# Patient Record
Sex: FEMALE | Race: WHITE | NOT HISPANIC OR LATINO | Employment: FULL TIME | URBAN - METROPOLITAN AREA
[De-identification: names, ages, dates, MRNs, and addresses within clinical notes are randomized per-mention and may not be internally consistent; named-entity substitution may affect disease eponyms.]

---

## 2015-10-28 LAB
HUMAN PAPILLOMAVIRUS (HPV): NORMAL
PAP RECOMMENDATION EXT: NORMAL
PAP SMEAR: NORMAL

## 2019-08-09 ENCOUNTER — HISTORICAL (OUTPATIENT)
Dept: LAB | Facility: HOSPITAL | Age: 59
End: 2019-08-09

## 2019-08-09 LAB
ABS NEUT (OLG): 6.04 X10(3)/MCL (ref 2.1–9.2)
ALBUMIN SERPL-MCNC: 3.8 GM/DL (ref 3.4–5)
ALBUMIN/GLOB SERPL: 1 RATIO (ref 1.1–2)
ALP SERPL-CCNC: 85 UNIT/L (ref 46–116)
ALT SERPL-CCNC: 22 UNIT/L (ref 12–78)
AST SERPL-CCNC: 11 UNIT/L (ref 15–37)
BASOPHILS # BLD AUTO: 0 X10(3)/MCL (ref 0–0.2)
BASOPHILS NFR BLD AUTO: 0 %
BILIRUB SERPL-MCNC: 0.4 MG/DL (ref 0.2–1)
BILIRUBIN DIRECT+TOT PNL SERPL-MCNC: 0.11 MG/DL (ref 0–0.2)
BILIRUBIN DIRECT+TOT PNL SERPL-MCNC: 0.29 MG/DL (ref 0–0.8)
BUN SERPL-MCNC: 12.9 MG/DL (ref 7–18)
CALCIUM SERPL-MCNC: 9.6 MG/DL (ref 8.5–10.1)
CHLORIDE SERPL-SCNC: 102 MMOL/L (ref 98–107)
CHOLEST SERPL-MCNC: 202 MG/DL (ref 0–200)
CHOLEST/HDLC SERPL: 4.9 {RATIO} (ref 0–4)
CO2 SERPL-SCNC: 27.7 MMOL/L (ref 21–32)
CREAT SERPL-MCNC: 0.65 MG/DL (ref 0.6–1.3)
DEPRECATED CALCIDIOL+CALCIFEROL SERPL-MC: 4.89 NG/ML (ref 30–80)
EOSINOPHIL # BLD AUTO: 0.2 X10(3)/MCL (ref 0–0.9)
EOSINOPHIL NFR BLD AUTO: 2 %
ERYTHROCYTE [DISTWIDTH] IN BLOOD BY AUTOMATED COUNT: 13.2 % (ref 11.5–17)
GLOBULIN SER-MCNC: 3.9 GM/DL (ref 2.4–3.5)
GLUCOSE SERPL-MCNC: 90 MG/DL (ref 74–106)
HCT VFR BLD AUTO: 44.4 % (ref 37–47)
HDLC SERPL-MCNC: 41 MG/DL (ref 40–60)
HGB BLD-MCNC: 14.1 GM/DL (ref 12–16)
IMM GRANULOCYTES # BLD AUTO: 0.03 % (ref 0–0.02)
IMM GRANULOCYTES NFR BLD AUTO: 0.3 % (ref 0–0.43)
LDLC SERPL CALC-MCNC: 133 MG/DL (ref 0–129)
LYMPHOCYTES # BLD AUTO: 2.5 X10(3)/MCL (ref 0.6–4.6)
LYMPHOCYTES NFR BLD AUTO: 27 %
MCH RBC QN AUTO: 27.9 PG (ref 27–31)
MCHC RBC AUTO-ENTMCNC: 31.8 GM/DL (ref 33–36)
MCV RBC AUTO: 87.7 FL (ref 80–94)
MONOCYTES # BLD AUTO: 0.5 X10(3)/MCL (ref 0.1–1.3)
MONOCYTES NFR BLD AUTO: 6 %
NEUTROPHILS # BLD AUTO: 6.04 X10(3)/MCL (ref 1.4–7.9)
NEUTROPHILS NFR BLD AUTO: 64 %
PLATELET # BLD AUTO: 280 X10(3)/MCL (ref 130–400)
PMV BLD AUTO: 10.9 FL (ref 9.4–12.4)
POTASSIUM SERPL-SCNC: 4.5 MMOL/L (ref 3.5–5.1)
PROT SERPL-MCNC: 7.7 GM/DL (ref 6.4–8.2)
RBC # BLD AUTO: 5.06 X10(6)/MCL (ref 4.2–5.4)
SODIUM SERPL-SCNC: 140 MMOL/L (ref 136–145)
TRIGL SERPL-MCNC: 139 MG/DL
TSH SERPL-ACNC: 1 MIU/ML (ref 0.36–3.74)
VLDLC SERPL CALC-MCNC: 28 MG/DL
WBC # SPEC AUTO: 9.4 X10(3)/MCL (ref 4.5–11.5)

## 2019-08-14 ENCOUNTER — HISTORICAL (OUTPATIENT)
Dept: RADIOLOGY | Facility: HOSPITAL | Age: 59
End: 2019-08-14

## 2019-09-09 ENCOUNTER — HISTORICAL (OUTPATIENT)
Dept: RADIOLOGY | Facility: HOSPITAL | Age: 59
End: 2019-09-09

## 2019-09-19 ENCOUNTER — HISTORICAL (OUTPATIENT)
Dept: RADIOLOGY | Facility: HOSPITAL | Age: 59
End: 2019-09-19

## 2019-10-08 ENCOUNTER — HISTORICAL (OUTPATIENT)
Dept: PREADMISSION TESTING | Facility: HOSPITAL | Age: 59
End: 2019-10-08

## 2019-10-08 LAB
ABS NEUT (OLG): 6.19 X10(3)/MCL (ref 2.1–9.2)
ALBUMIN SERPL-MCNC: 3.7 GM/DL (ref 3.4–5)
ALBUMIN/GLOB SERPL: 1.1 {RATIO}
ALP SERPL-CCNC: 79 UNIT/L (ref 38–126)
ALT SERPL-CCNC: 18 UNIT/L (ref 12–78)
AST SERPL-CCNC: 8 UNIT/L (ref 15–37)
BASOPHILS # BLD AUTO: 0 X10(3)/MCL (ref 0–0.2)
BASOPHILS NFR BLD AUTO: 0 %
BILIRUB SERPL-MCNC: 0.6 MG/DL (ref 0.2–1)
BILIRUBIN DIRECT+TOT PNL SERPL-MCNC: 0.2 MG/DL (ref 0–0.2)
BILIRUBIN DIRECT+TOT PNL SERPL-MCNC: 0.4 MG/DL (ref 0–0.8)
BUN SERPL-MCNC: 12 MG/DL (ref 7–18)
CALCIUM SERPL-MCNC: 9 MG/DL (ref 8.5–10.1)
CHLORIDE SERPL-SCNC: 104 MMOL/L (ref 98–107)
CO2 SERPL-SCNC: 28 MMOL/L (ref 21–32)
CREAT SERPL-MCNC: 0.66 MG/DL (ref 0.55–1.02)
EOSINOPHIL # BLD AUTO: 0.2 X10(3)/MCL (ref 0–0.9)
EOSINOPHIL NFR BLD AUTO: 2 %
ERYTHROCYTE [DISTWIDTH] IN BLOOD BY AUTOMATED COUNT: 14 % (ref 11.5–17)
GLOBULIN SER-MCNC: 3.3 GM/DL (ref 2.4–3.5)
GLUCOSE SERPL-MCNC: 113 MG/DL (ref 74–106)
HCT VFR BLD AUTO: 42.9 % (ref 37–47)
HGB BLD-MCNC: 13.3 GM/DL (ref 12–16)
LYMPHOCYTES # BLD AUTO: 2.3 X10(3)/MCL (ref 0.6–4.6)
LYMPHOCYTES NFR BLD AUTO: 25 %
MCH RBC QN AUTO: 27.6 PG (ref 27–31)
MCHC RBC AUTO-ENTMCNC: 31 GM/DL (ref 33–36)
MCV RBC AUTO: 89 FL (ref 80–94)
MONOCYTES # BLD AUTO: 0.5 X10(3)/MCL (ref 0.1–1.3)
MONOCYTES NFR BLD AUTO: 6 %
NEUTROPHILS # BLD AUTO: 6.19 X10(3)/MCL (ref 2.1–9.2)
NEUTROPHILS NFR BLD AUTO: 67 %
PLATELET # BLD AUTO: 226 X10(3)/MCL (ref 130–400)
PMV BLD AUTO: 10.9 FL (ref 9.4–12.4)
POTASSIUM SERPL-SCNC: 4.2 MMOL/L (ref 3.5–5.1)
PROT SERPL-MCNC: 7 GM/DL (ref 6.4–8.2)
RBC # BLD AUTO: 4.82 X10(6)/MCL (ref 4.2–5.4)
SODIUM SERPL-SCNC: 139 MMOL/L (ref 136–145)
WBC # SPEC AUTO: 9.2 X10(3)/MCL (ref 4.5–11.5)

## 2019-11-04 ENCOUNTER — HISTORICAL (OUTPATIENT)
Dept: ADMINISTRATIVE | Facility: HOSPITAL | Age: 59
End: 2019-11-04

## 2019-11-04 LAB
ABS NEUT (OLG): 5.96 X10(3)/MCL (ref 2.1–9.2)
ALBUMIN SERPL-MCNC: 3.6 GM/DL (ref 3.4–5)
ALBUMIN/GLOB SERPL: 1.1 {RATIO}
ALP SERPL-CCNC: 86 UNIT/L (ref 38–126)
ALT SERPL-CCNC: 17 UNIT/L (ref 12–78)
AST SERPL-CCNC: 8 UNIT/L (ref 15–37)
BASOPHILS # BLD AUTO: 0 X10(3)/MCL (ref 0–0.2)
BASOPHILS NFR BLD AUTO: 0 %
BILIRUB SERPL-MCNC: 0.8 MG/DL (ref 0.2–1)
BILIRUBIN DIRECT+TOT PNL SERPL-MCNC: 0.1 MG/DL (ref 0–0.2)
BILIRUBIN DIRECT+TOT PNL SERPL-MCNC: 0.7 MG/DL (ref 0–0.8)
BUN SERPL-MCNC: 16 MG/DL (ref 7–18)
CALCIUM SERPL-MCNC: 8.6 MG/DL (ref 8.5–10.1)
CHLORIDE SERPL-SCNC: 107 MMOL/L (ref 98–107)
CO2 SERPL-SCNC: 26 MMOL/L (ref 21–32)
CREAT SERPL-MCNC: 0.71 MG/DL (ref 0.55–1.02)
EOSINOPHIL # BLD AUTO: 0.2 X10(3)/MCL (ref 0–0.9)
EOSINOPHIL NFR BLD AUTO: 2 %
ERYTHROCYTE [DISTWIDTH] IN BLOOD BY AUTOMATED COUNT: 14.2 % (ref 11.5–17)
GLOBULIN SER-MCNC: 3.4 GM/DL (ref 2.4–3.5)
GLUCOSE SERPL-MCNC: 104 MG/DL (ref 74–106)
HCT VFR BLD AUTO: 43 % (ref 37–47)
HGB BLD-MCNC: 13 GM/DL (ref 12–16)
LYMPHOCYTES # BLD AUTO: 2.4 X10(3)/MCL (ref 0.6–4.6)
LYMPHOCYTES NFR BLD AUTO: 26 %
MCH RBC QN AUTO: 27.5 PG (ref 27–31)
MCHC RBC AUTO-ENTMCNC: 30.2 GM/DL (ref 33–36)
MCV RBC AUTO: 91.1 FL (ref 80–94)
MONOCYTES # BLD AUTO: 0.6 X10(3)/MCL (ref 0.1–1.3)
MONOCYTES NFR BLD AUTO: 6 %
NEUTROPHILS # BLD AUTO: 5.96 X10(3)/MCL (ref 2.1–9.2)
NEUTROPHILS NFR BLD AUTO: 65 %
PLATELET # BLD AUTO: 233 X10(3)/MCL (ref 130–400)
PMV BLD AUTO: 11.5 FL (ref 9.4–12.4)
POTASSIUM SERPL-SCNC: 4.2 MMOL/L (ref 3.5–5.1)
PROT SERPL-MCNC: 7 GM/DL (ref 6.4–8.2)
RBC # BLD AUTO: 4.72 X10(6)/MCL (ref 4.2–5.4)
SODIUM SERPL-SCNC: 138 MMOL/L (ref 136–145)
WBC # SPEC AUTO: 9.2 X10(3)/MCL (ref 4.5–11.5)

## 2020-03-27 ENCOUNTER — HISTORICAL (OUTPATIENT)
Dept: LAB | Facility: HOSPITAL | Age: 60
End: 2020-03-27

## 2020-03-27 LAB — SARS-COV-2 RNA RESP QL NAA+PROBE: DETECTED

## 2020-05-08 ENCOUNTER — HISTORICAL (OUTPATIENT)
Dept: LAB | Facility: HOSPITAL | Age: 60
End: 2020-05-08

## 2021-01-11 LAB
BILIRUB SERPL-MCNC: NEGATIVE MG/DL
BLOOD URINE, POC: NORMAL
CLARITY, POC UA: CLEAR
COLOR, POC UA: YELLOW
GLUCOSE UR QL STRIP: NEGATIVE
KETONES UR QL STRIP: NEGATIVE
LEUKOCYTE EST, POC UA: NORMAL
NITRITE, POC UA: POSITIVE
PH, POC UA: 6.5
PROTEIN, POC: NEGATIVE
SPECIFIC GRAVITY, POC UA: 1.02
UROBILINOGEN, POC UA: NORMAL

## 2021-01-18 ENCOUNTER — HISTORICAL (OUTPATIENT)
Dept: RADIOLOGY | Facility: HOSPITAL | Age: 61
End: 2021-01-18

## 2021-12-02 ENCOUNTER — HISTORICAL (OUTPATIENT)
Dept: SURGERY | Facility: HOSPITAL | Age: 61
End: 2021-12-02

## 2021-12-02 LAB — CRC RECOMMENDATION EXT: NORMAL

## 2022-02-25 ENCOUNTER — HISTORICAL (OUTPATIENT)
Dept: LAB | Facility: HOSPITAL | Age: 62
End: 2022-02-25

## 2022-02-25 LAB
ABS NEUT (OLG): 7.49 (ref 2.1–9.2)
ALBUMIN SERPL-MCNC: 3.8 G/DL (ref 3.5–5)
ALBUMIN/GLOB SERPL: 1 {RATIO} (ref 1.1–2)
ALP SERPL-CCNC: 86 U/L (ref 40–150)
ALT SERPL-CCNC: 16 U/L (ref 0–55)
AST SERPL-CCNC: 12 U/L (ref 5–34)
BASOPHILS # BLD AUTO: 0 10*3/UL (ref 0–0.2)
BASOPHILS NFR BLD AUTO: 0 %
BILIRUB SERPL-MCNC: 0.6 MG/DL
BILIRUBIN DIRECT+TOT PNL SERPL-MCNC: 0.2 (ref 0–0.5)
BILIRUBIN DIRECT+TOT PNL SERPL-MCNC: 0.4 (ref 0–0.8)
BUN SERPL-MCNC: 16.3 MG/DL (ref 9.8–20.1)
CALCIUM SERPL-MCNC: 9.9 MG/DL (ref 8.7–10.5)
CHLORIDE SERPL-SCNC: 104 MMOL/L (ref 98–107)
CHOLEST SERPL-MCNC: 220 MG/DL
CHOLEST/HDLC SERPL: 5 {RATIO} (ref 0–5)
CO2 SERPL-SCNC: 27 MMOL/L (ref 22–29)
CREAT SERPL-MCNC: 0.78 MG/DL (ref 0.55–1.02)
DEPRECATED CALCIDIOL+CALCIFEROL SERPL-MC: 9.5 NG/ML (ref 30–80)
EOSINOPHIL # BLD AUTO: 0.3 10*3/UL (ref 0–0.9)
EOSINOPHIL NFR BLD AUTO: 3 %
ERYTHROCYTE [DISTWIDTH] IN BLOOD BY AUTOMATED COUNT: 13.9 % (ref 11.5–17)
EST. AVERAGE GLUCOSE BLD GHB EST-MCNC: 108.3 MG/DL
GLOBULIN SER-MCNC: 3.8 G/DL (ref 2.4–3.5)
GLUCOSE SERPL-MCNC: 113 MG/DL (ref 74–100)
HBA1C MFR BLD: 5.4 %
HCT VFR BLD AUTO: 43.1 % (ref 37–47)
HDLC SERPL-MCNC: 46 MG/DL (ref 35–60)
HEMOLYSIS INTERF INDEX SERPL-ACNC: 8
HGB BLD-MCNC: 13.5 G/DL (ref 12–16)
ICTERIC INTERF INDEX SERPL-ACNC: 1
IMM GRANULOCYTES # BLD AUTO: 0.03 10*3/UL (ref 0–0.02)
IMM GRANULOCYTES NFR BLD AUTO: 0.3 % (ref 0–0.43)
LDLC SERPL CALC-MCNC: 134 MG/DL (ref 50–140)
LIPEMIC INTERF INDEX SERPL-ACNC: 3
LYMPHOCYTES # BLD AUTO: 2.1 10*3/UL (ref 0.6–4.6)
LYMPHOCYTES NFR BLD AUTO: 20 %
MANUAL DIFF? (OHS): NO
MCH RBC QN AUTO: 28 PG (ref 27–31)
MCHC RBC AUTO-ENTMCNC: 31.3 G/DL (ref 33–36)
MCV RBC AUTO: 89.4 FL (ref 80–94)
MONOCYTES # BLD AUTO: 0.6 10*3/UL (ref 0.1–1.3)
MONOCYTES NFR BLD AUTO: 6 %
NEUTROPHILS # BLD AUTO: 7.49 10*3/UL (ref 1.4–7.9)
NEUTROPHILS NFR BLD AUTO: 71 %
PLATELET # BLD AUTO: 263 10*3/UL (ref 130–400)
PMV BLD AUTO: 10.3 FL (ref 9.4–12.4)
POTASSIUM SERPL-SCNC: 4.8 MMOL/L (ref 3.5–5.1)
PROT SERPL-MCNC: 7.6 G/DL (ref 6.4–8.3)
RBC # BLD AUTO: 4.82 10*6/UL (ref 4.2–5.4)
SODIUM SERPL-SCNC: 140 MMOL/L (ref 136–145)
TRIGL SERPL-MCNC: 201 MG/DL (ref 37–140)
TSH SERPL-ACNC: 1.33 M[IU]/L (ref 0.35–4.94)
VLDLC SERPL CALC-MCNC: 40 MG/DL
WBC # SPEC AUTO: 10.6 10*3/UL (ref 4.5–11.5)

## 2022-04-11 ENCOUNTER — HISTORICAL (OUTPATIENT)
Dept: ADMINISTRATIVE | Facility: HOSPITAL | Age: 62
End: 2022-04-11
Payer: COMMERCIAL

## 2022-04-24 VITALS
HEIGHT: 63 IN | DIASTOLIC BLOOD PRESSURE: 81 MMHG | OXYGEN SATURATION: 98 % | BODY MASS INDEX: 51.91 KG/M2 | SYSTOLIC BLOOD PRESSURE: 119 MMHG | WEIGHT: 293 LBS

## 2022-04-30 NOTE — OP NOTE
DATE OF SURGERY:        SURGEON:  Andrey Vizcarra MD    PROCEDURE PERFORMED:  Colonoscopy and rectal polypectomy.    FINDINGS:    1. Significant descending colon diverticulosis.  2. A 1 x 1 cm pedunculated rectal polyp 3 cm distal to the anal verge and no other intracolonic lesions.    ESTIMATED BLOOD LOSS:  None.    PREOPERATIVE DIAGNOSIS:  Need for screening colonoscopy.    POSTOPERATIVE DIAGNOSIS:  Need for screening colonoscopy.    ANESTHESIA:  MAC.    DESCRIPTION OF PROCEDURE:  Patient was brought to the operating room, laid on the OR stretcher, placed in the right lateral decubitus position.  She was sedated.  The enteroscope was advanced through her anus all the way to her cecum.  She had significant diverticulosis of the descending colon, but no colonic lesions or abnormalities.  However, upon retroflexion of the rectum, we saw that she had about a 1 x 1 cm pedunculated smooth-appearing rectal polyp 3 cm from the anal verge.  This was encircled and removed with a hot snare and sent to Pathology as specimen.  Patient tolerated the procedure well.  Scope was withdrawn.        ______________________________  Andrey Vizcarra MD RA/MAXX  DD:  12/02/2021  Time:  09:51AM  DT:  12/02/2021  Time:  10:15AM  Job #:  [id number]

## 2022-05-04 NOTE — HISTORICAL [facility] CERNER
This is a historical note converted from Master. Formatting and pictures may have been removed.  Please reference Master for original formatting and attached multimedia. DATE OF SERVICE:?11/04/2019  ?  SURGEON: Dr. Carmel Uriarte  ?   ASSIST: None  ?   PREOPERATIVE DIAGNOSIS: Left breast sclerosed intraductal papilloma  ?   POSTOPERATIVE DIAGNOSIS: Left breast sclerosed intraductal papilloma  ?  PROCEDURE:  1. Intra-op ultrasound-guided wire localization of the left breast?6:00 oclock papilloma  2. Left breast excisional biopsy  ?   ANESTHESIA: General plus 30 mL of 0.5% Bupivacaine  ?  ESTIMATED BLOOD LOSS:  ?  FINDINGS:  1. Left breast with clip and mass  ?  SPECIMEN:?Tissue Processing-pathology (left breast excisional biopsy (fresh),AP Specimen)  ?  DRAINS: None  ?  COMPLICATIONS: None  ?  PROCEDURE INDICATION:  Tanya Linda is a pleasant 58 year old female who presents with core biopsy of the left breast revealing an intraductal papilloma. Tyrer-Cuzick Risk Assessment - Lifetime risk 45.2% and 10-year risk is 17.7%.  ?  I informed the patient that intraductal papillomas are benign (non-cancerous), growths within the ducts of the breast. They are the most common cause of bloody nipple discharge. Not all women present with this finding and many are found incidentally on imaging and biopsy. Intraductal papillomas generally do not increase the risk of breast cancer. However, if they are associated with atypical cells, this may slightly increase the risk for breast cancer. The recommendation for an excisional biopsy stems from the potential of finding a breast cancer in approximately 10% of cases.  ?  I informed her that a surgical wire localized biopsy can be done on outpatient basis under local anesthesia and sedation or general anesthesia. The risks and complications of pain, bleeding, infection, hematoma, seroma, additional surgery, and scarring were explained. The details of the surgery were described in depth.  All of the patients questions were answered.  ?  ?  PROCEDURE DESCRIPTION:  The patient was then brought to the operating room and placed supine on the operative table. General anesthesia was initiated. The skin of the?Left breast was prepped and draped in standard sterile surgical fashion along with the Left axilla and upper arm. A time-out was completed verifying correct patient, procedure, site, positioning and equipment prior to beginning the procedure.?  ?  Under ultrasound guidance and 7cm Kopans double hooked wire was inserted into the are of the papilloma just at the double hook.  ?  A circumareolar?incision was planned of the Left breast. The incision was planned such that the wires may be included into the excision field. The incision was made using a 15-blade. The subcutaneous tissue was deepened using electrocautery. Hemostasis was checked and maintained. The wire was brought into the surgical field. The dissection was carried out circumferentially to include the wire. The specimen was then completed dissected free. Using intra-operative imaging, an x-ray film of the specimen was taken and reviewed. It was confirmed that the wire, clip, and lesion were within the specimen. The specimen was then sent to pathology. Hemostasis was again checked and obtained. Irrigation was performed of the cavity.  ?  The cavity was closed with interrupted 3-0 Dyed Vicryl sutures. The subdermal was closed with 3-0 dyed Vicryl and 4-0 subcuticular running skin closure. Exofin was applied over the incision followed by 4x4 gauze.  ?  All instruments and sponge counts were correct at the end of the procedure.  ?  The patient was awaken from anesthesia and taken to the post-anesthesia care unit in stable condition.

## 2022-05-06 ENCOUNTER — OFFICE VISIT (OUTPATIENT)
Dept: FAMILY MEDICINE | Facility: CLINIC | Age: 62
End: 2022-05-06
Payer: COMMERCIAL

## 2022-05-06 VITALS
WEIGHT: 293 LBS | DIASTOLIC BLOOD PRESSURE: 69 MMHG | HEIGHT: 65 IN | BODY MASS INDEX: 48.82 KG/M2 | TEMPERATURE: 98 F | RESPIRATION RATE: 18 BRPM | SYSTOLIC BLOOD PRESSURE: 109 MMHG | OXYGEN SATURATION: 100 % | HEART RATE: 63 BPM

## 2022-05-06 DIAGNOSIS — R10.9 FLANK PAIN: ICD-10-CM

## 2022-05-06 DIAGNOSIS — E66.9 OBESITY, UNSPECIFIED CLASSIFICATION, UNSPECIFIED OBESITY TYPE, UNSPECIFIED WHETHER SERIOUS COMORBIDITY PRESENT: ICD-10-CM

## 2022-05-06 DIAGNOSIS — R30.0 DYSURIA: Primary | ICD-10-CM

## 2022-05-06 LAB
APPEARANCE UR: ABNORMAL
BACTERIA #/AREA URNS AUTO: ABNORMAL /HPF
BILIRUB SERPL-MCNC: NORMAL MG/DL
BILIRUB UR QL STRIP.AUTO: NEGATIVE MG/DL
BLOOD URINE, POC: NORMAL
CAOX CRY URNS QL MICRO: ABNORMAL /HPF
CLARITY, POC UA: NORMAL
COLOR UR AUTO: YELLOW
COLOR, POC UA: NORMAL
GLUCOSE UR QL STRIP.AUTO: NEGATIVE MG/DL
GLUCOSE UR QL STRIP: NORMAL
KETONES UR QL STRIP.AUTO: NEGATIVE MG/DL
KETONES UR QL STRIP: NORMAL
LEUKOCYTE ESTERASE UR QL STRIP.AUTO: ABNORMAL UNIT/L
LEUKOCYTE ESTERASE URINE, POC: NORMAL
NITRITE UR QL STRIP.AUTO: NEGATIVE
NITRITE, POC UA: NORMAL
PH UR STRIP.AUTO: 5.5 [PH]
PH, POC UA: 6
PROT UR QL STRIP.AUTO: NEGATIVE MG/DL
PROTEIN, POC: NORMAL
RBC #/AREA URNS AUTO: ABNORMAL /HPF
RBC UR QL AUTO: NEGATIVE UNIT/L
SP GR UR STRIP.AUTO: 1.02
SPECIFIC GRAVITY, POC UA: 1.03
SQUAMOUS #/AREA URNS AUTO: ABNORMAL /LPF
UROBILINOGEN UR STRIP-ACNC: 0.2 MG/DL
UROBILINOGEN, POC UA: 0.2
WBC #/AREA URNS AUTO: ABNORMAL /HPF

## 2022-05-06 PROCEDURE — 3008F BODY MASS INDEX DOCD: CPT | Mod: CPTII,S$GLB,, | Performed by: NURSE PRACTITIONER

## 2022-05-06 PROCEDURE — 1160F PR REVIEW ALL MEDS BY PRESCRIBER/CLIN PHARMACIST DOCUMENTED: ICD-10-PCS | Mod: CPTII,S$GLB,, | Performed by: NURSE PRACTITIONER

## 2022-05-06 PROCEDURE — 81003 URINALYSIS AUTO W/O SCOPE: CPT | Performed by: NURSE PRACTITIONER

## 2022-05-06 PROCEDURE — 3078F PR MOST RECENT DIASTOLIC BLOOD PRESSURE < 80 MM HG: ICD-10-PCS | Mod: CPTII,S$GLB,, | Performed by: NURSE PRACTITIONER

## 2022-05-06 PROCEDURE — 1159F MED LIST DOCD IN RCRD: CPT | Mod: CPTII,S$GLB,, | Performed by: NURSE PRACTITIONER

## 2022-05-06 PROCEDURE — 1159F PR MEDICATION LIST DOCUMENTED IN MEDICAL RECORD: ICD-10-PCS | Mod: CPTII,S$GLB,, | Performed by: NURSE PRACTITIONER

## 2022-05-06 PROCEDURE — 81002 URINALYSIS NONAUTO W/O SCOPE: CPT | Mod: S$GLB,,, | Performed by: NURSE PRACTITIONER

## 2022-05-06 PROCEDURE — 99214 PR OFFICE/OUTPT VISIT, EST, LEVL IV, 30-39 MIN: ICD-10-PCS | Mod: S$GLB,,, | Performed by: NURSE PRACTITIONER

## 2022-05-06 PROCEDURE — 3074F SYST BP LT 130 MM HG: CPT | Mod: CPTII,S$GLB,, | Performed by: NURSE PRACTITIONER

## 2022-05-06 PROCEDURE — 3008F PR BODY MASS INDEX (BMI) DOCUMENTED: ICD-10-PCS | Mod: CPTII,S$GLB,, | Performed by: NURSE PRACTITIONER

## 2022-05-06 PROCEDURE — 1160F RVW MEDS BY RX/DR IN RCRD: CPT | Mod: CPTII,S$GLB,, | Performed by: NURSE PRACTITIONER

## 2022-05-06 PROCEDURE — 99214 OFFICE O/P EST MOD 30 MIN: CPT | Mod: S$GLB,,, | Performed by: NURSE PRACTITIONER

## 2022-05-06 PROCEDURE — 81002 POCT URINE DIPSTICK WITHOUT MICROSCOPE: ICD-10-PCS | Mod: S$GLB,,, | Performed by: NURSE PRACTITIONER

## 2022-05-06 PROCEDURE — 81015 MICROSCOPIC EXAM OF URINE: CPT | Performed by: NURSE PRACTITIONER

## 2022-05-06 PROCEDURE — 4010F PR ACE/ARB THEARPY RXD/TAKEN: ICD-10-PCS | Mod: CPTII,S$GLB,, | Performed by: NURSE PRACTITIONER

## 2022-05-06 PROCEDURE — 4010F ACE/ARB THERAPY RXD/TAKEN: CPT | Mod: CPTII,S$GLB,, | Performed by: NURSE PRACTITIONER

## 2022-05-06 PROCEDURE — 87077 CULTURE AEROBIC IDENTIFY: CPT | Performed by: NURSE PRACTITIONER

## 2022-05-06 PROCEDURE — 3078F DIAST BP <80 MM HG: CPT | Mod: CPTII,S$GLB,, | Performed by: NURSE PRACTITIONER

## 2022-05-06 PROCEDURE — 3074F PR MOST RECENT SYSTOLIC BLOOD PRESSURE < 130 MM HG: ICD-10-PCS | Mod: CPTII,S$GLB,, | Performed by: NURSE PRACTITIONER

## 2022-05-06 RX ORDER — IBUPROFEN 800 MG/1
800 TABLET ORAL EVERY 8 HOURS PRN
Qty: 30 TABLET | Refills: 6 | Status: SHIPPED | OUTPATIENT
Start: 2022-05-06 | End: 2022-11-17 | Stop reason: SDUPTHER

## 2022-05-06 RX ORDER — NITROFURANTOIN 25; 75 MG/1; MG/1
100 CAPSULE ORAL 2 TIMES DAILY
Qty: 14 CAPSULE | Refills: 0 | Status: SHIPPED | OUTPATIENT
Start: 2022-05-06 | End: 2022-05-13

## 2022-05-06 NOTE — ASSESSMENT & PLAN NOTE
UA collected in office.  Moderate leukocytes, positive nitrites, trace protein, trace blood, trace ketones, small bilirubin, negative glucose.  Macrobid 100 mg p.o. b.i.d. x7 days sent to pharmacy.  Perineal hygiene discussed with patient.  Urine sent to lab for C&S.  Will call with results.

## 2022-05-06 NOTE — PROGRESS NOTES
Subjective:       Patient ID: Tanya Linda is a 61 y.o. female.    Chief Complaint: Urinary Tract Infection (Denies any itching burning or discharge. Reports urinary retention.), Hip Pain (L hip), Flank Pain (Left), and Leg Pain (That starts in thighs that radiates down to feet causing numbness in feet and difficulty when ambulating.)    This is a 61-year-old female presents to clinic with complaints of dysuria and flank pain x1 month.  Patient was treated for urinary tract infection by ER on April 11th and again on April 25th.  Patient reports she has completed all prescribed antibiotics and is still experiencing symptoms.  Denies any fever, pyuria, vaginal discharge.    Review of Systems   Constitutional: Negative.    HENT: Negative.    Eyes: Negative.    Respiratory: Negative.    Cardiovascular: Negative.    Gastrointestinal: Negative.    Endocrine: Negative.    Genitourinary: Positive for decreased urine volume, dysuria and flank pain.   Integumentary:  Negative.   Allergic/Immunologic: Negative.    Neurological: Negative.    Hematological: Negative.    Psychiatric/Behavioral: Negative.           Objective:      Physical Exam  Vitals and nursing note reviewed.          General: Alert and oriented, No acute distress.   Eye: Pupils are equal, round and reactive to light, Extraocular movements are intact, Normal conjunctiva.   HENT: Normocephalic, No damage to dentition, Tympanic membranes are clear, Good light reflex, Normal hearing, Oral mucosa is moist, No pharyngeal erythema, No sinus tenderness. Neck: Supple, Non-tender.   Respiratory: Lungs are clear to auscultation, Respirations are non-labored, Breath sounds are equal, Symmetrical chest wall expansion.   Cardiovascular: Normal rate, Regular rhythm, No murmur, No gallop, Good pulses equal in all extremities, Normal peripheral perfusion, No edema.   Gastrointestinal: Soft, Non-tender, Non-distended, Normal bowel sounds.   Genitourinary: No costovertebral angle  tenderness.   Musculoskeletal: Normal range of motion, Normal strength, No tenderness, No swelling, No deformity, Normal gait.   Integumentary: Warm, Dry, Pink.   Neurologic: Alert, Oriented, Normal sensory, Normal motor function, No focal deficits.   Cognition and Speech: Oriented, Speech clear and coherent, Functional cognition intact.   Psychiatric: Cooperative, Appropriate mood & affect, Normal judgment, Non-suicidal.  Assessment:       Problem List Items Addressed This Visit        Renal/    Dysuria     UA collected in office.  Moderate leukocytes, positive nitrites, trace protein, trace blood, trace ketones, small bilirubin, negative glucose.  Macrobid 100 mg p.o. b.i.d. x7 days sent to pharmacy.  Perineal hygiene discussed with patient.  Urine sent to lab for C&S.  Will call with results.           Relevant Orders    Urinalysis, Reflex to Urine Culture Urine, Clean Catch    Urine Culture High Risk       Endocrine    Obesity       GI    Flank pain     Increase water intake.  Ibuprofen 800 mg p.o. t.i.d. p.r.n. pain sent to pharmacy.                 Plan:     RTC as needed. Keep scheduled follow up appt.

## 2022-05-09 LAB — BACTERIA UR CULT: ABNORMAL

## 2022-05-13 ENCOUNTER — HOSPITAL ENCOUNTER (EMERGENCY)
Facility: HOSPITAL | Age: 62
Discharge: HOME | End: 2022-05-13
Attending: EMERGENCY MEDICINE
Payer: COMMERCIAL

## 2022-05-13 VITALS
WEIGHT: 293 LBS | HEIGHT: 60 IN | TEMPERATURE: 98 F | RESPIRATION RATE: 20 BRPM | HEART RATE: 68 BPM | BODY MASS INDEX: 57.52 KG/M2 | SYSTOLIC BLOOD PRESSURE: 148 MMHG | DIASTOLIC BLOOD PRESSURE: 77 MMHG | OXYGEN SATURATION: 96 %

## 2022-05-13 DIAGNOSIS — S39.012A STRAIN OF LUMBAR REGION, INITIAL ENCOUNTER: Primary | ICD-10-CM

## 2022-05-13 LAB
ALBUMIN SERPL-MCNC: 3.6 GM/DL (ref 3.5–5)
ALBUMIN/GLOB SERPL: 1 RATIO (ref 1.1–2)
ALP SERPL-CCNC: 73 UNIT/L (ref 40–150)
ALT SERPL-CCNC: 17 UNIT/L (ref 0–55)
APPEARANCE UR: CLEAR
AST SERPL-CCNC: 12 UNIT/L (ref 5–34)
BACTERIA #/AREA URNS AUTO: ABNORMAL /HPF
BASOPHILS # BLD AUTO: 0.04 X10(3)/MCL (ref 0–0.2)
BASOPHILS NFR BLD AUTO: 0.4 %
BILIRUB UR QL STRIP.AUTO: NEGATIVE MG/DL
BILIRUBIN DIRECT+TOT PNL SERPL-MCNC: 0.4 MG/DL
BUN SERPL-MCNC: 23.1 MG/DL (ref 9.8–20.1)
CALCIUM SERPL-MCNC: 9.5 MG/DL (ref 8.4–10.2)
CHLORIDE SERPL-SCNC: 108 MMOL/L (ref 98–107)
CO2 SERPL-SCNC: 26 MMOL/L (ref 22–29)
COLOR UR AUTO: YELLOW
CREAT SERPL-MCNC: 0.77 MG/DL (ref 0.55–1.02)
EOSINOPHIL # BLD AUTO: 0.17 X10(3)/MCL (ref 0–0.9)
EOSINOPHIL NFR BLD AUTO: 1.9 %
ERYTHROCYTE [DISTWIDTH] IN BLOOD BY AUTOMATED COUNT: 14 % (ref 11.5–17)
GLOBULIN SER-MCNC: 3.7 GM/DL (ref 2.4–3.5)
GLUCOSE SERPL-MCNC: 91 MG/DL (ref 74–100)
GLUCOSE UR QL STRIP.AUTO: NEGATIVE MG/DL
HCT VFR BLD AUTO: 39.9 % (ref 37–47)
HGB BLD-MCNC: 12.5 GM/DL (ref 12–16)
IMM GRANULOCYTES # BLD AUTO: 0.02 X10(3)/MCL (ref 0–0.02)
IMM GRANULOCYTES NFR BLD AUTO: 0.2 % (ref 0–0.43)
KETONES UR QL STRIP.AUTO: NEGATIVE MG/DL
LEUKOCYTE ESTERASE UR QL STRIP.AUTO: ABNORMAL UNIT/L
LYMPHOCYTES # BLD AUTO: 2.06 X10(3)/MCL (ref 0.6–4.6)
LYMPHOCYTES NFR BLD AUTO: 22.5 %
MCH RBC QN AUTO: 28.2 PG (ref 27–31)
MCHC RBC AUTO-ENTMCNC: 31.3 MG/DL (ref 33–36)
MCV RBC AUTO: 89.9 FL (ref 80–94)
MONOCYTES # BLD AUTO: 0.54 X10(3)/MCL (ref 0.1–1.3)
MONOCYTES NFR BLD AUTO: 5.9 %
NEUTROPHILS # BLD AUTO: 6.3 X10(3)/MCL (ref 2.1–9.2)
NEUTROPHILS NFR BLD AUTO: 69.1 %
NITRITE UR QL STRIP.AUTO: NEGATIVE
PH UR STRIP.AUTO: 6 [PH]
PLATELET # BLD AUTO: 245 X10(3)/MCL (ref 130–400)
PMV BLD AUTO: 10.9 FL (ref 9.4–12.4)
POTASSIUM SERPL-SCNC: 4.2 MMOL/L (ref 3.5–5.1)
PROT SERPL-MCNC: 7.3 GM/DL (ref 6.4–8.3)
PROT UR QL STRIP.AUTO: NEGATIVE MG/DL
RBC # BLD AUTO: 4.44 X10(6)/MCL (ref 4.2–5.4)
RBC #/AREA URNS AUTO: ABNORMAL /HPF
RBC UR QL AUTO: ABNORMAL UNIT/L
SODIUM SERPL-SCNC: 141 MMOL/L (ref 136–145)
SP GR UR STRIP.AUTO: 1.02
SQUAMOUS #/AREA URNS AUTO: ABNORMAL /LPF
UROBILINOGEN UR STRIP-ACNC: 0.2 MG/DL
WBC # SPEC AUTO: 9.2 X10(3)/MCL (ref 4.5–11.5)
WBC #/AREA URNS AUTO: ABNORMAL /HPF
YEAST URNS QL MICRO: ABNORMAL /HPF

## 2022-05-13 PROCEDURE — 85025 COMPLETE CBC W/AUTO DIFF WBC: CPT | Performed by: PHYSICIAN ASSISTANT

## 2022-05-13 PROCEDURE — 81001 URINALYSIS AUTO W/SCOPE: CPT | Performed by: EMERGENCY MEDICINE

## 2022-05-13 PROCEDURE — 99284 EMERGENCY DEPT VISIT MOD MDM: CPT | Mod: 25

## 2022-05-13 PROCEDURE — 87088 URINE BACTERIA CULTURE: CPT | Performed by: EMERGENCY MEDICINE

## 2022-05-13 PROCEDURE — 36415 COLL VENOUS BLD VENIPUNCTURE: CPT | Performed by: PHYSICIAN ASSISTANT

## 2022-05-13 PROCEDURE — 80053 COMPREHEN METABOLIC PANEL: CPT | Performed by: PHYSICIAN ASSISTANT

## 2022-05-13 RX ORDER — METHOCARBAMOL 750 MG/1
750 TABLET, FILM COATED ORAL 3 TIMES DAILY
Qty: 30 TABLET | Refills: 0 | Status: SHIPPED | OUTPATIENT
Start: 2022-05-13 | End: 2022-05-23

## 2022-05-13 RX ORDER — METHOCARBAMOL 750 MG/1
750 TABLET, FILM COATED ORAL 3 TIMES DAILY
Qty: 30 TABLET | Refills: 0 | Status: SHIPPED | OUTPATIENT
Start: 2022-05-13 | End: 2022-05-13 | Stop reason: SDUPTHER

## 2022-05-13 NOTE — ED PROVIDER NOTES
Encounter Date: 5/13/2022       History     Chief Complaint   Patient presents with    Back Pain     Back pain R lower side x 3-4 weeks -pt has been treated for UTI several times since then     Patient presents to ER today with a complaint of right flank pain.  Pain is intermittent.  Described as stabbing.  Patient has been treated for a urinary tract infection with antibiotics.  Patient denies fever or dysuria.  Patient states pain is worse with movement.  Patient has been taking anti-inflammatories with some early        Review of patient's allergies indicates:  No Known Allergies  No past medical history on file.  No past surgical history on file.  No family history on file.  Social History     Tobacco Use    Smoking status: Never Smoker    Smokeless tobacco: Never Used   Substance Use Topics    Alcohol use: Never     Review of Systems   Genitourinary: Positive for flank pain.   All other systems reviewed and are negative.      Physical Exam     Initial Vitals [05/13/22 1056]   BP Pulse Resp Temp SpO2   (!) 148/77 68 20 97.9 °F (36.6 °C) 96 %      MAP       --         Physical Exam    Constitutional: She appears well-developed and well-nourished.   HENT:   Head: Normocephalic and atraumatic.   Right Ear: External ear normal.   Left Ear: External ear normal.   Nose: Nose normal.   Mouth/Throat: Oropharynx is clear and moist.   Eyes: Conjunctivae and EOM are normal. Pupils are equal, round, and reactive to light.   Neck:   Normal range of motion.  Cardiovascular: Normal rate, regular rhythm, normal heart sounds and intact distal pulses.   Pulmonary/Chest: Breath sounds normal.   Abdominal: Abdomen is soft. Bowel sounds are normal.   Musculoskeletal:      Cervical back: Normal range of motion.     Neurological: She is alert and oriented to person, place, and time. She has normal strength and normal reflexes.   Skin: Skin is warm and dry.   Psychiatric: She has a normal mood and affect. Her behavior is normal.  Judgment and thought content normal.         ED Course   Procedures  Labs Reviewed   URINALYSIS, REFLEX TO URINE CULTURE - Abnormal; Notable for the following components:       Result Value    Blood, UA Trace-Intact (*)     Leukocyte Esterase, UA Small (*)     All other components within normal limits   URINALYSIS, MICROSCOPIC - Abnormal; Notable for the following components:    Yeast, UA Few (*)     WBC, UA 11-20 (*)     Squamous Epithelial Cells, UA Moderate (*)     All other components within normal limits   COMPREHENSIVE METABOLIC PANEL - Abnormal; Notable for the following components:    Chloride 108 (*)     Blood Urea Nitrogen 23.1 (*)     Globulin 3.7 (*)     Albumin/Globulin Ratio 1.0 (*)     All other components within normal limits   CBC WITH DIFFERENTIAL - Abnormal; Notable for the following components:    MCHC 31.3 (*)     IG# 0.02 (*)     All other components within normal limits   CULTURE, URINE   CBC W/ AUTO DIFFERENTIAL    Narrative:     The following orders were created for panel order CBC auto differential.  Procedure                               Abnormality         Status                     ---------                               -----------         ------                     CBC with Differential[[id number]]        Abnormal            Final result                 Please view results for these tests on the individual orders.          Imaging Results          CT Chest Abdomen Pelvis Without Contrast (XPD) (Final result)  Result time 05/13/22 13:08:00    Final result by Devan Waldrop MD (05/13/22 13:08:00)                 Impression:      No adverse interval change.  No acute abnormality appreciated within the limits of noncontrast exam.      Electronically signed by: Devan Waldrop  Date:    05/13/2022  Time:    13:08             Narrative:    EXAMINATION:  CT CHEST ABDOMEN PELVIS WITHOUT CONTRAST(XPD)    CLINICAL HISTORY:  flank pain;    TECHNIQUE:  Axial images of the chest, abdomen, and  pelvis were obtained without contrast. Sagittal and coronal reconstructed images were available for review.    Automatic exposure control was utilized to reduce the patient's radiation dose.    DLP = 1604    COMPARISON:  04/01/2021    FINDINGS:  AORTA: Limited evaluation secondary to lack of IV contrast.  Grossly normal in course and caliber.    HEART: Normal size. No pericardial effusion.    THYROID GLAND: The thyroid is not enlarged. There are no nodules identified.    AIRWAYS: Trachea is midline and tracheobronchial tree is patent.    LUNGS: Right lower lobe 3 mm nodule is unchanged.  No pleural effusion or pneumothorax.    THROACIC LYMPH NODES: There is no significant mediastinal, axillary or hilar lymphadenopathy.    HEPATOBILIARY: Limited evaluation secondary to IV contrast, however no focal hepatic lesion is identified, The gallbladder is normal.    SPLEEN: Normal    PANCREAS: No focal masses or ductal dilatation.    ADRENALS: 2.5 cm left adrenal nodule is unchanged.    KIDNEYS: The right kidney demonstrates no obstructing stone, hydronephrosis, or hydroureter. No focal mass identified. The left kidney demonstrates no obstructing stone, hydronephrosis, or hydroureter. No focal mass identified.    ABDOMINAL LYMPHADENOPATHY/RETROPERITONEUM: There is no retroperitoneal lymphadenopathy.    BOWEL: No acute bowel related abnormalities. No evidence of appendiceal inflammation.    PELVIC VISCERA: Normal. No pelvic mass.    PELVIC LYMPH NODES: No lymphadenopathy.    PERITONEUM/ BODY WALL: No ascites or implant.    SKELETAL: No aggressive appearing lytic/blastic lesion. No acute fractures, subluxations or dislocations.                                 Medications - No data to display  Medical Decision Making:   ED Management:  Lab work and CT were unremarkable.  Patient is to continue taking anti-inflammatories as needed for pain.  Will prescribe a muscle relaxer.  Patient is to follow-up with her primary care doctor on  Monday.  Return to the ER for worsening symptoms or condition.  Patient verbalized understanding and agrees to treatment plan.                      Clinical Impression:   Final diagnoses:  [S39.012A] Strain of lumbar region, initial encounter (Primary)        Flank pain, urinary tract infection, musculoskeletal pain   ED Disposition Condition    Discharge Stable        ED Prescriptions     Medication Sig Dispense Start Date End Date Auth. Provider    methocarbamoL (ROBAXIN) 750 MG Tab Take 1 tablet (750 mg total) by mouth 3 (three) times daily. for 10 days 30 tablet 5/13/2022 5/23/2022 TAMARA Cruz        Follow-up Information     Follow up With Specialties Details Why Contact Info    MARION Salazar Nurse Practitioner In 1 week  [address]  [facility] [address]  [phone]      MARION Salazar Nurse Practitioner In 3 days  [address]  [facility] [address]  [phone]             TAMARA Cruz  05/13/22 4071       TAMARA Cruz  05/13/22 [id number]

## 2022-05-13 NOTE — DISCHARGE INSTRUCTIONS
Continue taking the ibuprofen as previously prescribed.  Take Robaxin 1 tablet 3 times a day.  Follow-up with your primary care doctor in 3 days.  Return to the ER for worsening symptoms or conditions

## 2022-05-15 LAB — BACTERIA UR CULT: NORMAL

## 2022-05-26 ENCOUNTER — OFFICE VISIT (OUTPATIENT)
Dept: FAMILY MEDICINE | Facility: CLINIC | Age: 62
End: 2022-05-26
Payer: COMMERCIAL

## 2022-05-26 VITALS
HEART RATE: 62 BPM | TEMPERATURE: 98 F | OXYGEN SATURATION: 97 % | RESPIRATION RATE: 18 BRPM | HEIGHT: 60 IN | BODY MASS INDEX: 57.52 KG/M2 | WEIGHT: 293 LBS | SYSTOLIC BLOOD PRESSURE: 117 MMHG | DIASTOLIC BLOOD PRESSURE: 64 MMHG

## 2022-05-26 DIAGNOSIS — E66.9 OBESITY, UNSPECIFIED CLASSIFICATION, UNSPECIFIED OBESITY TYPE, UNSPECIFIED WHETHER SERIOUS COMORBIDITY PRESENT: ICD-10-CM

## 2022-05-26 DIAGNOSIS — R10.9 FLANK PAIN: Primary | ICD-10-CM

## 2022-05-26 DIAGNOSIS — M62.830 BACK MUSCLE SPASM: ICD-10-CM

## 2022-05-26 PROCEDURE — 1160F PR REVIEW ALL MEDS BY PRESCRIBER/CLIN PHARMACIST DOCUMENTED: ICD-10-PCS | Mod: CPTII,,, | Performed by: NURSE PRACTITIONER

## 2022-05-26 PROCEDURE — 1160F RVW MEDS BY RX/DR IN RCRD: CPT | Mod: CPTII,,, | Performed by: NURSE PRACTITIONER

## 2022-05-26 PROCEDURE — 3008F BODY MASS INDEX DOCD: CPT | Mod: CPTII,,, | Performed by: NURSE PRACTITIONER

## 2022-05-26 PROCEDURE — 1159F MED LIST DOCD IN RCRD: CPT | Mod: CPTII,,, | Performed by: NURSE PRACTITIONER

## 2022-05-26 PROCEDURE — 99213 PR OFFICE/OUTPT VISIT, EST, LEVL III, 20-29 MIN: ICD-10-PCS | Mod: ,,, | Performed by: NURSE PRACTITIONER

## 2022-05-26 PROCEDURE — 4010F PR ACE/ARB THEARPY RXD/TAKEN: ICD-10-PCS | Mod: CPTII,,, | Performed by: NURSE PRACTITIONER

## 2022-05-26 PROCEDURE — 3008F PR BODY MASS INDEX (BMI) DOCUMENTED: ICD-10-PCS | Mod: CPTII,,, | Performed by: NURSE PRACTITIONER

## 2022-05-26 PROCEDURE — 99213 OFFICE O/P EST LOW 20 MIN: CPT | Mod: ,,, | Performed by: NURSE PRACTITIONER

## 2022-05-26 PROCEDURE — 1159F PR MEDICATION LIST DOCUMENTED IN MEDICAL RECORD: ICD-10-PCS | Mod: CPTII,,, | Performed by: NURSE PRACTITIONER

## 2022-05-26 PROCEDURE — 4010F ACE/ARB THERAPY RXD/TAKEN: CPT | Mod: CPTII,,, | Performed by: NURSE PRACTITIONER

## 2022-05-26 RX ORDER — METHOCARBAMOL 750 MG/1
750 TABLET, FILM COATED ORAL 3 TIMES DAILY PRN
COMMUNITY
End: 2022-05-26 | Stop reason: SDUPTHER

## 2022-05-26 RX ORDER — VENLAFAXINE HYDROCHLORIDE 37.5 MG/1
37.5 CAPSULE, EXTENDED RELEASE ORAL DAILY
COMMUNITY
Start: 2022-05-14 | End: 2022-11-17 | Stop reason: SDUPTHER

## 2022-05-26 RX ORDER — METHOCARBAMOL 750 MG/1
1500 TABLET, FILM COATED ORAL 3 TIMES DAILY PRN
Qty: 180 TABLET | Refills: 1 | Status: SHIPPED | OUTPATIENT
Start: 2022-05-26 | End: 2022-11-17

## 2022-05-26 RX ORDER — FLUCONAZOLE 150 MG/1
150 TABLET ORAL DAILY
Qty: 1 TABLET | Refills: 0 | Status: SHIPPED | OUTPATIENT
Start: 2022-05-26 | End: 2022-05-27

## 2022-05-26 RX ORDER — LISINOPRIL 10 MG/1
10 TABLET ORAL NIGHTLY
COMMUNITY
Start: 2022-04-15 | End: 2022-08-19 | Stop reason: SDUPTHER

## 2022-05-26 RX ORDER — BUSPIRONE HYDROCHLORIDE 7.5 MG/1
7.5 TABLET ORAL NIGHTLY
COMMUNITY
Start: 2022-02-21 | End: 2022-11-17 | Stop reason: SDUPTHER

## 2022-05-26 NOTE — PROGRESS NOTES
Subjective:       Patient ID: Tanya Linda is a 61 y.o. female.    Chief Complaint: Spasms (Reports R flank muscle spasms for approx 3 wks that has become more constant since dx with UTI several weeks ago. Presented to [facility] ED on 5/13/22 and was dx with Strain of Lumbar Region. /States she is taking Robaxin with little relief. Reports spasms are constant./Denies any dysuria, urinary retention, or urinary discomfort.) and Follow-up    This is a 61-year-old female presents to the clinic with complaints of right flank pain.    Spasms  Associated symptoms include myalgias.   Follow-up  Associated symptoms include myalgias.     Review of Systems   Constitutional: Negative.    HENT: Negative.    Eyes: Negative.    Respiratory: Negative.    Cardiovascular: Negative.    Gastrointestinal: Negative.    Endocrine: Negative.    Genitourinary: Negative.    Musculoskeletal: Positive for back pain and myalgias.        Right flank muscle spasms   Integumentary:  Negative.   Allergic/Immunologic: Negative.    Neurological: Negative.    Hematological: Negative.    Psychiatric/Behavioral: Negative.          Objective:      Physical Exam  Constitutional:       Appearance: Normal appearance. She is obese.   HENT:      Head: Normocephalic.      Right Ear: Tympanic membrane, ear canal and external ear normal.      Left Ear: Tympanic membrane, ear canal and external ear normal.      Nose: Nose normal.      Mouth/Throat:      Mouth: Mucous membranes are moist.      Pharynx: Oropharynx is clear.   Eyes:      Extraocular Movements: Extraocular movements intact.      Conjunctiva/sclera: Conjunctivae normal.      Pupils: Pupils are equal, round, and reactive to light.   Cardiovascular:      Rate and Rhythm: Normal rate and regular rhythm.      Pulses: Normal pulses.      Heart sounds: Normal heart sounds.   Pulmonary:      Effort: Pulmonary effort is normal.      Breath sounds: Normal breath sounds.   Abdominal:      General: Bowel sounds are  normal.      Palpations: Abdomen is soft.   Musculoskeletal:         General: Tenderness present.      Cervical back: Normal range of motion and neck supple.   Skin:     General: Skin is warm and dry.   Neurological:      General: No focal deficit present.      Mental Status: She is alert and oriented to person, place, and time. Mental status is at baseline.   Psychiatric:         Mood and Affect: Mood normal.         Behavior: Behavior normal.         Thought Content: Thought content normal.         Judgment: Judgment normal.         Assessment:       Problem List Items Addressed This Visit        Endocrine    Obesity       GI    Flank pain - Primary       Orthopedic    Back muscle spasm     Robaxin increased to 1500 mg p.o. t.i.d. p.r.n. spasm.                 Plan:       RTC as needed.

## 2022-06-16 ENCOUNTER — PATIENT OUTREACH (OUTPATIENT)
Dept: ADMINISTRATIVE | Facility: HOSPITAL | Age: 62
End: 2022-06-16
Payer: COMMERCIAL

## 2022-06-16 NOTE — PROGRESS NOTES
Population Health Outreach.Records Received, hyper-linked into chart at this time. The following record(s)  below were uploaded for Health Maintenance .               12/2/21 COLONOSCOPY

## 2022-08-19 ENCOUNTER — OFFICE VISIT (OUTPATIENT)
Dept: FAMILY MEDICINE | Facility: CLINIC | Age: 62
End: 2022-08-19
Payer: COMMERCIAL

## 2022-08-19 VITALS
BODY MASS INDEX: 57.52 KG/M2 | DIASTOLIC BLOOD PRESSURE: 65 MMHG | WEIGHT: 293 LBS | OXYGEN SATURATION: 98 % | HEIGHT: 60 IN | TEMPERATURE: 98 F | SYSTOLIC BLOOD PRESSURE: 96 MMHG | RESPIRATION RATE: 18 BRPM | HEART RATE: 59 BPM

## 2022-08-19 DIAGNOSIS — I10 HYPERTENSION, UNSPECIFIED TYPE: ICD-10-CM

## 2022-08-19 DIAGNOSIS — E66.01 CLASS 3 SEVERE OBESITY WITH BODY MASS INDEX (BMI) OF 60.0 TO 69.9 IN ADULT, UNSPECIFIED OBESITY TYPE, UNSPECIFIED WHETHER SERIOUS COMORBIDITY PRESENT: Primary | ICD-10-CM

## 2022-08-19 DIAGNOSIS — F32.A DEPRESSION, UNSPECIFIED DEPRESSION TYPE: ICD-10-CM

## 2022-08-19 PROCEDURE — 4010F PR ACE/ARB THEARPY RXD/TAKEN: ICD-10-PCS | Mod: CPTII,,, | Performed by: NURSE PRACTITIONER

## 2022-08-19 PROCEDURE — 3074F PR MOST RECENT SYSTOLIC BLOOD PRESSURE < 130 MM HG: ICD-10-PCS | Mod: CPTII,,, | Performed by: NURSE PRACTITIONER

## 2022-08-19 PROCEDURE — 4010F ACE/ARB THERAPY RXD/TAKEN: CPT | Mod: CPTII,,, | Performed by: NURSE PRACTITIONER

## 2022-08-19 PROCEDURE — 3008F PR BODY MASS INDEX (BMI) DOCUMENTED: ICD-10-PCS | Mod: CPTII,,, | Performed by: NURSE PRACTITIONER

## 2022-08-19 PROCEDURE — 3074F SYST BP LT 130 MM HG: CPT | Mod: CPTII,,, | Performed by: NURSE PRACTITIONER

## 2022-08-19 PROCEDURE — 99213 PR OFFICE/OUTPT VISIT, EST, LEVL III, 20-29 MIN: ICD-10-PCS | Mod: ,,, | Performed by: NURSE PRACTITIONER

## 2022-08-19 PROCEDURE — 1159F PR MEDICATION LIST DOCUMENTED IN MEDICAL RECORD: ICD-10-PCS | Mod: CPTII,,, | Performed by: NURSE PRACTITIONER

## 2022-08-19 PROCEDURE — 3078F PR MOST RECENT DIASTOLIC BLOOD PRESSURE < 80 MM HG: ICD-10-PCS | Mod: CPTII,,, | Performed by: NURSE PRACTITIONER

## 2022-08-19 PROCEDURE — 99213 OFFICE O/P EST LOW 20 MIN: CPT | Mod: ,,, | Performed by: NURSE PRACTITIONER

## 2022-08-19 PROCEDURE — 3008F BODY MASS INDEX DOCD: CPT | Mod: CPTII,,, | Performed by: NURSE PRACTITIONER

## 2022-08-19 PROCEDURE — 3078F DIAST BP <80 MM HG: CPT | Mod: CPTII,,, | Performed by: NURSE PRACTITIONER

## 2022-08-19 PROCEDURE — 1159F MED LIST DOCD IN RCRD: CPT | Mod: CPTII,,, | Performed by: NURSE PRACTITIONER

## 2022-08-19 RX ORDER — LISINOPRIL 5 MG/1
5 TABLET ORAL NIGHTLY
Qty: 30 TABLET | Refills: 6 | Status: SHIPPED | OUTPATIENT
Start: 2022-08-19 | End: 2022-11-17 | Stop reason: SDUPTHER

## 2022-08-19 NOTE — ASSESSMENT & PLAN NOTE
Blood pressure 96/65.  Repeat blood pressure 106/66.  Patient reports occasional dizziness.  Lisinopril decreased to 5 mg p.o. daily.  Instructed patient to monitor blood pressure and notify clinic if blood pressure is out of normal range.  Return to clinic in 2 weeks for blood pressure recheck.

## 2022-08-19 NOTE — ASSESSMENT & PLAN NOTE
Increase physical activity to least 30 minutes a day most days a week as tolerated.  Low fat, low carb diet advised.

## 2022-08-19 NOTE — PROGRESS NOTES
Subjective:       Patient ID: Tanya Linda is a 61 y.o. female.    ----------------------------  Personal history of colonic polyps      Comment:  Dr. Andrey Vizcarra     Chief Complaint: Follow-up (6 mo), Depression, Hypertension, and Shortness of Breath (At times especially at work... thinks do to over exertion )    This is a 61-year-old female presents to clinic for 6 month follow-up appointment.  Patient has past medical history of hypertension, depression, anxiety, obesity, and chronic back pain.      Review of Systems   Constitutional: Negative.    HENT: Negative.    Eyes: Negative.    Respiratory: Positive for shortness of breath (WIth exertion).    Cardiovascular: Negative.    Gastrointestinal: Negative.    Endocrine: Negative.    Genitourinary: Negative.    Musculoskeletal: Negative.    Integumentary:  Negative.   Allergic/Immunologic: Negative.    Neurological: Negative.    Hematological: Negative.    Psychiatric/Behavioral: Negative.            Objective:      Physical Exam  Constitutional:       Appearance: Normal appearance. She is obese.   HENT:      Head: Normocephalic.      Right Ear: Tympanic membrane, ear canal and external ear normal.      Left Ear: Tympanic membrane, ear canal and external ear normal.      Nose: Nose normal.      Mouth/Throat:      Mouth: Mucous membranes are moist.      Pharynx: Oropharynx is clear.   Eyes:      Extraocular Movements: Extraocular movements intact.      Conjunctiva/sclera: Conjunctivae normal.      Pupils: Pupils are equal, round, and reactive to light.   Cardiovascular:      Rate and Rhythm: Normal rate and regular rhythm.      Pulses: Normal pulses.      Heart sounds: Normal heart sounds.   Pulmonary:      Effort: Pulmonary effort is normal.      Breath sounds: Normal breath sounds.   Abdominal:      General: Bowel sounds are normal.      Palpations: Abdomen is soft.   Musculoskeletal:         General: Normal range of motion.      Cervical back: Normal range of  motion and neck supple.   Skin:     General: Skin is warm and dry.   Neurological:      General: No focal deficit present.      Mental Status: She is alert and oriented to person, place, and time. Mental status is at baseline.   Psychiatric:         Mood and Affect: Mood normal.         Behavior: Behavior normal.         Thought Content: Thought content normal.         Judgment: Judgment normal.           Assessment & Plan:   1. Class 3 severe obesity with body mass index (BMI) of 60.0 to 69.9 in adult, unspecified obesity type, unspecified whether serious comorbidity present  Assessment & Plan:  Increase physical activity to least 30 minutes a day most days a week as tolerated.  Low fat, low carb diet advised.      2. Hypertension, unspecified type  Assessment & Plan:  Blood pressure 96/65.  Repeat blood pressure 106/66.  Patient reports occasional dizziness.  Lisinopril decreased to 5 mg p.o. daily.  Instructed patient to monitor blood pressure and notify clinic if blood pressure is out of normal range.  Return to clinic in 2 weeks for blood pressure recheck.      3. Depression, unspecified depression type  Assessment & Plan:  Stable.  Continue current management.  Report to ED with any suicidal or homicidal ideations.        Return to clinic in 2 weeks for blood pressure recheck and 3 months for follow-up appointment.

## 2022-09-19 ENCOUNTER — CLINICAL SUPPORT (OUTPATIENT)
Dept: FAMILY MEDICINE | Facility: CLINIC | Age: 62
End: 2022-09-19
Payer: COMMERCIAL

## 2022-09-19 VITALS — DIASTOLIC BLOOD PRESSURE: 64 MMHG | SYSTOLIC BLOOD PRESSURE: 124 MMHG | HEART RATE: 80 BPM

## 2022-09-19 DIAGNOSIS — I10 HYPERTENSION, UNSPECIFIED TYPE: Primary | ICD-10-CM

## 2022-09-19 PROCEDURE — 99499 UNLISTED E&M SERVICE: CPT | Mod: ,,, | Performed by: NURSE PRACTITIONER

## 2022-09-19 PROCEDURE — 99499 NO LOS: ICD-10-PCS | Mod: ,,, | Performed by: NURSE PRACTITIONER

## 2022-09-19 NOTE — PROGRESS NOTES
Pt in for NV/BP check. BP this am wnl. Pt instructed to continue current med/diet regimen/diet, keep all scheduled appts and follow up as needed. UV.

## 2022-09-21 ENCOUNTER — HISTORICAL (OUTPATIENT)
Dept: ADMINISTRATIVE | Facility: HOSPITAL | Age: 62
End: 2022-09-21
Payer: COMMERCIAL

## 2022-11-17 ENCOUNTER — OFFICE VISIT (OUTPATIENT)
Dept: FAMILY MEDICINE | Facility: CLINIC | Age: 62
End: 2022-11-17
Payer: COMMERCIAL

## 2022-11-17 VITALS
OXYGEN SATURATION: 96 % | SYSTOLIC BLOOD PRESSURE: 121 MMHG | BODY MASS INDEX: 57.52 KG/M2 | TEMPERATURE: 98 F | WEIGHT: 293 LBS | RESPIRATION RATE: 18 BRPM | DIASTOLIC BLOOD PRESSURE: 78 MMHG | HEART RATE: 68 BPM | HEIGHT: 60 IN

## 2022-11-17 DIAGNOSIS — F32.A DEPRESSION, UNSPECIFIED DEPRESSION TYPE: ICD-10-CM

## 2022-11-17 DIAGNOSIS — Z00.00 WELLNESS EXAMINATION: Primary | ICD-10-CM

## 2022-11-17 DIAGNOSIS — E66.01 CLASS 3 SEVERE OBESITY WITH BODY MASS INDEX (BMI) OF 60.0 TO 69.9 IN ADULT, UNSPECIFIED OBESITY TYPE, UNSPECIFIED WHETHER SERIOUS COMORBIDITY PRESENT: ICD-10-CM

## 2022-11-17 DIAGNOSIS — I10 HYPERTENSION, UNSPECIFIED TYPE: ICD-10-CM

## 2022-11-17 PROCEDURE — 4010F PR ACE/ARB THEARPY RXD/TAKEN: ICD-10-PCS | Mod: CPTII,,, | Performed by: NURSE PRACTITIONER

## 2022-11-17 PROCEDURE — 3008F PR BODY MASS INDEX (BMI) DOCUMENTED: ICD-10-PCS | Mod: CPTII,,, | Performed by: NURSE PRACTITIONER

## 2022-11-17 PROCEDURE — 99213 PR OFFICE/OUTPT VISIT, EST, LEVL III, 20-29 MIN: ICD-10-PCS | Mod: ,,, | Performed by: NURSE PRACTITIONER

## 2022-11-17 PROCEDURE — 3078F PR MOST RECENT DIASTOLIC BLOOD PRESSURE < 80 MM HG: ICD-10-PCS | Mod: CPTII,,, | Performed by: NURSE PRACTITIONER

## 2022-11-17 PROCEDURE — 3008F BODY MASS INDEX DOCD: CPT | Mod: CPTII,,, | Performed by: NURSE PRACTITIONER

## 2022-11-17 PROCEDURE — 3078F DIAST BP <80 MM HG: CPT | Mod: CPTII,,, | Performed by: NURSE PRACTITIONER

## 2022-11-17 PROCEDURE — 3074F PR MOST RECENT SYSTOLIC BLOOD PRESSURE < 130 MM HG: ICD-10-PCS | Mod: CPTII,,, | Performed by: NURSE PRACTITIONER

## 2022-11-17 PROCEDURE — 1159F PR MEDICATION LIST DOCUMENTED IN MEDICAL RECORD: ICD-10-PCS | Mod: CPTII,,, | Performed by: NURSE PRACTITIONER

## 2022-11-17 PROCEDURE — 1159F MED LIST DOCD IN RCRD: CPT | Mod: CPTII,,, | Performed by: NURSE PRACTITIONER

## 2022-11-17 PROCEDURE — 4010F ACE/ARB THERAPY RXD/TAKEN: CPT | Mod: CPTII,,, | Performed by: NURSE PRACTITIONER

## 2022-11-17 PROCEDURE — 99213 OFFICE O/P EST LOW 20 MIN: CPT | Mod: ,,, | Performed by: NURSE PRACTITIONER

## 2022-11-17 PROCEDURE — 3074F SYST BP LT 130 MM HG: CPT | Mod: CPTII,,, | Performed by: NURSE PRACTITIONER

## 2022-11-17 RX ORDER — BUSPIRONE HYDROCHLORIDE 7.5 MG/1
7.5 TABLET ORAL NIGHTLY
Qty: 30 TABLET | Refills: 11 | Status: SHIPPED | OUTPATIENT
Start: 2022-11-17 | End: 2023-05-08 | Stop reason: SDUPTHER

## 2022-11-17 RX ORDER — VENLAFAXINE 37.5 MG/1
37.5 TABLET ORAL DAILY
Qty: 30 TABLET | Refills: 11 | Status: SHIPPED | OUTPATIENT
Start: 2022-11-17 | End: 2023-05-08 | Stop reason: SDUPTHER

## 2022-11-17 RX ORDER — LISINOPRIL 5 MG/1
5 TABLET ORAL NIGHTLY
Qty: 30 TABLET | Refills: 11 | Status: ON HOLD | OUTPATIENT
Start: 2022-11-17 | End: 2022-12-29 | Stop reason: HOSPADM

## 2022-11-17 RX ORDER — IBUPROFEN 800 MG/1
800 TABLET ORAL EVERY 8 HOURS PRN
Qty: 90 TABLET | Refills: 11 | Status: ON HOLD | OUTPATIENT
Start: 2022-11-17 | End: 2022-12-29 | Stop reason: HOSPADM

## 2022-11-17 NOTE — ASSESSMENT & PLAN NOTE
Blood pressure 121/78.  Patient reports she has been off of all medications for approximately 1 week because she could not afford them.  Advised patient to  prescription from pharmacy and resume medication as prescribed.  Dash diet advised.

## 2022-11-17 NOTE — ASSESSMENT & PLAN NOTE
Stable.  Patient reports she had stopped taking venlafaxine 37.5 mg approximately 2 months ago due to her being in unable to afford her medications.  Patient would like to resume medications due to uncontrolled symptoms of depression.  Denies any suicidal or homicidal ideations.  Venlafaxine 37.5 mg p.o. daily sent to pharmacy.

## 2022-11-17 NOTE — PROGRESS NOTES
Subjective:       Patient ID: Tanya Linda is a 61 y.o. female.    ----------------------------  Personal history of colonic polyps      Comment:  Dr. Andrey Vizcarra     Chief Complaint: Follow-up (3 mo), Hypertension, and Heel Pain (L heel pain with occasional numbness to L lower leg)    This is a 61-year-old female who presents to the clinic for three-month follow-up appointment for hypertension follow-up.  Patient reports she has been off of medications for weeks due to her not being able to afford them.    Follow-up    Hypertension    Review of Systems   Constitutional: Negative.    HENT: Negative.     Eyes: Negative.    Respiratory: Negative.     Cardiovascular: Negative.    Gastrointestinal: Negative.    Endocrine: Negative.    Genitourinary: Negative.    Musculoskeletal: Negative.    Integumentary:  Negative.   Allergic/Immunologic: Negative.    Neurological: Negative.    Hematological: Negative.    Psychiatric/Behavioral:  Positive for dysphoric mood.          Objective:      Physical Exam  Constitutional:       Appearance: Normal appearance. She is obese.   HENT:      Head: Normocephalic.      Right Ear: Tympanic membrane, ear canal and external ear normal.      Left Ear: Tympanic membrane, ear canal and external ear normal.      Nose: Nose normal.      Mouth/Throat:      Mouth: Mucous membranes are moist.      Pharynx: Oropharynx is clear.   Eyes:      Extraocular Movements: Extraocular movements intact.      Conjunctiva/sclera: Conjunctivae normal.      Pupils: Pupils are equal, round, and reactive to light.   Cardiovascular:      Rate and Rhythm: Normal rate and regular rhythm.      Pulses: Normal pulses.      Heart sounds: Normal heart sounds.   Pulmonary:      Effort: Pulmonary effort is normal.      Breath sounds: Normal breath sounds.   Abdominal:      General: Bowel sounds are normal.      Palpations: Abdomen is soft.   Musculoskeletal:         General: Normal range of motion.      Cervical back:  Normal range of motion and neck supple.   Skin:     General: Skin is warm and dry.   Neurological:      General: No focal deficit present.      Mental Status: She is alert and oriented to person, place, and time. Mental status is at baseline.   Psychiatric:         Mood and Affect: Mood normal.         Behavior: Behavior normal.         Thought Content: Thought content normal.         Judgment: Judgment normal.       Assessment & Plan:   1. Wellness examination  -     CBC Auto Differential; Future  -     Comprehensive Metabolic Panel; Future; Expected date: 11/17/2022  -     Hemoglobin A1C; Future; Expected date: 11/17/2022  -     Lipid Panel; Future; Expected date: 11/17/2022  -     Vitamin D; Future; Expected date: 11/17/2022  -     Microalbumin/Creatinine Ratio, Urine; Future; Expected date: 11/17/2022  -     TSH; Future; Expected date: 11/17/2022    2. Depression, unspecified depression type  Assessment & Plan:  Stable.  Patient reports she had stopped taking venlafaxine 37.5 mg approximately 2 months ago due to her being in unable to afford her medications.  Patient would like to resume medications due to uncontrolled symptoms of depression.  Denies any suicidal or homicidal ideations.  Venlafaxine 37.5 mg p.o. daily sent to pharmacy.      3. Hypertension, unspecified type  Assessment & Plan:  Blood pressure 121/78.  Patient reports she has been off of all medications for approximately 1 week because she could not afford them.  Advised patient to  prescription from pharmacy and resume medication as prescribed.  Dash diet advised.      4. Class 3 severe obesity with body mass index (BMI) of 60.0 to 69.9 in adult, unspecified obesity type, unspecified whether serious comorbidity present    Other orders  -     venlafaxine (EFFEXOR) 37.5 MG Tab; Take 1 tablet (37.5 mg total) by mouth Daily.  Dispense: 30 tablet; Refill: 11  -     ibuprofen (ADVIL,MOTRIN) 800 MG tablet; Take 1 tablet (800 mg total) by mouth  every 8 (eight) hours as needed for Pain.  Dispense: 90 tablet; Refill: 11  -     busPIRone (BUSPAR) 7.5 MG tablet; Take 1 tablet (7.5 mg total) by mouth every evening.  Dispense: 30 tablet; Refill: 11  -     lisinopriL (PRINIVIL,ZESTRIL) 5 MG tablet; Take 1 tablet (5 mg total) by mouth every evening.  Dispense: 30 tablet; Refill: 11        ED precautions. Follow up in about 3 months (around 2/17/2023) for Wellness Exam. In addition to their scheduled follow up, the patient has also been instructed to follow up on as needed basis.

## 2022-12-24 ENCOUNTER — HOSPITAL ENCOUNTER (INPATIENT)
Facility: HOSPITAL | Age: 62
LOS: 5 days | Discharge: NURSING, REHAB OR LONG-TERM CARE | DRG: 481 | End: 2022-12-29
Attending: EMERGENCY MEDICINE | Admitting: INTERNAL MEDICINE
Payer: COMMERCIAL

## 2022-12-24 DIAGNOSIS — Z01.810 PREOP CARDIOVASCULAR EXAM: ICD-10-CM

## 2022-12-24 DIAGNOSIS — Z01.818 ENCOUNTER FOR PREOPERATIVE ASSESSMENT: ICD-10-CM

## 2022-12-24 DIAGNOSIS — S72.142A CLOSED INTERTROCHANTERIC FRACTURE OF HIP, LEFT, INITIAL ENCOUNTER: ICD-10-CM

## 2022-12-24 DIAGNOSIS — M25.552 LEFT HIP PAIN: ICD-10-CM

## 2022-12-24 LAB
ALBUMIN SERPL-MCNC: 3.8 G/DL (ref 3.4–4.8)
ALBUMIN/GLOB SERPL: 1.1 RATIO (ref 1.1–2)
ALP SERPL-CCNC: 98 UNIT/L (ref 40–150)
ALT SERPL-CCNC: 17 UNIT/L (ref 0–55)
APTT PPP: 23.3 SECONDS (ref 23.2–33.7)
AST SERPL-CCNC: 14 UNIT/L (ref 5–34)
BASOPHILS # BLD AUTO: 0.05 X10(3)/MCL (ref 0–0.2)
BASOPHILS NFR BLD AUTO: 0.5 %
BILIRUBIN DIRECT+TOT PNL SERPL-MCNC: 0.5 MG/DL
BUN SERPL-MCNC: 17.2 MG/DL (ref 9.8–20.1)
CALCIUM SERPL-MCNC: 9.3 MG/DL (ref 8.4–10.2)
CHLORIDE SERPL-SCNC: 105 MMOL/L (ref 98–107)
CO2 SERPL-SCNC: 25 MMOL/L (ref 23–31)
CREAT SERPL-MCNC: 0.97 MG/DL (ref 0.55–1.02)
EOSINOPHIL # BLD AUTO: 0.18 X10(3)/MCL (ref 0–0.9)
EOSINOPHIL NFR BLD AUTO: 1.7 %
ERYTHROCYTE [DISTWIDTH] IN BLOOD BY AUTOMATED COUNT: 14.4 % (ref 11–14.5)
GFR SERPLBLD CREATININE-BSD FMLA CKD-EPI: >60 MLS/MIN/1.73/M2
GLOBULIN SER-MCNC: 3.4 GM/DL (ref 2.4–3.5)
GLUCOSE SERPL-MCNC: 117 MG/DL (ref 82–115)
HCT VFR BLD AUTO: 41.2 % (ref 37–47)
HGB BLD-MCNC: 12.9 GM/DL (ref 12–16)
IMM GRANULOCYTES # BLD AUTO: 0.09 X10(3)/MCL (ref 0–0.04)
IMM GRANULOCYTES NFR BLD AUTO: 0.8 %
INR BLD: 0.99 (ref 0–1.3)
LYMPHOCYTES # BLD AUTO: 1.72 X10(3)/MCL (ref 0.6–4.6)
LYMPHOCYTES NFR BLD AUTO: 16.2 %
MCH RBC QN AUTO: 27.4 PG
MCHC RBC AUTO-ENTMCNC: 31.3 MG/DL (ref 33–36)
MCV RBC AUTO: 87.5 FL (ref 80–94)
MONOCYTES # BLD AUTO: 0.65 X10(3)/MCL (ref 0.1–1.3)
MONOCYTES NFR BLD AUTO: 6.1 %
NEUTROPHILS # BLD AUTO: 7.94 X10(3)/MCL (ref 2.1–9.2)
NEUTROPHILS NFR BLD AUTO: 74.7 %
NRBC BLD AUTO-RTO: 0 % (ref 0–1)
PLATELET # BLD AUTO: 223 X10(3)/MCL (ref 140–371)
PMV BLD AUTO: 11.5 FL (ref 9.4–12.4)
POTASSIUM SERPL-SCNC: 3.9 MMOL/L (ref 3.5–5.1)
PROT SERPL-MCNC: 7.2 GM/DL (ref 5.8–7.6)
PROTHROMBIN TIME: 13 SECONDS (ref 12.5–14.5)
RBC # BLD AUTO: 4.71 X10(6)/MCL (ref 4.2–5.4)
SODIUM SERPL-SCNC: 140 MMOL/L (ref 136–145)
WBC # SPEC AUTO: 10.6 X10(3)/MCL (ref 4.5–11.5)

## 2022-12-24 PROCEDURE — 99285 EMERGENCY DEPT VISIT HI MDM: CPT | Mod: 25

## 2022-12-24 PROCEDURE — 80053 COMPREHEN METABOLIC PANEL: CPT | Performed by: EMERGENCY MEDICINE

## 2022-12-24 PROCEDURE — 85730 THROMBOPLASTIN TIME PARTIAL: CPT | Performed by: EMERGENCY MEDICINE

## 2022-12-24 PROCEDURE — 96375 TX/PRO/DX INJ NEW DRUG ADDON: CPT

## 2022-12-24 PROCEDURE — 85610 PROTHROMBIN TIME: CPT | Performed by: EMERGENCY MEDICINE

## 2022-12-24 PROCEDURE — 63600175 PHARM REV CODE 636 W HCPCS: Performed by: EMERGENCY MEDICINE

## 2022-12-24 PROCEDURE — 85025 COMPLETE CBC W/AUTO DIFF WBC: CPT | Performed by: EMERGENCY MEDICINE

## 2022-12-24 PROCEDURE — 11000001 HC ACUTE MED/SURG PRIVATE ROOM

## 2022-12-24 PROCEDURE — 96374 THER/PROPH/DIAG INJ IV PUSH: CPT

## 2022-12-24 RX ORDER — ONDANSETRON 2 MG/ML
4 INJECTION INTRAMUSCULAR; INTRAVENOUS
Status: COMPLETED | OUTPATIENT
Start: 2022-12-24 | End: 2022-12-24

## 2022-12-24 RX ORDER — MORPHINE SULFATE 4 MG/ML
4 INJECTION, SOLUTION INTRAMUSCULAR; INTRAVENOUS
Status: COMPLETED | OUTPATIENT
Start: 2022-12-24 | End: 2022-12-24

## 2022-12-24 RX ADMIN — MORPHINE SULFATE 4 MG: 4 INJECTION INTRAVENOUS at 10:12

## 2022-12-24 RX ADMIN — ONDANSETRON 4 MG: 2 INJECTION INTRAMUSCULAR; INTRAVENOUS at 10:12

## 2022-12-24 NOTE — Clinical Note
Diagnosis: Closed intertrochanteric fracture of hip, left, initial encounter [[id number]]   Admitting Provider:: VALENCIA MEDINA [[id number]]   Future Attending Provider: VALENCIA MEDINA [[id number]]   Reason for IP Medical Treatment  (Clinical interventions that can only be accomplished in the IP setting? ) :: surgery   Estimated Length of Stay:: 3-4 midnights   I certify that Inpatient services for greater than or equal to 2 midnights are medically necessary:: Yes   Plans for Post-Acute care--if anticipated (pick the single best option):: A. No post acute care anticipated at this time

## 2022-12-25 ENCOUNTER — ANESTHESIA (OUTPATIENT)
Dept: SURGERY | Facility: HOSPITAL | Age: 62
DRG: 481 | End: 2022-12-25
Payer: COMMERCIAL

## 2022-12-25 ENCOUNTER — ANESTHESIA EVENT (OUTPATIENT)
Dept: SURGERY | Facility: HOSPITAL | Age: 62
DRG: 481 | End: 2022-12-25
Payer: COMMERCIAL

## 2022-12-25 PROBLEM — S72.142A CLOSED INTERTROCHANTERIC FRACTURE OF HIP, LEFT, INITIAL ENCOUNTER: Status: ACTIVE | Noted: 2022-12-25

## 2022-12-25 LAB
GROUP & RH: NORMAL
INDIRECT COOMBS GEL: NORMAL

## 2022-12-25 PROCEDURE — C1713 ANCHOR/SCREW BN/BN,TIS/BN: HCPCS | Performed by: STUDENT IN AN ORGANIZED HEALTH CARE EDUCATION/TRAINING PROGRAM

## 2022-12-25 PROCEDURE — 11000001 HC ACUTE MED/SURG PRIVATE ROOM

## 2022-12-25 PROCEDURE — 27245 PR OPEN FIX INTER/SUBTROCH FX,IMPLNT: ICD-10-PCS | Mod: 22,LT,, | Performed by: STUDENT IN AN ORGANIZED HEALTH CARE EDUCATION/TRAINING PROGRAM

## 2022-12-25 PROCEDURE — 27245 TREAT THIGH FRACTURE: CPT | Mod: 22,LT,, | Performed by: STUDENT IN AN ORGANIZED HEALTH CARE EDUCATION/TRAINING PROGRAM

## 2022-12-25 PROCEDURE — C1769 GUIDE WIRE: HCPCS | Performed by: STUDENT IN AN ORGANIZED HEALTH CARE EDUCATION/TRAINING PROGRAM

## 2022-12-25 PROCEDURE — 86900 BLOOD TYPING SEROLOGIC ABO: CPT | Performed by: STUDENT IN AN ORGANIZED HEALTH CARE EDUCATION/TRAINING PROGRAM

## 2022-12-25 PROCEDURE — 25000003 PHARM REV CODE 250: Performed by: NURSE PRACTITIONER

## 2022-12-25 PROCEDURE — 36000710: Performed by: STUDENT IN AN ORGANIZED HEALTH CARE EDUCATION/TRAINING PROGRAM

## 2022-12-25 PROCEDURE — 99223 1ST HOSP IP/OBS HIGH 75: CPT | Mod: 57,,, | Performed by: STUDENT IN AN ORGANIZED HEALTH CARE EDUCATION/TRAINING PROGRAM

## 2022-12-25 PROCEDURE — 36415 COLL VENOUS BLD VENIPUNCTURE: CPT | Performed by: STUDENT IN AN ORGANIZED HEALTH CARE EDUCATION/TRAINING PROGRAM

## 2022-12-25 PROCEDURE — 36000711: Performed by: STUDENT IN AN ORGANIZED HEALTH CARE EDUCATION/TRAINING PROGRAM

## 2022-12-25 PROCEDURE — 93005 ELECTROCARDIOGRAM TRACING: CPT

## 2022-12-25 PROCEDURE — 25000003 PHARM REV CODE 250

## 2022-12-25 PROCEDURE — 93010 EKG 12-LEAD: ICD-10-PCS | Mod: ,,, | Performed by: INTERNAL MEDICINE

## 2022-12-25 PROCEDURE — 27201423 OPTIME MED/SURG SUP & DEVICES STERILE SUPPLY: Performed by: STUDENT IN AN ORGANIZED HEALTH CARE EDUCATION/TRAINING PROGRAM

## 2022-12-25 PROCEDURE — 37000008 HC ANESTHESIA 1ST 15 MINUTES: Performed by: STUDENT IN AN ORGANIZED HEALTH CARE EDUCATION/TRAINING PROGRAM

## 2022-12-25 PROCEDURE — 63600175 PHARM REV CODE 636 W HCPCS

## 2022-12-25 PROCEDURE — 63600175 PHARM REV CODE 636 W HCPCS: Performed by: STUDENT IN AN ORGANIZED HEALTH CARE EDUCATION/TRAINING PROGRAM

## 2022-12-25 PROCEDURE — 37000009 HC ANESTHESIA EA ADD 15 MINS: Performed by: STUDENT IN AN ORGANIZED HEALTH CARE EDUCATION/TRAINING PROGRAM

## 2022-12-25 PROCEDURE — 99223 PR INITIAL HOSPITAL CARE,LEVL III: ICD-10-PCS | Mod: 57,,, | Performed by: STUDENT IN AN ORGANIZED HEALTH CARE EDUCATION/TRAINING PROGRAM

## 2022-12-25 PROCEDURE — 71000033 HC RECOVERY, INTIAL HOUR: Performed by: STUDENT IN AN ORGANIZED HEALTH CARE EDUCATION/TRAINING PROGRAM

## 2022-12-25 PROCEDURE — 27000221 HC OXYGEN, UP TO 24 HOURS

## 2022-12-25 PROCEDURE — 93010 ELECTROCARDIOGRAM REPORT: CPT | Mod: ,,, | Performed by: INTERNAL MEDICINE

## 2022-12-25 PROCEDURE — 25000003 PHARM REV CODE 250: Performed by: EMERGENCY MEDICINE

## 2022-12-25 PROCEDURE — 63600175 PHARM REV CODE 636 W HCPCS: Performed by: EMERGENCY MEDICINE

## 2022-12-25 DEVICE — SCREW GAMMA3 10.5 THRD 105MM: Type: IMPLANTABLE DEVICE | Site: LEG | Status: FUNCTIONAL

## 2022-12-25 DEVICE — SCREW LOCKING 5.0 X 40MM: Type: IMPLANTABLE DEVICE | Site: LEG | Status: FUNCTIONAL

## 2022-12-25 DEVICE — SCREW LOCKING 5 X 42.5: Type: IMPLANTABLE DEVICE | Site: LEG | Status: FUNCTIONAL

## 2022-12-25 DEVICE — IMPLANTABLE DEVICE: Type: IMPLANTABLE DEVICE | Site: LEG | Status: FUNCTIONAL

## 2022-12-25 RX ORDER — KETOROLAC TROMETHAMINE 30 MG/ML
INJECTION, SOLUTION INTRAMUSCULAR; INTRAVENOUS
Status: DISCONTINUED | OUTPATIENT
Start: 2022-12-25 | End: 2022-12-25

## 2022-12-25 RX ORDER — CEFAZOLIN SODIUM 2 G/50ML
SOLUTION INTRAVENOUS
Status: DISPENSED
Start: 2022-12-25 | End: 2022-12-25

## 2022-12-25 RX ORDER — METOCLOPRAMIDE HYDROCHLORIDE 5 MG/ML
10 INJECTION INTRAMUSCULAR; INTRAVENOUS EVERY 10 MIN PRN
Status: DISCONTINUED | OUTPATIENT
Start: 2022-12-25 | End: 2022-12-29 | Stop reason: HOSPADM

## 2022-12-25 RX ORDER — DEXAMETHASONE SODIUM PHOSPHATE 4 MG/ML
INJECTION, SOLUTION INTRA-ARTICULAR; INTRALESIONAL; INTRAMUSCULAR; INTRAVENOUS; SOFT TISSUE
Status: DISCONTINUED | OUTPATIENT
Start: 2022-12-25 | End: 2022-12-25

## 2022-12-25 RX ORDER — PROCHLORPERAZINE EDISYLATE 5 MG/ML
5 INJECTION INTRAMUSCULAR; INTRAVENOUS EVERY 30 MIN PRN
Status: DISCONTINUED | OUTPATIENT
Start: 2022-12-25 | End: 2022-12-29 | Stop reason: HOSPADM

## 2022-12-25 RX ORDER — ONDANSETRON 2 MG/ML
INJECTION INTRAMUSCULAR; INTRAVENOUS
Status: DISCONTINUED | OUTPATIENT
Start: 2022-12-25 | End: 2022-12-25

## 2022-12-25 RX ORDER — ONDANSETRON 2 MG/ML
4 INJECTION INTRAMUSCULAR; INTRAVENOUS EVERY 8 HOURS PRN
Status: DISCONTINUED | OUTPATIENT
Start: 2022-12-25 | End: 2022-12-29 | Stop reason: HOSPADM

## 2022-12-25 RX ORDER — CEFAZOLIN SODIUM 1 G/3ML
INJECTION, POWDER, FOR SOLUTION INTRAMUSCULAR; INTRAVENOUS
Status: DISCONTINUED | OUTPATIENT
Start: 2022-12-25 | End: 2022-12-25

## 2022-12-25 RX ORDER — MUPIROCIN 20 MG/G
OINTMENT TOPICAL 2 TIMES DAILY
Status: DISCONTINUED | OUTPATIENT
Start: 2022-12-25 | End: 2022-12-29 | Stop reason: HOSPADM

## 2022-12-25 RX ORDER — ROCURONIUM BROMIDE 10 MG/ML
INJECTION, SOLUTION INTRAVENOUS
Status: DISCONTINUED | OUTPATIENT
Start: 2022-12-25 | End: 2022-12-25

## 2022-12-25 RX ORDER — MORPHINE SULFATE 4 MG/ML
4 INJECTION, SOLUTION INTRAMUSCULAR; INTRAVENOUS EVERY 4 HOURS PRN
Status: DISCONTINUED | OUTPATIENT
Start: 2022-12-25 | End: 2022-12-29 | Stop reason: HOSPADM

## 2022-12-25 RX ORDER — FENTANYL CITRATE 50 UG/ML
INJECTION, SOLUTION INTRAMUSCULAR; INTRAVENOUS
Status: DISCONTINUED | OUTPATIENT
Start: 2022-12-25 | End: 2022-12-25

## 2022-12-25 RX ORDER — BUSPIRONE HYDROCHLORIDE 7.5 MG/1
7.5 TABLET ORAL NIGHTLY
Status: DISCONTINUED | OUTPATIENT
Start: 2022-12-25 | End: 2022-12-29 | Stop reason: HOSPADM

## 2022-12-25 RX ORDER — VANCOMYCIN HYDROCHLORIDE 1 G/20ML
INJECTION, POWDER, LYOPHILIZED, FOR SOLUTION INTRAVENOUS
Status: DISCONTINUED | OUTPATIENT
Start: 2022-12-25 | End: 2022-12-25 | Stop reason: HOSPADM

## 2022-12-25 RX ORDER — SODIUM CHLORIDE 0.9 % (FLUSH) 0.9 %
10 SYRINGE (ML) INJECTION
Status: DISCONTINUED | OUTPATIENT
Start: 2022-12-25 | End: 2022-12-29 | Stop reason: HOSPADM

## 2022-12-25 RX ORDER — ENOXAPARIN SODIUM 100 MG/ML
40 INJECTION SUBCUTANEOUS EVERY 24 HOURS
Status: DISCONTINUED | OUTPATIENT
Start: 2022-12-26 | End: 2022-12-29 | Stop reason: HOSPADM

## 2022-12-25 RX ORDER — MEPERIDINE HYDROCHLORIDE 25 MG/ML
12.5 INJECTION INTRAMUSCULAR; INTRAVENOUS; SUBCUTANEOUS EVERY 10 MIN PRN
Status: ACTIVE | OUTPATIENT
Start: 2022-12-25 | End: 2022-12-25

## 2022-12-25 RX ORDER — VENLAFAXINE 37.5 MG/1
37.5 TABLET ORAL DAILY
Status: DISCONTINUED | OUTPATIENT
Start: 2022-12-25 | End: 2022-12-29 | Stop reason: HOSPADM

## 2022-12-25 RX ORDER — TALC
6 POWDER (GRAM) TOPICAL NIGHTLY PRN
Status: DISCONTINUED | OUTPATIENT
Start: 2022-12-25 | End: 2022-12-29 | Stop reason: HOSPADM

## 2022-12-25 RX ORDER — PROPOFOL 10 MG/ML
VIAL (ML) INTRAVENOUS
Status: DISCONTINUED | OUTPATIENT
Start: 2022-12-25 | End: 2022-12-25

## 2022-12-25 RX ORDER — HYDRALAZINE HYDROCHLORIDE 20 MG/ML
INJECTION INTRAMUSCULAR; INTRAVENOUS
Status: DISCONTINUED | OUTPATIENT
Start: 2022-12-25 | End: 2022-12-25

## 2022-12-25 RX ORDER — LIDOCAINE HYDROCHLORIDE 20 MG/ML
INJECTION, SOLUTION EPIDURAL; INFILTRATION; INTRACAUDAL; PERINEURAL
Status: DISCONTINUED | OUTPATIENT
Start: 2022-12-25 | End: 2022-12-25

## 2022-12-25 RX ORDER — HYDROCODONE BITARTRATE AND ACETAMINOPHEN 5; 325 MG/1; MG/1
1 TABLET ORAL EVERY 4 HOURS PRN
Status: DISCONTINUED | OUTPATIENT
Start: 2022-12-25 | End: 2022-12-29 | Stop reason: HOSPADM

## 2022-12-25 RX ORDER — LISINOPRIL 5 MG/1
5 TABLET ORAL NIGHTLY
Status: DISCONTINUED | OUTPATIENT
Start: 2022-12-25 | End: 2022-12-27

## 2022-12-25 RX ORDER — DIPHENHYDRAMINE HYDROCHLORIDE 50 MG/ML
25 INJECTION INTRAMUSCULAR; INTRAVENOUS EVERY 6 HOURS PRN
Status: DISCONTINUED | OUTPATIENT
Start: 2022-12-25 | End: 2022-12-29 | Stop reason: HOSPADM

## 2022-12-25 RX ORDER — PHENYLEPHRINE HYDROCHLORIDE 10 MG/ML
INJECTION INTRAVENOUS
Status: DISCONTINUED | OUTPATIENT
Start: 2022-12-25 | End: 2022-12-25

## 2022-12-25 RX ORDER — MIDAZOLAM HYDROCHLORIDE 1 MG/ML
INJECTION INTRAMUSCULAR; INTRAVENOUS
Status: DISCONTINUED | OUTPATIENT
Start: 2022-12-25 | End: 2022-12-25

## 2022-12-25 RX ORDER — HYDROMORPHONE HYDROCHLORIDE 2 MG/ML
0.4 INJECTION, SOLUTION INTRAMUSCULAR; INTRAVENOUS; SUBCUTANEOUS EVERY 10 MIN PRN
Status: DISCONTINUED | OUTPATIENT
Start: 2022-12-25 | End: 2022-12-29 | Stop reason: HOSPADM

## 2022-12-25 RX ADMIN — HYDRALAZINE HYDROCHLORIDE 5 MG: 20 INJECTION INTRAMUSCULAR; INTRAVENOUS at 02:12

## 2022-12-25 RX ADMIN — SODIUM CHLORIDE, SODIUM GLUCONATE, SODIUM ACETATE, POTASSIUM CHLORIDE AND MAGNESIUM CHLORIDE: 526; 502; 368; 37; 30 INJECTION, SOLUTION INTRAVENOUS at 12:12

## 2022-12-25 RX ADMIN — PROPOFOL 200 MG: 10 INJECTION, EMULSION INTRAVENOUS at 12:12

## 2022-12-25 RX ADMIN — HYDROCODONE BITARTRATE AND ACETAMINOPHEN 1 TABLET: 5; 325 TABLET ORAL at 09:12

## 2022-12-25 RX ADMIN — Medication 6 MG: at 09:12

## 2022-12-25 RX ADMIN — MIDAZOLAM HYDROCHLORIDE 2 MG: 1 INJECTION, SOLUTION INTRAMUSCULAR; INTRAVENOUS at 12:12

## 2022-12-25 RX ADMIN — CEFAZOLIN 3 G: 330 INJECTION, POWDER, FOR SOLUTION INTRAMUSCULAR; INTRAVENOUS at 12:12

## 2022-12-25 RX ADMIN — LISINOPRIL 5 MG: 5 TABLET ORAL at 09:12

## 2022-12-25 RX ADMIN — SUGAMMADEX 400 MG: 100 INJECTION, SOLUTION INTRAVENOUS at 03:12

## 2022-12-25 RX ADMIN — DEXAMETHASONE SODIUM PHOSPHATE 4 MG: 4 INJECTION, SOLUTION INTRA-ARTICULAR; INTRALESIONAL; INTRAMUSCULAR; INTRAVENOUS; SOFT TISSUE at 12:12

## 2022-12-25 RX ADMIN — LIDOCAINE HYDROCHLORIDE 80 MG: 20 INJECTION, SOLUTION EPIDURAL; INFILTRATION; INTRACAUDAL; PERINEURAL at 12:12

## 2022-12-25 RX ADMIN — ROCURONIUM BROMIDE 100 MG: 10 INJECTION, SOLUTION INTRAVENOUS at 12:12

## 2022-12-25 RX ADMIN — PHENYLEPHRINE HYDROCHLORIDE 50 MCG: 10 INJECTION INTRAVENOUS at 12:12

## 2022-12-25 RX ADMIN — FENTANYL CITRATE 50 MCG: 50 INJECTION, SOLUTION INTRAMUSCULAR; INTRAVENOUS at 01:12

## 2022-12-25 RX ADMIN — KETOROLAC TROMETHAMINE 30 MG: 30 INJECTION, SOLUTION INTRAMUSCULAR at 03:12

## 2022-12-25 RX ADMIN — MORPHINE SULFATE 4 MG: 4 INJECTION INTRAVENOUS at 02:12

## 2022-12-25 RX ADMIN — BUSPIRONE HYDROCHLORIDE 7.5 MG: 7.5 TABLET ORAL at 09:12

## 2022-12-25 RX ADMIN — ONDANSETRON 4 MG: 2 INJECTION INTRAMUSCULAR; INTRAVENOUS at 03:12

## 2022-12-25 RX ADMIN — MINERAL OIL AND WHITE PETROLATUM 0.5 G: 150; 830 OINTMENT OPHTHALMIC at 12:12

## 2022-12-25 RX ADMIN — FENTANYL CITRATE 50 MCG: 50 INJECTION, SOLUTION INTRAMUSCULAR; INTRAVENOUS at 03:12

## 2022-12-25 RX ADMIN — FENTANYL CITRATE 100 MCG: 50 INJECTION, SOLUTION INTRAMUSCULAR; INTRAVENOUS at 12:12

## 2022-12-25 RX ADMIN — HYDROCODONE BITARTRATE AND ACETAMINOPHEN 1 TABLET: 5; 325 TABLET ORAL at 01:12

## 2022-12-25 RX ADMIN — ROCURONIUM BROMIDE 30 MG: 10 INJECTION, SOLUTION INTRAVENOUS at 02:12

## 2022-12-25 NOTE — PROGRESS NOTES
Chart reviewed.  Seen and examined at bedside she was alert, comfortably resting in the bed.  Scheduled for surgery today.  Complains of pain. At baseline she is independent.  Family member was at bedside.  We will continue current medical management, analgesics.  Monitor hemoglobin postoperatively.  Encourage incentive spirometer use.  SCDs for now, Lovenox postoperatively when cleared from surgery

## 2022-12-25 NOTE — ANESTHESIA PREPROCEDURE EVALUATION
12/25/2022  Tanya Linda is a 62 y.o., female.    Morbidly obese female with hx of htn, mdd admitted for mgmt of hip fracture after ground level fall.  12.9/41.2/223k.  Na 140, K 3.9, Cr 0.97.  Type and screen active.  NPO.  Plan for GETA.  ASA 3 for severe morbid obesity.    Yenny DIETRICH     Pre-op Assessment    I have reviewed the Patient Summary Reports.     I have reviewed the Nursing Notes. I have reviewed the NPO Status.   I have reviewed the Medications.     Review of Systems  Anesthesia Hx:   Denies Personal Hx of Anesthesia complications.   Social:  Non-Smoker    Hematology/Oncology:  Hematology Normal   Oncology Normal     EENT/Dental:EENT/Dental Normal   Cardiovascular:   Hypertension    Pulmonary:   Sleep Apnea (suspected)    Renal/:  Renal/ Normal     Hepatic/GI:  Hepatic/GI Normal    Neurological:  Neurology Normal    Endocrine:  Morbid Obesity / BMI > 40  Psych:   Psychiatric History depression          Physical Exam  General: Well nourished, Cooperative and Alert    Airway:  Mallampati: II   Mouth Opening: Normal  TM Distance: Normal  Tongue: Normal    Dental:  Intact    Chest/Lungs:  Normal Respiratory Rate    Heart:  Rate: Normal        Anesthesia Plan  Type of Anesthesia, risks & benefits discussed:    Anesthesia Type: Gen ETT  Intra-op Monitoring Plan: Standard ASA Monitors  Post Op Pain Control Plan: multimodal analgesia  Induction:  IV  Informed Consent: Informed consent signed with the Patient and all parties understand the risks and agree with anesthesia plan.  All questions answered. Patient consented to blood products? Yes  ASA Score: 3  Day of Surgery Review of History & Physical: H&P Update referred to the surgeon/provider.    Ready For Surgery From Anesthesia Perspective.     .

## 2022-12-25 NOTE — ED PROVIDER NOTES
"Encounter Date: 12/24/2022       History     Chief Complaint   Patient presents with    Hip Pain     Pt arrives via [facility], EMS / Pt reports that the pt fell down one step, falling onto left hip, pt reports hearing a "crack". Pt denies hitting head, no LOC. Pt left leg is shortened and rotated, +PMS.      62-year-old female with a history of hypertension presents to the emergency department for evaluation of left hip pain after slip and fall at a [employer] party.  Denies striking her head or LOC.  Leg shortened and rotated.  Received 100 mcg of fentanyl prior to arrival and is feeling somewhat improved.  She denies any neck or back pain.  Denies any chest pain or abdominal pain.  Denies any extremity numbness, tingling, or weakness.  No anticoagulant use.    The history is provided by the patient.   Hip Pain  This is a new problem. The current episode started less than 1 hour ago. The problem occurs constantly. The problem has not changed since onset.Pertinent negatives include no chest pain, no abdominal pain, no headaches and no shortness of breath. Exacerbated by: movement. Nothing relieves the symptoms.   Review of patient's allergies indicates:  No Known Allergies  Past Medical History:   Diagnosis Date    Personal history of colonic polyps 12/02/2021    Dr. Andrey Vizcarra     Past Surgical History:   Procedure Laterality Date    COLONOSCOPY  12/02/2021    Dr. Andrey Vizcarra     No family history on file.  Social History     Tobacco Use    Smoking status: Never    Smokeless tobacco: Never   Substance Use Topics    Alcohol use: Never     Review of Systems   Constitutional:  Negative for fever.   HENT:  Negative for sore throat.    Respiratory:  Negative for shortness of breath.    Cardiovascular:  Negative for chest pain.   Gastrointestinal:  Negative for abdominal pain and nausea.   Genitourinary:  Negative for dysuria.   Musculoskeletal:  Positive for arthralgias. Negative for back pain.   Skin:  Negative for rash. "   Neurological:  Negative for speech difficulty, weakness, numbness and headaches.   Hematological:  Does not bruise/bleed easily.   All other systems reviewed and are negative.    Physical Exam     Initial Vitals [12/24/22 2204]   BP Pulse Resp Temp SpO2   (!) 158/89 71 20 97.9 °F (36.6 °C) 98 %      MAP       --         Physical Exam    Nursing note and vitals reviewed.  Constitutional: She appears well-developed and well-nourished. No distress.   HENT:   Head: Normocephalic and atraumatic.   Mouth/Throat: Oropharynx is clear and moist.   Eyes: Conjunctivae are normal. Pupils are equal, round, and reactive to light.   Neck: Neck supple.   Normal range of motion.  Cardiovascular:  Normal rate, regular rhythm and normal heart sounds.           No murmur heard.  2+ dorsalis pedis pulses bilaterally   Pulmonary/Chest: Breath sounds normal. No respiratory distress.   Abdominal: Abdomen is soft. She exhibits no distension. There is no abdominal tenderness.   Musculoskeletal:      Cervical back: Normal range of motion and neck supple.      Comments: LLE shortened and externally rotated, no open wound, no point tenderness to distal femur, knee, lower leg, ankle or foot     Neurological: She is alert and oriented to person, place, and time. No sensory deficit (sensation intact to light touch in all dermatomal distributions of the bilateral lower extremities). GCS score is 15. GCS eye subscore is 4. GCS verbal subscore is 5. GCS motor subscore is 6.   Skin: Skin is warm and dry. No rash noted.   Psychiatric: She has a normal mood and affect.       ED Course   Procedures  Labs Reviewed   COMPREHENSIVE METABOLIC PANEL - Abnormal; Notable for the following components:       Result Value    Glucose Level 117 (*)     All other components within normal limits   CBC WITH DIFFERENTIAL - Abnormal; Notable for the following components:    MCHC 31.3 (*)     IG# 0.09 (*)     All other components within normal limits   PROTIME-INR -  Normal   APTT - Normal   CBC W/ AUTO DIFFERENTIAL    Narrative:     The following orders were created for panel order CBC auto differential.  Procedure                               Abnormality         Status                     ---------                               -----------         ------                     CBC with Differential[[id number]]        Abnormal            Final result                 Please view results for these tests on the individual orders.          Imaging Results              CT Hip Without Contrast Left (Preliminary result)  Result time 12/25/22 00:24:25      Preliminary result by Carlos Licona MD (12/25/22 00:24:25)                   Narrative:    START OF REPORT:  Technique: CT scan of the left hip was performed without intravenous contrast with axial as well as sagittal and coronal images.    Comparison: Comparison is with study [id number] 22:31:36.    Dosage Information: Automated exposure control was utilized.    Clinical history: Fall.    Findings:  Hip: There is a comminuted mildly displaced intertrochanteric fracture of the left femur with slight cranial migration and angulation of the distal fracture fragment (series 4; image 47). The lesser trochanter is displaced posteromedially. The greater trochanter is also fractured. This is consistent with a four part Guzman 1e type fracture. The left hip joint is intact with no dislocation.  Other Osseous structures: The rest of the visualized osseus structures appear intact.  Soft tissues: Mild soft tissue swelling is seen at the posterolateral aspect of the lesser trochanter of the femur. There is mild prominence of the left medial rectus muscle anterolaterally (series 3; image 39). This is likely due to intramuscular hematoma.      Impression:  1. There is a comminuted mildly displaced intertrochanteric fracture of the left femur with slight cranial migration and angulation of the distal fracture fragment (series 4; image 47). The  lesser trochanter is displaced posteromedially. The greater trochanter is also fractured. This is consistent with a four part Guzman 1e type fracture. The left hip joint is intact with no dislocation. Follow-up as clinically indicated.  2. Mild soft tissue swelling is seen at the posterolateral aspect of the lesser trochanter of the femur. There is mild prominence of the left medial rectus muscle anterolaterally (series 3; image 39). This is likely due to intramuscular hematoma.  3. Details and other findings, as above described.                          Preliminary result by Interface, Rad Results In (12/25/22 00:24:25)                   Narrative:    START OF REPORT:  Technique: CT scan of the left hip was performed without intravenous contrast with axial as well as sagittal and coronal images.    Comparison: Comparison is with study [id number] 22:31:36.    Dosage Information: Automated exposure control was utilized.    Clinical history: Fall.    Findings:  Hip: There is a comminuted mildly displaced intertrochanteric fracture of the left femur with slight cranial migration and angulation of the distal fracture fragment (series 4; image 47). The lesser trochanter is displaced posteromedially. The greater trochanter is also fractured. This is consistent with a four part Guzman 1e type fracture. The left hip joint is intact with no dislocation.  Other Osseous structures: The rest of the visualized osseus structures appear intact.  Soft tissues: Mild soft tissue swelling is seen at the posterolateral aspect of the lesser trochanter of the femur. There is mild prominence of the left medial rectus muscle anterolaterally (series 3; image 39). This is likely due to intramuscular hematoma.      Impression:  1. There is a comminuted mildly displaced intertrochanteric fracture of the left femur with slight cranial migration and angulation of the distal fracture fragment (series 4; image 47). The lesser trochanter is  displaced posteromedially. The greater trochanter is also fractured. This is consistent with a four part Guzman 1e type fracture. The left hip joint is intact with no dislocation. Follow-up as clinically indicated.  2. Mild soft tissue swelling is seen at the posterolateral aspect of the lesser trochanter of the femur. There is mild prominence of the left medial rectus muscle anterolaterally (series 3; image 39). This is likely due to intramuscular hematoma.  3. Details and other findings, as above described.                                         X-Ray Femur Ap/Lat Left (In process)                      X-Ray Chest 1 View (In process)                      X-Ray Hip 2 or 3 views Left (with Pelvis when performed) (In process)                   X-Rays:   Independently Interpreted Readings:   Other Readings:  XR pelvis/L hip: displaced intertrochanteric hip fracture  CXR: no acute findings  X-ray left femur:  Displaced intertrochanteric hip fracture, no distal femur fracture, degenerative changes about the knee  Medications   morphine injection 4 mg (4 mg Intravenous Given 12/24/22 2228)   ondansetron injection 4 mg (4 mg Intravenous Given 12/24/22 2225)     Medical Decision Making:   Initial Assessment:   Ms. Linda presented for evaluation of left hip pain after mechanical fall.  No head injury reported.  Neurovascularly intact.  Leg shortened and rotated, suspect intertrochanteric hip fracture.  Will obtain x-rays, laboratory studies for preoperative planning.  Differential Diagnosis:   Left hip fracture, left hip dislocation, femur fracture, no indication of head injury or other injury sustained during this fall.  Currently distal neurovascular examination intact.  Independently Interpreted Test(s):   I have ordered and independently interpreted X-rays - see prior notes.  Clinical Tests:   Lab Tests: Ordered and Reviewed  The following lab test(s) were unremarkable: CBC and CMP  Radiological Study: Ordered and  Reviewed  ED Management:  X-ray demonstrates comminuted intertrochanteric hip fracture.  Case discussed with orthopedist on-call who requested CT of the hip as well as fully femur x-ray.  Given comorbid conditions, requests admitted to medicine service and he will follow in consultation with plan for operative repair.  Patient updated to findings and plan and she is agreeable to admission at this time.  Other:   I have discussed this case with another health care provider.           ED Course as of 12/25/22 0012   Sat Dec 24, 2022   2301 Discussed with ortho on call, Dr. Olivares, requests CT of the hip and full length femur XR, admit to medicine and NPO at midnight. [KS]      ED Course User Index  [KS] Beti Garsia MD                 Clinical Impression:   Final diagnoses:  [M25.552] Left hip pain  [Z01.818] Encounter for preoperative assessment  [S72.142A] Closed intertrochanteric fracture of hip, left, initial encounter        ED Disposition Condition    Admit                 Beti Garsia MD  12/25/22 0015

## 2022-12-25 NOTE — PROGRESS NOTES
Ortho     Patient has left intertrochanteric femur fracture. Admit to medicine. NPO for surgery in the morning. Formal consult to follow     Olivares    English

## 2022-12-25 NOTE — ANESTHESIA PROCEDURE NOTES
Intubation    Date/Time: 12/25/2022 12:07 PM  Performed by: Jerry Oakley CRNA  Authorized by: Henrry Ramon MD     Intubation:     Induction:  Intravenous    Intubated:  Postinduction    Mask Ventilation:  Easy mask    Attempts:  1    Attempted By:  CRNA    Method of Intubation:  Direct    Blade:  Rider 3    Laryngeal View Grade: Grade I - full view of cords      Difficult Airway Encountered?: No      Complications:  None    Airway Device:  Oral endotracheal tube    Airway Device Size:  7.0    Style/Cuff Inflation:  Cuffed (inflated to minimal occlusive pressure)    Tube secured:  23    Secured at:  The teeth    Placement Verified By:  Capnometry    Complicating Factors:  None    Findings Post-Intubation:  BS equal bilateral and atraumatic/condition of teeth unchanged

## 2022-12-25 NOTE — H&P
"[facility] Hospital Medicine   History & Physical Note      Patient Name: Tanya Linda  : 1960  MRN: [id number]  PCP: MARION Salazar  Admitting Service: Hospital Medicine  Attending Physician: Richard Llamas MD  Admission Date: 2022 - IP- Inpatient   Length of Stay: 1  History source: EMR, patient and/or patient's family  Code status: Full    Chief Complaint   Hip Pain (Pt arrives via [facility], EMS / Pt reports that the pt fell down one step, falling onto left hip, pt reports hearing a "crack". Pt denies hitting head, no LOC. Pt left leg is shortened and rotated, +PMS. )      History of Present Illness   Ms. Linda is a morbidly obese 63 yo female with pmhx HHTN who presents to the ED with c/o left hip pain after a ground level trip and fall landing on her left hip. There was no head trauma or LOC. States immediate pain to hip and unable to bear weight.  She does report that she takes Motrin daily for her chronic pain.     Upon arrival into the ED patient was hemodynamically stable and afebrile.  Laboratory work was unremarkable and CTA of the left hip showed a left intertrochanteric femur fracture with a prrbable left medial rectus muscle hematoma. Dr. Olivares was consulted and recommended admission, NPO after midnight for surgery tomorrow.    Patient seen and examined tonight.  She denies complaints except for expected pain. Her BMI is 57. She states that she has had no problems with anesthesia in the past.  She is independent of ADLs.    ROS   Except as documented, all other systems reviewed and negative     Past Medical History   Essential HTN  Colon Polyps  MDD    Past Surgical History   colonoscopy    Social History     Social History     Tobacco Use    Smoking status: Never    Smokeless tobacco: Never   Substance Use Topics    Alcohol use: Never        Family History   Reviewed and negative    Allergies   Patient has no known allergies.    Home Medications     Prior " to Admission medications    Medication Sig Start Date End Date Taking? Authorizing Provider   busPIRone (BUSPAR) 7.5 MG tablet Take 1 tablet (7.5 mg total) by mouth every evening. 11/17/22   MARION Salazar   ibuprofen (ADVIL,MOTRIN) 800 MG tablet Take 1 tablet (800 mg total) by mouth every 8 (eight) hours as needed for Pain. 11/17/22   MARION Salazar   lisinopriL (PRINIVIL,ZESTRIL) 5 MG tablet Take 1 tablet (5 mg total) by mouth every evening. 11/17/22   MARION Salazar   venlafaxine (EFFEXOR) 37.5 MG Tab Take 1 tablet (37.5 mg total) by mouth Daily. 11/17/22 11/17/23  MARION Salazar          Physical Exam   Vital Signs  Temp:  [97.8 °F (36.6 °C)-97.9 °F (36.6 °C)]   Pulse:  [60-71]   Resp:  [14-21]   BP: (152-172)/()   SpO2:  [96 %-100 %]    General: Appears comfortable, morbidly obese  HEENT: NC/AT, thick neck  Neck:  No JVD  Chest: CTABL  CVS: Regular rhythm. Normal S1/S2.  Abdomen: nondistended, normoactive BS, soft and non-tender.  MSK: left leg shortened and externally rotated. Good pedal pulses. Limited ROM due to pain  Skin: Warm and dry  Neuro: AAOx3, no focal neurological deficit  Psych: Cooperative    Labs     Recent Labs     12/24/22  2242   WBC 10.6   RBC 4.71   HGB 12.9   HCT 41.2   MCV 87.5   MCH 27.4   MCHC 31.3*   RDW 14.4        Recent Labs     12/24/22  2242   PROTIME 13.0   INR 0.99   PTT 23.3      Recent Labs     12/24/22  2242      K 3.9   CHLORIDE 105   CO2 25   BUN 17.2   CREATININE 0.97   EGFRNORACEVR >60   GLUCOSE 117*   CALCIUM 9.3   ALBUMIN 3.8   GLOBULIN 3.4   ALKPHOS 98   ALT 17   AST 14   BILITOT 0.5              Imaging   X-Ray Chest 1 View  Narrative: EXAMINATION:  XR CHEST 1 VIEW    CLINICAL HISTORY:  , Encounter for other preprocedural examination.    FINDINGS:  No alveolar consolidation, effusion, or pneumothorax is seen.   The thoracic aorta is normal  cardiac silhouette, central pulmonary vessels and mediastinum are  normal in size and are grossly unremarkable.   visualized osseous structures are grossly unremarkable.  Impression: No acute chest disease is identified.    Electronically signed by: Jaisno Gardunoeric  Date:    12/25/2022  Time:    08:29      Assessment & Plan   Left intertrochanteric hip fracture s/p GLF  Probable left medial rectus muscle hematoma 2/2 above  Essential HTN  MDD/LETICIA  Chronic NSAID Use    PLAN:   - Dr. Olivares consulted from ED. NPO after MN  - Obtain EKG and UA  - PRN analgesics. Monitor H&H.   - Home meds resumed. Discussed importance of cessation of Motrin 800mg daily and risk of kidney damage as well as gastric ulcers. Pt verbalized understanding.   - VTE Prophylaxis:SCD to unaffected leg    I, Lisa Hammonds, KELL have discussed this patients case with Dr. Llamas who agrees with the diagnosis and treatment plan.  --------------------------------------    Richard Llamas MD  I performed the substantive portion of this visit. I had a face-to-face time with the patient on [12/25/2022 concurrently with the nurse practitioner]. I reviewed the nurse practitioner's history, physical exam and plan of care.      History:  Morbidly obese, Present after a mechanical fall while walking down the steps landing on her left hip.  No head trauma or loss of consciousness.  Imaging revealed displaced intertrochanteric femur fracture.    Physical exam:  Resting comfortably pain controlled, left lower extremity neurovascularly intact    Medical decision making:  Orthopedic consulted planned operative management today, continue multimodal pain control and to start pharmacological VTE prophylaxis postoperatively

## 2022-12-25 NOTE — OP NOTE
OPERATIVE REPORT    Patient: Tanya Linda   : 1960    MRN: [id number]  Date: 2022      Surgeon:Ronnell Olivares MD  Assistant: None  Preoperative Diagnosis: : Closed left  intertrochanteric  femur fracture  Postoperative Diagnosis: Same  Procedure:  Treatment left  intertrochanteric  femur fracture with intramedullary nail CPT 46113  Modifier 22- this patient had a BMI of 57.41.  This every single part of the case more difficult.  Positioning this patient took an extensive amount of time in the operating room since we had to make sure that her body habitus was supported on the bed.  We also had to extensively retract her pannus so that we could get fluoroscopic images of the hip.  Despite this getting fluoroscopic images was very difficult due to the obese body habitus.  A much larger incision than normal needed to be made which required extensively more dissection time compared to a typical intramedullary nail.  Closure also took significantly longer since a layered closure of an extensive wound was needed.  Due to the patient's body habitus this procedure took about 3 times longer than what a typical intramedullary nail takes and took a considerable amount of more effort.    Anesthesiologist: No responsible provider has been recorded for the case.  OR Staff: Circulator: Rosie Mejía RN  Radiology Technologist: RT Dieudonne  Relief Scrub: ST Vipin  Scrub Person: Kiara Christie  Implants:   Implant Name Type Inv. Item Serial No.  Lot No. LRB No. Used Action   KWIRE FIXATION STRL 3.0C334BN - [id number]  KWIRE FIXATION STRL 3.1U724CN  [employer] LUPE. [id number] Left 2 Implanted and Explanted   GUIDE WIRE 3.2L1904PH BALL TIP - [id number]  GUIDE WIRE [id number] BALL TIP  HALIE [employer]TR[employer] LUPE. [id number] Left 1 Implanted and Explanted   long nail kit r1.5, ti, left    HALIE [id number] Left 1 Implanted   SCREW LOCKING 5.0 X 40MM - [id number]  SCREW LOCKING 5.0 X 40MM  [employer] LUPE. [id number]  Left 1 Implanted   SCREW PROXIMAL LOCK 5X37.5MM - [id number]  SCREW PROXIMAL LOCK 5X37.5MM  [employer] LUPE. [id number] Left 1 Implanted and Explanted   SCREW LOCKING 5 X 42.5 - [id number]  SCREW LOCKING 5 X 42.5  [employer] LUPE. [id number] Left 1 Implanted   SCREW GAMMA3 10.5 THRD 105MM - [id number]  SCREW GAMMA3 10.5 THRD 105MM  [employer]. [id number] Left 1 Implanted     EBL: See anesthesia note  Complications: None  Disposition: To PACU, stable    Indications: Tanya Linda is a 62 y.o. female presenting with the aforementioned injuries. The procedure is indicated to best obtain and maintain stability of the femur while allowing early ROM.  The patient is awake and alert. After thorough discussion of the risks, benefits, expected outcomes, and alternatives to surgical intervention, the patient agreed to proceed with surgical treatment.  Specific risks discussed included, but were not limited to: superficial or deep infection, wound healing complications, DVT/PE, significant bleeding requiring transfusion, damage to named anatomic structures in the immediate area including named neurovascular structures, implant failure, and general risks of anesthesia.  The patient voiced understanding and written as well as verbal consent was obtained by myself prior to the procedure.    Procedure in Detail:  The patient's left lower extremity was marked by me in the preoperative area.  She was taken to the operating room, and after satisfactory induction of anesthesia, the patient was placed in the supine position on a fracture table.  A closed reduction was performed using traction and internal rotation.  An extensive amount of time was needed to retract her pannus to that we can get fluoroscopic images.  Positioning and obtaining fluoroscopic images in this patient was extremely difficult.  The ipsilateral lower extremity was then sterilely prepped and draped in the usual sterile fashion.  Standard time out procedure was  performed confirming the correct surgeon, site, side, patient ID, procedure, and administration of antibiotics.       A large oblique incision was made over the lateral thigh.  Secondary to this patient's body habitus getting the correct guidewire position was extremely difficult.  I was unable to abduct the wire against her body habitus.  I had to extensively dissect down to the abductor musculature.  Once I was able to palpate the greater trochanter with my finger I was able to insert the guidewire and then abduct through the wound.  I placed the guidewire initially however was still aiming too far medially.  Fluoroscopic images confirmed an adequate start point but the trajectory was aiming to medially.  A starter awl was used to start a  hole and then the wire was levered in an abducted position to go down the canal.  A guidewire was then advanced down the canal across the fracture site.  A starting Reamer was used to make the entry hole.  The wire was advanced down to the superior pole of the patella and it was measured at 375.  A 360 nail was chosen.  Femoral canal was reamed sequentially starting at a 9 Reamer to a 12 mm Reamer.  A 10 x 360 x 125° gamma nail was placed.  Fluoroscopy confirmed adequate position.  Next a guidewire was placed into the femoral neck and head.  The wire was placed in a center center position on both the AP and lateral views in the femoral head.  This measured to be 115.  A 105 lag screw was then placed and compressed across the fracture site.  Adequate compression across the fracture site was obtained.  Next the proximal jig was removed and 2 distal interlocking screws were placed with a perfect [place] technique.  The nail was anterior on cortex but did not penetrate the anterior cortex femur.  Final fluoroscopic views confirmed adequate fracture reduction and hardware positioning.  The lag screw did not penetrate the femoral head and this was show on multiple fluoroscopic  views.    All wounds were copiously irrigated and closed in a layered fashion.  Given that the patient had significant layer of adipose tissue, closure took an extensive amount of time..  Vancomycin was placed in the surgical wound bed.  Sterile soft dressings were applied.  The patient remained stable throughout the entire procedure, and was awakened from anesthesia and extubated without obvious complication.  She was transported to the recovery room in apparently stable condition.    All sponge and needle counts were correct at the end of the case.  I was present and participated in all aspects of the procedure.    Prognosis:  The patient will be kept WBAT on the ipsilateral extremity.  DVT prophylaxis is indicated for this patient and procedure.  The patient is at risk of pain, infection, and nonunion, and we will watch for all of these, amongst other issues, as the patient continues to heal.      This note/OR report was created with the assistance of  voice recognition software or phone  dictation.  There may be transcription errors as a result of using this technology however minimal. Effort has been made to assure accuracy of transcription but any obvious errors or omissions should be clarified with the author of the document.

## 2022-12-25 NOTE — CONSULTS
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  She was at a [employer] party when she turned around and took a wrong step and fell.  She complains of pain in left hip.  No numbness or tingling radiating distally into the foot.  She does not have any pain anywhere else.  She did not have left hip pain prior to this fall.  She denies any chest pain, shortness of breath, headache.      PMH:   Past Medical History:   Diagnosis Date    Personal history of colonic polyps 12/02/2021    Dr. Andrey Vizcarra       PSH:   Past Surgical History:   Procedure Laterality Date    COLONOSCOPY  12/02/2021    Dr. Andrey Vizcarra       Med:   No current facility-administered medications on file prior to encounter.     Current Outpatient Medications on File Prior to Encounter   Medication Sig Dispense Refill    busPIRone (BUSPAR) 7.5 MG tablet Take 1 tablet (7.5 mg total) by mouth every evening. 30 tablet 11    ibuprofen (ADVIL,MOTRIN) 800 MG tablet Take 1 tablet (800 mg total) by mouth every 8 (eight) hours as needed for Pain. 90 tablet 11    lisinopriL (PRINIVIL,ZESTRIL) 5 MG tablet Take 1 tablet (5 mg total) by mouth every evening. 30 tablet 11    venlafaxine (EFFEXOR) 37.5 MG Tab Take 1 tablet (37.5 mg total) by mouth Daily. 30 tablet 11       Allergies: Review of patient's allergies indicates:  No Known Allergies    SH:   Social History     Socioeconomic History    Marital status: Single   Tobacco Use    Smoking status: Never    Smokeless tobacco: Never   Substance and Sexual Activity    Alcohol use: Never       FH: None    ROS:   Constitutional: Denies fever chills  Eyes: No change in vision  ENT: No ringing or current infections  CV: No chest pain  Resp: No labored breathing  MSK: Pain evident at site of injury located in HPI,   Integ: No signs of abrasions or lacerations  Neuro: No  "numbness or tingling  Lymphatic: No swelling outside the area of injury     /81   Pulse (!) 59   Temp 98 °F (36.7 °C) (Oral)   Resp 18   Ht 5' 5" (1.651 m)   Wt (!) 156.5 kg (345 lb)   SpO2 98%   BMI 57.41 kg/m²   NAD, Alert, Awake, Ox4   HEENT: atraumatic, normal cephalic, neg ecchymosis, lacerations   CV: No increased work of breathing   Pulm: Normal work of breathing   Skin: No cutaneous rash, no open wounds   Vascular: 2+ RP, wwp, bcr   Non tender to palpation in the right upper right lower left upper extremities  Left lower extremity:  No open wounds or rashes.  Tenderness to palpation around the trochanteric region.  No tenderness to palpation around the distal thigh or knee.  No calf pain.  Calf is soft and compressible.  She is able to flex and extend her great toe.  Dorsalis pedis pulses 2+ the foot is warm well perfused sensation light touch intact throughout the foot.       Imaging:  X-ray of the left hip shows intertrochanteric femur fracture        Assessment:  Left intertrochanteric femur fracture    Plan:  This will need surgery.  I will take her to the operating today for intramedullary nail placement of the left femur.    I explained that surgery and the nature of their condition are not without risks. These include, but are not limited to, bleeding, infection, neurovascular compromise, malunion, nonunion, hardware complications, wound complications, scarring, cosmetic defects, need for later and/or repeated surgeries, pain, loss of ROM, loss of function, PTOA, deformity, functional abnormalities, stiffness, thromboembolic complications, compartment syndrome, loss of limb, loss of life, anesthetic complications, and other imponderables. I explained that these can occur despite the adequacy of treatments rendered, and that their risks are heightened given the nature of their condition. They verbalized understanding. They would like to continue with surgery at this time. If appropriate " family was involved with surgical discussion.

## 2022-12-25 NOTE — NURSING
Nurses Note -- 4 Eyes      12/25/2022   3:53 AM      Skin assessed during: Admit      [x] No Pressure Injuries Present    [x]Prevention Measures Documented      [] Yes- Altered Skin Integrity Present or Discovered   [] LDA Added if Not in Epic (Describe Wound)   [] New Altered Skin Integrity was Present on Admit and Documented in LDA   [] Wound Image Taken    Wound Care Consulted? No    Attending Nurse:  Luisa Ross RN     Second RN/Staff Member:  perla andre

## 2022-12-26 LAB
ALBUMIN SERPL-MCNC: 3.1 G/DL (ref 3.4–4.8)
ALBUMIN/GLOB SERPL: 1.2 RATIO (ref 1.1–2)
ALP SERPL-CCNC: 72 UNIT/L (ref 40–150)
ALT SERPL-CCNC: 26 UNIT/L (ref 0–55)
AST SERPL-CCNC: 56 UNIT/L (ref 5–34)
BASOPHILS # BLD AUTO: 0.02 X10(3)/MCL (ref 0–0.2)
BASOPHILS # BLD AUTO: 0.03 X10(3)/MCL (ref 0–0.2)
BASOPHILS NFR BLD AUTO: 0.1 %
BASOPHILS NFR BLD AUTO: 0.3 %
BILIRUBIN DIRECT+TOT PNL SERPL-MCNC: 0.8 MG/DL
BUN SERPL-MCNC: 18.9 MG/DL (ref 9.8–20.1)
CALCIUM SERPL-MCNC: 8.7 MG/DL (ref 8.4–10.2)
CHLORIDE SERPL-SCNC: 103 MMOL/L (ref 98–107)
CO2 SERPL-SCNC: 25 MMOL/L (ref 23–31)
CREAT SERPL-MCNC: 0.85 MG/DL (ref 0.55–1.02)
DEPRECATED CALCIDIOL+CALCIFEROL SERPL-MC: 6.4 NG/ML (ref 30–80)
EOSINOPHIL # BLD AUTO: 0.02 X10(3)/MCL (ref 0–0.9)
EOSINOPHIL # BLD AUTO: 0.09 X10(3)/MCL (ref 0–0.9)
EOSINOPHIL NFR BLD AUTO: 0.1 %
EOSINOPHIL NFR BLD AUTO: 0.8 %
ERYTHROCYTE [DISTWIDTH] IN BLOOD BY AUTOMATED COUNT: 14.6 % (ref 11–14.5)
ERYTHROCYTE [DISTWIDTH] IN BLOOD BY AUTOMATED COUNT: 14.7 % (ref 11–14.5)
GFR SERPLBLD CREATININE-BSD FMLA CKD-EPI: >60 MLS/MIN/1.73/M2
GLOBULIN SER-MCNC: 2.6 GM/DL (ref 2.4–3.5)
GLUCOSE SERPL-MCNC: 118 MG/DL (ref 82–115)
HCT VFR BLD AUTO: 34.3 % (ref 37–47)
HCT VFR BLD AUTO: 35.2 % (ref 37–47)
HGB BLD-MCNC: 10.7 GM/DL (ref 12–16)
HGB BLD-MCNC: 10.9 GM/DL (ref 12–16)
IMM GRANULOCYTES # BLD AUTO: 0.06 X10(3)/MCL (ref 0–0.04)
IMM GRANULOCYTES # BLD AUTO: 0.07 X10(3)/MCL (ref 0–0.04)
IMM GRANULOCYTES NFR BLD AUTO: 0.5 %
IMM GRANULOCYTES NFR BLD AUTO: 0.5 %
LYMPHOCYTES # BLD AUTO: 1.7 X10(3)/MCL (ref 0.6–4.6)
LYMPHOCYTES # BLD AUTO: 2.16 X10(3)/MCL (ref 0.6–4.6)
LYMPHOCYTES NFR BLD AUTO: 12.4 %
LYMPHOCYTES NFR BLD AUTO: 18.2 %
MAGNESIUM SERPL-MCNC: 1.9 MG/DL (ref 1.6–2.6)
MCH RBC QN AUTO: 27.4 PG
MCH RBC QN AUTO: 27.4 PG
MCHC RBC AUTO-ENTMCNC: 31 MG/DL (ref 33–36)
MCHC RBC AUTO-ENTMCNC: 31.2 MG/DL (ref 33–36)
MCV RBC AUTO: 87.9 FL (ref 80–94)
MCV RBC AUTO: 88.4 FL (ref 80–94)
MONOCYTES # BLD AUTO: 0.63 X10(3)/MCL (ref 0.1–1.3)
MONOCYTES # BLD AUTO: 0.97 X10(3)/MCL (ref 0.1–1.3)
MONOCYTES NFR BLD AUTO: 5.3 %
MONOCYTES NFR BLD AUTO: 7.1 %
NEUTROPHILS # BLD AUTO: 10.93 X10(3)/MCL (ref 2.1–9.2)
NEUTROPHILS # BLD AUTO: 8.87 X10(3)/MCL (ref 2.1–9.2)
NEUTROPHILS NFR BLD AUTO: 74.9 %
NEUTROPHILS NFR BLD AUTO: 79.8 %
NRBC BLD AUTO-RTO: 0 % (ref 0–1)
NRBC BLD AUTO-RTO: 0 % (ref 0–1)
PLATELET # BLD AUTO: 125 X10(3)/MCL (ref 140–371)
PLATELET # BLD AUTO: 264 X10(3)/MCL (ref 140–371)
PMV BLD AUTO: 11.4 FL (ref 9.4–12.4)
PMV BLD AUTO: 11.8 FL (ref 9.4–12.4)
POTASSIUM SERPL-SCNC: 4.4 MMOL/L (ref 3.5–5.1)
PROT SERPL-MCNC: 5.7 GM/DL (ref 5.8–7.6)
RBC # BLD AUTO: 3.9 X10(6)/MCL (ref 4.2–5.4)
RBC # BLD AUTO: 3.98 X10(6)/MCL (ref 4.2–5.4)
SODIUM SERPL-SCNC: 137 MMOL/L (ref 136–145)
WBC # SPEC AUTO: 11.8 X10(3)/MCL (ref 4.5–11.5)
WBC # SPEC AUTO: 13.7 X10(3)/MCL (ref 4.5–11.5)

## 2022-12-26 PROCEDURE — 25000003 PHARM REV CODE 250: Performed by: INTERNAL MEDICINE

## 2022-12-26 PROCEDURE — 63600175 PHARM REV CODE 636 W HCPCS: Performed by: STUDENT IN AN ORGANIZED HEALTH CARE EDUCATION/TRAINING PROGRAM

## 2022-12-26 PROCEDURE — 97162 PT EVAL MOD COMPLEX 30 MIN: CPT

## 2022-12-26 PROCEDURE — 83735 ASSAY OF MAGNESIUM: CPT | Performed by: INTERNAL MEDICINE

## 2022-12-26 PROCEDURE — 85025 COMPLETE CBC W/AUTO DIFF WBC: CPT | Performed by: INTERNAL MEDICINE

## 2022-12-26 PROCEDURE — 11000001 HC ACUTE MED/SURG PRIVATE ROOM

## 2022-12-26 PROCEDURE — 80053 COMPREHEN METABOLIC PANEL: CPT | Performed by: INTERNAL MEDICINE

## 2022-12-26 PROCEDURE — 25000003 PHARM REV CODE 250: Performed by: STUDENT IN AN ORGANIZED HEALTH CARE EDUCATION/TRAINING PROGRAM

## 2022-12-26 PROCEDURE — 85025 COMPLETE CBC W/AUTO DIFF WBC: CPT | Performed by: STUDENT IN AN ORGANIZED HEALTH CARE EDUCATION/TRAINING PROGRAM

## 2022-12-26 PROCEDURE — 36415 COLL VENOUS BLD VENIPUNCTURE: CPT | Performed by: INTERNAL MEDICINE

## 2022-12-26 PROCEDURE — 25000003 PHARM REV CODE 250: Performed by: EMERGENCY MEDICINE

## 2022-12-26 PROCEDURE — 25000003 PHARM REV CODE 250: Performed by: NURSE PRACTITIONER

## 2022-12-26 PROCEDURE — 36415 COLL VENOUS BLD VENIPUNCTURE: CPT | Performed by: STUDENT IN AN ORGANIZED HEALTH CARE EDUCATION/TRAINING PROGRAM

## 2022-12-26 PROCEDURE — 82306 VITAMIN D 25 HYDROXY: CPT | Performed by: INTERNAL MEDICINE

## 2022-12-26 RX ORDER — ERGOCALCIFEROL 1.25 MG/1
50000 CAPSULE ORAL
Status: DISCONTINUED | OUTPATIENT
Start: 2022-12-26 | End: 2022-12-29 | Stop reason: HOSPADM

## 2022-12-26 RX ORDER — METHOCARBAMOL 500 MG/1
500 TABLET, FILM COATED ORAL 3 TIMES DAILY
Status: DISCONTINUED | OUTPATIENT
Start: 2022-12-26 | End: 2022-12-26

## 2022-12-26 RX ORDER — DOCUSATE SODIUM 100 MG/1
100 CAPSULE, LIQUID FILLED ORAL 2 TIMES DAILY
Status: DISCONTINUED | OUTPATIENT
Start: 2022-12-26 | End: 2022-12-29 | Stop reason: HOSPADM

## 2022-12-26 RX ORDER — POLYETHYLENE GLYCOL 3350 17 G/17G
17 POWDER, FOR SOLUTION ORAL DAILY
Status: DISCONTINUED | OUTPATIENT
Start: 2022-12-26 | End: 2022-12-29 | Stop reason: HOSPADM

## 2022-12-26 RX ORDER — METHOCARBAMOL 500 MG/1
500 TABLET, FILM COATED ORAL 3 TIMES DAILY
Status: DISCONTINUED | OUTPATIENT
Start: 2022-12-26 | End: 2022-12-27

## 2022-12-26 RX ORDER — FAMOTIDINE 20 MG/1
20 TABLET, FILM COATED ORAL DAILY
Status: DISCONTINUED | OUTPATIENT
Start: 2022-12-26 | End: 2022-12-29 | Stop reason: HOSPADM

## 2022-12-26 RX ADMIN — FAMOTIDINE 20 MG: 20 TABLET, FILM COATED ORAL at 12:12

## 2022-12-26 RX ADMIN — ENOXAPARIN SODIUM 40 MG: 40 INJECTION SUBCUTANEOUS at 06:12

## 2022-12-26 RX ADMIN — ERGOCALCIFEROL 50000 UNITS: 1.25 CAPSULE ORAL at 08:12

## 2022-12-26 RX ADMIN — DEXTROSE MONOHYDRATE 3 G: 50 INJECTION, SOLUTION INTRAVENOUS at 09:12

## 2022-12-26 RX ADMIN — HYDROCODONE BITARTRATE AND ACETAMINOPHEN 1 TABLET: 5; 325 TABLET ORAL at 08:12

## 2022-12-26 RX ADMIN — Medication 6 MG: at 09:12

## 2022-12-26 RX ADMIN — HYDROCODONE BITARTRATE AND ACETAMINOPHEN 1 TABLET: 5; 325 TABLET ORAL at 01:12

## 2022-12-26 RX ADMIN — DEXTROSE MONOHYDRATE 3 G: 50 INJECTION, SOLUTION INTRAVENOUS at 01:12

## 2022-12-26 RX ADMIN — DOCUSATE SODIUM 100 MG: 100 CAPSULE, LIQUID FILLED ORAL at 09:12

## 2022-12-26 RX ADMIN — DOCUSATE SODIUM 100 MG: 100 CAPSULE, LIQUID FILLED ORAL at 12:12

## 2022-12-26 RX ADMIN — MUPIROCIN: 20 OINTMENT TOPICAL at 08:12

## 2022-12-26 RX ADMIN — VENLAFAXINE 37.5 MG: 37.5 TABLET ORAL at 06:12

## 2022-12-26 RX ADMIN — POLYETHYLENE GLYCOL 3350 17 G: 17 POWDER, FOR SOLUTION ORAL at 12:12

## 2022-12-26 RX ADMIN — METHOCARBAMOL 500 MG: 500 TABLET ORAL at 09:12

## 2022-12-26 RX ADMIN — BUSPIRONE HYDROCHLORIDE 7.5 MG: 7.5 TABLET ORAL at 09:12

## 2022-12-26 RX ADMIN — HYDROCODONE BITARTRATE AND ACETAMINOPHEN 1 TABLET: 5; 325 TABLET ORAL at 03:12

## 2022-12-26 RX ADMIN — METHOCARBAMOL 500 MG: 500 TABLET ORAL at 01:12

## 2022-12-26 NOTE — PROGRESS NOTES
[facility] Hospital Medicine Progress Note        Chief Complaint: Inpatient Follow-up for fracture    HPI:   Ms. Linda is a morbidly obese 63 yo female with pmhx HHTN who presents to the ED with c/o left hip pain after a ground level trip and fall landing on her left hip. There was no head trauma or LOC. States immediate pain to hip and unable to bear weight.  She does report that she takes Motrin daily for her chronic pain.      Upon arrival into the ED patient was hemodynamically stable and afebrile.  Laboratory work was unremarkable and CTA of the left hip showed a left intertrochanteric femur fracture with a prrbable left medial rectus muscle hematoma. Dr. Olivares was consulted and recommended admission.      Interval Hx:   No acute events overnight.  Underwent intramedullary nailing for closed left intertrochanteric femur fracture on left side.  She tolerated procedure well.  When seen at bedside she was alert, comfortably resting.  Except for spasm and pain of left lower extremity she has no new complaints or concerns.  Labs showing postoperative reactive leukocytosis, stable hemoglobin and platelets, vitamin-D deficiency      Objective/physical exam:  Vitals:    12/25/22 2111 12/26/22 0000 12/26/22 0111 12/26/22 0400   BP:  130/80  95/60   BP Location:       Patient Position:       Pulse:  85  78   Resp: 16 16 16 18   Temp:  98.1 °F (36.7 °C)  98.4 °F (36.9 °C)   TempSrc:       SpO2:  97%  95%   Weight:       Height:         General: In no acute distress, afebrile  Respiratory: Clear to auscultation bilaterally  Cardiovascular: S1, S2, no appreciable murmur  Abdomen: Soft, nontender, BS +  MSK: Warm, no lower extremity edema, no clubbing or cyanosis  Neurologic: Alert and oriented x4, moving all extremities with good strength     Lab Results   Component Value Date     12/26/2022    K 4.4 12/26/2022    CO2 25 12/26/2022    BUN 18.9 12/26/2022    CREATININE 0.85 12/26/2022     CALCIUM 8.7 12/26/2022    EGFRNONAA >60 05/13/2022      Lab Results   Component Value Date    ALT 26 12/26/2022    AST 56 (H) 12/26/2022    ALKPHOS 72 12/26/2022    BILITOT 0.8 12/26/2022      Lab Results   Component Value Date    WBC 13.7 (H) 12/26/2022    HGB 10.7 (L) 12/26/2022    HCT 34.3 (L) 12/26/2022    MCV 87.9 12/26/2022     12/26/2022           Medications:   busPIRone  7.5 mg Oral QHS    ceFAZolin (ANCEF) IVPB  3 g Intravenous Q8H    enoxaparin  40 mg Subcutaneous Daily    ergocalciferol  50,000 Units Oral Q7 Days    lisinopriL  5 mg Oral QHS    mupirocin   Nasal BID    venlafaxine  37.5 mg Oral Daily      diphenhydrAMINE, HYDROcodone-acetaminophen, HYDROmorphone, melatonin, metoclopramide HCl, morphine, ondansetron, prochlorperazine, sodium chloride 0.9%     Assessment/Plan:    Closed left intertrochanteric fracture s/p kneeling 12/25/2022   Possible left medial rectus muscle hematoma seen on imaging at admission    HX:  Morbid obesity, hypertension, anxiety, depression    Plan:  -continue current analgesics, add antispasmodics.  PT as tolerated.  Orthopedics following.  Continue postoperative surgical wound care  -counseled on importance of weight loss, pulmonary toileting, ambulation, adequate p.o. hydration nutrition.  She verbalizes understanding  -other home medications were reviewed and renewed .  Add vitamin-D, bowel regimen, pepcid    Lovenox         Aleksandar Johnson MD

## 2022-12-26 NOTE — PT/OT/SLP EVAL
Physical Therapy Evaluation    Patient Name:  Tanya Linda   MRN:  [id number]    Recommendations:     Discharge Recommendations: rehabilitation facility   Discharge Equipment Recommendations: walker, rolling   Barriers to discharge: None    Assessment:     Tanya Linda is a 62 y.o. female admitted with a medical diagnosis of intertrochanteric L hip fx, s/p IM Nail.  She presents with the following impairments/functional limitations: weakness, gait instability, impaired balance, impaired endurance, impaired functional mobility. The pt lives at home with her son, but will be staying at her daughters home upon discharge. The is limited by pain at this time, and declined to attempt to stand today, but did mobilize to EOB with assistance. Pt would benefit from inpt rehab upon discharge.    Rehab Prognosis: Good; patient would benefit from acute skilled PT services to address these deficits and reach maximum level of function.    Recent Surgery: Procedure(s) (LRB):  INSERTION, INTRAMEDULLARY JARETH, FEMUR, ANTEGRADE (Left) 1 Day Post-Op    Plan:     During this hospitalization, patient to be seen daily to address the identified rehab impairments via gait training, therapeutic activities, therapeutic exercises and progress toward the following goals:    Plan of Care Expires:  01/26/23    Subjective     Chief Complaint: pain L hip with movement  Patient/Family Comments/goals: return to PLOF  Pain/Comfort:  Location - Side 1: Left  Location 1: hip  Pain Addressed 1: Reposition, Distraction    Patients cultural, spiritual, Congregational conflicts given the current situation:      Living Environment:  Home with daughter in first floor apartment. 1 step to enter. The apartment in 2 stories, and she will stay on first floor.   Prior to admission, patients level of function was independent.  Equipment used at home: none.  DME owned (not currently used): rolling walker.  Upon discharge, patient will have assistance from  "daughter.    Objective:     Communicated with NSG prior to session.  Patient found supine with peripheral IV  upon PT entry to room.    General Precautions: Standard, fall  Orthopedic Precautions:LLE weight bearing as tolerated   Braces: N/A  Respiratory Status: Room air    Exams:  RLE ROM: WFL  RLE Strength: WFL  LLE ROM: limited hip flexion/extension 2/2 pain   LLE Strength: hip NT, knee and ankle WFL    Functional Mobility:  Bed Mobility:     Scooting: moderate assistance  Supine to Sit: moderate assistance x2, cueing for technique   Sit to Supine: max A assistance, cueing for technique  Balance: Pt able to sit EOB x 5 minutes, with prompting to lean toward L hip to maintain midline.  Pt encouraged to participate in a standing attempt, pt declined x2. She states that she is "too scared" to attempt today, but is agreeable to stand and ambulate tomorrow.     Treatment & Education:  Pt educated on importance of mobility following orthopedic sx.    Patient left supine with wedge on L side, all lines intact, call button in reach, and family present.    GOALS:   Multidisciplinary Problems       Physical Therapy Goals          Problem: Physical Therapy    Goal Priority Disciplines Outcome Goal Variances Interventions   Physical Therapy Goal     PT, PT/OT Ongoing, Progressing     Description: Goals to be met by: 23     Patient will increase functional independence with mobility by performin. Supine to sit with Stand-by Assistance  2. Sit to supine with Stand-by Assistance  3. Sit to stand transfer with Stand-by Assistance  4. Bed to chair transfer with Stand-by Assistance using Rolling Walker  5. Gait  x 50 feet with Modified [place] using Rolling Walker.                          History:     Past Medical History:   Diagnosis Date    Personal history of colonic polyps 2021    Dr. Andrey Vizcarra       Past Surgical History:   Procedure Laterality Date    COLONOSCOPY  2021    Dr. Andrey Vizcarra "       Time Tracking:     PT Received On: 12/26/22  PT Start Time: 1447     PT Stop Time: 1505  PT Total Time (min): 18 min     Billable Minutes: Evaluation 18      12/26/2022

## 2022-12-26 NOTE — PLAN OF CARE
Problem: Physical Therapy  Goal: Physical Therapy Goal  Description: Goals to be met by: 23     Patient will increase functional independence with mobility by performin. Supine to sit with Stand-by Assistance  2. Sit to supine with Stand-by Assistance  3. Sit to stand transfer with Stand-by Assistance  4. Bed to chair transfer with Stand-by Assistance using Rolling Walker  5. Gait  x 50 feet with Modified [place] using Rolling Walker.     Outcome: Ongoing, Progressing

## 2022-12-26 NOTE — PLAN OF CARE
Problem: Adult Inpatient Plan of Care  Goal: Plan of Care Review  12/25/2022 1801 by Kristen Atkinson RN  Outcome: Ongoing, Progressing  12/25/2022 0950 by Kristen Atkinson RN  Outcome: Ongoing, Progressing  Goal: Patient-Specific Goal (Individualized)  12/25/2022 1801 by Kristen Atkinson RN  Outcome: Ongoing, Progressing  12/25/2022 0950 by Kristen Atkinson RN  Outcome: Ongoing, Progressing  Goal: Absence of Hospital-Acquired Illness or Injury  12/25/2022 1801 by Kristen Atkinson RN  Outcome: Ongoing, Progressing  12/25/2022 0950 by Kristen Atkinson RN  Outcome: Ongoing, Progressing  Goal: Optimal Comfort and Wellbeing  12/25/2022 1801 by Kristen Atkinson RN  Outcome: Ongoing, Progressing  12/25/2022 0950 by Kristen Atkinson RN  Outcome: Ongoing, Progressing  Goal: Readiness for Transition of Care  12/25/2022 1801 by Kristen Atkinson RN  Outcome: Ongoing, Progressing  12/25/2022 0950 by Kristen Atkinson RN  Outcome: Ongoing, Progressing     Problem: Bariatric Environmental Safety  Goal: Safety Maintained with Care  12/25/2022 1801 by Kristen Atkinson RN  Outcome: Ongoing, Progressing  12/25/2022 0950 by Kristen Atkinson RN  Outcome: Ongoing, Progressing     Problem: Fall Injury Risk  Goal: Absence of Fall and Fall-Related Injury  12/25/2022 1801 by Kristen Atkinson RN  Outcome: Ongoing, Progressing  12/25/2022 0950 by Kristen Atkinson RN  Outcome: Ongoing, Progressing     Problem: Skin Injury Risk Increased  Goal: Skin Health and Integrity  12/25/2022 1801 by Kristen Atkinson RN  Outcome: Ongoing, Progressing  12/25/2022 0950 by Kristen Atkinson RN  Outcome: Ongoing, Progressing     Problem: Infection  Goal: Absence of Infection Signs and Symptoms  Outcome: Ongoing, Progressing

## 2022-12-27 LAB
ALBUMIN SERPL-MCNC: 3 G/DL (ref 3.4–4.8)
ALBUMIN/GLOB SERPL: 0.9 RATIO (ref 1.1–2)
ALP SERPL-CCNC: 73 UNIT/L (ref 40–150)
ALT SERPL-CCNC: 22 UNIT/L (ref 0–55)
AST SERPL-CCNC: 39 UNIT/L (ref 5–34)
BASOPHILS # BLD AUTO: 0.03 X10(3)/MCL (ref 0–0.2)
BASOPHILS NFR BLD AUTO: 0.3 %
BILIRUBIN DIRECT+TOT PNL SERPL-MCNC: 0.8 MG/DL
BUN SERPL-MCNC: 16.1 MG/DL (ref 9.8–20.1)
CALCIUM SERPL-MCNC: 9 MG/DL (ref 8.4–10.2)
CHLORIDE SERPL-SCNC: 105 MMOL/L (ref 98–107)
CO2 SERPL-SCNC: 25 MMOL/L (ref 23–31)
CREAT SERPL-MCNC: 0.81 MG/DL (ref 0.55–1.02)
EOSINOPHIL # BLD AUTO: 0.08 X10(3)/MCL (ref 0–0.9)
EOSINOPHIL NFR BLD AUTO: 0.7 %
ERYTHROCYTE [DISTWIDTH] IN BLOOD BY AUTOMATED COUNT: 14.9 % (ref 11–14.5)
GFR SERPLBLD CREATININE-BSD FMLA CKD-EPI: >60 MLS/MIN/1.73/M2
GLOBULIN SER-MCNC: 3.4 GM/DL (ref 2.4–3.5)
GLUCOSE SERPL-MCNC: 121 MG/DL (ref 82–115)
HCT VFR BLD AUTO: 34.6 % (ref 37–47)
HGB BLD-MCNC: 10.7 GM/DL (ref 12–16)
IMM GRANULOCYTES # BLD AUTO: 0.06 X10(3)/MCL (ref 0–0.04)
IMM GRANULOCYTES NFR BLD AUTO: 0.5 %
LYMPHOCYTES # BLD AUTO: 1.83 X10(3)/MCL (ref 0.6–4.6)
LYMPHOCYTES NFR BLD AUTO: 15.9 %
MAGNESIUM SERPL-MCNC: 2.1 MG/DL (ref 1.6–2.6)
MCH RBC QN AUTO: 27.6 PG
MCHC RBC AUTO-ENTMCNC: 30.9 MG/DL (ref 33–36)
MCV RBC AUTO: 89.2 FL (ref 80–94)
MONOCYTES # BLD AUTO: 0.88 X10(3)/MCL (ref 0.1–1.3)
MONOCYTES NFR BLD AUTO: 7.6 %
NEUTROPHILS # BLD AUTO: 8.65 X10(3)/MCL (ref 2.1–9.2)
NEUTROPHILS NFR BLD AUTO: 75 %
NRBC BLD AUTO-RTO: 0 % (ref 0–1)
PLATELET # BLD AUTO: 202 X10(3)/MCL (ref 140–371)
PMV BLD AUTO: 11.5 FL (ref 9.4–12.4)
POTASSIUM SERPL-SCNC: 4.3 MMOL/L (ref 3.5–5.1)
PROT SERPL-MCNC: 6.4 GM/DL (ref 5.8–7.6)
RBC # BLD AUTO: 3.88 X10(6)/MCL (ref 4.2–5.4)
SODIUM SERPL-SCNC: 137 MMOL/L (ref 136–145)
WBC # SPEC AUTO: 11.5 X10(3)/MCL (ref 4.5–11.5)

## 2022-12-27 PROCEDURE — 25000003 PHARM REV CODE 250: Performed by: INTERNAL MEDICINE

## 2022-12-27 PROCEDURE — 85025 COMPLETE CBC W/AUTO DIFF WBC: CPT | Performed by: INTERNAL MEDICINE

## 2022-12-27 PROCEDURE — 97530 THERAPEUTIC ACTIVITIES: CPT | Mod: CQ

## 2022-12-27 PROCEDURE — 36415 COLL VENOUS BLD VENIPUNCTURE: CPT | Performed by: INTERNAL MEDICINE

## 2022-12-27 PROCEDURE — 63600175 PHARM REV CODE 636 W HCPCS: Performed by: STUDENT IN AN ORGANIZED HEALTH CARE EDUCATION/TRAINING PROGRAM

## 2022-12-27 PROCEDURE — 63600175 PHARM REV CODE 636 W HCPCS: Performed by: NURSE PRACTITIONER

## 2022-12-27 PROCEDURE — 83735 ASSAY OF MAGNESIUM: CPT | Performed by: INTERNAL MEDICINE

## 2022-12-27 PROCEDURE — 25000003 PHARM REV CODE 250: Performed by: STUDENT IN AN ORGANIZED HEALTH CARE EDUCATION/TRAINING PROGRAM

## 2022-12-27 PROCEDURE — 25000003 PHARM REV CODE 250: Performed by: EMERGENCY MEDICINE

## 2022-12-27 PROCEDURE — 25000003 PHARM REV CODE 250: Performed by: NURSE PRACTITIONER

## 2022-12-27 PROCEDURE — 11000001 HC ACUTE MED/SURG PRIVATE ROOM

## 2022-12-27 PROCEDURE — 80053 COMPREHEN METABOLIC PANEL: CPT | Performed by: INTERNAL MEDICINE

## 2022-12-27 RX ORDER — KETOROLAC TROMETHAMINE 30 MG/ML
30 INJECTION, SOLUTION INTRAMUSCULAR; INTRAVENOUS ONCE
Status: COMPLETED | OUTPATIENT
Start: 2022-12-27 | End: 2022-12-27

## 2022-12-27 RX ORDER — METHOCARBAMOL 500 MG/1
500 TABLET, FILM COATED ORAL 3 TIMES DAILY PRN
Status: DISCONTINUED | OUTPATIENT
Start: 2022-12-27 | End: 2022-12-29 | Stop reason: HOSPADM

## 2022-12-27 RX ORDER — ACETAMINOPHEN 325 MG/1
650 TABLET ORAL EVERY 6 HOURS PRN
Status: DISCONTINUED | OUTPATIENT
Start: 2022-12-27 | End: 2022-12-29 | Stop reason: HOSPADM

## 2022-12-27 RX ADMIN — ENOXAPARIN SODIUM 40 MG: 40 INJECTION SUBCUTANEOUS at 05:12

## 2022-12-27 RX ADMIN — HYDROCODONE BITARTRATE AND ACETAMINOPHEN 1 TABLET: 5; 325 TABLET ORAL at 09:12

## 2022-12-27 RX ADMIN — METHOCARBAMOL 500 MG: 500 TABLET ORAL at 09:12

## 2022-12-27 RX ADMIN — Medication 6 MG: at 09:12

## 2022-12-27 RX ADMIN — FAMOTIDINE 20 MG: 20 TABLET, FILM COATED ORAL at 09:12

## 2022-12-27 RX ADMIN — DEXTROSE MONOHYDRATE 3 G: 50 INJECTION, SOLUTION INTRAVENOUS at 05:12

## 2022-12-27 RX ADMIN — ACETAMINOPHEN 650 MG: 325 TABLET, FILM COATED ORAL at 05:12

## 2022-12-27 RX ADMIN — DOCUSATE SODIUM 100 MG: 100 CAPSULE, LIQUID FILLED ORAL at 09:12

## 2022-12-27 RX ADMIN — VENLAFAXINE 37.5 MG: 37.5 TABLET ORAL at 05:12

## 2022-12-27 RX ADMIN — KETOROLAC TROMETHAMINE 30 MG: 30 INJECTION, SOLUTION INTRAMUSCULAR; INTRAVENOUS at 05:12

## 2022-12-27 RX ADMIN — POLYETHYLENE GLYCOL 3350 17 G: 17 POWDER, FOR SOLUTION ORAL at 09:12

## 2022-12-27 RX ADMIN — BUSPIRONE HYDROCHLORIDE 7.5 MG: 7.5 TABLET ORAL at 09:12

## 2022-12-27 RX ADMIN — HYDROCODONE BITARTRATE AND ACETAMINOPHEN 1 TABLET: 5; 325 TABLET ORAL at 01:12

## 2022-12-27 RX ADMIN — HYDROCODONE BITARTRATE AND ACETAMINOPHEN 1 TABLET: 5; 325 TABLET ORAL at 05:12

## 2022-12-27 NOTE — PLAN OF CARE
Problem: Adult Inpatient Plan of Care  Goal: Plan of Care Review  Outcome: Ongoing, Progressing  Goal: Patient-Specific Goal (Individualized)  Outcome: Ongoing, Progressing  Goal: Absence of Hospital-Acquired Illness or Injury  Outcome: Ongoing, Progressing  Goal: Optimal Comfort and Wellbeing  Outcome: Ongoing, Progressing  Goal: Readiness for Transition of Care  Outcome: Ongoing, Progressing     Problem: Bariatric Environmental Safety  Goal: Safety Maintained with Care  Outcome: Ongoing, Progressing     Problem: Fall Injury Risk  Goal: Absence of Fall and Fall-Related Injury  Outcome: Ongoing, Progressing     Problem: Skin Injury Risk Increased  Goal: Skin Health and Integrity  Outcome: Ongoing, Progressing     Problem: Infection  Goal: Absence of Infection Signs and Symptoms  Outcome: Ongoing, Progressing

## 2022-12-27 NOTE — PT/OT/SLP PROGRESS
Physical Therapy         Treatment        Tanya Linda   MRN: [id number]     PT Received On: 12/27/22  PT Start Time: 1120     PT Stop Time: 1145    PT Total Time (min): 25 min       Billable Minutes:  Therapeutic Activity 25  Total Minutes: 25    Treatment Type: Treatment  PT/PTA: PTA     PTA Visit Number: 1       General Precautions: Standard, fall  Orthopedic Precautions: Orthopedic Precautions : LLE weight bearing as tolerated   Braces:           Subjective:  Communicated with NSG prior to session.    Pain/Comfort  Location - Side 1: Left  Location 1: leg  Pain Addressed 1: Reposition, Distraction    Objective:  Patient found in bed with HOB elevated. Zhang catheter    Functional Mobility:  Bed Mobility:   Supine to sit: Moderate Assistance. modAx2   Sit to supine: Maximum Assistance. maxAx2   Rolling: Activity did not occur   Scooting: Moderate Assistance    Balance:   Static Sit: NORMAL: No deviations seen in posture held statically  Dynamic Sit:  GOOD-: Incosistently Maintains balance through MODERATE excursions of active trunk movement,     Static Stand: POOR+: Needs MINIMAL assist to maintain  Dynamic stand: POOR: Needs MOD (moderate) assist during gait    Transfer Training:  Sit to stand:Moderate Assistance with Rolling Walker . 2 trials completed from EOB. 1st trial pt modAx2 and 2nd trial pt modAx1    Pregait:  Pt able to pivot BL feet towards HOB with RW min-modA.       Additional Treatment:  Pt declined to sit UIC today and states that will be her goal for tomorrow. Pt required increased time for rest breaks throughout tx session.     Activity Tolerance:  Patient limited by fatigue and Patient limited by pain    Patient left with bed in chair position with all lines intact and call button in reach.    Assessment:  Tanya Linda is a 62 y.o. female with a medical diagnosis of intertrochanteric L hip fx, s/p IM Nail.  She presents with the following impairments/functional limitations: weakness, gait  instability, impaired balance, impaired endurance, impaired functional mobility. Pt with decreased safety awareness and remains a fall risk.     Pt would benefit from further rehab therapy services to increase overall independence level and assist in getting pt closer to PLOF.      Rehab potential is excellent.    Activity tolerance: Excellent    Discharge recommendations: Discharge Facility/Level of Care Needs: rehabilitation facility     Equipment recommendations:       GOALS:   Multidisciplinary Problems       Physical Therapy Goals          Problem: Physical Therapy    Goal Priority Disciplines Outcome Goal Variances Interventions   Physical Therapy Goal     PT, PT/OT Ongoing, Progressing     Description: Goals to be met by: 23     Patient will increase functional independence with mobility by performin. Supine to sit with Stand-by Assistance  2. Sit to supine with Stand-by Assistance  3. Sit to stand transfer with Stand-by Assistance  4. Bed to chair transfer with Stand-by Assistance using Rolling Walker  5. Gait  x 50 feet with Modified [place] using Rolling Walker.                          PLAN:    Patient to be seen daily  to address the above listed problems via gait training, therapeutic activities, therapeutic exercises  Plan of Care expires: 23  Plan of Care reviewed with: patient, family         2022

## 2022-12-27 NOTE — ANESTHESIA POSTPROCEDURE EVALUATION
Anesthesia Post Evaluation    Patient: Tanya Linda    Procedure(s) Performed: Procedure(s) (LRB):  INSERTION, INTRAMEDULLARY JARETH, FEMUR, ANTEGRADE (Left)    Final Anesthesia Type: general      Patient location during evaluation: PACU  Patient participation: Yes- Able to Participate  Level of consciousness: awake and alert  Post-procedure vital signs: reviewed and stable  Pain management: adequate  Airway patency: patent    PONV status at discharge: No PONV  Anesthetic complications: no      Respiratory status: unassisted  Hydration status: euvolemic  Follow-up not needed.          Vitals Value Taken Time   /72 12/27/22 0700   Temp 36.7 °C (98 °F) 12/27/22 0700   Pulse 73 12/27/22 0700   Resp 16 12/27/22 0958   SpO2 97 % 12/27/22 0700         Event Time   Out of Recovery 12/25/2022 17:23:53         Pain/Jayda Score: Pain Rating Prior to Med Admin: 6 (12/27/2022  9:58 AM)  Pain Rating Post Med Admin: 2 (12/27/2022  6:45 AM)

## 2022-12-27 NOTE — PROGRESS NOTES
[facility] Hospital Medicine Progress Note        Chief Complaint: Inpatient Follow-up for fracture    HPI:   Ms. Linda is a morbidly obese 61 yo female with pmhx HHTN who presents to the ED with c/o left hip pain after a ground level trip and fall landing on her left hip. There was no head trauma or LOC. States immediate pain to hip and unable to bear weight.  She does report that she takes Motrin daily for her chronic pain.      Upon arrival into the ED patient was hemodynamically stable and afebrile.  Laboratory work was unremarkable and CTA of the left hip showed a left intertrochanteric femur fracture with a prrbable left medial rectus muscle hematoma. Dr. Olivares was consulted and recommended admission.    Underwent intramedullary nailing for closed left intertrochanteric femur fracture on left side.  She tolerated procedure well.    Interval Hx:     Blood pressure running borderline low.  Otherwise hemodynamically stable.  Doing well on room air.  Pain is well controlled with medications.  Did well with PT yesterday.  No bowel movement yet.    Objective/physical exam:  Vitals:    12/26/22 1638 12/26/22 2040 12/27/22 0039 12/27/22 0431   BP: 121/63 (!) 98/58 (!) 98/53 98/63   Pulse: 79 88 96 87   Resp: 18 18 18 18   Temp: 98.5 °F (36.9 °C) 98.9 °F (37.2 °C) 97.7 °F (36.5 °C) 98.1 °F (36.7 °C)   TempSrc: Oral Oral Oral Oral   SpO2: (!) 94% 95% (!) 93% (!) 94%   Weight:       Height:         General: In no acute distress, afebrile  Respiratory: Clear to auscultation bilaterally  Cardiovascular: S1, S2, no appreciable murmur  Abdomen: Soft, nontender, BS +  MSK: Warm, no lower extremity edema, no clubbing or cyanosis  Neurologic: Alert and oriented x4, moving all extremities with good strength     Lab Results   Component Value Date     12/27/2022    K 4.3 12/27/2022    CO2 25 12/27/2022    BUN 16.1 12/27/2022    CREATININE 0.81 12/27/2022    CALCIUM 9.0 12/27/2022    EGFRNONAA  >60 05/13/2022      Lab Results   Component Value Date    ALT 22 12/27/2022    AST 39 (H) 12/27/2022    ALKPHOS 73 12/27/2022    BILITOT 0.8 12/27/2022      Lab Results   Component Value Date    WBC 11.5 12/27/2022    HGB 10.7 (L) 12/27/2022    HCT 34.6 (L) 12/27/2022    MCV 89.2 12/27/2022     12/27/2022           Medications:   busPIRone  7.5 mg Oral QHS    docusate sodium  100 mg Oral BID    enoxaparin  40 mg Subcutaneous Daily    ergocalciferol  50,000 Units Oral Q7 Days    famotidine  20 mg Oral Daily    mupirocin   Nasal BID    polyethylene glycol  17 g Oral Daily    venlafaxine  37.5 mg Oral Daily      acetaminophen, diphenhydrAMINE, HYDROcodone-acetaminophen, HYDROmorphone, melatonin, methocarbamoL, metoclopramide HCl, morphine, ondansetron, prochlorperazine, sodium chloride 0.9%     Assessment/Plan:    Closed left intertrochanteric fracture s/p nailing 12/25/2022   Possible left medial rectus muscle hematoma seen on imaging at admission    HX:  Morbid obesity, hypertension, anxiety, depression    Plan:  -continue current analgesics, add antispasmodics.  PT as tolerated.  Orthopedics following.  Continue postoperative surgical wound care  -counseled on importance of weight loss, pulmonary toileting, ambulation, adequate p.o. hydration nutrition.  She verbalizes understanding  -other home medications were reviewed and renewed .  Added vitamin-D, bowel regimen, pepcid    Lovenox   Needs rehab        Aleksandar Johnson MD

## 2022-12-27 NOTE — PROGRESS NOTES
Ortho Progress Note    Orthopaedic Injuries:  Left intertrochanteric femur fracture    Orthopaedic Surgeries:  Intramedullary nail placement to left femur 12/25/2022    S:  No acute events overnight.  Pain controlled.  Patient had not worked with therapy when I rounded I saw in the progress notes that she worked with therapy and sat on the edge of the bed later that day.  She denies any chest pain, fevers, chills, calf pain.    O:   Vitals:    12/26/22 1638   BP: 121/63   Pulse: 79   Resp: 18   Temp: 98.5 °F (36.9 °C)     Recent Labs     12/26/22  0427   WBC 13.7*   HGB 10.7*   HCT 34.3*        Recent Labs     12/26/22  0427      K 4.4   CO2 25   BUN 18.9     No results for input(s): ESR, CRP in the last 72 hours.    PE:  Gen: A+Ox3, NAD  Card: RRR by RP  Lungs: nonlabored breathing, symmetric chest rise  Abd: S/NT/ND  Left lower extremity:  Surgical dressing in place without any drainage.  Thigh is soft compressible.  Leg is soft compressible without any pain.  She can dorsiflex and plantar flex the foot and flex and extend the great toe.  Sensation light touch intact throughout the foot.  Foot is warm well perfused      A/P:  Status post left intramedullary nail placement for intertrochanteric femur fracture on 12/25/2022  - DVT ppx:  Lovenox  - PT/OT  - Pain control  ?  Weight-bearing status:  Weightbearing as tolerated

## 2022-12-28 PROCEDURE — 11000001 HC ACUTE MED/SURG PRIVATE ROOM

## 2022-12-28 PROCEDURE — 97166 OT EVAL MOD COMPLEX 45 MIN: CPT

## 2022-12-28 PROCEDURE — 25000003 PHARM REV CODE 250: Performed by: INTERNAL MEDICINE

## 2022-12-28 PROCEDURE — 97530 THERAPEUTIC ACTIVITIES: CPT | Mod: CQ

## 2022-12-28 PROCEDURE — 25000003 PHARM REV CODE 250: Performed by: EMERGENCY MEDICINE

## 2022-12-28 PROCEDURE — 63600175 PHARM REV CODE 636 W HCPCS: Performed by: STUDENT IN AN ORGANIZED HEALTH CARE EDUCATION/TRAINING PROGRAM

## 2022-12-28 PROCEDURE — 25000003 PHARM REV CODE 250: Performed by: NURSE PRACTITIONER

## 2022-12-28 RX ORDER — MICONAZOLE NITRATE 2 %
POWDER (GRAM) TOPICAL 2 TIMES DAILY PRN
Status: DISCONTINUED | OUTPATIENT
Start: 2022-12-28 | End: 2022-12-29 | Stop reason: HOSPADM

## 2022-12-28 RX ADMIN — ACETAMINOPHEN 650 MG: 325 TABLET, FILM COATED ORAL at 06:12

## 2022-12-28 RX ADMIN — ENOXAPARIN SODIUM 40 MG: 40 INJECTION SUBCUTANEOUS at 04:12

## 2022-12-28 RX ADMIN — HYDROCODONE BITARTRATE AND ACETAMINOPHEN 1 TABLET: 5; 325 TABLET ORAL at 09:12

## 2022-12-28 RX ADMIN — MUPIROCIN: 20 OINTMENT TOPICAL at 09:12

## 2022-12-28 RX ADMIN — DOCUSATE SODIUM 100 MG: 100 CAPSULE, LIQUID FILLED ORAL at 09:12

## 2022-12-28 RX ADMIN — HYDROCODONE BITARTRATE AND ACETAMINOPHEN 1 TABLET: 5; 325 TABLET ORAL at 01:12

## 2022-12-28 RX ADMIN — VENLAFAXINE 37.5 MG: 37.5 TABLET ORAL at 04:12

## 2022-12-28 RX ADMIN — BUSPIRONE HYDROCHLORIDE 7.5 MG: 7.5 TABLET ORAL at 08:12

## 2022-12-28 RX ADMIN — FAMOTIDINE 20 MG: 20 TABLET, FILM COATED ORAL at 09:12

## 2022-12-28 RX ADMIN — HYDROCODONE BITARTRATE AND ACETAMINOPHEN 1 TABLET: 5; 325 TABLET ORAL at 06:12

## 2022-12-28 RX ADMIN — POLYETHYLENE GLYCOL 3350 17 G: 17 POWDER, FOR SOLUTION ORAL at 09:12

## 2022-12-28 RX ADMIN — METHOCARBAMOL 500 MG: 500 TABLET ORAL at 09:12

## 2022-12-28 NOTE — PROGRESS NOTES
[facility] Hospital Medicine Progress Note        Chief Complaint: Inpatient Follow-up for fracture    HPI:   Ms. Linda is a morbidly obese 61 yo female with pmhx HHTN who presents to the ED with c/o left hip pain after a ground level trip and fall landing on her left hip. There was no head trauma or LOC. States immediate pain to hip and unable to bear weight.  She does report that she takes Motrin daily for her chronic pain.      Upon arrival into the ED patient was hemodynamically stable and afebrile.  Laboratory work was unremarkable and CTA of the left hip showed a left intertrochanteric femur fracture with a prrbable left medial rectus muscle hematoma. Dr. Olivares was consulted and recommended admission.   Patient is status postintramedullary nailing for closed left intertrochanteric femur fracture on left side.  She tolerated procedure well.      Interval Hx:    Patient seen and examined this morning was sitting in the chair family bedside pain was controlled    Objective/physical exam:  Vitals:    12/28/22 0310 12/28/22 0737 12/28/22 0911 12/28/22 1136   BP: 116/70 138/81  128/72   Pulse: 87 72  81   Resp: 18 18 16 18   Temp: 97.5 °F (36.4 °C) 97.8 °F (36.6 °C)  97.9 °F (36.6 °C)   TempSrc: Oral Oral  Oral   SpO2: (!) 94% (!) 93%  96%   Weight:       Height:         General: In no acute distress, afebrile  Respiratory: Clear to auscultation bilaterally  Cardiovascular: S1, S2, no appreciable murmur  Abdomen: Soft, nontender, BS +  MSK: Warm, no lower extremity edema, no clubbing or cyanosis  Neurologic: Alert and oriented x4, moving all extremities with good strength     Lab Results   Component Value Date     12/27/2022    K 4.3 12/27/2022    CO2 25 12/27/2022    BUN 16.1 12/27/2022    CREATININE 0.81 12/27/2022    CALCIUM 9.0 12/27/2022    EGFRNONAA >60 05/13/2022      Lab Results   Component Value Date    ALT 22 12/27/2022    AST 39 (H) 12/27/2022    ALKPHOS 73 12/27/2022     BILITOT 0.8 12/27/2022      Lab Results   Component Value Date    WBC 11.5 12/27/2022    HGB 10.7 (L) 12/27/2022    HCT 34.6 (L) 12/27/2022    MCV 89.2 12/27/2022     12/27/2022           Medications:   busPIRone  7.5 mg Oral QHS    docusate sodium  100 mg Oral BID    enoxaparin  40 mg Subcutaneous Daily    ergocalciferol  50,000 Units Oral Q7 Days    famotidine  20 mg Oral Daily    mupirocin   Nasal BID    polyethylene glycol  17 g Oral Daily    venlafaxine  37.5 mg Oral Daily      acetaminophen, diphenhydrAMINE, HYDROcodone-acetaminophen, HYDROmorphone, melatonin, methocarbamoL, metoclopramide HCl, morphine, ondansetron, prochlorperazine, sodium chloride 0.9%     Assessment/Plan:    Closed left intertrochanteric fracture s/p kneeling 12/25/2022   Possible left medial rectus muscle hematoma seen on imaging at admission    HX:  Morbid obesity, hypertension, anxiety, depression    Plan:   Continue with PT and OT  This morning patient sat in the chair for the 1st time   Case management consulted for rehab placement  Continue with stool softeners    Lovenox

## 2022-12-28 NOTE — PLAN OF CARE
12/28/22 1313   Discharge Assessment   Assessment Type Discharge Planning Assessment   Confirmed/corrected address, phone number and insurance Yes   Confirmed Demographics Correct on Facesheet   Source of Information patient   Reason For Admission Fall, L hip fx s/p nailing   People in Home child(liset), adult  (Pt lives with her sonFrancis in a single story home with 4 steps to enter home with no rails)   Do you expect to return to your current living situation? No  (pt would like to go to [facility] upon dc)   Do you have help at home or someone to help you manage your care at home? No   Prior to hospitilization cognitive status: Alert/Oriented   Current cognitive status: Alert/Oriented   Walking or Climbing Stairs   (Independent with mobility)   Home Layout Able to live on 1st floor   Equipment Currently Used at Home none   Patient currently being followed by outpatient case management? No   Do you currently have service(s) that help you manage your care at home? No   Who is going to help you get home at discharge? pt would like to go to [facility] upon dc   How do you get to doctors appointments? car, drives self   Are you on dialysis? No   Discharge Plan A   ([facility])   Discharge Plan B   ([facility])   DME Needed Upon Discharge  other (see comments)  (TBD)   Discharge Plan discussed with: Patient   Discharge Barriers Identified None   Housing Stability   In the last 12 months, was there a time when you were not able to pay the mortgage or rent on time? N   Transportation Needs   In the past 12 months, has lack of transportation kept you from medical appointments or from getting medications? no   Food Insecurity   Within the past 12 months, you worried that your food would run out before you got the money to buy more. Never true   OTHER   Name(s) of People in Home sonFrancis     Pt's PCP is Sarita SCHULTZ who is located in [place]. Her  is her  daughter, Hillary ([phone]). She was independent with mobility, driving, and working at [employer] where she is required to lift > 50lbs. She uses [facility] in [place].  Pt would like to go to [facility]. FOC obtained. Referral sent to [facility] thru care port.

## 2022-12-28 NOTE — PT/OT/SLP PROGRESS
Physical Therapy         Treatment        Tanya Linda   MRN: [id number]     PT Received On: 12/28/22  PT Start Time: 0855     PT Stop Time: 0923    PT Total Time (min): 28 min       Billable Minutes:  Therapeutic Activity 28  Total Minutes: 28    Treatment Type: Treatment  PT/PTA: PTA     PTA Visit Number: 2       General Precautions: Standard, fall  Orthopedic Precautions: Orthopedic Precautions : LLE weight bearing as tolerated   Braces:           Subjective:  Communicated with NSG prior to session.    Pain/Comfort  Location - Side 1: Left  Location 1: leg  Pain Addressed 1: Reposition, Distraction    Objective:  Patient found in bed with HOB elevated.     Functional Mobility:  Bed Mobility:   Supine to sit: Moderate Assistance   Sit to supine: Activity did not occur   Rolling: Activity did not occur   Scooting: Minimal Assistance    Balance:   Static Sit: GOOD+: Takes MAXIMAL challenges from all directions.    Dynamic Sit:  GOOD: Maintains balance through MODERATE excursions of active trunk movement  Static Stand: POOR+: Needs MINIMAL assist to maintain  Dynamic stand: POOR: Needs MOD (moderate) assist during gait    Transfer Training:  Sit to stand:Moderate Assistance with Rolling Walker . 1 trial from EOB and 1 trial from bedside chair.   Bed <> Chair:  Stand Pivot with Moderate Assistance with Rolling Walker . Pt T/F EOB>bedside chair. Pt T/F towards her L side. Only able to pivot BL feet and not clear them from floor during T/F.     Additional Treatment:    Activity Tolerance:  Patient limited by fatigue and Patient limited by pain    Patient left up in chair with all lines intact and call button in reach.    Assessment:  Tanya Linda is a 62 y.o. female with a medical diagnosis of intertrochanteric L hip fx, s/p IM Nail.  She presents with the following impairments/functional limitations: weakness, gait instability, impaired balance, impaired endurance, impaired functional mobility.  Pt would benefit from  further rehab therapy services to increase overall independence level and assist in getting pt closer to PLOF.  Pt required increased time for all activities.     Rehab potential is excellent.    Activity tolerance: Excellent    Discharge recommendations: Discharge Facility/Level of Care Needs: rehabilitation facility     Equipment recommendations: Equipment Needed After Discharge: walker, rolling     GOALS:   Multidisciplinary Problems       Physical Therapy Goals          Problem: Physical Therapy    Goal Priority Disciplines Outcome Goal Variances Interventions   Physical Therapy Goal     PT, PT/OT Ongoing, Progressing     Description: Goals to be met by: 23     Patient will increase functional independence with mobility by performin. Supine to sit with Stand-by Assistance  2. Sit to supine with Stand-by Assistance  3. Sit to stand transfer with Stand-by Assistance  4. Bed to chair transfer with Stand-by Assistance using Rolling Walker  5. Gait  x 50 feet with Modified [place] using Rolling Walker.                          PLAN:    Patient to be seen daily  to address the above listed problems via gait training, therapeutic activities, therapeutic exercises  Plan of Care expires: 23  Plan of Care reviewed with: patient, daughter         2022

## 2022-12-28 NOTE — PLAN OF CARE
Problem: Occupational Therapy  Goal: Occupational Therapy Goal  Description: Goals to be met by: 1/11     Patient will increase functional independence with ADLs by performing:    LE Dressing with Stand-by Assistance.  Grooming while seated at sink with Modified [place].  Toileting from bedside commode with Stand-by Assistance for hygiene and clothing management.   Toilet transfer to bedside commode with Stand-by Assistance.    Outcome: Ongoing, Progressing

## 2022-12-28 NOTE — PT/OT/SLP EVAL
Occupational Therapy   Evaluation    Name: Tanya Linda  MRN: [id number]  Admitting Diagnosis: Fall: L IT femur fx s/p IM nail   Recent Surgery: Procedure(s) (LRB):  INSERTION, INTRAMEDULLARY JARETH, FEMUR, ANTEGRADE (Left) 3 Days Post-Op    Recommendations:     Discharge Recommendations: rehabilitation facility  Discharge Equipment Recommendations:   (TBD pending progress)  Barriers to discharge:  Other (Comment) (Severity of deficits)    Assessment:     Tanya Linda is a 62 y.o. female with a medical diagnosis of Fall: L IT femur fx s/p IM nail. Performance deficits affecting function: weakness, impaired functional mobility, impaired endurance, pain, impaired balance, impaired self care skills.      Rehab Prognosis: Good; patient would benefit from acute skilled OT services to address these deficits and reach maximum level of function.       Plan:     Patient to be seen 5 x/week, daily to address the above listed problems via self-care/home management, therapeutic activities, therapeutic exercises  Plan of Care Expires: 01/11/23  Plan of Care Reviewed with: patient    Subjective     Chief Complaint: Pain in LLE   Patient/Family Comments/goals: To return to [facility]     Occupational Profile:  Living Environment: Pt lives c her son but plans to d/c to her daughter's house. Reported she has a ramp to enter the porch and 1 step into the apartment. Reported the pt's bedroom and toilet would be on the first floor but the tub is on the 2nd level (~12 steps c a R rail to 2nd story).   Previous level of function: IND c ADLs and mobility   Roles and Routines: Works as a cook at [employer]  Equipment Used at Home: none  Assistance upon Discharge: Pt's daughter will be able to assist at d/c.     Pain/Comfort:  Location - Side 1: Left  Location 1: leg  Pain Addressed 1: Reposition    Patients cultural, spiritual, Shinto conflicts given the current situation: no    Objective:     Communicated with: RN prior to session.  Patient found up in  chair with PureWick upon OT entry to room.    General Precautions: Standard, fall  Orthopedic Precautions: LLE weight bearing as tolerated  Braces: N/A  Respiratory Status: Room air    Occupational Performance:    Bed Mobility:    Did not occur; Pt seated UI upon arrival.     Functional Mobility/Transfers:  Patient completed Sit <> Stand Transfer with moderate assistance and of 2 persons  with  rolling walker   Functional Mobility: Pt performed sit<>stand from bedside chair x2 trials c Mod Ax2. Pt unable to pivot to R side, but was able to pivot towards L c Min A. Hoyed pad and geomat placed under pt in chair.     Activities of Daily Living:  Lower Body Dressing: maximal assistance    Toileting: maximal assistance      Cognitive/Visual Perceptual:  Cognitive/Psychosocial Skills:     -       Mood/Affect/Coping skills/emotional control: Appropriate to situation and Cooperative    Physical Exam:  Upper Extremity Strength:    -       Right Upper Extremity: WFL  -       Left Upper Extremity: WFL      Treatment & Education:  Pt educated on OT POC- verbalized understanding.     Patient left up in chair with all lines intact and call button in reach    GOALS:   Multidisciplinary Problems       Occupational Therapy Goals          Problem: Occupational Therapy    Goal Priority Disciplines Outcome Interventions   Occupational Therapy Goal     OT, PT/OT Ongoing, Progressing    Description: Goals to be met by: 1/11     Patient will increase functional independence with ADLs by performing:    LE Dressing with Stand-by Assistance.  Grooming while seated at sink with Modified [place].  Toileting from bedside commode with Stand-by Assistance for hygiene and clothing management.   Toilet transfer to bedside commode with Stand-by Assistance.                         History:     Past Medical History:   Diagnosis Date    Personal history of colonic polyps 12/02/2021    Dr. Andrey Vizcarra         Past Surgical History:   Procedure  Laterality Date    COLONOSCOPY  12/02/2021    Dr. Andrey Vizcarra       Time Tracking:     OT Date of Treatment: 12/28/22  OT Start Time: 1030  OT Stop Time: 1055  OT Total Time (min): 25 min    Billable Minutes:Evaluation Moderate complexity     12/28/2022

## 2022-12-29 ENCOUNTER — HOSPITAL ENCOUNTER (INPATIENT)
Facility: HOSPITAL | Age: 62
LOS: 22 days | Discharge: HOME HEALTH | DRG: 560 | End: 2023-01-20
Attending: STUDENT IN AN ORGANIZED HEALTH CARE EDUCATION/TRAINING PROGRAM | Admitting: STUDENT IN AN ORGANIZED HEALTH CARE EDUCATION/TRAINING PROGRAM
Payer: COMMERCIAL

## 2022-12-29 VITALS
DIASTOLIC BLOOD PRESSURE: 84 MMHG | HEIGHT: 65 IN | WEIGHT: 293 LBS | BODY MASS INDEX: 48.82 KG/M2 | SYSTOLIC BLOOD PRESSURE: 146 MMHG | HEART RATE: 72 BPM | TEMPERATURE: 99 F | RESPIRATION RATE: 18 BRPM | OXYGEN SATURATION: 96 %

## 2022-12-29 DIAGNOSIS — R07.9 CHEST PAIN: ICD-10-CM

## 2022-12-29 DIAGNOSIS — S72.92XA FEMUR FRACTURE, LEFT: ICD-10-CM

## 2022-12-29 DIAGNOSIS — S72.142A CLOSED INTERTROCHANTERIC FRACTURE OF HIP, LEFT, INITIAL ENCOUNTER: Primary | ICD-10-CM

## 2022-12-29 LAB
ANION GAP SERPL CALC-SCNC: 10 MEQ/L
BASOPHILS # BLD AUTO: 0.05 X10(3)/MCL (ref 0–0.2)
BASOPHILS NFR BLD AUTO: 0.5 %
BUN SERPL-MCNC: 14.4 MG/DL (ref 9.8–20.1)
CALCIUM SERPL-MCNC: 8.9 MG/DL (ref 8.4–10.2)
CHLORIDE SERPL-SCNC: 100 MMOL/L (ref 98–107)
CO2 SERPL-SCNC: 26 MMOL/L (ref 23–31)
CREAT SERPL-MCNC: 0.73 MG/DL (ref 0.55–1.02)
CREAT/UREA NIT SERPL: 20
EOSINOPHIL # BLD AUTO: 0.3 X10(3)/MCL (ref 0–0.9)
EOSINOPHIL NFR BLD AUTO: 2.8 %
ERYTHROCYTE [DISTWIDTH] IN BLOOD BY AUTOMATED COUNT: 14.5 % (ref 11–14.5)
GFR SERPLBLD CREATININE-BSD FMLA CKD-EPI: >60 MLS/MIN/1.73/M2
GLUCOSE SERPL-MCNC: 107 MG/DL (ref 82–115)
HCT VFR BLD AUTO: 31.7 % (ref 37–47)
HGB BLD-MCNC: 9.8 GM/DL (ref 12–16)
IMM GRANULOCYTES # BLD AUTO: 0.07 X10(3)/MCL (ref 0–0.04)
IMM GRANULOCYTES NFR BLD AUTO: 0.6 %
LYMPHOCYTES # BLD AUTO: 2.11 X10(3)/MCL (ref 0.6–4.6)
LYMPHOCYTES NFR BLD AUTO: 19.4 %
MCH RBC QN AUTO: 27.5 PG
MCHC RBC AUTO-ENTMCNC: 30.9 MG/DL (ref 33–36)
MCV RBC AUTO: 89 FL (ref 80–94)
MONOCYTES # BLD AUTO: 0.72 X10(3)/MCL (ref 0.1–1.3)
MONOCYTES NFR BLD AUTO: 6.6 %
NEUTROPHILS # BLD AUTO: 7.65 X10(3)/MCL (ref 2.1–9.2)
NEUTROPHILS NFR BLD AUTO: 70.1 %
NRBC BLD AUTO-RTO: 0 % (ref 0–1)
PLATELET # BLD AUTO: 241 X10(3)/MCL (ref 140–371)
PMV BLD AUTO: 11.2 FL (ref 9.4–12.4)
POTASSIUM SERPL-SCNC: 4.7 MMOL/L (ref 3.5–5.1)
RBC # BLD AUTO: 3.56 X10(6)/MCL (ref 4.2–5.4)
SODIUM SERPL-SCNC: 136 MMOL/L (ref 136–145)
WBC # SPEC AUTO: 10.9 X10(3)/MCL (ref 4.5–11.5)

## 2022-12-29 PROCEDURE — 36415 COLL VENOUS BLD VENIPUNCTURE: CPT | Performed by: INTERNAL MEDICINE

## 2022-12-29 PROCEDURE — 97110 THERAPEUTIC EXERCISES: CPT | Mod: CQ

## 2022-12-29 PROCEDURE — 97530 THERAPEUTIC ACTIVITIES: CPT | Mod: CQ

## 2022-12-29 PROCEDURE — 80048 BASIC METABOLIC PNL TOTAL CA: CPT | Performed by: INTERNAL MEDICINE

## 2022-12-29 PROCEDURE — 97535 SELF CARE MNGMENT TRAINING: CPT

## 2022-12-29 PROCEDURE — 85025 COMPLETE CBC W/AUTO DIFF WBC: CPT | Performed by: INTERNAL MEDICINE

## 2022-12-29 PROCEDURE — 25000003 PHARM REV CODE 250: Performed by: INTERNAL MEDICINE

## 2022-12-29 PROCEDURE — 25000003 PHARM REV CODE 250: Performed by: STUDENT IN AN ORGANIZED HEALTH CARE EDUCATION/TRAINING PROGRAM

## 2022-12-29 PROCEDURE — 63600175 PHARM REV CODE 636 W HCPCS: Performed by: STUDENT IN AN ORGANIZED HEALTH CARE EDUCATION/TRAINING PROGRAM

## 2022-12-29 PROCEDURE — 25000003 PHARM REV CODE 250: Performed by: EMERGENCY MEDICINE

## 2022-12-29 PROCEDURE — 11000004 HC SNF PRIVATE

## 2022-12-29 RX ORDER — HYDROMORPHONE HYDROCHLORIDE 2 MG/ML
0.4 INJECTION, SOLUTION INTRAMUSCULAR; INTRAVENOUS; SUBCUTANEOUS EVERY 10 MIN PRN
Status: DISCONTINUED | OUTPATIENT
Start: 2022-12-29 | End: 2023-01-20 | Stop reason: HOSPADM

## 2022-12-29 RX ORDER — MICONAZOLE NITRATE 2 %
POWDER (GRAM) TOPICAL 2 TIMES DAILY
Status: DISCONTINUED | OUTPATIENT
Start: 2022-12-29 | End: 2022-12-29 | Stop reason: HOSPADM

## 2022-12-29 RX ORDER — METOCLOPRAMIDE HYDROCHLORIDE 5 MG/ML
10 INJECTION INTRAMUSCULAR; INTRAVENOUS EVERY 10 MIN PRN
Status: DISCONTINUED | OUTPATIENT
Start: 2022-12-29 | End: 2023-01-20 | Stop reason: HOSPADM

## 2022-12-29 RX ORDER — VENLAFAXINE 37.5 MG/1
37.5 TABLET ORAL DAILY
Status: DISCONTINUED | OUTPATIENT
Start: 2022-12-29 | End: 2023-01-20 | Stop reason: HOSPADM

## 2022-12-29 RX ORDER — POLYETHYLENE GLYCOL 3350 17 G/17G
17 POWDER, FOR SOLUTION ORAL 3 TIMES DAILY PRN
Status: DISCONTINUED | OUTPATIENT
Start: 2022-12-29 | End: 2023-01-20 | Stop reason: HOSPADM

## 2022-12-29 RX ORDER — BUSPIRONE HYDROCHLORIDE 7.5 MG/1
7.5 TABLET ORAL NIGHTLY
Status: DISCONTINUED | OUTPATIENT
Start: 2022-12-29 | End: 2023-01-20 | Stop reason: HOSPADM

## 2022-12-29 RX ORDER — MAG HYDROX/ALUMINUM HYD/SIMETH 200-200-20
30 SUSPENSION, ORAL (FINAL DOSE FORM) ORAL 4 TIMES DAILY PRN
Status: DISCONTINUED | OUTPATIENT
Start: 2022-12-29 | End: 2023-01-20 | Stop reason: HOSPADM

## 2022-12-29 RX ORDER — SIMETHICONE 80 MG
1 TABLET,CHEWABLE ORAL 4 TIMES DAILY PRN
Status: DISCONTINUED | OUTPATIENT
Start: 2022-12-29 | End: 2023-01-20 | Stop reason: HOSPADM

## 2022-12-29 RX ORDER — GLUCAGON 1 MG
1 KIT INJECTION
Status: DISCONTINUED | OUTPATIENT
Start: 2022-12-29 | End: 2023-01-20 | Stop reason: HOSPADM

## 2022-12-29 RX ORDER — DOCUSATE SODIUM 100 MG/1
100 CAPSULE, LIQUID FILLED ORAL 2 TIMES DAILY
Status: DISCONTINUED | OUTPATIENT
Start: 2022-12-29 | End: 2023-01-20 | Stop reason: HOSPADM

## 2022-12-29 RX ORDER — TALC
6 POWDER (GRAM) TOPICAL NIGHTLY PRN
Status: DISCONTINUED | OUTPATIENT
Start: 2022-12-29 | End: 2022-12-29

## 2022-12-29 RX ORDER — TALC
9 POWDER (GRAM) TOPICAL NIGHTLY PRN
Status: DISCONTINUED | OUTPATIENT
Start: 2022-12-29 | End: 2023-01-20 | Stop reason: HOSPADM

## 2022-12-29 RX ORDER — ACETAMINOPHEN 325 MG/1
650 TABLET ORAL EVERY 4 HOURS PRN
Status: DISCONTINUED | OUTPATIENT
Start: 2022-12-29 | End: 2023-01-20 | Stop reason: HOSPADM

## 2022-12-29 RX ORDER — HYDROCODONE BITARTRATE AND ACETAMINOPHEN 5; 325 MG/1; MG/1
1 TABLET ORAL EVERY 4 HOURS PRN
Refills: 0 | Status: ON HOLD
Start: 2022-12-29 | End: 2023-01-20 | Stop reason: HOSPADM

## 2022-12-29 RX ORDER — IBUPROFEN 200 MG
16 TABLET ORAL
Status: DISCONTINUED | OUTPATIENT
Start: 2022-12-29 | End: 2023-01-20 | Stop reason: HOSPADM

## 2022-12-29 RX ORDER — FAMOTIDINE 20 MG/1
20 TABLET, FILM COATED ORAL DAILY
Status: DISCONTINUED | OUTPATIENT
Start: 2022-12-30 | End: 2023-01-20 | Stop reason: HOSPADM

## 2022-12-29 RX ORDER — MICONAZOLE NITRATE 2 %
POWDER (GRAM) TOPICAL 2 TIMES DAILY
Status: DISCONTINUED | OUTPATIENT
Start: 2022-12-29 | End: 2023-01-20 | Stop reason: HOSPADM

## 2022-12-29 RX ORDER — HYDROCODONE BITARTRATE AND ACETAMINOPHEN 5; 325 MG/1; MG/1
1 TABLET ORAL EVERY 4 HOURS PRN
Status: DISCONTINUED | OUTPATIENT
Start: 2022-12-29 | End: 2023-01-20 | Stop reason: HOSPADM

## 2022-12-29 RX ORDER — DIPHENHYDRAMINE HYDROCHLORIDE 50 MG/ML
25 INJECTION INTRAMUSCULAR; INTRAVENOUS EVERY 6 HOURS PRN
Status: DISCONTINUED | OUTPATIENT
Start: 2022-12-29 | End: 2023-01-20 | Stop reason: HOSPADM

## 2022-12-29 RX ORDER — PROCHLORPERAZINE EDISYLATE 5 MG/ML
5 INJECTION INTRAMUSCULAR; INTRAVENOUS EVERY 30 MIN PRN
Status: DISCONTINUED | OUTPATIENT
Start: 2022-12-29 | End: 2023-01-20 | Stop reason: HOSPADM

## 2022-12-29 RX ORDER — ACETAMINOPHEN 325 MG/1
650 TABLET ORAL EVERY 6 HOURS PRN
Status: DISCONTINUED | OUTPATIENT
Start: 2022-12-29 | End: 2022-12-29

## 2022-12-29 RX ORDER — POLYETHYLENE GLYCOL 3350 17 G/17G
17 POWDER, FOR SOLUTION ORAL DAILY
Status: DISCONTINUED | OUTPATIENT
Start: 2022-12-30 | End: 2023-01-08

## 2022-12-29 RX ORDER — IBUPROFEN 200 MG
24 TABLET ORAL
Status: DISCONTINUED | OUTPATIENT
Start: 2022-12-29 | End: 2023-01-20 | Stop reason: HOSPADM

## 2022-12-29 RX ORDER — SODIUM CHLORIDE 0.9 % (FLUSH) 0.9 %
10 SYRINGE (ML) INJECTION
Status: DISCONTINUED | OUTPATIENT
Start: 2022-12-29 | End: 2023-01-20 | Stop reason: HOSPADM

## 2022-12-29 RX ORDER — ONDANSETRON 2 MG/ML
4 INJECTION INTRAMUSCULAR; INTRAVENOUS EVERY 8 HOURS PRN
Status: DISCONTINUED | OUTPATIENT
Start: 2022-12-29 | End: 2023-01-20 | Stop reason: HOSPADM

## 2022-12-29 RX ORDER — IPRATROPIUM BROMIDE AND ALBUTEROL SULFATE 2.5; .5 MG/3ML; MG/3ML
3 SOLUTION RESPIRATORY (INHALATION) EVERY 6 HOURS PRN
Status: DISCONTINUED | OUTPATIENT
Start: 2022-12-29 | End: 2023-01-20 | Stop reason: HOSPADM

## 2022-12-29 RX ORDER — ENOXAPARIN SODIUM 100 MG/ML
40 INJECTION SUBCUTANEOUS EVERY 24 HOURS
Status: DISCONTINUED | OUTPATIENT
Start: 2022-12-29 | End: 2022-12-30

## 2022-12-29 RX ORDER — MICONAZOLE NITRATE 2 %
POWDER (GRAM) TOPICAL 2 TIMES DAILY PRN
Status: DISCONTINUED | OUTPATIENT
Start: 2022-12-29 | End: 2023-01-20 | Stop reason: HOSPADM

## 2022-12-29 RX ORDER — METHOCARBAMOL 500 MG/1
500 TABLET, FILM COATED ORAL 3 TIMES DAILY PRN
Status: DISCONTINUED | OUTPATIENT
Start: 2022-12-29 | End: 2023-01-20 | Stop reason: HOSPADM

## 2022-12-29 RX ORDER — BISACODYL 10 MG
10 SUPPOSITORY, RECTAL RECTAL DAILY PRN
Status: DISCONTINUED | OUTPATIENT
Start: 2022-12-29 | End: 2023-01-20 | Stop reason: HOSPADM

## 2022-12-29 RX ORDER — MORPHINE SULFATE 4 MG/ML
4 INJECTION, SOLUTION INTRAMUSCULAR; INTRAVENOUS EVERY 4 HOURS PRN
Status: DISCONTINUED | OUTPATIENT
Start: 2022-12-29 | End: 2023-01-20 | Stop reason: HOSPADM

## 2022-12-29 RX ORDER — ERGOCALCIFEROL 1.25 MG/1
50000 CAPSULE ORAL
Status: DISCONTINUED | OUTPATIENT
Start: 2022-12-30 | End: 2023-01-20 | Stop reason: HOSPADM

## 2022-12-29 RX ORDER — MUPIROCIN 20 MG/G
OINTMENT TOPICAL 2 TIMES DAILY
Status: COMPLETED | OUTPATIENT
Start: 2022-12-29 | End: 2022-12-30

## 2022-12-29 RX ORDER — NALOXONE HCL 0.4 MG/ML
0.02 VIAL (ML) INJECTION
Status: DISCONTINUED | OUTPATIENT
Start: 2022-12-29 | End: 2023-01-20 | Stop reason: HOSPADM

## 2022-12-29 RX ORDER — POLYETHYLENE GLYCOL 3350 17 G/17G
17 POWDER, FOR SOLUTION ORAL DAILY
Refills: 0
Start: 2022-12-30 | End: 2023-09-21

## 2022-12-29 RX ADMIN — HYDROCODONE BITARTRATE AND ACETAMINOPHEN 1 TABLET: 5; 325 TABLET ORAL at 05:12

## 2022-12-29 RX ADMIN — MUPIROCIN: 20 OINTMENT TOPICAL at 08:12

## 2022-12-29 RX ADMIN — MICONAZOLE NITRATE 2 % TOPICAL POWDER: at 08:12

## 2022-12-29 RX ADMIN — VENLAFAXINE 37.5 MG: 37.5 TABLET ORAL at 04:12

## 2022-12-29 RX ADMIN — HYDROCODONE BITARTRATE AND ACETAMINOPHEN 1 TABLET: 5; 325 TABLET ORAL at 01:12

## 2022-12-29 RX ADMIN — BUSPIRONE HYDROCHLORIDE 7.5 MG: 7.5 TABLET ORAL at 08:12

## 2022-12-29 RX ADMIN — HYDROCODONE BITARTRATE AND ACETAMINOPHEN 1 TABLET: 5; 325 TABLET ORAL at 09:12

## 2022-12-29 RX ADMIN — DOCUSATE SODIUM 100 MG: 100 CAPSULE, LIQUID FILLED ORAL at 09:12

## 2022-12-29 RX ADMIN — FAMOTIDINE 20 MG: 20 TABLET, FILM COATED ORAL at 09:12

## 2022-12-29 RX ADMIN — POLYETHYLENE GLYCOL 3350 17 G: 17 POWDER, FOR SOLUTION ORAL at 09:12

## 2022-12-29 RX ADMIN — HYDROCODONE BITARTRATE AND ACETAMINOPHEN 1 TABLET: 5; 325 TABLET ORAL at 06:12

## 2022-12-29 RX ADMIN — ENOXAPARIN SODIUM 40 MG: 40 INJECTION SUBCUTANEOUS at 04:12

## 2022-12-29 RX ADMIN — HYDROCODONE BITARTRATE AND ACETAMINOPHEN 1 TABLET: 5; 325 TABLET ORAL at 10:12

## 2022-12-29 NOTE — CARE UPDATE
[id number] Dr Condon will put in dc orders. Informed Rachel with [facility] that pt can go to their facility today. Rachel asked that the nurse call report to Donna at [phone]. Will schedule transport with Magda

## 2022-12-29 NOTE — NURSING
Patient to discharge to [facility] today. Report was called and given to Rosetta Brown RN. Patient history, Admitting diagnosis,and surgical procedures were discussed with RN. Patient will leave with IV per nurse request. Patient and family bedside and gathering all patient belongs. No further questions were asked. Patient to be picked up with facility transport. Patient stable upon discharge.

## 2022-12-29 NOTE — HPI
Ms. Linda is a 62-year-old  female with history of morbid obesity, hypertension, depression, GERD, and anxiety who presented to [facility] ER on 12/24/2022 with left hip pain following a fall.  X-ray of the left hip and femur showed a displaced intertrochanteric fracture of the left femur and chest x-ray revealed no acute cardiopulmonary process. CT of the left hip confirmed a comminuted intertrochanteric left femoral fracture and orthopedics was consulted.  She was taken to the OR on 12/25/2022 for intramedullary nail of left intertrochanteric femur fracture by Dr. Ronnell Olivares and she did relatively well postoperatively.  Her lisinopril was held due to mild hypotension and her blood pressure stabilized.  She had no other complications throughout her hospital course and she was transferred to [facility] swing bed on 12/29/2022 for PT/OT and management of her multiple medical comorbidities.

## 2022-12-29 NOTE — PROGRESS NOTES
Ortho Progress Note    Orthopaedic Injuries:  Left intertrochanteric femur fracture    Orthopaedic Surgeries:  Intramedullary nail placement to left femur 12/25/2022    S:  No acute events overnight.  Pain controlled.  Patient is doing well with therapy.  No chest pain, shortness of breath, calf pain.    O:   Vitals:    12/28/22 1518   BP: 117/69   Pulse: 83   Resp: 18   Temp: 98.8 °F (37.1 °C)     Recent Labs     12/27/22  0508   WBC 11.5   HGB 10.7*   HCT 34.6*        Recent Labs     12/27/22  0508      K 4.3   CO2 25   BUN 16.1     No results for input(s): ESR, CRP in the last 72 hours.    PE:  Gen: A+Ox3, NAD  Card: RRR by RP  Lungs: nonlabored breathing, symmetric chest rise  Abd: S/NT/ND  Left lower extremity:  Surgical dressing in place without any drainage.  Thigh is soft compressible.  Leg is soft compressible without any pain.  She can dorsiflex and plantar flex the foot and flex and extend the great toe.  Sensation light touch intact throughout the foot.  Foot is warm well perfused      A/P:  Status post left intramedullary nail placement for intertrochanteric femur fracture on 12/25/2022  - DVT ppx:  Lovenox  - PT/OT  - Pain control  ?  Weight-bearing status:  Weightbearing as tolerated

## 2022-12-29 NOTE — PLAN OF CARE
Problem: Adult Inpatient Plan of Care  Goal: Plan of Care Review  Outcome: Ongoing, Progressing  Flowsheets (Taken 12/29/2022 1558)  Plan of Care Reviewed With: patient  Goal: Patient-Specific Goal (Individualized)  Outcome: Ongoing, Progressing  Goal: Absence of Hospital-Acquired Illness or Injury  Outcome: Ongoing, Progressing  Intervention: Identify and Manage Fall Risk  Flowsheets (Taken 12/29/2022 1558)  Safety Promotion/Fall Prevention:   assistive device/personal item within reach   bed alarm set   nonskid shoes/socks when out of bed   side rails raised x 3   instructed to call staff for mobility  Intervention: Prevent Skin Injury  Flowsheets (Taken 12/29/2022 1558)  Body Position: sitting up in bed  Skin Protection:   adhesive use limited   incontinence pads utilized  Intervention: Prevent and Manage VTE (Venous Thromboembolism) Risk  Flowsheets (Taken 12/29/2022 1558)  Activity Management: Up to bedside commode - L3  VTE Prevention/Management:   ambulation promoted   bleeding precations maintained   fluids promoted  Range of Motion: active ROM (range of motion) encouraged  Intervention: Prevent Infection  Flowsheets (Taken 12/29/2022 1558)  Infection Prevention:   equipment surfaces disinfected   hand hygiene promoted   single patient room provided   rest/sleep promoted   personal protective equipment utilized  Goal: Optimal Comfort and Wellbeing  Outcome: Ongoing, Progressing  Intervention: Monitor Pain and Promote Comfort  Flowsheets (Taken 12/29/2022 1558)  Pain Management Interventions:   medication offered   quiet environment facilitated  Intervention: Provide Person-Centered Care  Flowsheets (Taken 12/29/2022 1558)  Trust Relationship/Rapport:   care explained   questions answered   reassurance provided  Goal: Readiness for Transition of Care  Outcome: Ongoing, Progressing     Problem: Bariatric Environmental Safety  Goal: Safety Maintained with Care  Outcome: Ongoing, Progressing  Intervention:  Promote Safety and Comfort  Flowsheets (Taken 12/29/2022 1558)  Bariatric Safety: bariatric commode at bedside     Problem: Impaired Wound Healing  Goal: Optimal Wound Healing  Outcome: Ongoing, Progressing  Intervention: Promote Wound Healing  Flowsheets (Taken 12/29/2022 1558)  Oral Nutrition Promotion: social interaction promoted  Sleep/Rest Enhancement:   awakenings minimized   regular sleep/rest pattern promoted  Activity Management: Up to bedside commode - L3  Pain Management Interventions:   medication offered   quiet environment facilitated     Problem: Skin Injury Risk Increased  Goal: Skin Health and Integrity  Outcome: Ongoing, Progressing  Intervention: Optimize Skin Protection  Flowsheets (Taken 12/29/2022 1558)  Pressure Reduction Techniques: frequent weight shift encouraged  Pressure Reduction Devices: positioning supports utilized  Skin Protection:   adhesive use limited   incontinence pads utilized  Head of Bed (HOB) Positioning: HOB at 20-30 degrees  Intervention: Promote and Optimize Oral Intake  Flowsheets (Taken 12/29/2022 1558)  Oral Nutrition Promotion: social interaction promoted

## 2022-12-29 NOTE — CARE UPDATE
[id number] Schedule transport with Magda for 2:00 for pt to go to [facility]. Informed pt's nurse

## 2022-12-29 NOTE — PT/OT/SLP PROGRESS
Physical Therapy         Treatment        Tanya Linda   MRN: [id number]     PT Received On: 12/29/22  PT Start Time: 0757     PT Stop Time: 0823    PT Total Time (min): 26 min       Billable Minutes:  Therapeutic Activity 26  Total Minutes: 26    Treatment Type: Treatment  PT/PTA: PTA     PTA Visit Number: 3       General Precautions: Standard, fall  Orthopedic Precautions: Orthopedic Precautions : LLE weight bearing as tolerated   Braces:           Subjective:  Communicated with NSG prior to session.    Pain/Comfort  Location - Side 1: Left  Location 1: leg  Pain Addressed 1: Reposition, Distraction    Objective:  Patient found in bed with HOB elevated, with Patient found with: ZOOM TV    Functional Mobility:  Bed Mobility:   Supine to sit: Maximum Assistance. maxAx2   Sit to supine: Activity did not occur   Rolling: Activity did not occur   Scooting: Moderate Assistance    ~pt required increased time for bed mobility.       Balance:   Static Sit: GOOD+: Takes MAXIMAL challenges from all directions.    Dynamic Sit:  GOOD: Maintains balance through MODERATE excursions of active trunk movement  Static Stand: POOR+: Needs MINIMAL assist to maintain  Dynamic stand: POOR+: Needs MIN (minimal ) assist during gait    Transfer Training:  Sit to stand:Moderate Assistance with Rolling Walker . From EOB  Bed <> Chair:  Stand Pivot and Step Transfer with Minimal Assistance with Rolling Walker . Pt T/F EOB>bedside chair. Able to take 2 steps with BL before becoming fatigued. Pt able to pivot BL feet remainder of way to chair. Pt required increased time.       Additional Treatment:  BLE AROM/AAROM: ankle pumps, knee flex/ext, alt marching, hip add/abd. 12 reps BLE.     Activity Tolerance:  Patient limited by fatigue and Patient limited by pain    Patient left up in chair with call button in reach and on melinda pad .    Assessment:  Tanya Linda is a 62 y.o. female with a medical diagnosis of Closed intertrochanteric fracture of  hip, left, initial encounter. She presents with decreased safety awareness and remains a fall risk. Pt very motivated to get better.   Pt would benefit from further rehab therapy services to increase overall independence level and assist in getting pt closer to PLOF.      Rehab potential is excellent.    Activity tolerance: Excellent    Discharge recommendations: Discharge Facility/Level of Care Needs: rehabilitation facility     Equipment recommendations: Equipment Needed After Discharge: walker, rolling     GOALS:   Multidisciplinary Problems       Physical Therapy Goals          Problem: Physical Therapy    Goal Priority Disciplines Outcome Goal Variances Interventions   Physical Therapy Goal     PT, PT/OT Ongoing, Progressing     Description: Goals to be met by: 23     Patient will increase functional independence with mobility by performin. Supine to sit with Stand-by Assistance  2. Sit to supine with Stand-by Assistance  3. Sit to stand transfer with Stand-by Assistance  4. Bed to chair transfer with Stand-by Assistance using Rolling Walker  5. Gait  x 50 feet with Modified [place] using Rolling Walker.                          PLAN:    Patient to be seen daily  to address the above listed problems via gait training, therapeutic activities, therapeutic exercises  Plan of Care expires: 23  Plan of Care reviewed with: patient         2022

## 2022-12-29 NOTE — DISCHARGE SUMMARY
[facility] Medicine Discharge Summary    Admit Date: 12/24/2022  Discharge Date and Time: 12/29/202212:22 PM  Admitting Physician: DENNIS Team  Discharging Physician: Ciarra Condon MD.  Primary Care Physician: MARION Salazar  Consults: Orthopedic Surgery    Discharge Diagnoses:  Closed left intertrochanteric fracture s/p kneeling 12/25/2022   Possible left medial rectus muscle hematoma seen on imaging at admission    HX:  Morbid obesity, hypertension, anxiety, depression    Hospital Course:   61 yo female with pmhx HHTN who presents to the ED with c/o left hip pain after a ground level trip and fall landing on her left hip. There was no head trauma or LOC. States immediate pain to hip and unable to bear weight.  She does report that she takes Motrin daily for her chronic pain.      Upon arrival into the ED patient was hemodynamically stable and afebrile.  Laboratory work was unremarkable and CTA of the left hip showed a left intertrochanteric femur fracture with a prrbable left medial rectus muscle hematoma. Dr. Olivares was consulted and recommended admission.   Patient is status postintramedullary nailing for closed left intertrochanteric femur fracture on left side.  She tolerated procedure well.  She was working with physical therapy patient was discharged to [facility] in stable condition  Pt was seen and examined on the day of discharge  Vitals:  VITAL SIGNS: 24 HRS MIN & MAX LAST   Temp  Min: 98.6 °F (37 °C)  Max: 98.8 °F (37.1 °C) 98.8 °F (37.1 °C)   BP  Min: 117/69  Max: 154/82 (!) 146/84     Pulse  Min: 72  Max: 83  72   Resp  Min: 16  Max: 20 18   SpO2  Min: 93 %  Max: 97 % 96 %       Physical Exam:  General: In no acute distress, afebrile  Respiratory: Clear to auscultation bilaterally  Cardiovascular: S1, S2, no appreciable murmur  Abdomen: Soft, nontender, BS +  MSK: Warm, no lower extremity edema, no clubbing or cyanosis  Neurologic: Alert and  oriented x4    Procedures Performed: No admission procedures for hospital encounter.     Significant Diagnostic Studies: See Full reports for all details    Recent Labs   Lab 12/26/22  1938 12/27/22  0508 12/29/22  0521   WBC 11.8* 11.5 10.9   RBC 3.98* 3.88* 3.56*   HGB 10.9* 10.7* 9.8*   HCT 35.2* 34.6* 31.7*   MCV 88.4 89.2 89.0   MCH 27.4 27.6 27.5   MCHC 31.0* 30.9* 30.9*   RDW 14.7* 14.9* 14.5   * 202 241   MPV 11.8 11.5 11.2       Recent Labs   Lab 12/24/22 2242 12/26/22 0427 12/27/22  0508 12/29/22  0521    137 137 136   K 3.9 4.4 4.3 4.7   CO2 25 25 25 26   BUN 17.2 18.9 16.1 14.4   CREATININE 0.97 0.85 0.81 0.73   CALCIUM 9.3 8.7 9.0 8.9   MG  --  1.90 2.10  --    ALBUMIN 3.8 3.1* 3.0*  --    ALKPHOS 98 72 73  --    ALT 17 26 22  --    AST 14 56* 39*  --    BILITOT 0.5 0.8 0.8  --         Microbiology Results (last 7 days)       ** No results found for the last 168 hours. **             X-Ray Hip 2 or 3 views Left (with Pelvis when performed)  Narrative: EXAMINATION:  XR HIP WITH PELVIS WHEN PERFORMED, 2 OR 3 VIEWS LEFT    CLINICAL HISTORY:  Two-view.    TECHNIQUE:  Three views    COMPARISON:  December 24, 2022    FINDINGS:  There has been placement of left femoral intramedullary tza and compression screw traverses into the femoral head transfixing the intertrochanteric fracture.  Positioning and alignment is satisfactory.  Impression: As above.    Electronically signed by: Price Pelaez  Date:    12/25/2022  Time:    17:27  X-Ray Femur 2 View Left  Narrative: EXAMINATION:  XR FEMUR 2 VIEW LEFT    CLINICAL HISTORY:  Postop.    TECHNIQUE:  Two views.    COMPARISON:  December 24, 2022    FINDINGS:  There has been placement of left femoral intramedullary taz and a compression screw traversing into the femoral head transfixing the intertrochanteric fracture.  Position and alignment is satisfactory.  Degenerative changes of the left knee joint  Impression: As above.    Electronically signed  by: Price Pelaez  Date:    12/25/2022  Time:    17:26  SURG FL Surgery Fluoro Usage  See OP Notes for results.     IMPRESSION: See OP Notes for results.     This procedure was auto-finalized by: Virtual Radiologist  CT Hip Without Contrast Left  Narrative: EXAMINATION:  CT HIP WITHOUT CONTRAST LEFT    CLINICAL HISTORY:  Fracture, hip;    TECHNIQUE:  Helically acquired imaging through the left hip were obtained without the IV administration of contrast. Axial, sagittal and coronal reformations were created and interpreted.    Automated tube current modulation, weight-based exposure dosing, and/or iterative reconstruction technique utilized to reach lowest reasonably achievable exposure rate.    DLP: 1023 mGy*cm    COMPARISON:  No relevant prior available for comparison at the time of dictation.    FINDINGS:  BONES/JOINTS: There is a comminuted intertrochanteric fracture of the left femur.  Mild foreshortening.  Normal alignment of the femoral head at the acetabulum.  The acetabulum is intact.    SOFT TISSUES: Unremarkable    OTHER: Sigmoid diverticulosis.  Impression: 1. Comminuted intertrochanteric left femoral fracture  2. No significant discrepancy from preliminary report.    Electronically signed by: Margo Balderas  Date:    12/25/2022  Time:    09:36  X-Ray Hip 2 or 3 views Left (with Pelvis when performed)  Narrative: EXAMINATION:  XR HIP WITH PELVIS WHEN PERFORMED, 2 OR 3 VIEWS LEFT    CLINICAL HISTORY:  Pain in left hip    COMPARISON:  None.    FINDINGS:  Displace intertrochanteric fracture of the left femur    Joint spaces preserved.    No blastic or lytic lesions.    Soft tissues within normal limits.  Impression: Fracture as above.    Electronically signed by: Jaison Sarabia  Date:    12/25/2022  Time:    09:31  X-Ray Femur Ap/Lat Left  Narrative: EXAMINATION:  XR FEMUR 2 VIEW LEFT    CLINICAL HISTORY:  Displaced intertrochanteric fracture of left femur, initial encounter for closed  fracture    COMPARISON:  None.    FINDINGS:  There is a comminuted displaced intertrochanteric fracture of the femur with no other definite fractures or dislocations identified  Impression: Fracture as above.    Electronically signed by: Jaison Sarabia  Date:    12/25/2022  Time:    09:25  X-Ray Chest 1 View  Narrative: EXAMINATION:  XR CHEST 1 VIEW    CLINICAL HISTORY:  , Encounter for other preprocedural examination.    FINDINGS:  No alveolar consolidation, effusion, or pneumothorax is seen.   The thoracic aorta is normal  cardiac silhouette, central pulmonary vessels and mediastinum are normal in size and are grossly unremarkable.   visualized osseous structures are grossly unremarkable.  Impression: No acute chest disease is identified.    Electronically signed by: Jaison Sarabia  Date:    12/25/2022  Time:    08:29         Medication List        START taking these medications      HYDROcodone-acetaminophen 5-325 mg per tablet  Commonly known as: NORCO  Take 1 tablet by mouth every 4 (four) hours as needed for Pain.     polyethylene glycol 17 gram Pwpk  Commonly known as: GLYCOLAX  Take 17 g by mouth once daily.  Start taking on: December 30, 2022            CONTINUE taking these medications      busPIRone 7.5 MG tablet  Commonly known as: BUSPAR  Take 1 tablet (7.5 mg total) by mouth every evening.            STOP taking these medications      ibuprofen 800 MG tablet  Commonly known as: ADVIL,MOTRIN     lisinopriL 5 MG tablet  Commonly known as: PRINIVIL,ZESTRIL            ASK your doctor about these medications      venlafaxine 37.5 MG Tab  Commonly known as: EFFEXOR  Take 1 tablet (37.5 mg total) by mouth Daily.               Where to Get Your Medications        Information about where to get these medications is not yet available    Ask your nurse or doctor about these medications  HYDROcodone-acetaminophen 5-325 mg per tablet  polyethylene glycol 17 gram Pwpk          Explained in detail to the  patient about the discharge plan, medications, and follow-up visits. Pt understands and agrees with the treatment plan  Discharge Disposition: Home or Self Care   Discharged Condition: stable  Diet-   Dietary Orders (From admission, onward)       Start     Ordered    12/25/22 1647  Diet Adult Regular  Diet effective now         12/25/22 1653                   Medications Per DC med rec  Activities as tolerated   Follow-up Information       MARION Salazar Follow up in 1 week(s).    Specialty: Nurse Practitioner  Contact information:  [address]  [address]  [place] [facility] [address] [phone]               Ronnell Olivares MD Follow up in 1 week(s).    Specialty: Hand Surgery  Contact information:  [address]  [facility] [address] [phone]                           For further questions contact hospitalist office    Discharge time 33 minutes    For worsening symptoms, chest pain, shortness of breath, increased abdominal pain, high grade fever, stroke or stroke like symptoms, immediately go to the nearest Emergency Room or call 911 as soon as possible.      Ciarra Jauregui M.D, on 12/29/2022. at 12:22 PM.

## 2022-12-29 NOTE — SUBJECTIVE & OBJECTIVE
Past Medical History:   Diagnosis Date    Personal history of colonic polyps 12/02/2021    Dr. Andrey Vizcarra       Past Surgical History:   Procedure Laterality Date    ANTEGRADE INTRAMEDULLARY RODDING OF FEMUR Left 12/25/2022    Procedure: INSERTION, INTRAMEDULLARY JARETH, FEMUR, ANTEGRADE;  Surgeon: Ronnell Olivares MD;  Location: [facility];  Service: Orthopedics;  Laterality: Left;    COLONOSCOPY  12/02/2021    Dr. Andrey Vizcarra       Review of patient's allergies indicates:  No Known Allergies    Current Facility-Administered Medications on File Prior to Encounter   Medication    [DISCONTINUED] acetaminophen tablet 650 mg    [DISCONTINUED] busPIRone tablet 7.5 mg    [DISCONTINUED] diphenhydrAMINE injection 25 mg    [DISCONTINUED] docusate sodium capsule 100 mg    [DISCONTINUED] enoxaparin injection 40 mg    [DISCONTINUED] ergocalciferol capsule 50,000 Units    [DISCONTINUED] famotidine tablet 20 mg    [DISCONTINUED] HYDROcodone-acetaminophen 5-325 mg per tablet 1 tablet    [DISCONTINUED] HYDROmorphone (PF) injection 0.4 mg    [DISCONTINUED] melatonin tablet 6 mg    [DISCONTINUED] methocarbamoL tablet 500 mg    [DISCONTINUED] metoclopramide HCl injection 10 mg    [DISCONTINUED] miconazole NITRATE 2 % top powder    [DISCONTINUED] miconazole NITRATE 2 % top powder    [DISCONTINUED] morphine injection 4 mg    [DISCONTINUED] mupirocin 2 % ointment    [DISCONTINUED] ondansetron injection 4 mg    [DISCONTINUED] polyethylene glycol packet 17 g    [DISCONTINUED] prochlorperazine injection Soln 5 mg    [DISCONTINUED] sodium chloride 0.9% flush 10 mL    [DISCONTINUED] venlafaxine tablet 37.5 mg     Current Outpatient Medications on File Prior to Encounter   Medication Sig    busPIRone (BUSPAR) 7.5 MG tablet Take 1 tablet (7.5 mg total) by mouth every evening.    HYDROcodone-acetaminophen (NORCO) 5-325 mg per tablet Take 1 tablet by mouth every 4 (four) hours as needed for Pain.    [START ON 12/30/2022] polyethylene glycol  (GLYCOLAX) 17 gram PwPk Take 17 g by mouth once daily.    venlafaxine (EFFEXOR) 37.5 MG Tab Take 1 tablet (37.5 mg total) by mouth Daily.    [DISCONTINUED] ibuprofen (ADVIL,MOTRIN) 800 MG tablet Take 1 tablet (800 mg total) by mouth every 8 (eight) hours as needed for Pain.    [DISCONTINUED] lisinopriL (PRINIVIL,ZESTRIL) 5 MG tablet Take 1 tablet (5 mg total) by mouth every evening.     Family History    None       Tobacco Use    Smoking status: Never    Smokeless tobacco: Never   Substance and Sexual Activity    Alcohol use: Never    Drug use: Not on file    Sexual activity: Not on file     Review of Systems   Constitutional:  Negative for fatigue and fever.   HENT:  Negative for congestion, ear pain, sinus pain and sore throat.    Eyes:  Negative for pain, discharge and redness.   Respiratory:  Negative for cough, shortness of breath and wheezing.    Cardiovascular:  Negative for chest pain, palpitations and leg swelling.   Gastrointestinal:  Negative for abdominal pain, diarrhea, nausea and vomiting.   Endocrine: Negative for cold intolerance and heat intolerance.   Genitourinary:  Negative for dysuria, frequency and urgency.   Musculoskeletal:  Positive for arthralgias, gait problem, joint swelling and myalgias. Negative for back pain and neck pain.   Skin:  Negative for rash.   Neurological:  Negative for dizziness, weakness, numbness and headaches.   Hematological:  Does not bruise/bleed easily.   Objective:     Vital Signs (Most Recent):  Temp: 99.1 °F (37.3 °C) (12/29/22 1520)  Pulse: 78 (12/29/22 1520)  Resp: 18 (12/29/22 1520)  BP: (!) 142/60 (12/29/22 1520)  SpO2: 98 % (12/29/22 1520)   Vital Signs (24h Range):  Temp:  [98.6 °F (37 °C)-99.1 °F (37.3 °C)] 99.1 °F (37.3 °C)  Pulse:  [72-82] 78  Resp:  [16-20] 18  SpO2:  [93 %-98 %] 98 %  BP: (130-154)/(60-84) 142/60     Weight: (!) 156.5 kg (345 lb 0.3 oz)  Body mass index is 57.41 kg/m².    Physical Exam  Constitutional:       General: She is not in acute  distress.     Appearance: Normal appearance. She is morbidly obese.   HENT:      Mouth/Throat:      Mouth: Mucous membranes are moist.      Pharynx: Oropharynx is clear. No oropharyngeal exudate or posterior oropharyngeal erythema.   Eyes:      Extraocular Movements: Extraocular movements intact.      Conjunctiva/sclera: Conjunctivae normal.      Pupils: Pupils are equal, round, and reactive to light.   Neck:      Thyroid: No thyromegaly.   Cardiovascular:      Rate and Rhythm: Normal rate and regular rhythm.      Heart sounds: No murmur heard.    No friction rub. No gallop.   Pulmonary:      Breath sounds: Normal breath sounds.   Abdominal:      General: Bowel sounds are normal. There is no distension.      Palpations: Abdomen is soft.      Tenderness: There is no abdominal tenderness.   Lymphadenopathy:      Cervical: No cervical adenopathy.   Skin:     General: Skin is warm.      Findings: No rash.      Comments: Steri-Strips in place over surgical incisions, no erythema, no drainage   Intertrigo   Neurological:      General: No focal deficit present.      Mental Status: She is oriented to person, place, and time.      Cranial Nerves: No cranial nerve deficit.   Psychiatric:         Mood and Affect: Mood is not anxious or depressed. Affect is not inappropriate.         CRANIAL NERVES     CN III, IV, VI   Pupils are equal, round, and reactive to light.     Significant Labs: All pertinent labs within the past 24 hours have been reviewed.    Significant Imaging: I have reviewed all pertinent imaging results/findings within the past 24 hours.

## 2022-12-29 NOTE — PROGRESS NOTES
[facility] - Ortho Neuro  Wound Care    Patient Name:  Tanya Linda   MRN:  [id number]  Date: 12/29/2022  Diagnosis: Closed intertrochanteric fracture of hip, left, initial encounter    History:     Past Medical History:   Diagnosis Date    Personal history of colonic polyps 12/02/2021    Dr. Andrey Vizcarra       Social History     Socioeconomic History    Marital status: Single   Tobacco Use    Smoking status: Never    Smokeless tobacco: Never   Substance and Sexual Activity    Alcohol use: Never     Social Determinants of Health     Food Insecurity: Unknown    Worried About Running Out of Food in the Last Year: Never true   Transportation Needs: Unknown    Lack of Transportation (Medical): No   Housing Stability: Unknown    Unable to Pay for Housing in the Last Year: No       Precautions:     Allergies as of 12/24/2022    (No Known Allergies)       [facility] Assessment Details/Treatment     Initial visit regarding hyperpigmented area at coccyx , ( noted per photo in EMR), patient seated up in chair and denies any skin integrity issues at area and declines assessment of coccyx at this time. However, she reports MASD at inferior breast area and assessment notes same.        12/29/22 0830        Altered Skin Integrity 12/29/22 0830 Left lower Breast Moisture associated dermatitis   Date First Assessed/Time First Assessed: 12/29/22 0830   Altered Skin Integrity Present on Admission: yes  Side: Left  Orientation: lower  Location: Breast  Primary Wound Type: Moisture associated dermatitis   Wound Image    Description of Altered Skin Integrity   (MASD with erythema suggestive of candidiasis.)   Dressing Appearance Open to air   Red (%), Wound Tissue Color 80 %   Periwound Area Intact   Care Soap and water     Recommendations made to primary team for local wound care to coccyx and beneath breasts  . Orders placed.     12/29/2022

## 2022-12-29 NOTE — ASSESSMENT & PLAN NOTE
Body mass index is 57.41 kg/m². Morbid obesity complicates all aspects of disease management from diagnostic modalities to treatment. Weight loss encouraged and health benefits explained to patient.

## 2022-12-29 NOTE — ASSESSMENT & PLAN NOTE
-status post ORIF with Dr. Olivares on 12/25   -weight-bearing as tolerating   -DVT prophylaxis with Lovenox per Orthopedic surgery  -PT/OT

## 2022-12-29 NOTE — PT/OT/SLP PROGRESS
Occupational Therapy  Treatment    Tanya Linda   MRN: [id number]   Admitting Diagnosis: Closed intertrochanteric fracture of hip, left, initial encounter s/p IM nail    OT Date of Treatment: 12/29/22   OT Start Time: 0925  OT Stop Time: 0948  OT Total Time (min): 23 min     Billable Minutes:  Self Care/Home Management 2 units   Total Minutes: 23 minutes      OT/FAUSTO: OT     FAUSTO Visit Number: 1    General Precautions: Standard, fall  Orthopedic Precautions: LLE weight bearing as tolerated  Braces: N/A    Spiritual, Cultural Beliefs, Temple Practices, Values that Affect Care: no    Subjective:  Communicated with RN prior to session.    Pain/Comfort  Location - Side 1: Left  Location 1: leg  Pain Addressed 1: Reposition    Objective:       Functional Mobility:  Transfer Training:   Sit to stand:Moderate Assistance with Rolling Walker from bedside chair   Toilet Transfer:  Pt Stand Pivot with Moderate Assistance with Rolling Walker to bsc; Required cues for hand placement on RW   Toilet Training:  Max A for pericare following BM      Additional Treatment:  Patient left up in chair with call button in reach    ASSESSMENT:  Rehab potential is good    Activity tolerance: Good    Discharge recommendations: rehabilitation facility     Equipment recommendations: bedside commode, walker, rolling, hip kit (TTB)     GOALS:   Multidisciplinary Problems       Occupational Therapy Goals          Problem: Occupational Therapy    Goal Priority Disciplines Outcome Interventions   Occupational Therapy Goal     OT, PT/OT Ongoing, Progressing    Description: Goals to be met by: 1/11     Patient will increase functional independence with ADLs by performing:    LE Dressing with Stand-by Assistance.  Grooming while seated at sink with Modified [place].  Toileting from bedside commode with Stand-by Assistance for hygiene and clothing management.   Toilet transfer to bedside commode with Stand-by Assistance.                          Plan:  Patient to be seen 5 x/week, daily to address the above listed problems via self-care/home management, therapeutic activities, therapeutic exercises  Plan of Care expires: 01/11/23  Plan of Care reviewed with: patient         12/29/2022

## 2022-12-29 NOTE — H&P
[facility] Medicine  History & Physical    Patient Name: Tanya Linda  MRN: [id number]  Patient Class: IP- Swing  Admission Date: 12/29/2022  Attending Physician: Thairy G Reyes, DO   Primary Care Provider: MARION Salazar         Patient information was obtained from patient, past medical records and ER records.     Subjective:     Principal Problem:Closed intertrochanteric fracture of hip, left, initial encounter    Chief Complaint: No chief complaint on file.       HPI: 62-year-old female with a past medical history of morbid obesity, hypertension, anxiety who presents from [facility] after ORIF on 12/25 with Dr. Olivares secondary to mechanical trip and fall resulting in a left femur fracture.  WBAT on the ipsilateral extremity per Orthopedic surgery.      At baseline patient lives with her son and is fully independent.      Past Medical History:   Diagnosis Date    Personal history of colonic polyps 12/02/2021    Dr. Andrey Vizcarra       Past Surgical History:   Procedure Laterality Date    ANTEGRADE INTRAMEDULLARY RODDING OF FEMUR Left 12/25/2022    Procedure: INSERTION, INTRAMEDULLARY JARETH, FEMUR, ANTEGRADE;  Surgeon: Ronnell Olivares MD;  Location: [facility];  Service: Orthopedics;  Laterality: Left;    COLONOSCOPY  12/02/2021    Dr. Andrey Vizcarra       Review of patient's allergies indicates:  No Known Allergies    Current Facility-Administered Medications on File Prior to Encounter   Medication    [DISCONTINUED] acetaminophen tablet 650 mg    [DISCONTINUED] busPIRone tablet 7.5 mg    [DISCONTINUED] diphenhydrAMINE injection 25 mg    [DISCONTINUED] docusate sodium capsule 100 mg    [DISCONTINUED] enoxaparin injection 40 mg    [DISCONTINUED] ergocalciferol capsule 50,000 Units    [DISCONTINUED] famotidine tablet 20 mg    [DISCONTINUED] HYDROcodone-acetaminophen 5-325 mg per tablet 1 tablet    [DISCONTINUED] HYDROmorphone (PF) injection 0.4 mg     [DISCONTINUED] melatonin tablet 6 mg    [DISCONTINUED] methocarbamoL tablet 500 mg    [DISCONTINUED] metoclopramide HCl injection 10 mg    [DISCONTINUED] miconazole NITRATE 2 % top powder    [DISCONTINUED] miconazole NITRATE 2 % top powder    [DISCONTINUED] morphine injection 4 mg    [DISCONTINUED] mupirocin 2 % ointment    [DISCONTINUED] ondansetron injection 4 mg    [DISCONTINUED] polyethylene glycol packet 17 g    [DISCONTINUED] prochlorperazine injection Soln 5 mg    [DISCONTINUED] sodium chloride 0.9% flush 10 mL    [DISCONTINUED] venlafaxine tablet 37.5 mg     Current Outpatient Medications on File Prior to Encounter   Medication Sig    busPIRone (BUSPAR) 7.5 MG tablet Take 1 tablet (7.5 mg total) by mouth every evening.    HYDROcodone-acetaminophen (NORCO) 5-325 mg per tablet Take 1 tablet by mouth every 4 (four) hours as needed for Pain.    [START ON 12/30/2022] polyethylene glycol (GLYCOLAX) 17 gram PwPk Take 17 g by mouth once daily.    venlafaxine (EFFEXOR) 37.5 MG Tab Take 1 tablet (37.5 mg total) by mouth Daily.    [DISCONTINUED] ibuprofen (ADVIL,MOTRIN) 800 MG tablet Take 1 tablet (800 mg total) by mouth every 8 (eight) hours as needed for Pain.    [DISCONTINUED] lisinopriL (PRINIVIL,ZESTRIL) 5 MG tablet Take 1 tablet (5 mg total) by mouth every evening.     Family History    None       Tobacco Use    Smoking status: Never    Smokeless tobacco: Never   Substance and Sexual Activity    Alcohol use: Never    Drug use: Not on file    Sexual activity: Not on file     Review of Systems   Constitutional:  Negative for fatigue and fever.   HENT:  Negative for congestion, ear pain, sinus pain and sore throat.    Eyes:  Negative for pain, discharge and redness.   Respiratory:  Negative for cough, shortness of breath and wheezing.    Cardiovascular:  Negative for chest pain, palpitations and leg swelling.   Gastrointestinal:  Negative for abdominal pain, diarrhea, nausea and vomiting.    Endocrine: Negative for cold intolerance and heat intolerance.   Genitourinary:  Negative for dysuria, frequency and urgency.   Musculoskeletal:  Positive for arthralgias, gait problem, joint swelling and myalgias. Negative for back pain and neck pain.   Skin:  Negative for rash.   Neurological:  Negative for dizziness, weakness, numbness and headaches.   Hematological:  Does not bruise/bleed easily.   Objective:     Vital Signs (Most Recent):  Temp: 99.1 °F (37.3 °C) (12/29/22 1520)  Pulse: 78 (12/29/22 1520)  Resp: 18 (12/29/22 1520)  BP: (!) 142/60 (12/29/22 1520)  SpO2: 98 % (12/29/22 1520)   Vital Signs (24h Range):  Temp:  [98.6 °F (37 °C)-99.1 °F (37.3 °C)] 99.1 °F (37.3 °C)  Pulse:  [72-82] 78  Resp:  [16-20] 18  SpO2:  [93 %-98 %] 98 %  BP: (130-154)/(60-84) 142/60     Weight: (!) 156.5 kg (345 lb 0.3 oz)  Body mass index is 57.41 kg/m².    Physical Exam  Constitutional:       General: She is not in acute distress.     Appearance: Normal appearance. She is morbidly obese.   HENT:      Mouth/Throat:      Mouth: Mucous membranes are moist.      Pharynx: Oropharynx is clear. No oropharyngeal exudate or posterior oropharyngeal erythema.   Eyes:      Extraocular Movements: Extraocular movements intact.      Conjunctiva/sclera: Conjunctivae normal.      Pupils: Pupils are equal, round, and reactive to light.   Neck:      Thyroid: No thyromegaly.   Cardiovascular:      Rate and Rhythm: Normal rate and regular rhythm.      Heart sounds: No murmur heard.    No friction rub. No gallop.   Pulmonary:      Breath sounds: Normal breath sounds.   Abdominal:      General: Bowel sounds are normal. There is no distension.      Palpations: Abdomen is soft.      Tenderness: There is no abdominal tenderness.   Lymphadenopathy:      Cervical: No cervical adenopathy.   Skin:     General: Skin is warm.      Findings: No rash.      Comments: Steri-Strips in place over surgical incisions, no erythema, no drainage   Intertrigo    Neurological:      General: No focal deficit present.      Mental Status: She is oriented to person, place, and time.      Cranial Nerves: No cranial nerve deficit.   Psychiatric:         Mood and Affect: Mood is not anxious or depressed. Affect is not inappropriate.         CRANIAL NERVES     CN III, IV, VI   Pupils are equal, round, and reactive to light.     Significant Labs: All pertinent labs within the past 24 hours have been reviewed.    Significant Imaging: I have reviewed all pertinent imaging results/findings within the past 24 hours.    Assessment/Plan:     * Closed intertrochanteric fracture of hip, left, initial encounter  -status post ORIF with Dr. Olivares on 12/25   -weight-bearing as tolerating   -DVT prophylaxis with Lovenox per Orthopedic surgery  -PT/OT      Hypertension  -patient previously on medication at home however was discontinued at prior facility secondary to hypotension   -resume medication as condition requires      Obesity  Body mass index is 57.41 kg/m². Morbid obesity complicates all aspects of disease management from diagnostic modalities to treatment. Weight loss encouraged and health benefits explained to patient.           VTE Risk Mitigation (From admission, onward)         Ordered     enoxaparin injection 40 mg  Daily         12/29/22 1612     Place sequential compression device  Until discontinued         12/29/22 1612                   Thairy G Reyes, DO  Department of Hospital Medicine   [facility]

## 2022-12-29 NOTE — ASSESSMENT & PLAN NOTE
-patient previously on medication at home however was discontinued at prior facility secondary to hypotension   -resume medication as condition requires

## 2022-12-29 NOTE — CARE UPDATE
[id number] Rec call from Rachel with  [place] reporting pt has been accepted to [facility] swing bed today. Text Dr Condon to determine if pt was ready for dc

## 2022-12-30 ENCOUNTER — TELEPHONE (OUTPATIENT)
Dept: ORTHOPEDICS | Facility: CLINIC | Age: 62
End: 2022-12-30
Payer: COMMERCIAL

## 2022-12-30 LAB
ANION GAP SERPL CALC-SCNC: 12 MEQ/L
BASOPHILS # BLD AUTO: 0.05 X10(3)/MCL (ref 0–0.2)
BASOPHILS NFR BLD AUTO: 0.5 %
BUN SERPL-MCNC: 14.3 MG/DL (ref 9.8–20.1)
CALCIUM SERPL-MCNC: 9.1 MG/DL (ref 8.4–10.2)
CHLORIDE SERPL-SCNC: 100 MMOL/L (ref 98–107)
CO2 SERPL-SCNC: 25 MMOL/L (ref 23–31)
CREAT SERPL-MCNC: 0.68 MG/DL (ref 0.55–1.02)
CREAT/UREA NIT SERPL: 21
EOSINOPHIL # BLD AUTO: 0.32 X10(3)/MCL (ref 0–0.9)
EOSINOPHIL NFR BLD AUTO: 3.1 %
ERYTHROCYTE [DISTWIDTH] IN BLOOD BY AUTOMATED COUNT: 14.4 % (ref 11–14.5)
GFR SERPLBLD CREATININE-BSD FMLA CKD-EPI: >60 MLS/MIN/1.73/M2
GLUCOSE SERPL-MCNC: 100 MG/DL (ref 82–115)
HCT VFR BLD AUTO: 33.6 % (ref 37–47)
HGB BLD-MCNC: 10.1 GM/DL (ref 12–16)
IMM GRANULOCYTES # BLD AUTO: 0.06 X10(3)/MCL (ref 0–0.04)
IMM GRANULOCYTES NFR BLD AUTO: 0.6 %
LYMPHOCYTES # BLD AUTO: 2.3 X10(3)/MCL (ref 0.6–4.6)
LYMPHOCYTES NFR BLD AUTO: 22.2 %
MAGNESIUM SERPL-MCNC: 2 MG/DL (ref 1.6–2.6)
MCH RBC QN AUTO: 27.1 PG
MCHC RBC AUTO-ENTMCNC: 30.1 MG/DL (ref 33–36)
MCV RBC AUTO: 90.1 FL (ref 80–94)
MONOCYTES # BLD AUTO: 0.74 X10(3)/MCL (ref 0.1–1.3)
MONOCYTES NFR BLD AUTO: 7.1 %
NEUTROPHILS # BLD AUTO: 6.89 X10(3)/MCL (ref 2.1–9.2)
NEUTROPHILS NFR BLD AUTO: 66.5 %
PLATELET # BLD AUTO: 259 X10(3)/MCL (ref 140–371)
PMV BLD AUTO: 10.9 FL (ref 9.4–12.4)
POTASSIUM SERPL-SCNC: 4.4 MMOL/L (ref 3.5–5.1)
RBC # BLD AUTO: 3.73 X10(6)/MCL (ref 4.2–5.4)
SODIUM SERPL-SCNC: 137 MMOL/L (ref 136–145)
WBC # SPEC AUTO: 10.4 X10(3)/MCL (ref 4.5–11.5)

## 2022-12-30 PROCEDURE — 97166 OT EVAL MOD COMPLEX 45 MIN: CPT

## 2022-12-30 PROCEDURE — 97530 THERAPEUTIC ACTIVITIES: CPT

## 2022-12-30 PROCEDURE — 11000004 HC SNF PRIVATE

## 2022-12-30 PROCEDURE — 85025 COMPLETE CBC W/AUTO DIFF WBC: CPT | Performed by: STUDENT IN AN ORGANIZED HEALTH CARE EDUCATION/TRAINING PROGRAM

## 2022-12-30 PROCEDURE — 94760 N-INVAS EAR/PLS OXIMETRY 1: CPT

## 2022-12-30 PROCEDURE — 36415 COLL VENOUS BLD VENIPUNCTURE: CPT | Performed by: STUDENT IN AN ORGANIZED HEALTH CARE EDUCATION/TRAINING PROGRAM

## 2022-12-30 PROCEDURE — 97535 SELF CARE MNGMENT TRAINING: CPT

## 2022-12-30 PROCEDURE — 83735 ASSAY OF MAGNESIUM: CPT | Performed by: STUDENT IN AN ORGANIZED HEALTH CARE EDUCATION/TRAINING PROGRAM

## 2022-12-30 PROCEDURE — 97161 PT EVAL LOW COMPLEX 20 MIN: CPT

## 2022-12-30 PROCEDURE — 80048 BASIC METABOLIC PNL TOTAL CA: CPT | Performed by: STUDENT IN AN ORGANIZED HEALTH CARE EDUCATION/TRAINING PROGRAM

## 2022-12-30 PROCEDURE — 25000003 PHARM REV CODE 250: Performed by: STUDENT IN AN ORGANIZED HEALTH CARE EDUCATION/TRAINING PROGRAM

## 2022-12-30 RX ORDER — ASPIRIN 81 MG/1
81 TABLET ORAL 2 TIMES DAILY
Status: DISCONTINUED | OUTPATIENT
Start: 2022-12-30 | End: 2023-01-20 | Stop reason: HOSPADM

## 2022-12-30 RX ADMIN — ASPIRIN 81 MG: 81 TABLET, COATED ORAL at 08:12

## 2022-12-30 RX ADMIN — HYDROCODONE BITARTRATE AND ACETAMINOPHEN 1 TABLET: 5; 325 TABLET ORAL at 09:12

## 2022-12-30 RX ADMIN — METHOCARBAMOL 500 MG: 500 TABLET ORAL at 08:12

## 2022-12-30 RX ADMIN — VENLAFAXINE 37.5 MG: 37.5 TABLET ORAL at 05:12

## 2022-12-30 RX ADMIN — HYDROCODONE BITARTRATE AND ACETAMINOPHEN 1 TABLET: 5; 325 TABLET ORAL at 03:12

## 2022-12-30 RX ADMIN — MICONAZOLE NITRATE 2 % TOPICAL POWDER: at 08:12

## 2022-12-30 RX ADMIN — BUSPIRONE HYDROCHLORIDE 7.5 MG: 7.5 TABLET ORAL at 08:12

## 2022-12-30 RX ADMIN — ERGOCALCIFEROL 50000 UNITS: 1.25 CAPSULE ORAL at 09:12

## 2022-12-30 RX ADMIN — ASPIRIN 81 MG: 81 TABLET, COATED ORAL at 03:12

## 2022-12-30 RX ADMIN — HYDROCODONE BITARTRATE AND ACETAMINOPHEN 1 TABLET: 5; 325 TABLET ORAL at 02:12

## 2022-12-30 RX ADMIN — MUPIROCIN: 20 OINTMENT TOPICAL at 03:12

## 2022-12-30 RX ADMIN — DOCUSATE SODIUM 100 MG: 100 CAPSULE, LIQUID FILLED ORAL at 09:12

## 2022-12-30 RX ADMIN — FAMOTIDINE 20 MG: 20 TABLET ORAL at 09:12

## 2022-12-30 RX ADMIN — MICONAZOLE NITRATE 2 % TOPICAL POWDER: at 02:12

## 2022-12-30 NOTE — PT/OT/SLP EVAL
Occupational Therapy   Swingbed Evaluation    Name: Tanya Linda  MRN: [id number]  Admitting Diagnosis:  Closed intertrochanteric fracture of hip, left, initial encounter      History:   Tanya Linda is a 62 y.o. female with a medical diagnosis of Closed intertrochanteric fracture of hip, left, initial encounter.    Past Medical History:   Diagnosis Date    Personal history of colonic polyps 12/02/2021    Dr. Andrey Vizcarra         Past Surgical History:   Procedure Laterality Date    ANTEGRADE INTRAMEDULLARY RODDING OF FEMUR Left 12/25/2022    Procedure: INSERTION, INTRAMEDULLARY JARETH, FEMUR, ANTEGRADE;  Surgeon: Ronnell Olivares MD;  Location: [facility];  Service: Orthopedics;  Laterality: Left;    COLONOSCOPY  12/02/2021    Dr. Andrey Vizcarra       Subjective     Chief Complaint: pain and stiffness in LLE  Patient/Family Comments/goals: Return to [facility]    Occupational Profile:  Living Environment: Pt lives c her son but plans to d/c to her daughter's house. Reported she has a ramp to enter the porch and 1 step into the apartment. Reported the pt's bedroom and toilet would be on the first floor but the tub is on the 2nd level (~12 steps c a R rail to 2nd story).   Previous level of function: IND c ADLs and mobility   Roles and Routines: Works as a cook at [employer]  Equipment Used at Home: none  Assistance upon Discharge: Pt's daughter will be able to assist at d/c.      Pain/Comfort:  Pain Rating 1: 5/10  Location - Side 1: Left  Location 1: hip  Pain Addressed 1: Pre-medicate for activity      Objective:     Communicated with: RN prior to session.  Patient found supine with bed alarm, PureWick upon OT entry to room.    General Precautions: Standard, fall   Orthopedic Precautions:LLE weight bearing as tolerated   Braces: N/A  Respiratory Status: Room air    Occupational Performance:    Mobility  Assist level Comments    Bed mobility minimal assist    Transfer moderate assist    Functional mobility N/A    Sit to stand transitions  moderate assist    Other:          Activities of Daily Living Assist level Comments    Feeding independent    Grooming/hygiene independent    Bathing moderate assist    Upper body dressing set up    Lower body dressing maximal assist    Toileting maximal assist    Toilet transfer moderate assist    Adaptive equipment training     Other:       Physical Exam:  Upper Extremity Range of Motion:     -       Right Upper Extremity: WFL  -       Left Upper Extremity: WFL  Upper Extremity Strength:    -       Right Upper Extremity: WFL  -       Left Upper Extremity: WFL    Cognitive/Visual Perceptual:  Cognitive/Psychosocial Skills:     -       Oriented to: Person, Place, Time, and Situation       Treatment & Education:  Educated to call before attempting to get up. Educated on plan/schedule for therapy.     Patient left up in chair with call button in reach    Assessment:     She presents with decreased strength and endurance as well as LLE stiffness/pain limiting her ADL performance. Pt is also limited by her size. Pt was unable to perform vivien cleaning on evaluation following BM. Performance deficits affecting function: weakness, impaired endurance, impaired self care skills, impaired functional mobility, pain, decreased lower extremity function.      Rehab Prognosis: Fair; patient would benefit from acute skilled OT services to address these deficits and reach maximum level of function.       Plan:     Patient to be seen 5 x/week, 6 x/week to address the above listed problems via self-care/home management, therapeutic activities, therapeutic exercises  Plan of Care Reviewed with: patient      GOALS:   Multidisciplinary Problems       Occupational Therapy Goals          Problem: Occupational Therapy    Goal Priority Disciplines Outcome Interventions   Occupational Therapy Goal     OT, PT/OT Ongoing, Progressing    Description: Goals to be met by: 1/20/22     Patient will increase functional independence with ADLs by  performing:    UE Dressing with Modified [place].  LE Dressing with Minimal Assistance.  Grooming while seated at sink with Modified [place].  Toileting from bedside commode with Minimal Assistance for hygiene and clothing management.   Bathing from bench with Minimal Assistance.  Toilet transfer to bedside commode with Contact Guard Assistance.                           Recommendations:     Discharge Recommendations: home with home health  Discharge Equipment Recommendations:  bedside commode, walker, rolling, hip kit      Time Tracking:     OT Date of Treatment: 12/30/22  OT Start Time: 0945  OT Stop Time: 1015  OT Total Time (min): 30 min    Billable Minutes:Evaluation 15 min  Self Care/Home Management 15 min    12/30/2022

## 2022-12-30 NOTE — PT/OT/SLP PROGRESS
Physical Therapy Treatment Note           Name: Tanya Linda    : 1960 (62 y.o.)  MRN: [id number]           TREATMENT SUMMARY AND RECOMMENDATIONS:    PT Received On: 22  PT Start Time: 1340     PT Stop Time: 1400  PT Total Time (min): 20 min     Subjective Assessment:   No complaints  Lethargic   x Awake, alert, cooperative  Uncooperative    Agitated  c/o pain    Appropriate  c/o fatigue    Confused x Treated at bedside     Emotionally labile  Treated in gym/dept.    Impulsive  Other:    Flat affect       Therapy Precautions:    Cognitive deficits  Spinal precautions    Collar - hard  Sternal precautions    Collar - soft   TLSO   x Fall risk  LSO    Hip precautions - posterior  Knee immobilizer    Hip precautions - anterior x WBAT    Impaired communication  Partial weightbearing    Oxygen  TTWB    PEG tube  NWB    Visual deficits  Other:    Hearing deficits          Treatment Objectives:     Mobility Training:   Assist level Comments    Bed mobility     Transfer MIN A Stand step transfer wc > BSC using RW   Gait     Sit to stand transitions MOD A Sit to stand from bedside chair   Sitting balance     Standing balance      Wheelchair mobility     Car transfer     Other:          Therapeutic Exercise:   Exercise Sets Reps Comments                               Additional Comments:      Assessment: Patient tolerated session well.  Pt reporting needing restroom at this time - [place] [facility]  with call bell in reach and notified CNA    PT Plan: continue POC  Revisions made to plan of care: No    GOALS:   Multidisciplinary Problems       Physical Therapy Goals          Problem: Physical Therapy    Goal Priority Disciplines Outcome Goal Variances Interventions   Physical Therapy Goal     PT, PT/OT Ongoing, Progressing     Description: Goals to be met by: Discharge     Patient will increase functional independence with mobility by performin. Supine to sit with Modified [place]  2. Sit to  supine with Modified [place]  3. Sit to stand transfer with Modified [place]  4. Bed to chair transfer with Stand-by Assistance using Rolling Walker  5. Gait  x 25 feet with Stand-by Assistance using Rolling Walker.                          Skilled PT Minutes Provided: 20 minutes   Communication with Treatment Team:     Equipment recommendations:       At end of treatment, patient remained:   Up in chair     Up in wheelchair in room    In bed    With alarm activated    Bed rails up   x Call bell in reach     Family/friends present    Restraints secured properly   x In bathroom with CNA/RN notified    Nurse aware    In gym with therapist/tech    Other:

## 2022-12-30 NOTE — PLAN OF CARE
[facility]  Initial Discharge Assessment       Primary Care Provider: MARION Salazar    Admission Diagnosis: Femur fracture, left [S72.92XA]    Admission Date: 12/29/2022  Expected Discharge Date:     Discharge Barriers Identified: None    Payor: BLUE CROSS [employer] BENEFIT / Plan: [employer] [place] [place] [place] / Product Type: Self Funded /     Extended Emergency Contact Information  Primary Emergency Contact: Hillary Thomas  Address: [address] [place] [address] [id number] [place] [address]  Home Phone: [phone]  Work Phone: [phone]  Mobile Phone: [phone]  Relation: Other  Preferred language: English    Discharge Plan A: Home Health, Home with family         [facility] - [place], [place] - [address]  [address]  [facility] [address]  Phone: [phone] Fax: [phone]      Initial Assessment (most recent)       Adult Discharge Assessment - 12/30/22 1452          Discharge Assessment    Assessment Type Discharge Planning Assessment     Confirmed/corrected address, phone number and insurance Yes     Confirmed Demographics Correct on Facesheet     Source of Information patient     If unable to respond/provide information was family/caregiver contacted? Yes     Contact Name/Number Hillary Thomas daughter [phone]     Communicated XENA with patient/caregiver Date not available/Unable to determine     Reason For Admission FX FEMUR     People in Home alone     [place] [facility]     Do you expect to return to your current living situation? Yes     Do you have help at home or someone to help you manage your care at home? Yes     Who are your caregiver(s) and their phone number(s)? Hillary Thomas daughter [phone]     Prior to hospitilization cognitive status: Alert/Oriented     Current cognitive status: Alert/Oriented     Walking or Climbing Stairs transferring difficulty, dependent;transferring  difficulty, requires equipment     Dressing/Bathing bathing difficulty, requires equipment     Home Layout Able to live on 1st floor     Equipment Currently Used at Home none     Readmission within 30 days? No     Patient currently being followed by outpatient case management? No     Do you currently have service(s) that help you manage your care at home? No     Do you take prescription medications? Yes     Do you have prescription coverage? Yes     Coverage BCBS     Do you have any problems affording any of your prescribed medications? TBD     Is the patient taking medications as prescribed? yes     How do you get to doctors appointments? family or friend will provide     Are you on dialysis? No     Do you take coumadin? No     Discharge Plan A Home Health;Home with family     DME Needed Upon Discharge  walker, rolling;bedside commode;wheelchair     Discharge Plan discussed with: Adult children     Discharge Barriers Identified None        Physical Activity    On average, how many days per week do you engage in moderate to strenuous exercise (like a brisk walk)? 0 days     On average, how many minutes do you engage in exercise at this level? 0 min        Financial Resource Strain    How hard is it for you to pay for the very basics like food, housing, medical care, and heating? Not hard at all        Housing Stability    In the last 12 months, was there a time when you were not able to pay the mortgage or rent on time? No     In the last 12 months, how many places have you lived? 1     In the last 12 months, was there a time when you did not have a steady place to sleep or slept in a shelter (including now)? No        Transportation Needs    In the past 12 months, has lack of transportation kept you from medical appointments or from getting medications? No     In the past 12 months, has lack of transportation kept you from meetings, work, or from getting things needed for daily living? No        Food Insecurity     Within the past 12 months, you worried that your food would run out before you got the money to buy more. Never true     Within the past 12 months, the food you bought just didn't last and you didn't have money to get more. Never true        Stress    Do you feel stress - tense, restless, nervous, or anxious, or unable to sleep at night because your mind is troubled all the time - these days? Only a little        Social Connections    In a typical week, how many times do you talk on the phone with family, friends, or neighbors? More than three times a week     How often do you get together with friends or relatives? More than three times a week     How often do you attend Druze or Latter day services? More than 4 times per year     Do you belong to any clubs or organizations such as Druze groups, unions, fraternal or athletic groups, or school groups? No     How often do you attend meetings of the clubs or organizations you belong to? 1 to 4 times per year     Are you , , , , never , or living with a partner?         Alcohol Use    Q1: How often do you have a drink containing alcohol? Never     Q2: How many drinks containing alcohol do you have on a typical day when you are drinking? Patient does not drink     Q3: How often do you have six or more drinks on one occasion? Never        OTHER    Name(s) of People in Home Hillary Thomas daughter [phone]

## 2022-12-30 NOTE — PLAN OF CARE
Problem: Occupational Therapy  Goal: Occupational Therapy Goal  Description: Goals to be met by: 1/20/22     Patient will increase functional independence with ADLs by performing:    UE Dressing with Modified [place].  LE Dressing with Minimal Assistance.  Grooming while seated at sink with Modified [place].  Toileting from bedside commode with Minimal Assistance for hygiene and clothing management.   Bathing from bench with Minimal Assistance.  Toilet transfer to bedside commode with Contact Guard Assistance.    Outcome: Ongoing, Progressing

## 2022-12-30 NOTE — PLAN OF CARE
Problem: Adult Inpatient Plan of Care  Goal: Absence of Hospital-Acquired Illness or Injury  Outcome: Ongoing, Progressing  Goal: Optimal Comfort and Wellbeing  Outcome: Ongoing, Progressing  Intervention: Monitor Pain and Promote Comfort  Flowsheets (Taken 12/30/2022 1633)  Pain Management Interventions:   around-the-clock dosing utilized   premedicated for activity  Intervention: Provide Person-Centered Care  Flowsheets (Taken 12/30/2022 1633)  Trust Relationship/Rapport:   care explained   emotional support provided   empathic listening provided   thoughts/feelings acknowledged   questions answered  Goal: Readiness for Transition of Care  Outcome: Ongoing, Progressing     Problem: Bariatric Environmental Safety  Goal: Safety Maintained with Care  Outcome: Ongoing, Progressing     Problem: Impaired Wound Healing  Goal: Optimal Wound Healing  Outcome: Ongoing, Progressing     Problem: Skin Injury Risk Increased  Goal: Skin Health and Integrity  Outcome: Ongoing, Progressing  Intervention: Optimize Skin Protection  Flowsheets (Taken 12/30/2022 1633)  Pressure Reduction Techniques:   frequent weight shift encouraged   weight shift assistance provided   pressure points protected  Pressure Reduction Devices: specialty bed utilized  Skin Protection: tubing/devices free from skin contact  Head of Bed (HOB) Positioning: HOB at 20-30 degrees     Problem: Infection  Goal: Absence of Infection Signs and Symptoms  Outcome: Ongoing, Progressing  Intervention: Prevent or Manage Infection  Flowsheets (Taken 12/30/2022 1633)  Fever Reduction/Comfort Measures:   lightweight bedding   lightweight clothing  Infection Management: aseptic technique maintained

## 2022-12-30 NOTE — PLAN OF CARE
Problem: Physical Therapy  Goal: Physical Therapy Goal  Description: Goals to be met by: Discharge     Patient will increase functional independence with mobility by performin. Supine to sit with Modified [place]  2. Sit to supine with Modified [place]  3. Sit to stand transfer with Modified [place]  4. Bed to chair transfer with Stand-by Assistance using Rolling Walker  5. Gait  x 25 feet with Stand-by Assistance using Rolling Walker.     Outcome: Ongoing, Progressing

## 2022-12-30 NOTE — PT/OT/SLP PROGRESS
Occupational Therapy  Treatment    Name: Tanya Linda    : 1960 (62 y.o.)  MRN: [id number]           TREATMENT SUMMARY AND RECOMMENDATIONS:      OT Date of Treatment: 22  OT Start Time: 1340  OT Stop Time: 1400  OT Total Time (min): 20 min      Subjective Assessment:   No complaints  Lethargic   x Awake, alert, cooperative  Impulsive    Uncooperative   Flat affect    Agitated x c/o pain    Appropriate  c/o fatigue    Confused x Treated at bedside     Emotionally labile  Treated in gym/dept.      Other:        Therapy Precautions:    Cognitive deficits  Spinal precautions    Collar - hard  Sternal precautions    Collar - soft   TLSO   x Fall risk  LSO    Hip precautions - posterior  Knee immobilizer    Hip precautions - anterior x WBAT    Impaired communication  Partial weightbearing    Oxygen  TTWB    PEG tube  NWB    Visual deficits      Hearing deficits   Other:        Treatment Objectives:     Mobility Training:    Mobility task Assist level Comments    Bed mobility     Transfer     Sit to stands transitions Mod A     Functional mobility     Sitting balance     Standing balance      Other:        ADL Training:    ADL Assist level Comments    Feeding     Grooming/hygiene     Bathing     Upper body dressing     Lower body dressing     Toileting Max A  Pt required assist to manage diaper off prior to toileting    Toilet transfer Mod A  Stand/pivot t/f with RW to BSC; mod A for lifting from BSChair   Adaptive equipment training     Other:         Additional Comments:  Pt requesting to sit on BSC for a while; notified CNA. Left call bell in reach when Pt done.     Assessment: Patient tolerated session fair.    OT Plan: Continue POC  Revisions made to plan of care: No    GOALS:   Multidisciplinary Problems       Occupational Therapy Goals          Problem: Occupational Therapy    Goal Priority Disciplines Outcome Interventions   Occupational Therapy Goal     OT, PT/OT Ongoing, Progressing    Description:  Goals to be met by: 1/20/22     Patient will increase functional independence with ADLs by performing:    UE Dressing with Modified [place].  LE Dressing with Minimal Assistance.  Grooming while seated at sink with Modified [place].  Toileting from bedside commode with Minimal Assistance for hygiene and clothing management.   Bathing from bench with Minimal Assistance.  Toilet transfer to bedside commode with Contact Guard Assistance.                         Skilled OT Minutes Provided: 20  Communication with Treatment Team: co-tx with PT    Equipment recommendations:       At end of treatment, patient remained:   Up in chair     Up in wheelchair in room    In bed    With alarm activated    Bed rails up    Call bell in reach     Family/friends present    Restraints secured properly   x In bathroom with CNA/RN notified    In gym with PT/PTA/tech    Nurse aware    Other:

## 2022-12-30 NOTE — HOSPITAL COURSE
She was admitted to [facility] swing bed on 12/29/2022 for rehab following intramedullary nail of left intertrochanteric femur fracture.  She is being treated with a 4 week course of aspirin 81 mg PO BID for DVT prophylaxis and norco 5/325 mg PO every 6 hours as needed for pain.  She was started on diclofenac 1% gel 2 grams topical BID prn for left knee pain and stiffness on 1/6/2023. Her miralax was changed from 17 gm PO daily to prn on 1/8/2023. She had no other complications throughout her hospital course and she was discharged home with home health in stable condition.

## 2022-12-30 NOTE — TELEPHONE ENCOUNTER
Called [facility] and spoke to charge nurse and physician on call. Explained what is listed in Dr. Olivares's message hong. Both the physician and charge nurse voiced a clear understanding of this plan.

## 2022-12-30 NOTE — ASSESSMENT & PLAN NOTE
-status post ORIF with Dr. Olivares on 12/25   -weight-bearing as tolerating   -DVT prophylaxis with aspirin 81 mg BID for 4 weeks   -PT/OT

## 2022-12-30 NOTE — PROGRESS NOTES
[facility]  [facility] Medicine  Progress Note    Patient Name: Tanya Linda  MRN: [id number]  Patient Class: IP- Swing   Admission Date: 12/29/2022  Length of Stay: 1 days  Attending Physician: Thairy G Reyes, DO  Primary Care Provider: MARION Salazar        Subjective:     Principal Problem:Closed intertrochanteric fracture of hip, left, initial encounter        HPI:  62-year-old female with a past medical history of morbid obesity, hypertension, anxiety who [place] ORIF on 12/25 with Dr. Olivares secondary to mechanical trip and fall resulting in a left femur fracture.  WBAT on the ipsilateral extremity per Orthopedic surgery.      At baseline patient lives with her son and is fully independent.      Overview/Hospital Course:  Patient admitted for rehabilitation services secondary to femur fracture.  Patient to remain on aspirin 81 mg b.i.d. for the next 4 weeks.      Interval History:  No acute events overnight    Review of Systems   Constitutional:  Negative for fatigue and fever.   HENT:  Negative for congestion, sore throat and tinnitus.    Respiratory:  Negative for chest tightness, shortness of breath and wheezing.    Cardiovascular:  Negative for chest pain and leg swelling.   Gastrointestinal:  Negative for diarrhea and rectal pain.   Endocrine: Negative for cold intolerance and heat intolerance.   Genitourinary:  Negative for dysuria and urgency.   Musculoskeletal:  Positive for myalgias. Negative for back pain.   Skin:  Negative for wound.   Neurological:  Negative for dizziness, syncope and weakness.   Hematological:  Does not bruise/bleed easily.   Psychiatric/Behavioral:  Negative for agitation. The patient is not nervous/anxious.    Objective:     Vital Signs (Most Recent):  Temp: 98.3 °F (36.8 °C) (12/30/22 1145)  Pulse: 66 (12/30/22 1145)  Resp: 14 (12/30/22 1405)  BP: (!) 146/76 (12/30/22 1145)  SpO2: 98 % (12/30/22 1145)    Vital Signs (24h Range):  Temp:  [97.9 °F (36.6 °C)-99.1 °F (37.3 °C)] 98.3 °F (36.8 °C)  Pulse:  [66-90] 66  Resp:  [14-20] 14  SpO2:  [94 %-98 %] 98 %  BP: (123-146)/(60-80) 146/76     Weight: (!) 156.5 kg (345 lb 0.3 oz)  Body mass index is 57.41 kg/m².    Intake/Output Summary (Last 24 hours) at 12/30/2022 1443  Last data filed at 12/30/2022 1300  Gross per 24 hour   Intake 960 ml   Output 1800 ml   Net -840 ml      Physical Exam  Constitutional:       General: She is not in acute distress.     Appearance: She is morbidly obese.   HENT:      Head: Normocephalic and atraumatic.      Nose: No congestion.   Cardiovascular:      Rate and Rhythm: Normal rate and regular rhythm.      Heart sounds: No murmur heard.  Pulmonary:      Effort: Pulmonary effort is normal.      Breath sounds: Normal breath sounds.   Abdominal:      General: Bowel sounds are normal. There is no distension.      Palpations: Abdomen is soft. There is no mass.      Tenderness: There is no abdominal tenderness.   Musculoskeletal:         General: Normal range of motion.   Skin:     General: Skin is warm.      Findings: No lesion or rash.      Comments: Steri-Strips in place over surgical incisions, well healing.  No erythema, no discharge   Neurological:      General: No focal deficit present.      Cranial Nerves: No cranial nerve deficit.   Psychiatric:         Mood and Affect: Mood normal.         Behavior: Behavior normal.       Significant Labs: All pertinent labs within the past 24 hours have been reviewed.    Significant Imaging: I have reviewed all pertinent imaging results/findings within the past 24 hours.      Assessment/Plan:      * Closed intertrochanteric fracture of hip, left, initial encounter  -status post ORIF with Dr. Olivares on 12/25   -weight-bearing as tolerating   -DVT prophylaxis with aspirin 81 mg BID for 4 weeks   -PT/OT      Hypertension  -patient previously on medication at home however was discontinued at prior facility  secondary to hypotension   -resume medication as condition requires      Obesity  Body mass index is 57.41 kg/m². Morbid obesity complicates all aspects of disease management from diagnostic modalities to treatment. Weight loss encouraged and health benefits explained to patient.           VTE Risk Mitigation (From admission, onward)         Ordered     Place sequential compression device  Until discontinued         12/29/22 1612                Discharge Planning   XENA:      Code Status: Full Code   Is the patient medically ready for discharge?:     Reason for patient still in hospital (select all that apply): PT / OT recommendations                     Thairy G Reyes, DO  Department of Hospital Medicine   [facility]

## 2022-12-30 NOTE — TELEPHONE ENCOUNTER
----- Message from Ronnell Olivares MD sent at 12/29/2022 10:24 PM CST -----  Can you guys please call the rehab facility and let them know that I would like this patient on aspirin 81 twice daily for 4 weeks for DVT prophylaxis?  Thank you

## 2022-12-30 NOTE — PROGRESS NOTES
"Inpatient Nutrition Evaluation    Admit Date: 12/29/2022   Total duration of encounter: 1 day    Nutrition Recommendation/Prescription   Continue regular diet.       Nutrition Assessment     Chart Review    Reason Seen: continuous nutrition monitoring and length of stay    Malnutrition Screening Tool Results   Have you recently lost weight without trying?: No  Have you been eating poorly because of a decreased appetite?: No   MST Score: 0     Diagnosis:  Closed intertrochanteric fracture of hip, left, initial encounter    Relevant Medical History: Personal history of colonic polyps     Nutrition-Related Medications: ergocalciferol,    Nutrition-Related Labs:   Latest Reference Range & Units 12/30/22 05:00   Sodium 136 - 145 mmol/L 137   Potassium 3.5 - 5.1 mmol/L 4.4   Chloride 98 - 107 mmol/L 100   CO2 23 - 31 mmol/L 25   Anion Gap mEq/L 12.0   BUN 9.8 - 20.1 mg/dL 14.3   Creatinine 0.55 - 1.02 mg/dL 0.68   BUN/CREAT RATIO  21   eGFR mls/min/1.73/m2 >60   Glucose 82 - 115 mg/dL 100   Calcium 8.4 - 10.2 mg/dL 9.1   Magnesium 1.60 - 2.60 mg/dL 2.00       Diet Order: Diet Adult Regular  Oral Supplement Order: none  Appetite/Oral Intake: good/% of meals  Factors Affecting Nutritional Intake: none identified  Food/Mormon/Cultural Preferences: none reported  Food Allergies: none reported    Skin Integrity: incision  Wound(s):       Comments    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   Pt reports intake is good, 100% of meals. Discussed food preferences. Marked menus. Will monitor. Pt likes grilled fish, biscuit, peanut butter/jelly sandwich, and water.     Anthropometrics    Height: 5' 5" (165.1 cm) Height Method: Stated  Last Weight: (!) 156.5 kg (345 lb 0.3 oz) (12/29/22 1520) Weight Method: Bed Scale  BMI (Calculated): 57.4  BMI Classification: obese grade III (BMI >/=40)     Ideal Body Weight (IBW), Female: 125 lb     % Ideal Body Weight, Female (lb): 276.02 %                             Usual Weight Provided By: not " applicable    Wt Readings from Last 3 Encounters:   12/29/22 1520 (!) 156.5 kg (345 lb 0.3 oz)   12/25/22 0157 (!) 156.5 kg (345 lb)   12/24/22 2204 (!) 156.5 kg (345 lb)   11/17/22 0943 (!) 154.4 kg (340 lb 4.8 oz)      Weight Change(s) Since Admission:  Admit Weight: (!) 156.5 kg (345 lb 0.3 oz) (12/29/22 1520)      Patient Education    Not applicable.    Monitoring & Evaluation     Dietitian will monitor food and beverage intake, weight, glucose/endocrine profile, and gastrointestinal profile.  Nutrition Risk/Follow-Up: low (follow-up in 5-7 days)  Patients assigned 'low nutrition risk' status do not qualify for a full nutritional assessment but will be monitored and re-evaluated in a 5-7 day time period. Please consult if re-evaluation needed sooner.

## 2022-12-30 NOTE — SUBJECTIVE & OBJECTIVE
Interval History:  No acute events overnight    Review of Systems   Constitutional:  Negative for fatigue and fever.   HENT:  Negative for congestion, sore throat and tinnitus.    Respiratory:  Negative for chest tightness, shortness of breath and wheezing.    Cardiovascular:  Negative for chest pain and leg swelling.   Gastrointestinal:  Negative for diarrhea and rectal pain.   Endocrine: Negative for cold intolerance and heat intolerance.   Genitourinary:  Negative for dysuria and urgency.   Musculoskeletal:  Positive for myalgias. Negative for back pain.   Skin:  Negative for wound.   Neurological:  Negative for dizziness, syncope and weakness.   Hematological:  Does not bruise/bleed easily.   Psychiatric/Behavioral:  Negative for agitation. The patient is not nervous/anxious.    Objective:     Vital Signs (Most Recent):  Temp: 98.3 °F (36.8 °C) (12/30/22 1145)  Pulse: 66 (12/30/22 1145)  Resp: 14 (12/30/22 1405)  BP: (!) 146/76 (12/30/22 1145)  SpO2: 98 % (12/30/22 1145)   Vital Signs (24h Range):  Temp:  [97.9 °F (36.6 °C)-99.1 °F (37.3 °C)] 98.3 °F (36.8 °C)  Pulse:  [66-90] 66  Resp:  [14-20] 14  SpO2:  [94 %-98 %] 98 %  BP: (123-146)/(60-80) 146/76     Weight: (!) 156.5 kg (345 lb 0.3 oz)  Body mass index is 57.41 kg/m².    Intake/Output Summary (Last 24 hours) at 12/30/2022 1443  Last data filed at 12/30/2022 1300  Gross per 24 hour   Intake 960 ml   Output 1800 ml   Net -840 ml      Physical Exam  Constitutional:       General: She is not in acute distress.     Appearance: She is morbidly obese.   HENT:      Head: Normocephalic and atraumatic.      Nose: No congestion.   Cardiovascular:      Rate and Rhythm: Normal rate and regular rhythm.      Heart sounds: No murmur heard.  Pulmonary:      Effort: Pulmonary effort is normal.      Breath sounds: Normal breath sounds.   Abdominal:      General: Bowel sounds are normal. There is no distension.      Palpations: Abdomen is soft. There is no mass.       Tenderness: There is no abdominal tenderness.   Musculoskeletal:         General: Normal range of motion.   Skin:     General: Skin is warm.      Findings: No lesion or rash.      Comments: Steri-Strips in place over surgical incisions, well healing.  No erythema, no discharge   Neurological:      General: No focal deficit present.      Cranial Nerves: No cranial nerve deficit.   Psychiatric:         Mood and Affect: Mood normal.         Behavior: Behavior normal.       Significant Labs: All pertinent labs within the past 24 hours have been reviewed.    Significant Imaging: I have reviewed all pertinent imaging results/findings within the past 24 hours.

## 2022-12-30 NOTE — PT/OT/SLP EVAL
Physical Therapy Swingbed Evaluation      Patient Name:  Tanya Linda   MRN:  [id number]    History:     Past Medical History:   Diagnosis Date    Personal history of colonic polyps 12/02/2021    Dr. Andrey Vizcarra       Past Surgical History:   Procedure Laterality Date    ANTEGRADE INTRAMEDULLARY RODDING OF FEMUR Left 12/25/2022    Procedure: INSERTION, INTRAMEDULLARY JARETH, FEMUR, ANTEGRADE;  Surgeon: Ronnell Olivares MD;  Location: [facility] OR;  Service: Orthopedics;  Laterality: Left;    COLONOSCOPY  12/02/2021    Dr. Andrey Vizcarra         Subjective     Chief Complaint: L hip pain  Patient/Family Comments/goals: To regain functional independence   Pain/Comfort:  Location - Side 1: Left  Location - Orientation 1: lateral  Location 1: hip  Pain Addressed 1: Pre-medicate for activity      Living Environment:  Lives with: patient lives with her son and plans to dc to her daughter's home.   Home Environment: Reported she has a ramp to enter the porch and 1 step into the apartment. Reported the patient's bedroom and toilet will be on the first floor but the tub is on the second level (~12 steps with R rail to second story)   Previous level of function: Independent with ADLs and mobility; was still employed as a cook at [employer]  Equipment used at home: none.      Objective:     Communicated with nursing prior to session.  Patient found supine with bed alarm, PureWick  upon PT entry to room.    General Precautions: Standard, fall   Orthopedic Precautions:LLE weight bearing as tolerated   Braces:    Respiratory Status: Room air     Vitals Value    Room air      Oxygen (L)     Blood pressure     Heart rate         Exams:  Cognitive Exam:  Patient is oriented to Person, Place, Time, and Situation  RLE ROM: WFL  RLE Strength: WFL  LLE ROM: WFL except s/p sx  LLE Strength: WFL except s/p sx    Functional Mobility:  Bed Mobility:     Supine to Sit: minimum assistance  Transfers:     Sit to Stand:  moderate assistance with rolling  walker  Bed to Chair: minimum assistance and of 2 persons with  rolling walker  using  Step Transfer  Gait: Unable to at this time d/t pain and weakness  Balance: Fair    Therapeutic Activities and Exercises:       Additional information:     Patient left up in chair with all lines intact, call button in reach, and chair alarm on.      Assessment:     Tanya Linda is a 62 y.o. female admitted with a medical diagnosis of Closed intertrochanteric fracture of hip, left, initial encounter.  She presents with the following impairments/functional limitations:  weakness, impaired endurance, orthopedic precautions, impaired functional mobility, decreased lower extremity function, gait instability, decreased safety awareness, impaired balance, pain, impaired cardiopulmonary response to activity.    Rehab Prognosis: Good; patient would benefit from acute skilled PT services to address these deficits and reach maximum level of function.    Recent Surgery: * No surgery found *        Recommendations:     Discharge Recommendations:  home with home health   Discharge Equipment Recommendations: bedside commode, walker, rolling, wheelchair, shower chair       Plan:     During this hospitalization, patient to be seen 6 x/week to address the identified rehab impairments via gait training, therapeutic activities, therapeutic exercises and progress toward the following goals:    GOALS:   Multidisciplinary Problems       Physical Therapy Goals          Problem: Physical Therapy    Goal Priority Disciplines Outcome Goal Variances Interventions   Physical Therapy Goal     PT, PT/OT Ongoing, Progressing     Description: Goals to be met by: Discharge     Patient will increase functional independence with mobility by performin. Supine to sit with Modified [place]  2. Sit to supine with Modified [place]  3. Sit to stand transfer with Modified [place]  4. Bed to chair transfer with Stand-by Assistance using Rolling  Walker  5. Gait  x 25 feet with Stand-by Assistance using Rolling Walker.                          Time Tracking:       PT Start Time: 0945     PT Stop Time: 1015  PT Total Time (min): 30 min     Billable Minutes: Evaluation 30 minutes MIN complexity      12/30/2022

## 2022-12-30 NOTE — PLAN OF CARE
Problem: Adult Inpatient Plan of Care  Goal: Plan of Care Review  Outcome: Ongoing, Progressing  Flowsheets (Taken 12/29/2022 2208)  Plan of Care Reviewed With: patient     Problem: Impaired Wound Healing  Goal: Optimal Wound Healing  Outcome: Ongoing, Progressing  Intervention: Promote Wound Healing  Flowsheets (Taken 12/29/2022 2208)  Sleep/Rest Enhancement: awakenings minimized     Problem: Infection  Goal: Absence of Infection Signs and Symptoms  Outcome: Ongoing, Progressing  Intervention: Prevent or Manage Infection  Flowsheets (Taken 12/29/2022 2208)  Infection Management: aseptic technique maintained

## 2022-12-31 PROCEDURE — 25000003 PHARM REV CODE 250: Performed by: STUDENT IN AN ORGANIZED HEALTH CARE EDUCATION/TRAINING PROGRAM

## 2022-12-31 PROCEDURE — 97530 THERAPEUTIC ACTIVITIES: CPT

## 2022-12-31 PROCEDURE — 11000004 HC SNF PRIVATE

## 2022-12-31 PROCEDURE — 94760 N-INVAS EAR/PLS OXIMETRY 1: CPT

## 2022-12-31 RX ORDER — HEPARIN SODIUM,PORCINE/D5W 25000/250
0-40 INTRAVENOUS SOLUTION INTRAVENOUS CONTINUOUS
Status: CANCELLED | OUTPATIENT
Start: 2022-12-31

## 2022-12-31 RX ADMIN — MICONAZOLE NITRATE 2 % TOPICAL POWDER: at 08:12

## 2022-12-31 RX ADMIN — METHOCARBAMOL 500 MG: 500 TABLET ORAL at 08:12

## 2022-12-31 RX ADMIN — DOCUSATE SODIUM 100 MG: 100 CAPSULE, LIQUID FILLED ORAL at 08:12

## 2022-12-31 RX ADMIN — HYDROCODONE BITARTRATE AND ACETAMINOPHEN 1 TABLET: 5; 325 TABLET ORAL at 01:12

## 2022-12-31 RX ADMIN — HYDROCODONE BITARTRATE AND ACETAMINOPHEN 1 TABLET: 5; 325 TABLET ORAL at 06:12

## 2022-12-31 RX ADMIN — ASPIRIN 81 MG: 81 TABLET, COATED ORAL at 08:12

## 2022-12-31 RX ADMIN — FAMOTIDINE 20 MG: 20 TABLET ORAL at 08:12

## 2022-12-31 RX ADMIN — HYDROCODONE BITARTRATE AND ACETAMINOPHEN 1 TABLET: 5; 325 TABLET ORAL at 08:12

## 2022-12-31 RX ADMIN — VENLAFAXINE 37.5 MG: 37.5 TABLET ORAL at 05:12

## 2022-12-31 RX ADMIN — METHOCARBAMOL 500 MG: 500 TABLET ORAL at 01:12

## 2022-12-31 RX ADMIN — BUSPIRONE HYDROCHLORIDE 7.5 MG: 7.5 TABLET ORAL at 08:12

## 2022-12-31 NOTE — PROGRESS NOTES
[facility]  [facility] Medicine  Progress Note    Patient Name: Tanya Linda  MRN: [id number]  Patient Class: IP- Swing   Admission Date: 12/29/2022  Length of Stay: 2 days  Attending Physician: Thairy G Reyes, DO  Primary Care Provider: MARION Salazar    Telemedicine Statement   This service was provided using Telemedicine  Audio/Visual Software: [employer]  Location of Provider - [place], [place]  Location of patient: [facility]  Persons & role in encounter:  Physician - Edd Sharif MD  Nursing Staff - RN and Ancillary at [facility]       Subjective:     Principal Problem:Closed intertrochanteric fracture of hip, left, initial encounter        HPI:  62-year-old female with a past medical history of morbid obesity, hypertension, anxiety who presents from [facility] after ORIF on 12/25 with Dr. Olivares secondary to mechanical trip and fall resulting in a left femur fracture.  WBAT on the ipsilateral extremity per Orthopedic surgery.      At baseline patient lives with her son and is fully independent.      Overview/Hospital Course:  Patient admitted for rehabilitation services secondary to femur fracture.  Patient to remain on aspirin 81 mg b.i.d. for the next 4 weeks.    Interval history:  Continues to do well with therapy.  Currently on oral pain medications which seems to be controlling her pain.  She does report significant pain when she tries to make any form of movement.  She is at this point in time with therapy, she has minimum to moderate assist.    On examination:  General:  Obese female, lying in bed not in any acute distress   HEENT:  Head is normocephalic atraumatic   Neck:  No visible JVD   Chest:  Moves respiration appropriately   Abdomen:  Obese but soft and nontender as by RN palpation   Musculoskeletal:  Left hip area: Significant reduced range of movement.  Dressing clean dry and intact.  Skin:  No visible rash    Neuro: He is awake alert oriented time place and person.  Psych:  Mood is congruent affect is flat    Assessment/Plan:      * Closed intertrochanteric fracture of hip, left, initial encounter  -status post ORIF with Dr. Olivares on 12/25   -weight-bearing as tolerating   -DVT prophylaxis with aspirin 81 mg BID for 4 weeks   -PT/OT  - Will go ahead and add p.r.n. IV medications for pain.  Continue with therapy.      Hypertension  -patient previously on medication at home however was discontinued at prior facility secondary to hypotension   -resume medication as condition requires      Obesity  Body mass index is 57.41 kg/m². Morbid obesity complicates all aspects of disease management from diagnostic modalities to treatment. Weight loss encouraged and health benefits explained to patient.           VTE Risk Mitigation (From admission, onward)           Ordered     Place sequential compression device  Until discontinued         12/29/22 1612                    Discharge Planning   XENA:      Code Status: Full Code   Is the patient medically ready for discharge?:     Reason for patient still in hospital (select all that apply): PT / OT recommendations  Discharge Plan A: Home Health, Home with family          Edd Sharif MD  Department of Hospital Medicine   [facility]

## 2022-12-31 NOTE — PT/OT/SLP PROGRESS
Occupational Therapy  Treatment    Name: Tanya Linda    : 1960 (62 y.o.)  MRN: [id number]           TREATMENT SUMMARY AND RECOMMENDATIONS:      OT Date of Treatment: 22  OT Start Time: 1315  OT Stop Time: 1340  OT Total Time (min): 25 min      Subjective Assessment:   No complaints  Lethargic   x Awake, alert, cooperative  Impulsive    Uncooperative   Flat affect    Agitated x c/o pain    Appropriate  c/o fatigue    Confused x Treated at bedside     Emotionally labile  Treated in gym/dept.      Other:        Therapy Precautions:    Cognitive deficits  Spinal precautions    Collar - hard  Sternal precautions    Collar - soft   TLSO   x Fall risk  LSO    Hip precautions - posterior  Knee immobilizer    Hip precautions - anterior x WBAT LLE    Impaired communication  Partial weightbearing    Oxygen  TTWB    PEG tube  NWB    Visual deficits      Hearing deficits   Other:        Treatment Objectives:     Mobility Training:    Mobility task Assist level Comments    Bed mobility Max A x2 Tf sitting EOB to supine, mod A rolling to R side with assist to position LLE with flexed knee/hip to help with rolling   Transfer     Sit to stands transitions Mod A x2 From recliner chair to RW, 3 reps   Functional mobility Max A x2 Attempted to ambulate 5 feet from recliner chair to bed, able to scoot alternating LE along the floor but unable to lift either LE to take steps, endurance for standing limited by c/o pain at LLE and by anxiety, but gave great effort   Sitting balance     Standing balance      Other:              Additional Comments:  Pt agreeable to goal of ambulating short distance with RW to bed, however unable to take steps d/t BLE weakness and c/o LLE hip pain with weightbearing/ROM.  Pt attempted and was able to scoot alternating feet to cover roughly 2 feet and then required seated rest break; recliner chair repositioned and pt then transferred to Eob using RW with mod A x2.    Assessment: Patient tolerated  session well.    OT Plan: Cont OT per POC  Revisions made to plan of care: No    GOALS:   Multidisciplinary Problems       Occupational Therapy Goals          Problem: Occupational Therapy    Goal Priority Disciplines Outcome Interventions   Occupational Therapy Goal     OT, PT/OT Ongoing, Progressing    Description: Goals to be met by: 1/20/22     Patient will increase functional independence with ADLs by performing:    UE Dressing with Modified [place].  LE Dressing with Minimal Assistance.  Grooming while seated at sink with Modified [place].  Toileting from bedside commode with Minimal Assistance for hygiene and clothing management.   Bathing from bench with Minimal Assistance.  Toilet transfer to bedside commode with Contact Guard Assistance.                         Skilled OT Minutes Provided: 25  Communication with Treatment Team:     Equipment recommendations:       At end of treatment, patient remained:   Up in chair     Up in wheelchair in room   x In bed    With alarm activated   x Bed rails up   x Call bell in reach     Family/friends present    Restraints secured properly    In bathroom with CNA/RN notified    In gym with PT/PTA/tech   x Nurse aware    Other:

## 2022-12-31 NOTE — PLAN OF CARE
Problem: Adult Inpatient Plan of Care  Goal: Plan of Care Review  Outcome: Ongoing, Progressing  Flowsheets (Taken 12/31/2022 0800)  Plan of Care Reviewed With: patient  Goal: Absence of Hospital-Acquired Illness or Injury  Outcome: Ongoing, Progressing  Intervention: Prevent and Manage VTE (Venous Thromboembolism) Risk  Flowsheets (Taken 12/31/2022 0800)  VTE Prevention/Management: remove, assess skin, and reapply sequential compression device  Goal: Optimal Comfort and Wellbeing  Outcome: Ongoing, Progressing  Intervention: Monitor Pain and Promote Comfort  Flowsheets (Taken 12/31/2022 0800)  Pain Management Interventions: care clustered  Intervention: Provide Person-Centered Care  Flowsheets (Taken 12/31/2022 0800)  Trust Relationship/Rapport:   care explained   choices provided   emotional support provided   empathic listening provided   questions answered   questions encouraged   reassurance provided   thoughts/feelings acknowledged     Problem: Skin Injury Risk Increased  Goal: Skin Health and Integrity  Outcome: Ongoing, Progressing  Intervention: Promote and Optimize Oral Intake  Flowsheets (Taken 12/31/2022 0800)  Oral Nutrition Promotion: rest periods promoted     Problem: Infection  Goal: Absence of Infection Signs and Symptoms  Outcome: Ongoing, Progressing  Intervention: Prevent or Manage Infection  Flowsheets (Taken 12/31/2022 1541)  Fever Reduction/Comfort Measures: lightweight bedding  Infection Management: aseptic technique maintained  Isolation Precautions: protective

## 2022-12-31 NOTE — PLAN OF CARE
Problem: Adult Inpatient Plan of Care  Goal: Plan of Care Review  Outcome: Ongoing, Progressing  Flowsheets (Taken 12/30/2022 2056)  Plan of Care Reviewed With: patient     Problem: Impaired Wound Healing  Goal: Optimal Wound Healing  Outcome: Ongoing, Progressing  Intervention: Promote Wound Healing  Flowsheets (Taken 12/30/2022 2056)  Sleep/Rest Enhancement: awakenings minimized     Problem: Infection  Goal: Absence of Infection Signs and Symptoms  Outcome: Ongoing, Progressing  Intervention: Prevent or Manage Infection  Flowsheets (Taken 12/30/2022 2056)  Infection Management: aseptic technique maintained

## 2023-01-01 PROBLEM — F41.9 ANXIETY: Status: ACTIVE | Noted: 2023-01-01

## 2023-01-01 PROBLEM — Z75.8 DISCHARGE PLANNING ISSUES: Status: ACTIVE | Noted: 2023-01-01

## 2023-01-01 PROBLEM — Z02.9 DISCHARGE PLANNING ISSUES: Status: ACTIVE | Noted: 2023-01-01

## 2023-01-01 PROBLEM — K21.9 GERD (GASTROESOPHAGEAL REFLUX DISEASE): Status: ACTIVE | Noted: 2023-01-01

## 2023-01-01 PROBLEM — E66.01 MORBID OBESITY: Status: ACTIVE | Noted: 2022-05-06

## 2023-01-01 PROCEDURE — 25000003 PHARM REV CODE 250: Performed by: STUDENT IN AN ORGANIZED HEALTH CARE EDUCATION/TRAINING PROGRAM

## 2023-01-01 PROCEDURE — 11000004 HC SNF PRIVATE

## 2023-01-01 PROCEDURE — 94760 N-INVAS EAR/PLS OXIMETRY 1: CPT

## 2023-01-01 RX ADMIN — BUSPIRONE HYDROCHLORIDE 7.5 MG: 7.5 TABLET ORAL at 08:01

## 2023-01-01 RX ADMIN — MICONAZOLE NITRATE 2 % TOPICAL POWDER: at 09:01

## 2023-01-01 RX ADMIN — ASPIRIN 81 MG: 81 TABLET, COATED ORAL at 08:01

## 2023-01-01 RX ADMIN — METHOCARBAMOL 500 MG: 500 TABLET ORAL at 08:01

## 2023-01-01 RX ADMIN — DOCUSATE SODIUM 100 MG: 100 CAPSULE, LIQUID FILLED ORAL at 09:01

## 2023-01-01 RX ADMIN — HYDROCODONE BITARTRATE AND ACETAMINOPHEN 1 TABLET: 5; 325 TABLET ORAL at 04:01

## 2023-01-01 RX ADMIN — VENLAFAXINE 37.5 MG: 37.5 TABLET ORAL at 05:01

## 2023-01-01 RX ADMIN — FAMOTIDINE 20 MG: 20 TABLET ORAL at 09:01

## 2023-01-01 RX ADMIN — ASPIRIN 81 MG: 81 TABLET, COATED ORAL at 09:01

## 2023-01-01 RX ADMIN — MICONAZOLE NITRATE 2 % TOPICAL POWDER: at 08:01

## 2023-01-01 RX ADMIN — HYDROCODONE BITARTRATE AND ACETAMINOPHEN 1 TABLET: 5; 325 TABLET ORAL at 09:01

## 2023-01-01 NOTE — SUBJECTIVE & OBJECTIVE
Interval History: She reports left hip pain 7/10 in severity this morning s/p IM nail of left intertrochanteric femur fracture on 12/25/2022. She ambulated 5 feet with maximum assistance x 2 yesterday and she required moderate assistance x 2 with sit to stand transitions and maximum assistance x 2 with bed mobility.      Review of Systems   All other systems reviewed and are negative.  Objective:     Vital Signs (Most Recent):  Temp: 97.8 °F (36.6 °C) (01/01/23 0741)  Pulse: 67 (01/01/23 0741)  Resp: 20 (01/01/23 0741)  BP: 118/76 (01/01/23 0741)  SpO2: 97 % (01/01/23 0741) Vital Signs (24h Range):  Temp:  [97.4 °F (36.3 °C)-98.6 °F (37 °C)] 97.8 °F (36.6 °C)  Pulse:  [67-99] 67  Resp:  [19-20] 20  SpO2:  [95 %-100 %] 97 %  BP: (104-124)/(60-76) 118/76     Weight: (!) 156.5 kg (345 lb 0.3 oz)  Body mass index is 57.41 kg/m².    Intake/Output Summary (Last 24 hours) at 1/1/2023 0834  Last data filed at 1/1/2023 0635  Gross per 24 hour   Intake 1440 ml   Output 3500 ml   Net -2060 ml      Physical Exam  General - Morbidly obese  female in no acute distress.  HEENT - PERRLA. Extraocular movements intact. No scleral icterus. Oropharynx clear. Mucous membranes moist.  Neck - No lymphadenopathy, thyromegaly, or JVD.  CVS - Regular rate and rhythm. No murmurs, rubs, or gallops.  Resp - Lungs are clear to auscultation bilaterally. No rales, wheeze, or rhonchi.   GI - Soft, nontender, nondistended, normoactive bowel sounds present.   Extremities - No clubbing, cyanosis, or edema.   Neuro - CN II through XII are grossly intact. No focal neurological deficits. Alert and oriented times four.   Psych - Normal affect.  Skin - Left hip surgical site c/d/i.    Significant Labs: All pertinent labs within the past 24 hours have been reviewed.    12/30/2022:   CBC:  WBC count 10.4, hemoglobin 10.1, hematocrit 33.6, platelet count 259 food   BMP: Sodium 137, potassium 4.4, chloride 100, CO2 25, BUN 14.3, creatinine 0.68,  glucose 100, calcium 9.1, magnesium 2.0.      12/29/2022:  CBC:  WBC count 10.9, hemoglobin 9.8, hematocrit 31.7, glucose 241.    BMP: Sodium 136, potassium 4.7, chloride 100, CO2 26, BUN 14.4, creatinine 0.73, glucose 107, calcium 8.9.    12/27/2022:   CBC:  WBC count 1.5, hemoglobin 10.7, hematocrit 34.6, platelet count 202.    CMP: Sodium 137, potassium 4.3, chloride 105, CO2 25, BUN 16.1, creatinine 0.81, glucose 121, calcium 9.0, magnesium 2.1, alkaline phosphatase 73, total protein 6.4, albumin 3.0, total bilirubin 0.8, AST 39, ALT 22.     12/26/2022:   CBC:  WBC count 13.7, hemoglobin 10.7, hematocrit 34.3, platelet count 264.  CMP:  Sodium 137, potassium 4.4, chloride 103, CO2 25, BUN 18.9, creatinine 0.85, glucose 118, calcium 8.71 magnesium 1.9, alkaline phosphatase 72, total protein 5.7, albumin 3.1, total bilirubin 0.8, AST 56, ALT 26.    12/24/2022:   CMP: Sodium 140, potassium 3.9, chloride 105, CO2 25, BUN 17.2 creatinine 0.97, glucose 117, calcium 9.3, alkaline phosphatase 98, total protein 7.2, albumin 3.8, total bilirubin 0.5, AST 14, ALT 17.    CBC: WBC count 10.6, hemoglobin 12.9, hematocrit 41.2, platelet count 223.    Significant Imaging: I have reviewed all pertinent imaging results/findings within the past 24 hours.    X-ray left hip 12/25/2022: There has been placement of left femoral intramedullary atz and compression screw traverses into the femoral head transfixing the intertrochanteric fracture.  Positioning and alignment is satisfactory.    CT left hip 12/25/2022: Comminuted intertrochanteric left femoral fracture.    CXR 12/24/2022: No acute chest disease is identified.    X-ray left hip 12/24/2022: Displaced intertrochanteric fracture of the left femur

## 2023-01-01 NOTE — ASSESSMENT & PLAN NOTE
Continue 4 week course of aspirin 81 mg PO BID for DVT prophylaxis, PT/OT, norco as needed for pain control, and weight-bearing as tolerated to the left lower extremity.  She is status post intramedullary nail by Dr. Ronnell Olivares on 12/25/2022.

## 2023-01-01 NOTE — PROGRESS NOTES
[facility] - Medical Surgical Unit  [facility] Medicine  Telehospitalist Progress Note    Patient Name: Tanya Linda  MRN: [id number]  Patient Class: IP- Swing   Admission Date: 12/29/2022  Length of Stay: 3 days  Attending Physician: Stephon Kearney MD  Primary Care Provider: MARION Salazar      Subjective:     Principal Problem:Closed intertrochanteric fracture of hip, left, initial encounter      HPI:  Ms. Linda is a 62-year-old  female with history of morbid obesity, hypertension, depression, GERD, and anxiety who presented to [facility] ER on 12/24/2022 with left hip pain following a fall.  X-ray of the left hip and femur showed a displaced intertrochanteric fracture of the left femur and chest x-ray revealed no acute cardiopulmonary process. CT of the left hip confirmed a comminuted intertrochanteric left femoral fracture and orthopedics was consulted.  She was taken to the OR on 12/25/2022 for intramedullary nail of left intertrochanteric femur fracture by Dr. Ronnell Olivares and she did relatively well postoperatively.  Her lisinopril was held due to mild hypotension and her blood pressure stabilized.  She had no other complications throughout her hospital course and she was transferred to [facility] swing bed on 12/29/2022 for PT/OT and management of her multiple medical comorbidities.      Overview/Hospital Course:  She was admitted to [facility] swing bed on 12/29/2022 for rehab following intramedullary nail of left intertrochanteric femur fracture.  She is being treated with a 4 week course of aspirin 81 mg PO BID for DVT prophylaxis and norco 5/325 mg PO every 6 hours as needed for pain.        Interval History: She reports left hip pain 7/10 in severity this morning s/p IM nail of left intertrochanteric femur fracture on 12/25/2022. She ambulated 5 feet with maximum assistance x 2 yesterday and she required moderate assistance x 2 with sit to stand transitions and  maximum assistance x 2 with bed mobility.      Review of Systems   All other systems reviewed and are negative.  Objective:     Vital Signs (Most Recent):  Temp: 97.8 °F (36.6 °C) (01/01/23 0741)  Pulse: 67 (01/01/23 0741)  Resp: 20 (01/01/23 0741)  BP: 118/76 (01/01/23 0741)  SpO2: 97 % (01/01/23 0741) Vital Signs (24h Range):  Temp:  [97.4 °F (36.3 °C)-98.6 °F (37 °C)] 97.8 °F (36.6 °C)  Pulse:  [67-99] 67  Resp:  [19-20] 20  SpO2:  [95 %-100 %] 97 %  BP: (104-124)/(60-76) 118/76     Weight: (!) 156.5 kg (345 lb 0.3 oz)  Body mass index is 57.41 kg/m².    Intake/Output Summary (Last 24 hours) at 1/1/2023 0834  Last data filed at 1/1/2023 0635  Gross per 24 hour   Intake 1440 ml   Output 3500 ml   Net -2060 ml      Physical Exam  General - Morbidly obese  female in no acute distress.  HEENT - PERRLA. Extraocular movements intact. No scleral icterus. Oropharynx clear. Mucous membranes moist.  Neck - No lymphadenopathy, thyromegaly, or JVD.  CVS - Regular rate and rhythm. No murmurs, rubs, or gallops.  Resp - Lungs are clear to auscultation bilaterally. No rales, wheeze, or rhonchi.   GI - Soft, nontender, nondistended, normoactive bowel sounds present.   Extremities - No clubbing, cyanosis, or edema.   Neuro - CN II through XII are grossly intact. No focal neurological deficits. Alert and oriented times four.   Psych - Normal affect.  Skin - Left hip surgical site c/d/i.    Significant Labs: All pertinent labs within the past 24 hours have been reviewed.    12/30/2022:   CBC:  WBC count 10.4, hemoglobin 10.1, hematocrit 33.6, platelet count 259 food   BMP: Sodium 137, potassium 4.4, chloride 100, CO2 25, BUN 14.3, creatinine 0.68, glucose 100, calcium 9.1, magnesium 2.0.      12/29/2022:  CBC:  WBC count 10.9, hemoglobin 9.8, hematocrit 31.7, glucose 241.    BMP: Sodium 136, potassium 4.7, chloride 100, CO2 26, BUN 14.4, creatinine 0.73, glucose 107, calcium 8.9.    12/27/2022:   CBC:  WBC count 1.5,  hemoglobin 10.7, hematocrit 34.6, platelet count 202.    CMP: Sodium 137, potassium 4.3, chloride 105, CO2 25, BUN 16.1, creatinine 0.81, glucose 121, calcium 9.0, magnesium 2.1, alkaline phosphatase 73, total protein 6.4, albumin 3.0, total bilirubin 0.8, AST 39, ALT 22.     12/26/2022:   CBC:  WBC count 13.7, hemoglobin 10.7, hematocrit 34.3, platelet count 264.  CMP:  Sodium 137, potassium 4.4, chloride 103, CO2 25, BUN 18.9, creatinine 0.85, glucose 118, calcium 8.71 magnesium 1.9, alkaline phosphatase 72, total protein 5.7, albumin 3.1, total bilirubin 0.8, AST 56, ALT 26.    12/24/2022:   CMP: Sodium 140, potassium 3.9, chloride 105, CO2 25, BUN 17.2 creatinine 0.97, glucose 117, calcium 9.3, alkaline phosphatase 98, total protein 7.2, albumin 3.8, total bilirubin 0.5, AST 14, ALT 17.    CBC: WBC count 10.6, hemoglobin 12.9, hematocrit 41.2, platelet count 223.    Significant Imaging: I have reviewed all pertinent imaging results/findings within the past 24 hours.    X-ray left hip 12/25/2022: There has been placement of left femoral intramedullary taz and compression screw traverses into the femoral head transfixing the intertrochanteric fracture.  Positioning and alignment is satisfactory.    CT left hip 12/25/2022: Comminuted intertrochanteric left femoral fracture.    CXR 12/24/2022: No acute chest disease is identified.    X-ray left hip 12/24/2022: Displaced intertrochanteric fracture of the left femur      Assessment/Plan:      * Left intertrochanteric femur fracture  Continue 4 week course of aspirin 81 mg PO BID for DVT prophylaxis, PT/OT, norco as needed for pain control, and weight-bearing as tolerated to the left lower extremity.  She is status post intramedullary nail by Dr. Ronnell Olivares on 12/25/2022.     Hypertension  Her blood pressure has been stable off antihypertensives.  She is no longer on lisinopril 5 mg PO daily which was held at [facility] due to hypotension.    Anxiety  Continue  buspirone.    Depression  Continue venlafaxine.    GERD (gastroesophageal reflux disease)  Continue famotidine.    Morbid obesity  She was counseled on the importance of weight loss and diet.  She was noted to have a BMI 57.41.      Disposition  Anticipate discharge home with home health in the next week.        VTE Risk Mitigation (From admission, onward)         Ordered     Place sequential compression device  Until discontinued         12/29/22 1612                Discharge Planning   XENA:      Code Status: Full Code   Is the patient medically ready for discharge?:     Reason for patient still in hospital (select all that apply): PT / OT recommendations  Discharge Plan A: Home Health, Home with family        This service was provided via telemedicine.    Type of Software: Audio/Visual.  Originating site: [facility].  Distant site: [facility].  Her exam was performed with the assistance of Sherice Christie RN.      Stephon Kearney MD  Department of Hospital Medicine   [facility] - Medical Surgical Unit

## 2023-01-01 NOTE — PLAN OF CARE
Problem: Adult Inpatient Plan of Care  Goal: Plan of Care Review  Outcome: Ongoing, Progressing  Flowsheets (Taken 12/31/2022 2139)  Plan of Care Reviewed With: patient     Problem: Impaired Wound Healing  Goal: Optimal Wound Healing  Outcome: Ongoing, Progressing  Intervention: Promote Wound Healing  Flowsheets (Taken 12/31/2022 2139)  Sleep/Rest Enhancement: awakenings minimized     Problem: Infection  Goal: Absence of Infection Signs and Symptoms  Outcome: Ongoing, Progressing  Intervention: Prevent or Manage Infection  Flowsheets (Taken 12/31/2022 2139)  Infection Management: aseptic technique maintained

## 2023-01-01 NOTE — ASSESSMENT & PLAN NOTE
Her blood pressure has been stable off antihypertensives.  She is no longer on lisinopril 5 mg PO daily which was held at [facility] due to hypotension.

## 2023-01-01 NOTE — PLAN OF CARE
Problem: Adult Inpatient Plan of Care  Goal: Plan of Care Review  Outcome: Ongoing, Progressing  Flowsheets (Taken 1/1/2023 0800)  Plan of Care Reviewed With: patient  Goal: Absence of Hospital-Acquired Illness or Injury  Outcome: Ongoing, Progressing  Intervention: Prevent Skin Injury  Flowsheets (Taken 1/1/2023 0800)  Skin Protection:   adhesive use limited   incontinence pads utilized  Goal: Optimal Comfort and Wellbeing  Outcome: Ongoing, Progressing  Intervention: Monitor Pain and Promote Comfort  Flowsheets (Taken 1/1/2023 0800)  Pain Management Interventions: care clustered  Intervention: Provide Person-Centered Care  Flowsheets (Taken 1/1/2023 0800)  Trust Relationship/Rapport:   care explained   choices provided   emotional support provided   empathic listening provided   questions answered   questions encouraged   reassurance provided   thoughts/feelings acknowledged     Problem: Bariatric Environmental Safety  Goal: Safety Maintained with Care  Outcome: Ongoing, Progressing  Intervention: Promote Safety and Comfort  Flowsheets (Taken 1/1/2023 0800)  Bariatric Safety:   bariatric commode at bedside   specialty bed utilized     Problem: Impaired Wound Healing  Goal: Optimal Wound Healing  Outcome: Ongoing, Progressing  Intervention: Promote Wound Healing  Flowsheets (Taken 1/1/2023 0800)  Oral Nutrition Promotion: rest periods promoted  Pain Management Interventions: care clustered     Problem: Skin Injury Risk Increased  Goal: Skin Health and Integrity  Outcome: Ongoing, Progressing  Intervention: Optimize Skin Protection  Flowsheets (Taken 1/1/2023 0800)  Skin Protection:   adhesive use limited   incontinence pads utilized  Intervention: Promote and Optimize Oral Intake  Flowsheets (Taken 1/1/2023 0800)  Oral Nutrition Promotion: rest periods promoted     Problem: Infection  Goal: Absence of Infection Signs and Symptoms  Outcome: Ongoing, Progressing  Intervention: Prevent or Manage  Infection  Flowsheets (Taken 1/1/2023 0800)  Fever Reduction/Comfort Measures: fluid intake increased  Infection Management: aseptic technique maintained  Isolation Precautions: protective

## 2023-01-01 NOTE — ASSESSMENT & PLAN NOTE
She was counseled on the importance of weight loss and diet.  She was noted to have a BMI 57.41.

## 2023-01-02 PROCEDURE — 25000003 PHARM REV CODE 250: Performed by: STUDENT IN AN ORGANIZED HEALTH CARE EDUCATION/TRAINING PROGRAM

## 2023-01-02 PROCEDURE — 11000004 HC SNF PRIVATE

## 2023-01-02 PROCEDURE — 97110 THERAPEUTIC EXERCISES: CPT

## 2023-01-02 PROCEDURE — 94760 N-INVAS EAR/PLS OXIMETRY 1: CPT

## 2023-01-02 PROCEDURE — 97530 THERAPEUTIC ACTIVITIES: CPT

## 2023-01-02 RX ADMIN — HYDROCODONE BITARTRATE AND ACETAMINOPHEN 1 TABLET: 5; 325 TABLET ORAL at 09:01

## 2023-01-02 RX ADMIN — FAMOTIDINE 20 MG: 20 TABLET ORAL at 09:01

## 2023-01-02 RX ADMIN — MICONAZOLE NITRATE 2 % TOPICAL POWDER: at 09:01

## 2023-01-02 RX ADMIN — BUSPIRONE HYDROCHLORIDE 7.5 MG: 7.5 TABLET ORAL at 10:01

## 2023-01-02 RX ADMIN — MICONAZOLE NITRATE 2 % TOPICAL POWDER: at 10:01

## 2023-01-02 RX ADMIN — METHOCARBAMOL 500 MG: 500 TABLET ORAL at 01:01

## 2023-01-02 RX ADMIN — HYDROCODONE BITARTRATE AND ACETAMINOPHEN 1 TABLET: 5; 325 TABLET ORAL at 03:01

## 2023-01-02 RX ADMIN — HYDROCODONE BITARTRATE AND ACETAMINOPHEN 1 TABLET: 5; 325 TABLET ORAL at 10:01

## 2023-01-02 RX ADMIN — ASPIRIN 81 MG: 81 TABLET, COATED ORAL at 09:01

## 2023-01-02 RX ADMIN — VENLAFAXINE 37.5 MG: 37.5 TABLET ORAL at 05:01

## 2023-01-02 RX ADMIN — ASPIRIN 81 MG: 81 TABLET, COATED ORAL at 10:01

## 2023-01-02 RX ADMIN — METHOCARBAMOL 500 MG: 500 TABLET ORAL at 10:01

## 2023-01-02 NOTE — PLAN OF CARE
Problem: Adult Inpatient Plan of Care  Goal: Plan of Care Review  Outcome: Ongoing, Progressing  Flowsheets (Taken 1/1/2023 1905)  Plan of Care Reviewed With: patient     Problem: Impaired Wound Healing  Goal: Optimal Wound Healing  Outcome: Ongoing, Progressing  Intervention: Promote Wound Healing  Flowsheets (Taken 1/1/2023 1905)  Sleep/Rest Enhancement: awakenings minimized     Problem: Infection  Goal: Absence of Infection Signs and Symptoms  Outcome: Ongoing, Progressing  Intervention: Prevent or Manage Infection  Flowsheets (Taken 1/1/2023 1905)  Infection Management: aseptic technique maintained

## 2023-01-02 NOTE — PT/OT/SLP PROGRESS
Occupational Therapy  Treatment    Name: Tanya Linda    : 1960 (62 y.o.)  MRN: [id number]           TREATMENT SUMMARY AND RECOMMENDATIONS:      OT Date of Treatment: 23  OT Start Time: 1115  OT Stop Time: 1156  OT Total Time (min): 41 min      Subjective Assessment:   No complaints  Lethargic   x Awake, alert, cooperative  Impulsive    Uncooperative   Flat affect    Agitated x c/o pain    Appropriate  c/o fatigue    Confused x Treated at bedside     Emotionally labile  Treated in gym/dept.      Other:        Therapy Precautions:    Cognitive deficits  Spinal precautions    Collar - hard  Sternal precautions    Collar - soft   TLSO   x Fall risk  LSO    Hip precautions - posterior  Knee immobilizer    Hip precautions - anterior x WBAT    Impaired communication  Partial weightbearing    Oxygen  TTWB    PEG tube  NWB    Visual deficits      Hearing deficits   Other:        Treatment Objectives:     Mobility Training:    Mobility task Assist level Comments    Bed mobility Max A Supine to sitting EOB to manage LLE   Transfer Min A EOB to recliner chair using RW   Sit to stands transitions Min A From EOB to RW   Functional mobility     Sitting balance     Standing balance      Other:        ADL Training:    ADL Assist level Comments    Feeding     Grooming/hygiene setup At sink in seated position at recliner chair   Bathing     Upper body dressing     Lower body dressing     Toileting     Toilet transfer     Adaptive equipment training     Other:           Therapeutic Exercise:   Exercise Sets Reps Comments   3# dowel o/h presses, chest presses, and biceps curls 3 10                            Assessment: Patient tolerated session very well, with improving mobility.    OT Plan: Cont OT per POC  Revisions made to plan of care: No    GOALS:   Multidisciplinary Problems       Occupational Therapy Goals          Problem: Occupational Therapy    Goal Priority Disciplines Outcome Interventions   Occupational Therapy  Goal     OT, PT/OT Ongoing, Progressing    Description: Goals to be met by: 1/20/22     Patient will increase functional independence with ADLs by performing:    UE Dressing with Modified [place].  LE Dressing with Minimal Assistance.  Grooming while seated at sink with Modified [place].  Toileting from bedside commode with Minimal Assistance for hygiene and clothing management.   Bathing from bench with Minimal Assistance.  Toilet transfer to bedside commode with Contact Guard Assistance.                         Skilled OT Minutes Provided: 30  Communication with Treatment Team:     Equipment recommendations:       At end of treatment, patient remained:  x Up in chair     Up in wheelchair in room    In bed   x With alarm activated    Bed rails up   x Call bell in reach     Family/friends present    Restraints secured properly    In bathroom with CNA/RN notified    In gym with PT/PTA/tech    Nurse aware    Other:

## 2023-01-02 NOTE — SUBJECTIVE & OBJECTIVE
Interval History: She is doing well today without complaints. She denies any left hip pain this morning. She requests a heating pad today.      Review of Systems   All other systems reviewed and are negative.  Objective:     Vital Signs (Most Recent):  Temp: 97.8 °F (36.6 °C) (01/02/23 0431)  Pulse: 66 (01/02/23 0725)  Resp: 18 (01/02/23 0431)  BP: (!) 157/77 (01/02/23 0725)  SpO2: (!) 93 % (01/02/23 0431)   Vital Signs (24h Range):  Temp:  [97.3 °F (36.3 °C)-99.1 °F (37.3 °C)] 97.8 °F (36.6 °C)  Pulse:  [63-87] 66  Resp:  [18-20] 18  SpO2:  [93 %-97 %] 93 %  BP: (118-157)/(66-82) 157/77     Weight: (!) 156.5 kg (345 lb 0.3 oz)  Body mass index is 57.41 kg/m².    Intake/Output Summary (Last 24 hours) at 1/2/2023 0823  Last data filed at 1/2/2023 0553  Gross per 24 hour   Intake 960 ml   Output 1850 ml   Net -890 ml      Physical Exam  General - Morbidly obese  female in no acute distress.  HEENT - NCAT. No scleral icterus. Oropharynx clear. Mucous membranes moist.  Neck - No lymphadenopathy, thyromegaly, or JVD.  CVS - Regular rate and rhythm. No murmurs, rubs, or gallops.  Resp - Lungs are clear to auscultation bilaterally. No rales, wheeze, or rhonchi.   GI - Soft, nontender, nondistended, normoactive bowel sounds present.   Extremities - No clubbing, cyanosis, or edema.   Neuro - CN II through XII are grossly intact. No focal neurological deficits. Alert and oriented times four.   Psych - Normal affect.  Skin - Left hip surgical site c/d/i.     Significant Labs: All pertinent labs within the past 24 hours have been reviewed.     12/30/2022:   CBC:  WBC count 10.4, hemoglobin 10.1, hematocrit 33.6, platelet count 259 food   BMP: Sodium 137, potassium 4.4, chloride 100, CO2 25, BUN 14.3, creatinine 0.68, glucose 100, calcium 9.1, magnesium 2.0.      12/29/2022:  CBC:  WBC count 10.9, hemoglobin 9.8, hematocrit 31.7, glucose 241.    BMP: Sodium 136, potassium 4.7, chloride 100, CO2 26, BUN 14.4, creatinine  0.73, glucose 107, calcium 8.9.    12/27/2022:   CBC:  WBC count 1.5, hemoglobin 10.7, hematocrit 34.6, platelet count 202.    CMP: Sodium 137, potassium 4.3, chloride 105, CO2 25, BUN 16.1, creatinine 0.81, glucose 121, calcium 9.0, magnesium 2.1, alkaline phosphatase 73, total protein 6.4, albumin 3.0, total bilirubin 0.8, AST 39, ALT 22.     12/26/2022:   CBC:  WBC count 13.7, hemoglobin 10.7, hematocrit 34.3, platelet count 264.  CMP:  Sodium 137, potassium 4.4, chloride 103, CO2 25, BUN 18.9, creatinine 0.85, glucose 118, calcium 8.71 magnesium 1.9, alkaline phosphatase 72, total protein 5.7, albumin 3.1, total bilirubin 0.8, AST 56, ALT 26.     12/24/2022:   CMP: Sodium 140, potassium 3.9, chloride 105, CO2 25, BUN 17.2 creatinine 0.97, glucose 117, calcium 9.3, alkaline phosphatase 98, total protein 7.2, albumin 3.8, total bilirubin 0.5, AST 14, ALT 17.    CBC: WBC count 10.6, hemoglobin 12.9, hematocrit 41.2, platelet count 223.     Significant Imaging: I have reviewed all pertinent imaging results/findings within the past 24 hours.     X-ray left hip 12/25/2022: There has been placement of left femoral intramedullary taz and compression screw traverses into the femoral head transfixing the intertrochanteric fracture.  Positioning and alignment is satisfactory.     CT left hip 12/25/2022: Comminuted intertrochanteric left femoral fracture.     CXR 12/24/2022: No acute chest disease is identified.     X-ray left hip 12/24/2022: Displaced intertrochanteric fracture of the left femur

## 2023-01-02 NOTE — PROGRESS NOTES
[facility] - Medical Surgical Unit  [facility] Medicine  Telehospitalist Progress Note    Patient Name: Tanya Linda  MRN: [id number]  Patient Class: IP- Swing   Admission Date: 12/29/2022  Length of Stay: 4 days  Attending Physician: Stephon Kearney MD  Primary Care Provider: MARION Salazar      Subjective:     Principal Problem:Closed intertrochanteric fracture of hip, left, initial encounter      HPI:  Ms. Linda is a 62-year-old  female with history of morbid obesity, hypertension, depression, GERD, and anxiety who presented to [facility] ER on 12/24/2022 with left hip pain following a fall.  X-ray of the left hip and femur showed a displaced intertrochanteric fracture of the left femur and chest x-ray revealed no acute cardiopulmonary process. CT of the left hip confirmed a comminuted intertrochanteric left femoral fracture and orthopedics was consulted.  She was taken to the OR on 12/25/2022 for intramedullary nail of left intertrochanteric femur fracture by Dr. Ronnell Olivares and she did relatively well postoperatively.  Her lisinopril was held due to mild hypotension and her blood pressure stabilized.  She had no other complications throughout her hospital course and she was transferred to [facility] swing bed on 12/29/2022 for PT/OT and management of her multiple medical comorbidities.      Overview/Hospital Course:  She was admitted to [facility] swing bed on 12/29/2022 for rehab following intramedullary nail of left intertrochanteric femur fracture.  She is being treated with a 4 week course of aspirin 81 mg PO BID for DVT prophylaxis and norco 5/325 mg PO every 6 hours as needed for pain.        Interval History: She is doing well today without complaints. She denies any left hip pain this morning. She requests a heating pad today.      Review of Systems   All other systems reviewed and are negative.  Objective:     Vital Signs (Most Recent):  Temp: 97.8 °F (36.6 °C)  (01/02/23 0431)  Pulse: 66 (01/02/23 0725)  Resp: 18 (01/02/23 0431)  BP: (!) 157/77 (01/02/23 0725)  SpO2: (!) 93 % (01/02/23 0431)   Vital Signs (24h Range):  Temp:  [97.3 °F (36.3 °C)-99.1 °F (37.3 °C)] 97.8 °F (36.6 °C)  Pulse:  [63-87] 66  Resp:  [18-20] 18  SpO2:  [93 %-97 %] 93 %  BP: (118-157)/(66-82) 157/77     Weight: (!) 156.5 kg (345 lb 0.3 oz)  Body mass index is 57.41 kg/m².    Intake/Output Summary (Last 24 hours) at 1/2/2023 0823  Last data filed at 1/2/2023 0553  Gross per 24 hour   Intake 960 ml   Output 1850 ml   Net -890 ml      Physical Exam  General - Morbidly obese  female in no acute distress.  HEENT - NCAT. No scleral icterus. Oropharynx clear. Mucous membranes moist.  Neck - No lymphadenopathy, thyromegaly, or JVD.  CVS - Regular rate and rhythm. No murmurs, rubs, or gallops.  Resp - Lungs are clear to auscultation bilaterally. No rales, wheeze, or rhonchi.   GI - Soft, nontender, nondistended, normoactive bowel sounds present.   Extremities - No clubbing, cyanosis, or edema.   Neuro - CN II through XII are grossly intact. No focal neurological deficits. Alert and oriented times four.   Psych - Normal affect.  Skin - Left hip surgical site c/d/i.     Significant Labs: All pertinent labs within the past 24 hours have been reviewed.     12/30/2022:   CBC:  WBC count 10.4, hemoglobin 10.1, hematocrit 33.6, platelet count 259 food   BMP: Sodium 137, potassium 4.4, chloride 100, CO2 25, BUN 14.3, creatinine 0.68, glucose 100, calcium 9.1, magnesium 2.0.      12/29/2022:  CBC:  WBC count 10.9, hemoglobin 9.8, hematocrit 31.7, glucose 241.    BMP: Sodium 136, potassium 4.7, chloride 100, CO2 26, BUN 14.4, creatinine 0.73, glucose 107, calcium 8.9.    12/27/2022:   CBC:  WBC count 1.5, hemoglobin 10.7, hematocrit 34.6, platelet count 202.    CMP: Sodium 137, potassium 4.3, chloride 105, CO2 25, BUN 16.1, creatinine 0.81, glucose 121, calcium 9.0, magnesium 2.1, alkaline phosphatase 73,  total protein 6.4, albumin 3.0, total bilirubin 0.8, AST 39, ALT 22.     12/26/2022:   CBC:  WBC count 13.7, hemoglobin 10.7, hematocrit 34.3, platelet count 264.  CMP:  Sodium 137, potassium 4.4, chloride 103, CO2 25, BUN 18.9, creatinine 0.85, glucose 118, calcium 8.71 magnesium 1.9, alkaline phosphatase 72, total protein 5.7, albumin 3.1, total bilirubin 0.8, AST 56, ALT 26.     12/24/2022:   CMP: Sodium 140, potassium 3.9, chloride 105, CO2 25, BUN 17.2 creatinine 0.97, glucose 117, calcium 9.3, alkaline phosphatase 98, total protein 7.2, albumin 3.8, total bilirubin 0.5, AST 14, ALT 17.    CBC: WBC count 10.6, hemoglobin 12.9, hematocrit 41.2, platelet count 223.     Significant Imaging: I have reviewed all pertinent imaging results/findings within the past 24 hours.     X-ray left hip 12/25/2022: There has been placement of left femoral intramedullary taz and compression screw traverses into the femoral head transfixing the intertrochanteric fracture.  Positioning and alignment is satisfactory.     CT left hip 12/25/2022: Comminuted intertrochanteric left femoral fracture.     CXR 12/24/2022: No acute chest disease is identified.     X-ray left hip 12/24/2022: Displaced intertrochanteric fracture of the left femur      Assessment/Plan:      * Left intertrochanteric femur fracture  Continue 4 week course of aspirin 81 mg PO BID for DVT prophylaxis, PT/OT, norco as needed for pain control, and weight-bearing as tolerated to the left lower extremity.  She is status post intramedullary nail by Dr. Ronnell Olivares on 12/25/2022.     Hypertension  Her blood pressure has been stable off antihypertensives.  She is no longer on lisinopril 5 mg PO daily which was held at [facility] due to hypotension.    Anxiety  Continue buspirone.    Depression  Continue venlafaxine.    GERD (gastroesophageal reflux disease)  Continue famotidine.    Morbid obesity  She was counseled on the importance of weight loss and diet.  She was noted to  have a BMI 57.41.      Disposition  Anticipate discharge home with home health in the next week.        VTE Risk Mitigation (From admission, onward)         Ordered     Place sequential compression device  Until discontinued         12/29/22 1612                Discharge Planning   XENA:      Code Status: Full Code   Is the patient medically ready for discharge?:     Reason for patient still in hospital (select all that apply): PT / OT recommendations  Discharge Plan A: Home with family, Home Health   Discharge Delays: None known at this time      This service was provided via telemedicine.  Type of Software: Audio/Visual.  Originating Site: [facility].  Distant Site: [place], [place].  Her exam was performed with the assistance of Donna Barroso RN.      Stephon Kearney MD  Department of Hospital Medicine   [facility] - [facility]

## 2023-01-02 NOTE — PLAN OF CARE
[facility] - Medical Surgical Unit  Discharge Reassessment    Primary Care Provider: MARION Salazar    Expected Discharge Date:     Reassessment (most recent)       Discharge Reassessment - 01/02/23 0914          Discharge Reassessment    Assessment Type Discharge Planning Reassessment     Did the patient's condition or plan change since previous assessment? No     Discharge Plan discussed with: Adult children     Communicated XENA with patient/caregiver Date not available/Unable to determine     Discharge Plan A Home with family;Home Health     DME Needed Upon Discharge  walker, standard;wheelchair;bedside commode     Discharge Barriers Identified None     Why the patient remains in the hospital Requires continued medical care        Post-Acute Status    Post-Acute Authorization Home Health     Home Health Status Pending medical clearance/testing     Hospital Resources/Appts/Education Provided Provided patient/caregiver with written discharge plan information     Discharge Delays None known at this time

## 2023-01-03 PROCEDURE — 11000004 HC SNF PRIVATE

## 2023-01-03 PROCEDURE — 97110 THERAPEUTIC EXERCISES: CPT

## 2023-01-03 PROCEDURE — 25000003 PHARM REV CODE 250: Performed by: STUDENT IN AN ORGANIZED HEALTH CARE EDUCATION/TRAINING PROGRAM

## 2023-01-03 PROCEDURE — 97530 THERAPEUTIC ACTIVITIES: CPT

## 2023-01-03 PROCEDURE — 94760 N-INVAS EAR/PLS OXIMETRY 1: CPT

## 2023-01-03 PROCEDURE — 97110 THERAPEUTIC EXERCISES: CPT | Mod: CQ

## 2023-01-03 PROCEDURE — 97530 THERAPEUTIC ACTIVITIES: CPT | Mod: CQ

## 2023-01-03 RX ADMIN — HYDROCODONE BITARTRATE AND ACETAMINOPHEN 1 TABLET: 5; 325 TABLET ORAL at 08:01

## 2023-01-03 RX ADMIN — ASPIRIN 81 MG: 81 TABLET, COATED ORAL at 08:01

## 2023-01-03 RX ADMIN — HYDROCODONE BITARTRATE AND ACETAMINOPHEN 1 TABLET: 5; 325 TABLET ORAL at 02:01

## 2023-01-03 RX ADMIN — VENLAFAXINE 37.5 MG: 37.5 TABLET ORAL at 05:01

## 2023-01-03 RX ADMIN — DOCUSATE SODIUM 100 MG: 100 CAPSULE, LIQUID FILLED ORAL at 09:01

## 2023-01-03 RX ADMIN — FAMOTIDINE 20 MG: 20 TABLET ORAL at 08:01

## 2023-01-03 RX ADMIN — HYDROCODONE BITARTRATE AND ACETAMINOPHEN 1 TABLET: 5; 325 TABLET ORAL at 09:01

## 2023-01-03 RX ADMIN — ASPIRIN 81 MG: 81 TABLET, COATED ORAL at 09:01

## 2023-01-03 RX ADMIN — MICONAZOLE NITRATE 2 % TOPICAL POWDER: at 08:01

## 2023-01-03 RX ADMIN — METHOCARBAMOL 500 MG: 500 TABLET ORAL at 09:01

## 2023-01-03 RX ADMIN — MICONAZOLE NITRATE 2 % TOPICAL POWDER: at 09:01

## 2023-01-03 RX ADMIN — BUSPIRONE HYDROCHLORIDE 7.5 MG: 7.5 TABLET ORAL at 09:01

## 2023-01-03 NOTE — ASSESSMENT & PLAN NOTE
She was counseled on the importance of weight loss and diet.  She was noted to have a BMI of 57.41.

## 2023-01-03 NOTE — PLAN OF CARE
Problem: Adult Inpatient Plan of Care  Goal: Plan of Care Review  Outcome: Ongoing, Progressing  Flowsheets (Taken 1/2/2023 2000)  Plan of Care Reviewed With: patient  Goal: Absence of Hospital-Acquired Illness or Injury  Outcome: Ongoing, Progressing  Intervention: Identify and Manage Fall Risk  Flowsheets (Taken 1/2/2023 2000)  Safety Promotion/Fall Prevention:   assistive device/personal item within reach   bed alarm set   Fall Risk reviewed with patient/family   muscle strengthening facilitated   nonskid shoes/socks when out of bed   side rails raised x 3   supervised activity   Supervised toileting - stay within arms reach   instructed to call staff for mobility   gait belt with ambulation  Intervention: Prevent Skin Injury  Flowsheets (Taken 1/2/2023 2000)  Body Position:   position changed independently   weight shifting  Skin Protection:   incontinence pads utilized   skin sealant/moisture barrier applied  Intervention: Prevent and Manage VTE (Venous Thromboembolism) Risk  Flowsheets (Taken 1/2/2023 2000)  Activity Management: Walk with assistive devise and /or staff member - L3  VTE Prevention/Management: ambulation promoted  Intervention: Prevent Infection  Flowsheets (Taken 1/2/2023 2000)  Infection Prevention:   cohorting utilized   environmental surveillance performed   hand hygiene promoted   single patient room provided   rest/sleep promoted     Problem: Bariatric Environmental Safety  Goal: Safety Maintained with Care  Outcome: Ongoing, Progressing  Intervention: Promote Safety and Comfort  Flowsheets (Taken 1/2/2023 2000)  Bariatric Safety: specialty bed utilized     Problem: Impaired Wound Healing  Goal: Optimal Wound Healing  Outcome: Ongoing, Progressing  Intervention: Promote Wound Healing  Flowsheets (Taken 1/2/2023 2000)  Oral Nutrition Promotion:   calorie-dense foods provided   calorie-dense liquids provided   physical activity promoted   safe use of adaptive equipment  encouraged  Sleep/Rest Enhancement:   awakenings minimized   family presence promoted   noise level reduced   regular sleep/rest pattern promoted  Activity Management: Walk with assistive devise and /or staff member - L3  Pain Management Interventions: medication offered     Problem: Skin Injury Risk Increased  Goal: Skin Health and Integrity  Outcome: Ongoing, Progressing  Intervention: Optimize Skin Protection  Flowsheets (Taken 1/2/2023 2000)  Pressure Reduction Techniques:   frequent weight shift encouraged   heels elevated off bed   positioned off wounds   pressure points protected   weight shift assistance provided  Pressure Reduction Devices:   positioning supports utilized   specialty bed utilized  Skin Protection:   incontinence pads utilized   skin sealant/moisture barrier applied  Head of Bed (HOB) Positioning: HOB at 20-30 degrees  Intervention: Promote and Optimize Oral Intake  Flowsheets (Taken 1/2/2023 2000)  Oral Nutrition Promotion:   calorie-dense foods provided   calorie-dense liquids provided   physical activity promoted   safe use of adaptive equipment encouraged     Problem: Fall Injury Risk  Goal: Absence of Fall and Fall-Related Injury  Outcome: Ongoing, Progressing  Intervention: Identify and Manage Contributors  Flowsheets (Taken 1/2/2023 2000)  Self-Care Promotion:   independence encouraged   BADL personal objects within reach   BADL personal routines maintained   safe use of adaptive equipment encouraged  Intervention: Promote Injury-Free Environment  Flowsheets (Taken 1/2/2023 2000)  Safety Promotion/Fall Prevention:   assistive device/personal item within reach   bed alarm set   Fall Risk reviewed with patient/family   muscle strengthening facilitated   nonskid shoes/socks when out of bed   side rails raised x 3   supervised activity   Supervised toileting - stay within arms reach   instructed to call staff for mobility   gait belt with ambulation     Problem: Surgical Site  Infection  Goal: Absence of Infection Signs and Symptoms  Outcome: Ongoing, Progressing  Intervention: Prevent or Manage Infection  Flowsheets (Taken 1/2/2023 2000)  Fluid/Electrolyte Management: fluids provided  Infection Management: aseptic technique maintained  Topical Inflammation Care: border marked

## 2023-01-03 NOTE — PROGRESS NOTES
Wound care assessment performed.   Pt admitted s/p tx of L intertrochanteric femur fx w/ intramedullary nail on 12/25 with Dr Olivares.  3 incisions present to L hip / lateral thigh. All sites area dry, well approximated with intact steristrips.  Proximal incision measures L22cm, middle incision measures 8 cm, distal measures 6 cm.  Slight edema and ecchymosis noted to periwounds. Applied Island dressing to incisions.  Recommend every other day and prn dressing changes.  MASD noted to L breast fold with denuded skin beneath.  Applied  Zguard mixed with antifungal powder to breast folds.  Recommend BID application.  No open areas noted to coccyx/sacral region.  Applied Zguard barrier cream.  Pressure prevention measures needed with turn q 2 hrs, wedge use, moisture management while hospitalized.  Discussed preventative measures with pt.  Verbalized understanding.     01/03/23 1727   WOCN Assessment   WOCN Total Time (mins) 25   Visit Date 01/03/23   Visit Time 1727   Consult Type New   Wound surgical   Number of Wounds 3   Continence Type Urinary   Intervention assessed;orders   Teaching on-going   Skin Interventions   Pressure Reduction Devices positioning supports utilized;pressure-redistributing mattress utilized   Pressure Reduction Techniques weight shift assistance provided   Skin Protection drying agents applied;incontinence pads utilized;skin sealant/moisture barrier applied   Pressure Injury Prevention    Check Moisture Management Pad Done   Check Medical Devices Done        Altered Skin Integrity 12/28/22 1052 Coccyx   Date First Assessed/Time First Assessed: 12/28/22 1052   Altered Skin Integrity Present on Admission: suspected hospital acquired  Location: Coccyx   Wound Image    Dressing Appearance Open to air   Drainage Amount None   Appearance Closed/resurfaced;Pink   Tissue loss description Not applicable   Periwound Area Dry;Other (see comments)  (rash)   Wound Length (cm) 0 cm   Wound Width (cm) 0 cm    Wound Depth (cm) 0 cm   Wound Volume (cm^3) 0 cm^3   Wound Surface Area (cm^2) 0 cm^2   Care Applied:;Skin Barrier  (zguard)        Altered Skin Integrity 12/29/22 0830 Left lower Breast Moisture associated dermatitis   Date First Assessed/Time First Assessed: 12/29/22 0830   Altered Skin Integrity Present on Admission: yes  Side: Left  Orientation: lower  Location: Breast  Primary Wound Type: Moisture associated dermatitis   Wound Image    Dressing Appearance Open to air   Drainage Amount None   Appearance Pink;Red;Not granulating   Tissue loss description Partial thickness   Periwound Area Intact   Wound Edges Defined   Care Applied:;Other (see comments)  (zguard mixed with antifungal powder)        Incision/Site 12/25/22 1530 Left Leg   Date First Assessed/Time First Assessed: 12/25/22 1530   Side: Left  Location: Leg   Wound Image     Incision WDL WDL   Dressing Appearance Intact;Dry;Clean   Drainage Amount None   Appearance Dressing in place, unable to visualize;Steri-strips intact   Periwound Area Ecchymotic;Swelling   Wound Edges Approximated   Dressing Applied;Island/border   Dressing Change Due 01/05/23       ALAL mattress in use.

## 2023-01-03 NOTE — PROGRESS NOTES
[facility]  [facility]  Telehospitalist Progress Note    Patient Name: Tanya Linda  MRN: [id number]  Patient Class: IP- Swing   Admission Date: 12/29/2022  Length of Stay: 5 days  Attending Physician: Stephon Kearney MD  Primary Care Provider: MARION Salazar      Subjective:     Principal Problem:Closed intertrochanteric fracture of hip, left, initial encounter      HPI:  Ms. Linda is a 62-year-old  female with history of morbid obesity, hypertension, depression, GERD, and anxiety who presented to [facility] ER on 12/24/2022 with left hip pain following a fall.  X-ray of the left hip and femur showed a displaced intertrochanteric fracture of the left femur and chest x-ray revealed no acute cardiopulmonary process. CT of the left hip confirmed a comminuted intertrochanteric left femoral fracture and orthopedics was consulted.  She was taken to the OR on 12/25/2022 for intramedullary nail of left intertrochanteric femur fracture by Dr. Ronnell Olivares and she did relatively well postoperatively.  Her lisinopril was held due to mild hypotension and her blood pressure stabilized.  She had no other complications throughout her hospital course and she was transferred to [facility] swing bed on 12/29/2022 for PT/OT and management of her multiple medical comorbidities.      Overview/Hospital Course:  She was admitted to [facility] swing bed on 12/29/2022 for rehab following intramedullary nail of left intertrochanteric femur fracture.  She is being treated with a 4 week course of aspirin 81 mg PO BID for DVT prophylaxis and norco 5/325 mg PO every 6 hours as needed for pain.        Interval History: She is doing well this morning and she reports left hip pain 8/10 in severity with activity. She participated in OT yesterday and she required maximum assistance with bed mobility and minimal assistance with transfers and sit to stand transitions.      Review of  Systems   All other systems reviewed and are negative.  Objective:     Vital Signs (Most Recent):  Temp: 97.4 °F (36.3 °C) (01/03/23 0402)  Pulse: 62 (01/03/23 0402)  Resp: (!) 21 (01/03/23 0402)  BP: 120/70 (01/03/23 0402)  SpO2: 96 % (01/03/23 0402)   Vital Signs (24h Range):  Temp:  [97.4 °F (36.3 °C)-97.9 °F (36.6 °C)] 97.4 °F (36.3 °C)  Pulse:  [62-71] 62  Resp:  [18-21] 21  SpO2:  [96 %-97 %] 96 %  BP: (117-135)/(54-86) 120/70     Weight: (!) 156.5 kg (345 lb 0.3 oz)  Body mass index is 57.41 kg/m².    Intake/Output Summary (Last 24 hours) at 1/3/2023 0839  Last data filed at 1/3/2023 0600  Gross per 24 hour   Intake 480 ml   Output 2250 ml   Net -1770 ml      Physical Exam  General - Morbidly obese  female in no acute distress.  HEENT - NCAT. No scleral icterus. Oropharynx clear. Mucous membranes moist.  Neck - No lymphadenopathy, thyromegaly, or JVD.  CVS - Regular rate and rhythm. No murmurs, rubs, or gallops.  Resp - Lungs are clear to auscultation bilaterally. No rales, wheeze, or rhonchi.   GI - Soft, nontender, nondistended, normoactive bowel sounds present.   Extremities - No clubbing, cyanosis, or edema.   Neuro - CN II through XII are grossly intact. No focal neurological deficits. Alert and oriented times four.   Psych - Normal affect.  Skin - Left hip surgical site c/d/i.     Significant Labs: All pertinent labs within the past 24 hours have been reviewed.     12/30/2022:   CBC:  WBC count 10.4, hemoglobin 10.1, hematocrit 33.6, platelet count 259 food   BMP: Sodium 137, potassium 4.4, chloride 100, CO2 25, BUN 14.3, creatinine 0.68, glucose 100, calcium 9.1, magnesium 2.0.      12/29/2022:  CBC:  WBC count 10.9, hemoglobin 9.8, hematocrit 31.7, glucose 241.    BMP: Sodium 136, potassium 4.7, chloride 100, CO2 26, BUN 14.4, creatinine 0.73, glucose 107, calcium 8.9.    12/27/2022:   CBC:  WBC count 1.5, hemoglobin 10.7, hematocrit 34.6, platelet count 202.    CMP: Sodium 137, potassium  4.3, chloride 105, CO2 25, BUN 16.1, creatinine 0.81, glucose 121, calcium 9.0, magnesium 2.1, alkaline phosphatase 73, total protein 6.4, albumin 3.0, total bilirubin 0.8, AST 39, ALT 22.     12/26/2022:   CBC:  WBC count 13.7, hemoglobin 10.7, hematocrit 34.3, platelet count 264.  CMP:  Sodium 137, potassium 4.4, chloride 103, CO2 25, BUN 18.9, creatinine 0.85, glucose 118, calcium 8.71 magnesium 1.9, alkaline phosphatase 72, total protein 5.7, albumin 3.1, total bilirubin 0.8, AST 56, ALT 26.     12/24/2022:   CMP: Sodium 140, potassium 3.9, chloride 105, CO2 25, BUN 17.2 creatinine 0.97, glucose 117, calcium 9.3, alkaline phosphatase 98, total protein 7.2, albumin 3.8, total bilirubin 0.5, AST 14, ALT 17.    CBC: WBC count 10.6, hemoglobin 12.9, hematocrit 41.2, platelet count 223.     Significant Imaging: I have reviewed all pertinent imaging results/findings within the past 24 hours.     X-ray left hip 12/25/2022: There has been placement of left femoral intramedullary taz and compression screw traverses into the femoral head transfixing the intertrochanteric fracture.  Positioning and alignment is satisfactory.     CT left hip 12/25/2022: Comminuted intertrochanteric left femoral fracture.     CXR 12/24/2022: No acute chest disease is identified.     X-ray left hip 12/24/2022: Displaced intertrochanteric fracture of the left femur      Assessment/Plan:      * Left intertrochanteric femur fracture  Continue 4 week course of aspirin 81 mg PO BID for DVT prophylaxis, PT/OT, norco as needed for pain control, and weight-bearing as tolerated to the left lower extremity.  She is status post intramedullary nail by Dr. Ronnell Olivares on 12/25/2022.     Hypertension  Her blood pressure has been stable off antihypertensives.  She is no longer on lisinopril 5 mg PO daily which was held at [facility] due to hypotension.    Anxiety  Continue buspirone.    Depression  Continue venlafaxine.    GERD (gastroesophageal reflux  disease)  Continue famotidine.    Morbid obesity  She was counseled on the importance of weight loss and diet.  She was noted to have a BMI of 57.41.      Disposition  Anticipate discharge home with home health in the next week.      VTE Risk Mitigation (From admission, onward)         Ordered     Place sequential compression device  Until discontinued         12/29/22 1612                Discharge Planning   XENA:      Code Status: Full Code   Is the patient medically ready for discharge?:     Reason for patient still in hospital (select all that apply): PT / OT recommendations  Discharge Plan A: Home with family, Home Health   Discharge Delays: None known at this time      This service was provided via telemedicine.  Type of Software: Audio/Visual.  Originating Site: [facility].  Distant Site: [place], [place].  Her exam was performed with the assistance of Donna Barroso RN.      Stephon Kearney MD  Department of Hospital Medicine   [facility]

## 2023-01-03 NOTE — PT/OT/SLP PROGRESS
Physical Therapy Treatment Note           Name: Tanya Linda    : 1960 (62 y.o.)  MRN: [id number]           TREATMENT SUMMARY AND RECOMMENDATIONS:    PT Received On: 23  PT Start Time: 1100     PT Stop Time: 1125  PT Total Time (min): 25 min     Subjective Assessment:   No complaints  Lethargic    Awake, alert, cooperative  Uncooperative    Agitated x c/o pain    Appropriate  c/o fatigue    Confused x Treated at bedside     Emotionally labile  Treated in gym/dept.    Impulsive  Other:    Flat affect       Therapy Precautions:    Cognitive deficits  Spinal precautions    Collar - hard  Sternal precautions    Collar - soft   TLSO   x Fall risk  LSO    Hip precautions - posterior  Knee immobilizer    Hip precautions - anterior  WBAT    Impaired communication  Partial weightbearing    Oxygen  TTWB    PEG tube  NWB    Visual deficits  Other:    Hearing deficits          Treatment Objectives:     Mobility Training:   Assist level Comments    Bed mobility modA Supine-sit   Transfer Yadi Bed-recliner with RW    Gait Unable to advance R LE     Sit to stand transitions Yadi Sit-stand x 3 with bedrail   Sitting balance     Standing balance      Wheelchair mobility     Car transfer     Other:  Standing Wt shifts B UE supported- lateral wt shifts       Therapeutic Exercise:   Exercise Sets Reps Comments   L LE exer in standing and short sitting 10 reps x 2  L knee lifts                         Additional Comments:  Pt improving mobility - will just be slow progress    Assessment: Patient tolerated session very guarded L LE.    PT Plan: continue  Revisions made to plan of care: No    GOALS:   Multidisciplinary Problems       Physical Therapy Goals          Problem: Physical Therapy    Goal Priority Disciplines Outcome Goal Variances Interventions   Physical Therapy Goal     PT, PT/OT Ongoing, Progressing     Description: Goals to be met by: Discharge     Patient will increase functional independence with  mobility by performin. Supine to sit with Modified [place]  2. Sit to supine with Modified [place]  3. Sit to stand transfer with Modified [place]  4. Bed to chair transfer with Stand-by Assistance using Rolling Walker  5. Gait  x 25 feet with Stand-by Assistance using Rolling Walker.                          Skilled PT Minutes Provided: 25   Communication with Treatment Team:     Equipment recommendations:       At end of treatment, patient remained:  x Up in chair     Up in wheelchair in room    In bed   x With alarm activated    Bed rails up   x Call bell in reach     Family/friends present    Restraints secured properly    In bathroom with CNA/RN notified    Nurse aware    In gym with therapist/tech    Other:

## 2023-01-03 NOTE — SUBJECTIVE & OBJECTIVE
Interval History: She is doing well this morning and she reports left hip pain 8/10 in severity with activity. She participated in OT yesterday and she required maximum assistance with bed mobility and minimal assistance with transfers and sit to stand transitions.      Review of Systems   All other systems reviewed and are negative.  Objective:     Vital Signs (Most Recent):  Temp: 97.4 °F (36.3 °C) (01/03/23 0402)  Pulse: 62 (01/03/23 0402)  Resp: (!) 21 (01/03/23 0402)  BP: 120/70 (01/03/23 0402)  SpO2: 96 % (01/03/23 0402)   Vital Signs (24h Range):  Temp:  [97.4 °F (36.3 °C)-97.9 °F (36.6 °C)] 97.4 °F (36.3 °C)  Pulse:  [62-71] 62  Resp:  [18-21] 21  SpO2:  [96 %-97 %] 96 %  BP: (117-135)/(54-86) 120/70     Weight: (!) 156.5 kg (345 lb 0.3 oz)  Body mass index is 57.41 kg/m².    Intake/Output Summary (Last 24 hours) at 1/3/2023 0839  Last data filed at 1/3/2023 0600  Gross per 24 hour   Intake 480 ml   Output 2250 ml   Net -1770 ml      Physical Exam  General - Morbidly obese  female in no acute distress.  HEENT - NCAT. No scleral icterus. Oropharynx clear. Mucous membranes moist.  Neck - No lymphadenopathy, thyromegaly, or JVD.  CVS - Regular rate and rhythm. No murmurs, rubs, or gallops.  Resp - Lungs are clear to auscultation bilaterally. No rales, wheeze, or rhonchi.   GI - Soft, nontender, nondistended, normoactive bowel sounds present.   Extremities - No clubbing, cyanosis, or edema.   Neuro - CN II through XII are grossly intact. No focal neurological deficits. Alert and oriented times four.   Psych - Normal affect.  Skin - Left hip surgical site c/d/i.     Significant Labs: All pertinent labs within the past 24 hours have been reviewed.     12/30/2022:   CBC:  WBC count 10.4, hemoglobin 10.1, hematocrit 33.6, platelet count 259 food   BMP: Sodium 137, potassium 4.4, chloride 100, CO2 25, BUN 14.3, creatinine 0.68, glucose 100, calcium 9.1, magnesium 2.0.      12/29/2022:  CBC:  WBC count 10.9,  hemoglobin 9.8, hematocrit 31.7, glucose 241.    BMP: Sodium 136, potassium 4.7, chloride 100, CO2 26, BUN 14.4, creatinine 0.73, glucose 107, calcium 8.9.    12/27/2022:   CBC:  WBC count 1.5, hemoglobin 10.7, hematocrit 34.6, platelet count 202.    CMP: Sodium 137, potassium 4.3, chloride 105, CO2 25, BUN 16.1, creatinine 0.81, glucose 121, calcium 9.0, magnesium 2.1, alkaline phosphatase 73, total protein 6.4, albumin 3.0, total bilirubin 0.8, AST 39, ALT 22.     12/26/2022:   CBC:  WBC count 13.7, hemoglobin 10.7, hematocrit 34.3, platelet count 264.  CMP:  Sodium 137, potassium 4.4, chloride 103, CO2 25, BUN 18.9, creatinine 0.85, glucose 118, calcium 8.71 magnesium 1.9, alkaline phosphatase 72, total protein 5.7, albumin 3.1, total bilirubin 0.8, AST 56, ALT 26.     12/24/2022:   CMP: Sodium 140, potassium 3.9, chloride 105, CO2 25, BUN 17.2 creatinine 0.97, glucose 117, calcium 9.3, alkaline phosphatase 98, total protein 7.2, albumin 3.8, total bilirubin 0.5, AST 14, ALT 17.    CBC: WBC count 10.6, hemoglobin 12.9, hematocrit 41.2, platelet count 223.     Significant Imaging: I have reviewed all pertinent imaging results/findings within the past 24 hours.     X-ray left hip 12/25/2022: There has been placement of left femoral intramedullary taz and compression screw traverses into the femoral head transfixing the intertrochanteric fracture.  Positioning and alignment is satisfactory.     CT left hip 12/25/2022: Comminuted intertrochanteric left femoral fracture.     CXR 12/24/2022: No acute chest disease is identified.     X-ray left hip 12/24/2022: Displaced intertrochanteric fracture of the left femur

## 2023-01-03 NOTE — PLAN OF CARE
Problem: Adult Inpatient Plan of Care  Goal: Plan of Care Review  Outcome: Ongoing, Progressing  Flowsheets (Taken 1/2/2023 2115)  Plan of Care Reviewed With: patient  Goal: Patient-Specific Goal (Individualized)  Outcome: Ongoing, Progressing  Goal: Absence of Hospital-Acquired Illness or Injury  Outcome: Ongoing, Progressing  Intervention: Identify and Manage Fall Risk  Flowsheets (Taken 1/2/2023 2115)  Safety Promotion/Fall Prevention:   assistive device/personal item within reach   bed alarm set   chair alarm set   diversional activities provided   Fall Risk reviewed with patient/family   high risk medications identified   in recliner, wheels locked   lighting adjusted   gait belt with ambulation   medications reviewed   nonskid shoes/socks when out of bed   side rails raised x 3   instructed to call staff for mobility  Intervention: Prevent Skin Injury  Flowsheets (Taken 1/2/2023 2115)  Body Position:   position changed independently   position maintained   supine  Skin Protection:   adhesive use limited   incontinence pads utilized   skin sealant/moisture barrier applied  Intervention: Prevent and Manage VTE (Venous Thromboembolism) Risk  Flowsheets (Taken 1/2/2023 2115)  Activity Management:   Ambulated -L4   Arm raise - L1   Rolling - L1   Up in chair - L3   Walk with assistive devise and /or staff member - L3  VTE Prevention/Management:   ambulation promoted   bleeding precations maintained   bleeding risk assessed   dorsiflexion/plantar flexion performed   fluids promoted  Range of Motion: active ROM (range of motion) encouraged  Intervention: Prevent Infection  Flowsheets (Taken 1/2/2023 2115)  Infection Prevention:   hand hygiene promoted   rest/sleep promoted   single patient room provided  Goal: Optimal Comfort and Wellbeing  Outcome: Ongoing, Progressing  Intervention: Monitor Pain and Promote Comfort  Flowsheets (Taken 1/2/2023 2115)  Pain Management Interventions:   care clustered   diversional  activity provided   medication offered   medication offered but refused   pain management plan reviewed with patient/caregiver   pillow support provided   premedicated for activity   position adjusted   relaxation techniques promoted  Intervention: Provide Person-Centered Care  Flowsheets (Taken 1/2/2023 2115)  Trust Relationship/Rapport:   care explained   emotional support provided   choices provided   empathic listening provided   questions answered   questions encouraged   reassurance provided   thoughts/feelings acknowledged  Goal: Readiness for Transition of Care  Outcome: Ongoing, Progressing     Problem: Bariatric Environmental Safety  Goal: Safety Maintained with Care  Outcome: Ongoing, Progressing  Intervention: Promote Safety and Comfort  Flowsheets (Taken 1/2/2023 2115)  Bariatric Safety: specialty bed utilized     Problem: Impaired Wound Healing  Goal: Optimal Wound Healing  Outcome: Ongoing, Progressing  Intervention: Promote Wound Healing  Flowsheets (Taken 1/2/2023 2115)  Oral Nutrition Promotion:   rest periods promoted   physical activity promoted  Sleep/Rest Enhancement:   awakenings minimized   noise level reduced   regular sleep/rest pattern promoted   relaxation techniques promoted  Activity Management:   Ambulated -L4   Arm raise - L1   Rolling - L1   Up in chair - L3   Walk with assistive devise and /or staff member - L3  Pain Management Interventions:   care clustered   diversional activity provided   medication offered   medication offered but refused   pain management plan reviewed with patient/caregiver   pillow support provided   premedicated for activity   position adjusted   relaxation techniques promoted     Problem: Skin Injury Risk Increased  Goal: Skin Health and Integrity  Outcome: Ongoing, Progressing  Intervention: Optimize Skin Protection  Flowsheets (Taken 1/2/2023 2115)  Pressure Reduction Techniques:   frequent weight shift encouraged   weight shift assistance  provided  Pressure Reduction Devices:   positioning supports utilized   specialty bed utilized  Skin Protection:   adhesive use limited   incontinence pads utilized   skin sealant/moisture barrier applied  Head of Bed (HOB) Positioning: HOB at 30-45 degrees  Intervention: Promote and Optimize Oral Intake  Flowsheets (Taken 1/2/2023 2115)  Oral Nutrition Promotion:   rest periods promoted   physical activity promoted     Problem: Infection  Goal: Absence of Infection Signs and Symptoms  Outcome: Ongoing, Progressing  Intervention: Prevent or Manage Infection  Flowsheets (Taken 1/2/2023 2115)  Fever Reduction/Comfort Measures:   fluid intake increased   lightweight bedding   lightweight clothing

## 2023-01-03 NOTE — PROGRESS NOTES
"Inpatient Nutrition Evaluation    Admit Date: 12/29/2022   Total duration of encounter: 5 days    Nutrition Recommendation/Prescription     Continue regular diet    Nutrition Assessment     Chart Review    Reason Seen: continuous nutrition monitoring and length of stay    Malnutrition Screening Tool Results   Have you recently lost weight without trying?: No  Have you been eating poorly because of a decreased appetite?: No   MST Score: 0     Diagnosis:  Left intertrochanteric femur fracture 12/25/2022      Morbid obesity 5/6/2022      Hypertension 8/19/2022      Depression 8/19/2022      Anxiety 1/1/2023      GERD (gastroesophageal reflux disease) 1/1/2023      Disposition          Relevant Medical History: Personal history of colonic polyps     Nutrition-Related Medications: ergocalciferol    Nutrition-Related Labs:   Latest Reference Range & Units 12/30/22 05:00   Sodium 136 - 145 mmol/L 137   Potassium 3.5 - 5.1 mmol/L 4.4   Chloride 98 - 107 mmol/L 100   CO2 23 - 31 mmol/L 25   Anion Gap mEq/L 12.0   BUN 9.8 - 20.1 mg/dL 14.3   Creatinine 0.55 - 1.02 mg/dL 0.68   BUN/CREAT RATIO  21   eGFR mls/min/1.73/m2 >60   Glucose 82 - 115 mg/dL 100   Calcium 8.4 - 10.2 mg/dL 9.1   Magnesium 1.60 - 2.60 mg/dL 2.00       Diet Order: Diet Adult Regular  Oral Supplement Order: none  Appetite/Oral Intake: good/% of meals  Factors Affecting Nutritional Intake: none identified  Food/Mosque/Cultural Preferences: none reported  Food Allergies: none reported    Skin Integrity: incision  Wound(s):       Comments    Pt reports intake is good, 100%. Marked menus with patient. Pt likes peanut butter/jelly sandwich for snack, and is asking for fruit. Notified kitchen staff.     Anthropometrics    Height: 5' 5" (165.1 cm) Height Method: Stated  Last Weight: (!) 156.5 kg (345 lb 0.3 oz) (12/29/22 1520) Weight Method: Bed Scale  BMI (Calculated): 57.4  BMI Classification: obese grade III (BMI >/=40)     Ideal Body Weight (IBW), " Female: 125 lb     % Ideal Body Weight, Female (lb): 276.02 %                             Usual Weight Provided By: unable to obtain usual weight    Wt Readings from Last 3 Encounters:   12/29/22 1520 (!) 156.5 kg (345 lb 0.3 oz)   12/25/22 0157 (!) 156.5 kg (345 lb)   12/24/22 2204 (!) 156.5 kg (345 lb)   11/17/22 0943 (!) 154.4 kg (340 lb 4.8 oz)      Weight Change(s) Since Admission:  Admit Weight: (!) 156.5 kg (345 lb 0.3 oz) (12/29/22 1520)  No new wt recorded. Notified clinical nurse manager.     Patient Education    Not applicable.    Monitoring & Evaluation     Dietitian will monitor food and beverage intake, weight, weight change, glucose/endocrine profile, and gastrointestinal profile.  Nutrition Risk/Follow-Up: low (follow-up in 5-7 days)  Patients assigned 'low nutrition risk' status do not qualify for a full nutritional assessment but will be monitored and re-evaluated in a 5-7 day time period. Please consult if re-evaluation needed sooner.

## 2023-01-03 NOTE — PT/OT/SLP PROGRESS
11/07/22      Radha Ventura   [address]  [facility] [address]       This letter is a reminder that it is time to schedule your follow up Upper GI Endoscopy (EGD) with Dr. Benjamín Hartmann.    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    Dr Hartmann does these procedures in [place]:  the 1st, 3rd and 5th Monday all day, the 2nd and 4th Monday in the afternoon, the first Tuesday of the month in the morning, every Wednesday and the 2nd and 4th Friday afternoons    Please contact our office at [phone] in [place] to schedule this outpatient procedure.      We look forward to hear from you soon.        Sincerely,     Dr Benjamín Hartmann MD  [facility]-[address] [place] [address]  Phone: [phone]               Occupational Therapy  Treatment    Name: Tanya Linda    : 1960 (62 y.o.)  MRN: [id number]           TREATMENT SUMMARY AND RECOMMENDATIONS:      OT Date of Treatment: 23  OT Start Time:   OT Stop Time: 144  OT Total Time (min): 15 min      Subjective Assessment:   No complaints  Lethargic   x Awake, alert, cooperative  Impulsive    Uncooperative   Flat affect    Agitated  c/o pain    Appropriate  c/o fatigue    Confused x Treated at bedside     Emotionally labile  Treated in gym/dept.     x Other: pt returned to bed by PTA on entry        Therapy Precautions:    Cognitive deficits  Spinal precautions    Collar - hard  Sternal precautions    Collar - soft   TLSO   x Fall risk  LSO    Hip precautions - posterior  Knee immobilizer    Hip precautions - anterior x WBAT    Impaired communication  Partial weightbearing    Oxygen  TTWB    PEG tube  NWB    Visual deficits      Hearing deficits   Other:        Treatment Objectives:       Therapeutic Exercise:   Exercise Sets Reps Comments   2# dowel 2 10 Performed supine in bed; chest press, straight arm raises, bicep curls, forward rows, backwards rows                         Additional Comments:      Assessment: Patient tolerated session well.    OT Plan: Continue POC  Revisions made to plan of care: No    GOALS:   Multidisciplinary Problems       Occupational Therapy Goals          Problem: Occupational Therapy    Goal Priority Disciplines Outcome Interventions   Occupational Therapy Goal     OT, PT/OT Ongoing, Progressing    Description: Goals to be met by: 22     Patient will increase functional independence with ADLs by performing:    UE Dressing with Modified [place].  LE Dressing with Minimal Assistance.  Grooming while seated at sink with Modified [place].  Toileting from bedside commode with Minimal Assistance for hygiene and clothing management.   Bathing from bench with Minimal Assistance.  Toilet transfer to bedside commode with  Contact Guard Assistance.                         Skilled OT Minutes Provided: 15  Communication with Treatment Team:     Equipment recommendations:       At end of treatment, patient remained:   Up in chair     Up in wheelchair in room   x In bed   x With alarm activated   x Bed rails up   x Call bell in reach     Family/friends present    Restraints secured properly    In bathroom with CNA/RN notified    In gym with PT/PTA/tech    Nurse aware    Other:

## 2023-01-03 NOTE — PT/OT/SLP PROGRESS
Physical Therapy Treatment Note           Name: Tanya Linda    : 1960 (62 y.o.)  MRN: [id number]           TREATMENT SUMMARY AND RECOMMENDATIONS:    PT Received On: 23  PT Start Time: 1430     PT Stop Time: 1450  PT Total Time (min): 20 min     Subjective Assessment:   No complaints  Lethargic    Awake, alert, cooperative  Uncooperative    Agitated x c/o pain    Appropriate  c/o fatigue    Confused x Treated at bedside     Emotionally labile  Treated in gym/dept.    Impulsive  Other:    Flat affect       Therapy Precautions:    Cognitive deficits  Spinal precautions    Collar - hard  Sternal precautions    Collar - soft   TLSO    Fall risk  LSO    Hip precautions - posterior  Knee immobilizer    Hip precautions - anterior  WBAT    Impaired communication  Partial weightbearing    Oxygen  TTWB    PEG tube  NWB    Visual deficits  Other:    Hearing deficits          Treatment Objectives:     Mobility Training:   Assist level Comments    Bed mobility modA Sit-supine   Transfer Yadi Recliner-bed with RW    Gait     Sit to stand transitions     Sitting balance     Standing balance      Wheelchair mobility     Car transfer     Other:          Therapeutic Exercise:   Exercise Sets Reps Comments   B LE exer in supine 10 reps  Ankle pumps, heel slides, abd/add, quad sets                         Additional Comments:  Pt sat in recliner for about 3 hours today    Assessment: Patient tolerated session well.    PT Plan: continue  Revisions made to plan of care: No    GOALS:   Multidisciplinary Problems       Physical Therapy Goals          Problem: Physical Therapy    Goal Priority Disciplines Outcome Goal Variances Interventions   Physical Therapy Goal     PT, PT/OT Ongoing, Progressing     Description: Goals to be met by: Discharge     Patient will increase functional independence with mobility by performin. Supine to sit with Modified [place]  2. Sit to supine with Modified [place]  3. Sit  to stand transfer with Modified [place]  4. Bed to chair transfer with Stand-by Assistance using Rolling Walker  5. Gait  x 25 feet with Stand-by Assistance using Rolling Walker.                          Skilled PT Minutes Provided: 20   Communication with Treatment Team:     Equipment recommendations:       At end of treatment, patient remained:   Up in chair     Up in wheelchair in room   x In bed   x With alarm activated   x Bed rails up   x Call bell in reach     Family/friends present    Restraints secured properly    In bathroom with CNA/RN notified    Nurse aware    In gym with therapist/tech    Other:

## 2023-01-03 NOTE — PT/OT/SLP PROGRESS
Occupational Therapy  Treatment    Name: Tanya Linda    : 1960 (62 y.o.)  MRN: [id number]           TREATMENT SUMMARY AND RECOMMENDATIONS:      OT Date of Treatment: 23  OT Start Time: 1050  OT Stop Time: 1120  OT Total Time (min): 30 min      Subjective Assessment:   No complaints  Lethargic   x Awake, alert, cooperative  Impulsive    Uncooperative   Flat affect    Agitated x c/o pain    Appropriate  c/o fatigue    Confused x Treated at bedside     Emotionally labile  Treated in gym/dept.      Other:        Therapy Precautions:    Cognitive deficits  Spinal precautions    Collar - hard  Sternal precautions    Collar - soft   TLSO   x Fall risk  LSO    Hip precautions - posterior  Knee immobilizer    Hip precautions - anterior x WBAT    Impaired communication  Partial weightbearing    Oxygen  TTWB    PEG tube  NWB    Visual deficits      Hearing deficits   Other:        Treatment Objectives:     Mobility Training:    Mobility task Assist level Comments    Bed mobility Mod A  Supine>EOB   Transfer Min A x2 Stand/pivot t/f with RW EOB>BSchair   Sit to stands transitions     Functional mobility     Sitting balance     Standing balance      Other:        ADL Training:    ADL Assist level Comments    Feeding     Grooming/hygiene Mod (I)  Pt brushed teeth in w/c at sink and combed hair sitting EOB    Bathing     Upper body dressing     Lower body dressing     Toileting     Toilet transfer     Adaptive equipment training     Other:           Therapeutic Exercise:   Exercise Sets Reps Comments   Pre gait exercises  1 20 Weight shifts R<>L in standing with PTA on one side   L hip exercises in standing 1  5 reps L knee flex/ext   LLE exercises in sitting 1 10 AAROM hip flex/ext, knee flex/ext             Additional Comments: co-tx with PTA d/t Pt size and assist level       Assessment: Patient tolerated session fair.    OT Plan: Continue POC  Revisions made to plan of care: No    GOALS:   Multidisciplinary  Problems       Occupational Therapy Goals          Problem: Occupational Therapy    Goal Priority Disciplines Outcome Interventions   Occupational Therapy Goal     OT, PT/OT Ongoing, Progressing    Description: Goals to be met by: 1/20/22     Patient will increase functional independence with ADLs by performing:    UE Dressing with Modified [place].  LE Dressing with Minimal Assistance.  Grooming while seated at sink with Modified [place].  Toileting from bedside commode with Minimal Assistance for hygiene and clothing management.   Bathing from bench with Minimal Assistance.  Toilet transfer to bedside commode with Contact Guard Assistance.                         Skilled OT Minutes Provided: 30  Communication with Treatment Team:     Equipment recommendations:       At end of treatment, patient remained:  x Up in chair     Up in wheelchair in room    In bed   x With alarm activated    Bed rails up   x Call bell in reach     Family/friends present    Restraints secured properly    In bathroom with CNA/RN notified    In gym with PT/PTA/tech    Nurse aware    Other:

## 2023-01-04 LAB
ANION GAP SERPL CALC-SCNC: 11 MEQ/L
BASOPHILS # BLD AUTO: 0.04 X10(3)/MCL (ref 0–0.2)
BASOPHILS NFR BLD AUTO: 0.4 %
BUN SERPL-MCNC: 20.8 MG/DL (ref 9.8–20.1)
CALCIUM SERPL-MCNC: 9.7 MG/DL (ref 8.4–10.2)
CHLORIDE SERPL-SCNC: 102 MMOL/L (ref 98–107)
CO2 SERPL-SCNC: 28 MMOL/L (ref 23–31)
CREAT SERPL-MCNC: 0.79 MG/DL (ref 0.55–1.02)
CREAT/UREA NIT SERPL: 26
EOSINOPHIL # BLD AUTO: 0.3 X10(3)/MCL (ref 0–0.9)
EOSINOPHIL NFR BLD AUTO: 2.9 %
ERYTHROCYTE [DISTWIDTH] IN BLOOD BY AUTOMATED COUNT: 14.8 % (ref 11–14.5)
GFR SERPLBLD CREATININE-BSD FMLA CKD-EPI: 86 MLS/MIN/1.73/M2
GLUCOSE SERPL-MCNC: 114 MG/DL (ref 82–115)
HCT VFR BLD AUTO: 33.6 % (ref 37–47)
HGB BLD-MCNC: 10.2 GM/DL (ref 12–16)
LYMPHOCYTES # BLD AUTO: 2.44 X10(3)/MCL (ref 0.6–4.6)
LYMPHOCYTES NFR BLD AUTO: 23.5 %
MAGNESIUM SERPL-MCNC: 2 MG/DL (ref 1.6–2.6)
MCH RBC QN AUTO: 27.3 PG
MCHC RBC AUTO-ENTMCNC: 30.4 MG/DL (ref 33–36)
MCV RBC AUTO: 90.1 FL (ref 80–94)
MONOCYTES # BLD AUTO: 0.66 X10(3)/MCL (ref 0.1–1.3)
MONOCYTES NFR BLD AUTO: 6.4 %
NEUTROPHILS # BLD AUTO: 6.83 X10(3)/MCL (ref 2.1–9.2)
NEUTROPHILS NFR BLD AUTO: 65.6 %
PLATELET # BLD AUTO: 307 X10(3)/MCL (ref 140–371)
PMV BLD AUTO: 11.1 FL (ref 9.4–12.4)
POTASSIUM SERPL-SCNC: 4.7 MMOL/L (ref 3.5–5.1)
RBC # BLD AUTO: 3.73 X10(6)/MCL (ref 4.2–5.4)
SODIUM SERPL-SCNC: 141 MMOL/L (ref 136–145)
WBC # SPEC AUTO: 10.4 X10(3)/MCL (ref 4.5–11.5)

## 2023-01-04 PROCEDURE — 97535 SELF CARE MNGMENT TRAINING: CPT

## 2023-01-04 PROCEDURE — 97535 SELF CARE MNGMENT TRAINING: CPT | Mod: CQ

## 2023-01-04 PROCEDURE — 94760 N-INVAS EAR/PLS OXIMETRY 1: CPT

## 2023-01-04 PROCEDURE — 80048 BASIC METABOLIC PNL TOTAL CA: CPT | Performed by: INTERNAL MEDICINE

## 2023-01-04 PROCEDURE — 85025 COMPLETE CBC W/AUTO DIFF WBC: CPT | Performed by: INTERNAL MEDICINE

## 2023-01-04 PROCEDURE — 97530 THERAPEUTIC ACTIVITIES: CPT

## 2023-01-04 PROCEDURE — 11000004 HC SNF PRIVATE

## 2023-01-04 PROCEDURE — 97110 THERAPEUTIC EXERCISES: CPT

## 2023-01-04 PROCEDURE — 97110 THERAPEUTIC EXERCISES: CPT | Mod: CQ

## 2023-01-04 PROCEDURE — 25000003 PHARM REV CODE 250: Performed by: STUDENT IN AN ORGANIZED HEALTH CARE EDUCATION/TRAINING PROGRAM

## 2023-01-04 PROCEDURE — 36415 COLL VENOUS BLD VENIPUNCTURE: CPT | Performed by: INTERNAL MEDICINE

## 2023-01-04 PROCEDURE — 83735 ASSAY OF MAGNESIUM: CPT | Performed by: INTERNAL MEDICINE

## 2023-01-04 PROCEDURE — 97530 THERAPEUTIC ACTIVITIES: CPT | Mod: CQ

## 2023-01-04 RX ADMIN — MICONAZOLE NITRATE 2 % TOPICAL POWDER: at 08:01

## 2023-01-04 RX ADMIN — VENLAFAXINE 37.5 MG: 37.5 TABLET ORAL at 05:01

## 2023-01-04 RX ADMIN — FAMOTIDINE 20 MG: 20 TABLET ORAL at 08:01

## 2023-01-04 RX ADMIN — ASPIRIN 81 MG: 81 TABLET, COATED ORAL at 09:01

## 2023-01-04 RX ADMIN — DOCUSATE SODIUM 100 MG: 100 CAPSULE, LIQUID FILLED ORAL at 09:01

## 2023-01-04 RX ADMIN — METHOCARBAMOL 500 MG: 500 TABLET ORAL at 02:01

## 2023-01-04 RX ADMIN — DOCUSATE SODIUM 100 MG: 100 CAPSULE, LIQUID FILLED ORAL at 08:01

## 2023-01-04 RX ADMIN — ASPIRIN 81 MG: 81 TABLET, COATED ORAL at 08:01

## 2023-01-04 RX ADMIN — POLYETHYLENE GLYCOL 3350 17 G: 17 POWDER, FOR SOLUTION ORAL at 08:01

## 2023-01-04 RX ADMIN — POLYETHYLENE GLYCOL 3350 17 G: 17 POWDER, FOR SOLUTION ORAL at 03:01

## 2023-01-04 RX ADMIN — BUSPIRONE HYDROCHLORIDE 7.5 MG: 7.5 TABLET ORAL at 09:01

## 2023-01-04 RX ADMIN — MICONAZOLE NITRATE 2 % TOPICAL POWDER: at 09:01

## 2023-01-04 RX ADMIN — METHOCARBAMOL 500 MG: 500 TABLET ORAL at 09:01

## 2023-01-04 RX ADMIN — HYDROCODONE BITARTRATE AND ACETAMINOPHEN 1 TABLET: 5; 325 TABLET ORAL at 07:01

## 2023-01-04 RX ADMIN — METHOCARBAMOL 500 MG: 500 TABLET ORAL at 03:01

## 2023-01-04 RX ADMIN — HYDROCODONE BITARTRATE AND ACETAMINOPHEN 1 TABLET: 5; 325 TABLET ORAL at 09:01

## 2023-01-04 NOTE — PT/OT/SLP PROGRESS
Name: Tanya Linda    : 1960 (62 y.o.)  MRN: [id number]            Interdisciplinary Team Conference     Case conference held with patient/family and care team to discuss progress, plan of care, barriers to be addressed for safe return home, equipment recommendations, and discharge planning. Communicated therapy progress with MD, RN, therapy clinicians and case management. All questions/concerns answered.

## 2023-01-04 NOTE — SUBJECTIVE & OBJECTIVE
Interval History: She participated in therapy yesterday and she required moderate assistance with bed mobility and minimal assistance with sit to stand transitions. She was unable to advance her right lower extremity for gait yesterday. She is doing well this morning without complaints.      Review of Systems   All other systems reviewed and are negative.  Objective:     Vital Signs (Most Recent):  Temp: 97.9 °F (36.6 °C) (01/04/23 0725)  Pulse: 63 (01/04/23 0725)  Resp: 18 (01/04/23 0738)  BP: 118/71 (01/04/23 0725)  SpO2: 96 % (01/04/23 0725) Vital Signs (24h Range):  Temp:  [97.8 °F (36.6 °C)-98.4 °F (36.9 °C)] 97.9 °F (36.6 °C)  Pulse:  [63-79] 63  Resp:  [18-20] 18  SpO2:  [91 %-96 %] 96 %  BP: (118-131)/(61-77) 118/71     Weight: (!) 156.5 kg (345 lb 0.3 oz)  Body mass index is 57.41 kg/m².    Intake/Output Summary (Last 24 hours) at 1/4/2023 0834  Last data filed at 1/4/2023 0639  Gross per 24 hour   Intake 3480 ml   Output 750 ml   Net 2730 ml      Physical Exam  General - Morbidly obese  female in no acute distress.  HEENT - NCAT. No scleral icterus. Oropharynx clear. Mucous membranes moist.  Neck - No lymphadenopathy, thyromegaly, or JVD.  CVS - Regular rate and rhythm. No murmurs, rubs, or gallops.  Resp - Lungs are clear to auscultation bilaterally. No rales, wheeze, or rhonchi.   GI - Soft, nontender, nondistended, normoactive bowel sounds present.   Extremities - No clubbing, cyanosis, or edema.   Neuro - CN II through XII are grossly intact. No focal neurological deficits. Alert and oriented times four.   Psych - Normal affect.  Skin - Left hip surgical site c/d/i. Slight edema and ecchymosis noted to periwounds.     Significant Labs: All pertinent labs within the past 24 hours have been reviewed.    1/4/2023:  CBC: WBC count 10.4, hemoglobin 10.2, hematocrit 33.6, platelet count 307.  BMP: Sodium 141, potassium 4.7, chloride 102, CO2 28, BUN 20.8, creatinine 0.79, glucose 114, calcium 9.7,  magnesium 2.0.     12/30/2022:   CBC:  WBC count 10.4, hemoglobin 10.1, hematocrit 33.6, platelet count 259 food   BMP: Sodium 137, potassium 4.4, chloride 100, CO2 25, BUN 14.3, creatinine 0.68, glucose 100, calcium 9.1, magnesium 2.0.      12/29/2022:  CBC:  WBC count 10.9, hemoglobin 9.8, hematocrit 31.7, glucose 241.    BMP: Sodium 136, potassium 4.7, chloride 100, CO2 26, BUN 14.4, creatinine 0.73, glucose 107, calcium 8.9.    12/27/2022:   CBC:  WBC count 1.5, hemoglobin 10.7, hematocrit 34.6, platelet count 202.    CMP: Sodium 137, potassium 4.3, chloride 105, CO2 25, BUN 16.1, creatinine 0.81, glucose 121, calcium 9.0, magnesium 2.1, alkaline phosphatase 73, total protein 6.4, albumin 3.0, total bilirubin 0.8, AST 39, ALT 22.     12/26/2022:   CBC:  WBC count 13.7, hemoglobin 10.7, hematocrit 34.3, platelet count 264.  CMP:  Sodium 137, potassium 4.4, chloride 103, CO2 25, BUN 18.9, creatinine 0.85, glucose 118, calcium 8.71 magnesium 1.9, alkaline phosphatase 72, total protein 5.7, albumin 3.1, total bilirubin 0.8, AST 56, ALT 26.     12/24/2022:   CMP: Sodium 140, potassium 3.9, chloride 105, CO2 25, BUN 17.2 creatinine 0.97, glucose 117, calcium 9.3, alkaline phosphatase 98, total protein 7.2, albumin 3.8, total bilirubin 0.5, AST 14, ALT 17.    CBC: WBC count 10.6, hemoglobin 12.9, hematocrit 41.2, platelet count 223.     Significant Imaging: I have reviewed all pertinent imaging results/findings within the past 24 hours.     X-ray left hip 12/25/2022: There has been placement of left femoral intramedullary taz and compression screw traverses into the femoral head transfixing the intertrochanteric fracture.  Positioning and alignment is satisfactory.     CT left hip 12/25/2022: Comminuted intertrochanteric left femoral fracture.     CXR 12/24/2022: No acute chest disease is identified.     X-ray left hip 12/24/2022: Displaced intertrochanteric fracture of the left femur

## 2023-01-04 NOTE — PT/OT/SLP PROGRESS
Occupational Therapy  Treatment    Name: Tanya Linda    : 1960 (62 y.o.)  MRN: [id number]           TREATMENT SUMMARY AND RECOMMENDATIONS:      OT Date of Treatment: 23  OT Start Time: 1050  OT Stop Time: 1115  OT Total Time (min): 25 min      Subjective Assessment:  x No complaints  Lethargic   x Awake, alert, cooperative  Impulsive    Uncooperative   Flat affect    Agitated  c/o pain    Appropriate  c/o fatigue    Confused x Treated at bedside     Emotionally labile  Treated in gym/dept.      Other:        Therapy Precautions:    Cognitive deficits  Spinal precautions    Collar - hard  Sternal precautions    Collar - soft   TLSO   x Fall risk  LSO    Hip precautions - posterior  Knee immobilizer    Hip precautions - anterior x WBAT    Impaired communication  Partial weightbearing    Oxygen  TTWB    PEG tube  NWB    Visual deficits      Hearing deficits   Other:        Treatment Objectives:         Therapeutic Exercise:   Exercise Sets Reps Comments   3# dowel o/h presses, chest presses, and biceps curls 3 10 Seated at recliner with good motivation   Endurance training UBE 2 6min                  Assessment: Patient tolerated session very well, seated at recliner at initiation of tx, good motivation and participation for all exercises and no c/o pain during session, only with adjusting BLE at recliner chair.    OT Plan: Cont OT per POC  Revisions made to plan of care: No    GOALS:   Multidisciplinary Problems       Occupational Therapy Goals          Problem: Occupational Therapy    Goal Priority Disciplines Outcome Interventions   Occupational Therapy Goal     OT, PT/OT Ongoing, Progressing    Description: Goals to be met by: 22     Patient will increase functional independence with ADLs by performing:    UE Dressing with Modified [place].  LE Dressing with Minimal Assistance.  Grooming while seated at sink with Modified [place].  Toileting from bedside commode with Minimal Assistance  for hygiene and clothing management.   Bathing from bench with Minimal Assistance.  Toilet transfer to bedside commode with Contact Guard Assistance.                         Skilled OT Minutes Provided: 25  Communication with Treatment Team:     Equipment recommendations:       At end of treatment, patient remained:  x Up in chair     Up in wheelchair in room    In bed    With alarm activated    Bed rails up   x Call bell in reach    x Family/friends present    Restraints secured properly    In bathroom with CNA/RN notified    In gym with PT/PTA/tech    Nurse aware    Other:

## 2023-01-04 NOTE — PT/OT/SLP PROGRESS
Occupational Therapy  Treatment    Name: Tanya Linda    : 1960 (62 y.o.)  MRN: [id number]           TREATMENT SUMMARY AND RECOMMENDATIONS:      OT Date of Treatment: 23  OT Start Time: 1310  OT Stop Time: 1340  OT Total Time (min): 30 min      Subjective Assessment:   No complaints  Lethargic   x Awake, alert, cooperative  Impulsive    Uncooperative   Flat affect    Agitated  c/o pain    Appropriate  c/o fatigue    Confused x Treated at bedside     Emotionally labile  Treated in gym/dept.      Other:        Therapy Precautions:    Cognitive deficits  Spinal precautions    Collar - hard  Sternal precautions    Collar - soft   TLSO   x Fall risk  LSO    Hip precautions - posterior  Knee immobilizer    Hip precautions - anterior x WBAT    Impaired communication  Partial weightbearing    Oxygen  TTWB    PEG tube  NWB    Visual deficits      Hearing deficits   Other:        Treatment Objectives:     Mobility Training:    Mobility task Assist level Comments    Bed mobility Mod A  EOB>supine mod A for BLE lifting   Transfer Min A Stand/pivot t/f with RW [place]>EOB    Sit to stands transitions     Functional mobility     Sitting balance     Standing balance      Other:        ADL Training:    ADL Assist level Comments    Feeding     Grooming/hygiene     Bathing     Upper body dressing     Lower body dressing     Toileting     Toilet transfer     Adaptive equipment training SBA OT demo'd sock aide and reacher for donning clothing for lower body. Pt able to perform doffing/donning of underwear and B socks using AE.    Other:         Additional Comments:      Assessment: Patient tolerated session well.    OT Plan: Continue POC  Revisions made to plan of care: No    GOALS:   Multidisciplinary Problems       Occupational Therapy Goals          Problem: Occupational Therapy    Goal Priority Disciplines Outcome Interventions   Occupational Therapy Goal     OT, PT/OT Ongoing, Progressing    Description: Goals to be met  by: 1/20/22     Patient will increase functional independence with ADLs by performing:    UE Dressing with Modified [place].  LE Dressing with Minimal Assistance.  Grooming while seated at sink with Modified [place].  Toileting from bedside commode with Minimal Assistance for hygiene and clothing management.   Bathing from bench with Minimal Assistance.  Toilet transfer to bedside commode with Contact Guard Assistance.                         Skilled OT Minutes Provided: 30  Communication with Treatment Team:     Equipment recommendations:       At end of treatment, patient remained:   Up in chair     Up in wheelchair in room   x In bed   x With alarm activated   x Bed rails up   x Call bell in reach    x Family/friends present    Restraints secured properly    In bathroom with CNA/RN notified    In gym with PT/PTA/tech    Nurse aware    Other:

## 2023-01-04 NOTE — PLAN OF CARE
Problem: Adult Inpatient Plan of Care  Goal: Plan of Care Review  Outcome: Ongoing, Progressing  Flowsheets (Taken 1/3/2023 2000)  Plan of Care Reviewed With: patient     Problem: Adult Inpatient Plan of Care  Goal: Absence of Hospital-Acquired Illness or Injury  Outcome: Ongoing, Progressing  Intervention: Identify and Manage Fall Risk  Flowsheets (Taken 1/3/2023 2000)  Safety Promotion/Fall Prevention:   assistive device/personal item within reach   bed alarm set   Fall Risk reviewed with patient/family   gait belt with ambulation   muscle strengthening facilitated   nonskid shoes/socks when out of bed   side rails raised x 3   supervised activity   Supervised toileting - stay within arms reach   instructed to call staff for mobility  Intervention: Prevent Skin Injury  Flowsheets (Taken 1/3/2023 2000)  Body Position: weight shifting  Skin Protection:   incontinence pads utilized   skin sealant/moisture barrier applied  Intervention: Prevent and Manage VTE (Venous Thromboembolism) Risk  Flowsheets (Taken 1/3/2023 2000)  Activity Management: Partial stand - L2  VTE Prevention/Management: ambulation promoted  Range of Motion: active ROM (range of motion) encouraged  Intervention: Prevent Infection  Flowsheets (Taken 1/3/2023 2000)  Infection Prevention:   cohorting utilized   environmental surveillance performed   hand hygiene promoted   rest/sleep promoted   single patient room provided     Problem: Adult Inpatient Plan of Care  Goal: Absence of Hospital-Acquired Illness or Injury  Intervention: Identify and Manage Fall Risk  Flowsheets (Taken 1/3/2023 2000)  Safety Promotion/Fall Prevention:   assistive device/personal item within reach   bed alarm set   Fall Risk reviewed with patient/family   gait belt with ambulation   muscle strengthening facilitated   nonskid shoes/socks when out of bed   side rails raised x 3   supervised activity   Supervised toileting - stay within arms reach   instructed to call staff  for mobility     Problem: Adult Inpatient Plan of Care  Goal: Absence of Hospital-Acquired Illness or Injury  Intervention: Prevent Skin Injury  Flowsheets (Taken 1/3/2023 2000)  Body Position: weight shifting  Skin Protection:   incontinence pads utilized   skin sealant/moisture barrier applied     Problem: Adult Inpatient Plan of Care  Goal: Absence of Hospital-Acquired Illness or Injury  Intervention: Prevent and Manage VTE (Venous Thromboembolism) Risk  Flowsheets (Taken 1/3/2023 2000)  Activity Management: Partial stand - L2  VTE Prevention/Management: ambulation promoted  Range of Motion: active ROM (range of motion) encouraged     Problem: Adult Inpatient Plan of Care  Goal: Absence of Hospital-Acquired Illness or Injury  Intervention: Prevent Infection  Flowsheets (Taken 1/3/2023 2000)  Infection Prevention:   cohorting utilized   environmental surveillance performed   hand hygiene promoted   rest/sleep promoted   single patient room provided     Problem: Impaired Wound Healing  Goal: Optimal Wound Healing  Outcome: Ongoing, Progressing  Intervention: Promote Wound Healing  Flowsheets (Taken 1/3/2023 2000)  Oral Nutrition Promotion:   calorie-dense foods provided   calorie-dense liquids provided   physical activity promoted   safe use of adaptive equipment encouraged  Sleep/Rest Enhancement:   awakenings minimized   family presence promoted   noise level reduced   regular sleep/rest pattern promoted  Activity Management: Partial stand - L2  Pain Management Interventions: medication offered     Problem: Impaired Wound Healing  Goal: Optimal Wound Healing  Intervention: Promote Wound Healing  Flowsheets (Taken 1/3/2023 2000)  Oral Nutrition Promotion:   calorie-dense foods provided   calorie-dense liquids provided   physical activity promoted   safe use of adaptive equipment encouraged  Sleep/Rest Enhancement:   awakenings minimized   family presence promoted   noise level reduced   regular sleep/rest pattern  promoted  Activity Management: Partial stand - L2  Pain Management Interventions: medication offered     Problem: Skin Injury Risk Increased  Goal: Skin Health and Integrity  Outcome: Ongoing, Progressing  Intervention: Optimize Skin Protection  Flowsheets (Taken 1/3/2023 2000)  Pressure Reduction Techniques:   frequent weight shift encouraged   positioned off wounds   pressure points protected   weight shift assistance provided  Pressure Reduction Devices: pressure-redistributing mattress utilized  Skin Protection:   incontinence pads utilized   skin sealant/moisture barrier applied  Head of Bed (HOB) Positioning: HOB at 20-30 degrees  Intervention: Promote and Optimize Oral Intake  Flowsheets (Taken 1/3/2023 2000)  Oral Nutrition Promotion:   calorie-dense foods provided   calorie-dense liquids provided   physical activity promoted   safe use of adaptive equipment encouraged     Problem: Skin Injury Risk Increased  Goal: Skin Health and Integrity  Intervention: Optimize Skin Protection  Flowsheets (Taken 1/3/2023 2000)  Pressure Reduction Techniques:   frequent weight shift encouraged   positioned off wounds   pressure points protected   weight shift assistance provided  Pressure Reduction Devices: pressure-redistributing mattress utilized  Skin Protection:   incontinence pads utilized   skin sealant/moisture barrier applied  Head of Bed (HOB) Positioning: HOB at 20-30 degrees     Problem: Skin Injury Risk Increased  Goal: Skin Health and Integrity  Intervention: Promote and Optimize Oral Intake  Flowsheets (Taken 1/3/2023 2000)  Oral Nutrition Promotion:   calorie-dense foods provided   calorie-dense liquids provided   physical activity promoted   safe use of adaptive equipment encouraged     Problem: Fall Injury Risk  Goal: Absence of Fall and Fall-Related Injury  Outcome: Ongoing, Progressing  Intervention: Identify and Manage Contributors  Flowsheets (Taken 1/3/2023 2000)  Self-Care Promotion:   independence  encouraged   BADL personal objects within reach   BADL personal routines maintained   safe use of adaptive equipment encouraged  Intervention: Promote Injury-Free Environment  Flowsheets (Taken 1/3/2023 2000)  Safety Promotion/Fall Prevention:   assistive device/personal item within reach   bed alarm set   Fall Risk reviewed with patient/family   gait belt with ambulation   muscle strengthening facilitated   nonskid shoes/socks when out of bed   side rails raised x 3   supervised activity   Supervised toileting - stay within arms reach   instructed to call staff for mobility     Problem: Fall Injury Risk  Goal: Absence of Fall and Fall-Related Injury  Intervention: Identify and Manage Contributors  Flowsheets (Taken 1/3/2023 2000)  Self-Care Promotion:   independence encouraged   BADL personal objects within reach   BADL personal routines maintained   safe use of adaptive equipment encouraged     Problem: Fall Injury Risk  Goal: Absence of Fall and Fall-Related Injury  Intervention: Promote Injury-Free Environment  Flowsheets (Taken 1/3/2023 2000)  Safety Promotion/Fall Prevention:   assistive device/personal item within reach   bed alarm set   Fall Risk reviewed with patient/family   gait belt with ambulation   muscle strengthening facilitated   nonskid shoes/socks when out of bed   side rails raised x 3   supervised activity   Supervised toileting - stay within arms reach   instructed to call staff for mobility

## 2023-01-04 NOTE — PT/OT/SLP PROGRESS
Physical Therapy Treatment Note           Name: Tanya Linda    : 1960 (62 y.o.)  MRN: [id number]           TREATMENT SUMMARY AND RECOMMENDATIONS:    PT Received On: 23  PT Start Time: 1000     PT Stop Time: 1025  PT Total Time (min): 25 min     Subjective Assessment:  x No complaints  Lethargic    Awake, alert, cooperative  Uncooperative    Agitated  c/o pain    Appropriate  c/o fatigue    Confused  Treated at bedside     Emotionally labile  Treated in gym/dept.    Impulsive  Other:    Flat affect       Therapy Precautions:    Cognitive deficits  Spinal precautions    Collar - hard  Sternal precautions    Collar - soft   TLSO   x Fall risk  LSO    Hip precautions - posterior  Knee immobilizer    Hip precautions - anterior  WBAT    Impaired communication  Partial weightbearing    Oxygen  TTWB    PEG tube  NWB    Visual deficits  Other:    Hearing deficits          Treatment Objectives:     Mobility Training:   Assist level Comments    Bed mobility CGA Supine-sit   Transfer     Gait Yadi Amb on firm surface with RW 5 ft with VC for correct technique   Sit to stand transitions aYdi Sit-stand x 5 with b UE use- using bedrail   Sitting balance     Standing balance  SBA Lateral wt shifting with B UE supported   Wheelchair mobility     Car transfer     Other:          Therapeutic Exercise:   Exercise Sets Reps Comments   B LE exer supine 10 reps  Ankle pumps, heel slides, abd/add, quad sets   L LE exer standing B UE supported 10 reps x 2 Knee lifts                   Additional Comments:  Pt improving mobility     Assessment: Patient tolerated session well.    PT Plan: continue  Revisions made to plan of care: No    GOALS:   Multidisciplinary Problems       Physical Therapy Goals          Problem: Physical Therapy    Goal Priority Disciplines Outcome Goal Variances Interventions   Physical Therapy Goal     PT, PT/OT Ongoing, Progressing     Description: Goals to be met by: Discharge     Patient will  increase functional independence with mobility by performin. Supine to sit with Modified [place]  2. Sit to supine with Modified [place]  3. Sit to stand transfer with Modified [place]  4. Bed to chair transfer with Stand-by Assistance using Rolling Walker  5. Gait  x 25 feet with Stand-by Assistance using Rolling Walker.                          Skilled PT Minutes Provided: 25   Communication with Treatment Team:     Equipment recommendations:       At end of treatment, patient remained:  x Up in chair     Up in wheelchair in room    In bed   x With alarm activated    Bed rails up   x Call bell in reach     Family/friends present    Restraints secured properly    In bathroom with CNA/RN notified    Nurse aware    In gym with therapist/tech    Other:

## 2023-01-04 NOTE — PLAN OF CARE
Weekly Staffing Report      Date Admitted: 12/29/2022 :   Staffing Date: 1/4/2023     Patient Active Problem List   Diagnosis    Dysuria    Morbid obesity    Flank pain    Back muscle spasm    Hypertension    Depression    Left intertrochanteric femur fracture    Femur fracture, left    Anxiety    GERD (gastroesophageal reflux disease)    Disposition          Team Members Present:       Nursing Present     Yes [x]    No []    Physical Therapy Present    Yes [x]    No []    Occupational Therapy Present     Yes [x]    No []    Speech Therapy Present    Yes []    No []    NA [x]    Dietary Present    Yes [x]    No []        Physician Present   [] Prem Machado    [] Thairy Reyes    [] MIGUEL Arita    [x] ЕЛЕНА [place]     [] LORNA Delacruz      Family Present    [x] Adult Children    [] Spouse    [] POA    [] Friend/ Caregiver    [] Other       Interdisciplinary Meeting Notes:  PT- making progressing and putting more weight on the injured leg.Doing well. OT- most challenging thing is wiping after toileting and lower body dressing. Progressing well. Nutrition- appetite good. Medically- no acute issues. Doing well. All questions answered at this time                Please see Documented PT/OT/ST, Dietary, Nursing, and Physician notes for detailed treatment information.

## 2023-01-04 NOTE — PT/OT/SLP PROGRESS
Physical Therapy Treatment Note           Name: Tanya Linda    : 1960 (62 y.o.)  MRN: [id number]           TREATMENT SUMMARY AND RECOMMENDATIONS:    PT Received On: 23  PT Start Time: 1330     PT Stop Time: 1350  PT Total Time (min): 20 min     Subjective Assessment:  x No complaints  Lethargic    Awake, alert, cooperative  Uncooperative    Agitated  c/o pain    Appropriate  c/o fatigue    Confused  Treated at bedside     Emotionally labile  Treated in gym/dept.    Impulsive  Other:    Flat affect       Therapy Precautions:    Cognitive deficits  Spinal precautions    Collar - hard  Sternal precautions    Collar - soft   TLSO    Fall risk  LSO    Hip precautions - posterior  Knee immobilizer    Hip precautions - anterior  WBAT    Impaired communication  Partial weightbearing    Oxygen  TTWB    PEG tube  NWB    Visual deficits  Other:    Hearing deficits          Treatment Objectives:     Mobility Training:   Assist level Comments    Bed mobility modA- for B LE  Sit-supine   Transfer CGA Recliner-bed with RW    Gait     Sit to stand transitions Yadi Sit-stand with B UE use   Sitting balance     Standing balance      Wheelchair mobility     Car transfer     Other:          Therapeutic Exercise:   Exercise Sets Reps Comments                               Additional Comments:  Improving mobility     Assessment: Patient tolerated session well.    PT Plan: continue  Revisions made to plan of care: No    GOALS:   Multidisciplinary Problems       Physical Therapy Goals          Problem: Physical Therapy    Goal Priority Disciplines Outcome Goal Variances Interventions   Physical Therapy Goal     PT, PT/OT Ongoing, Progressing     Description: Goals to be met by: Discharge     Patient will increase functional independence with mobility by performin. Supine to sit with Modified [place]  2. Sit to supine with Modified [place]  3. Sit to stand transfer with Modified [place]  4. Bed to  chair transfer with Stand-by Assistance using Rolling Walker  5. Gait  x 25 feet with Stand-by Assistance using Rolling Walker.                          Skilled PT Minutes Provided: 20   Communication with Treatment Team:     Equipment recommendations:       At end of treatment, patient remained:   Up in chair     Up in wheelchair in room   x In bed   x With alarm activated   x Bed rails up   x Call bell in reach     Family/friends present    Restraints secured properly    In bathroom with CNA/RN notified    Nurse aware    In gym with therapist/tech    Other:

## 2023-01-04 NOTE — PT/OT/SLP PROGRESS
Name: Tanya Linda    : 1960 (62 y.o.)  MRN: [id number]            Interdisciplinary Team Conference     Case conference held with patient/family and care team to discuss progress, plan of care, barriers to be addressed for safe return home, equipment recommendations, and discharge planning. Communicated therapy progress with MD, RN, therapy clinicians and case management. All questions/concerns answered.

## 2023-01-04 NOTE — PLAN OF CARE
Problem: Adult Inpatient Plan of Care  Goal: Plan of Care Review  Outcome: Ongoing, Progressing  Flowsheets (Taken 1/4/2023 1730)  Plan of Care Reviewed With: patient  Goal: Absence of Hospital-Acquired Illness or Injury  Outcome: Ongoing, Progressing  Intervention: Identify and Manage Fall Risk  Flowsheets (Taken 1/4/2023 1730)  Safety Promotion/Fall Prevention:   assistive device/personal item within reach   bed alarm set   diversional activities provided   Fall Risk reviewed with patient/family   high risk medications identified   medications reviewed   side rails raised x 3   instructed to call staff for mobility  Intervention: Prevent Skin Injury  Flowsheets (Taken 1/4/2023 1730)  Body Position:   turned   position maintained   sitting up in bed   supine   weight shifting  Skin Protection:   adhesive use limited   incontinence pads utilized   skin sealant/moisture barrier applied  Intervention: Prevent and Manage VTE (Venous Thromboembolism) Risk  Flowsheets (Taken 1/4/2023 1730)  Activity Management:   Arm raise - L1   Rolling - L1   Up in stretcher chair - L1   Walk with assistive devise and /or staff member - L3  VTE Prevention/Management:   ambulation promoted   bleeding precations maintained   bleeding risk assessed   fluids promoted  Range of Motion: active ROM (range of motion) encouraged  Intervention: Prevent Infection  Flowsheets (Taken 1/4/2023 1730)  Infection Prevention:   hand hygiene promoted   rest/sleep promoted   single patient room provided     Problem: Bariatric Environmental Safety  Goal: Safety Maintained with Care  Outcome: Ongoing, Progressing  Intervention: Promote Safety and Comfort  Flowsheets (Taken 1/4/2023 1730)  Bariatric Safety: specialty bed utilized     Problem: Impaired Wound Healing  Goal: Optimal Wound Healing  Outcome: Ongoing, Progressing  Intervention: Promote Wound Healing  Flowsheets (Taken 1/4/2023 1730)  Oral Nutrition Promotion:   rest periods promoted   physical  activity promoted  Sleep/Rest Enhancement:   awakenings minimized   noise level reduced   regular sleep/rest pattern promoted   relaxation techniques promoted  Activity Management:   Arm raise - L1   Rolling - L1   Up in stretcher chair - L1   Walk with assistive devise and /or staff member - L3  Pain Management Interventions:   care clustered   diversional activity provided   pain management plan reviewed with patient/caregiver   position adjusted   quiet environment facilitated   relaxation techniques promoted     Problem: Skin Injury Risk Increased  Goal: Skin Health and Integrity  Outcome: Ongoing, Progressing  Intervention: Optimize Skin Protection  Flowsheets (Taken 1/4/2023 1730)  Pressure Reduction Techniques:   frequent weight shift encouraged   weight shift assistance provided  Pressure Reduction Devices:   positioning supports utilized   pressure-redistributing mattress utilized   specialty bed utilized  Skin Protection:   adhesive use limited   incontinence pads utilized   skin sealant/moisture barrier applied  Head of Bed (HOB) Positioning: HOB at 20-30 degrees  Intervention: Promote and Optimize Oral Intake  Flowsheets (Taken 1/4/2023 1730)  Oral Nutrition Promotion:   rest periods promoted   physical activity promoted     Problem: Fall Injury Risk  Goal: Absence of Fall and Fall-Related Injury  Outcome: Ongoing, Progressing  Intervention: Identify and Manage Contributors  Flowsheets (Taken 1/4/2023 1730)  Self-Care Promotion:   independence encouraged   BADL personal objects within reach   meal set-up provided  Medication Review/Management:   medications reviewed   high-risk medications identified  Intervention: Promote Injury-Free Environment  Flowsheets (Taken 1/4/2023 1730)  Safety Promotion/Fall Prevention:   assistive device/personal item within reach   bed alarm set   diversional activities provided   Fall Risk reviewed with patient/family   high risk medications identified   medications  reviewed   side rails raised x 3   instructed to call staff for mobility     Problem: Surgical Site Infection  Goal: Absence of Infection Signs and Symptoms  Outcome: Ongoing, Progressing  Intervention: Prevent or Manage Infection  Flowsheets (Taken 1/4/2023 1730)  Fever Reduction/Comfort Measures:   fluid intake increased   lightweight bedding   lightweight clothing  Fluid/Electrolyte Management: fluids provided

## 2023-01-04 NOTE — PROGRESS NOTES
[facility]  [facility] Medicine  Telehospitalist Progress Note    Patient Name: Tanya Linda  MRN: [id number]  Patient Class: IP- Swing   Admission Date: 12/29/2022  Length of Stay: 6 days  Attending Physician: Stephon Kearney MD  Primary Care Provider: MARION Salazar      Subjective:     Principal Problem:Closed intertrochanteric fracture of hip, left, initial encounter      HPI:  Ms. Linda is a 62-year-old  female with history of morbid obesity, hypertension, depression, GERD, and anxiety who presented to [facility] ER on 12/24/2022 with left hip pain following a fall.  X-ray of the left hip and femur showed a displaced intertrochanteric fracture of the left femur and chest x-ray revealed no acute cardiopulmonary process. CT of the left hip confirmed a comminuted intertrochanteric left femoral fracture and orthopedics was consulted.  She was taken to the OR on 12/25/2022 for intramedullary nail of left intertrochanteric femur fracture by Dr. Ronnell Olivares and she did relatively well postoperatively.  Her lisinopril was held due to mild hypotension and her blood pressure stabilized.  She had no other complications throughout her hospital course and she was transferred to [facility] swing bed on 12/29/2022 for PT/OT and management of her multiple medical comorbidities.      Overview/Hospital Course:  She was admitted to [facility] swing bed on 12/29/2022 for rehab following intramedullary nail of left intertrochanteric femur fracture.  She is being treated with a 4 week course of aspirin 81 mg PO BID for DVT prophylaxis and norco 5/325 mg PO every 6 hours as needed for pain.        Interval History: She participated in therapy yesterday and she required moderate assistance with bed mobility and minimal assistance with sit to stand transitions. She was unable to advance her right lower extremity for gait yesterday. She is doing well this morning without  complaints.      Review of Systems   All other systems reviewed and are negative.  Objective:     Vital Signs (Most Recent):  Temp: 97.9 °F (36.6 °C) (01/04/23 0725)  Pulse: 63 (01/04/23 0725)  Resp: 18 (01/04/23 0738)  BP: 118/71 (01/04/23 0725)  SpO2: 96 % (01/04/23 0725) Vital Signs (24h Range):  Temp:  [97.8 °F (36.6 °C)-98.4 °F (36.9 °C)] 97.9 °F (36.6 °C)  Pulse:  [63-79] 63  Resp:  [18-20] 18  SpO2:  [91 %-96 %] 96 %  BP: (118-131)/(61-77) 118/71     Weight: (!) 156.5 kg (345 lb 0.3 oz)  Body mass index is 57.41 kg/m².    Intake/Output Summary (Last 24 hours) at 1/4/2023 0834  Last data filed at 1/4/2023 0639  Gross per 24 hour   Intake 3480 ml   Output 750 ml   Net 2730 ml      Physical Exam  General - Morbidly obese  female in no acute distress.  HEENT - NCAT. No scleral icterus. Oropharynx clear. Mucous membranes moist.  Neck - No lymphadenopathy, thyromegaly, or JVD.  CVS - Regular rate and rhythm. No murmurs, rubs, or gallops.  Resp - Lungs are clear to auscultation bilaterally. No rales, wheeze, or rhonchi.   GI - Soft, nontender, nondistended, normoactive bowel sounds present.   Extremities - No clubbing, cyanosis, or edema.   Neuro - CN II through XII are grossly intact. No focal neurological deficits. Alert and oriented times four.   Psych - Normal affect.  Skin - Left hip surgical site c/d/i. Slight edema and ecchymosis noted to periwounds.     Significant Labs: All pertinent labs within the past 24 hours have been reviewed.    1/4/2023:  CBC: WBC count 10.4, hemoglobin 10.2, hematocrit 33.6, platelet count 307.  BMP: Sodium 141, potassium 4.7, chloride 102, CO2 28, BUN 20.8, creatinine 0.79, glucose 114, calcium 9.7, magnesium 2.0.     12/30/2022:   CBC:  WBC count 10.4, hemoglobin 10.1, hematocrit 33.6, platelet count 259 food   BMP: Sodium 137, potassium 4.4, chloride 100, CO2 25, BUN 14.3, creatinine 0.68, glucose 100, calcium 9.1, magnesium 2.0.      12/29/2022:  CBC:  WBC count 10.9,  hemoglobin 9.8, hematocrit 31.7, glucose 241.    BMP: Sodium 136, potassium 4.7, chloride 100, CO2 26, BUN 14.4, creatinine 0.73, glucose 107, calcium 8.9.    12/27/2022:   CBC:  WBC count 1.5, hemoglobin 10.7, hematocrit 34.6, platelet count 202.    CMP: Sodium 137, potassium 4.3, chloride 105, CO2 25, BUN 16.1, creatinine 0.81, glucose 121, calcium 9.0, magnesium 2.1, alkaline phosphatase 73, total protein 6.4, albumin 3.0, total bilirubin 0.8, AST 39, ALT 22.     12/26/2022:   CBC:  WBC count 13.7, hemoglobin 10.7, hematocrit 34.3, platelet count 264.  CMP:  Sodium 137, potassium 4.4, chloride 103, CO2 25, BUN 18.9, creatinine 0.85, glucose 118, calcium 8.71 magnesium 1.9, alkaline phosphatase 72, total protein 5.7, albumin 3.1, total bilirubin 0.8, AST 56, ALT 26.     12/24/2022:   CMP: Sodium 140, potassium 3.9, chloride 105, CO2 25, BUN 17.2 creatinine 0.97, glucose 117, calcium 9.3, alkaline phosphatase 98, total protein 7.2, albumin 3.8, total bilirubin 0.5, AST 14, ALT 17.    CBC: WBC count 10.6, hemoglobin 12.9, hematocrit 41.2, platelet count 223.     Significant Imaging: I have reviewed all pertinent imaging results/findings within the past 24 hours.     X-ray left hip 12/25/2022: There has been placement of left femoral intramedullary taz and compression screw traverses into the femoral head transfixing the intertrochanteric fracture.  Positioning and alignment is satisfactory.     CT left hip 12/25/2022: Comminuted intertrochanteric left femoral fracture.     CXR 12/24/2022: No acute chest disease is identified.     X-ray left hip 12/24/2022: Displaced intertrochanteric fracture of the left femur      Assessment/Plan:      * Left intertrochanteric femur fracture  Continue 4 week course of aspirin 81 mg PO BID for DVT prophylaxis, PT/OT, norco as needed for pain control, and weight-bearing as tolerated to the left lower extremity.  She is status post intramedullary nail by Dr. Ronnell Olivares on 12/25/2022.      Hypertension  Her blood pressure has been stable off antihypertensives.  She is no longer on lisinopril 5 mg PO daily which was held at [facility] due to hypotension.    Anxiety  Continue buspirone.    Depression  Continue venlafaxine.    GERD (gastroesophageal reflux disease)  Continue famotidine.    Morbid obesity  She was counseled on the importance of weight loss and diet.  She was noted to have a BMI of 57.41.      Disposition  Anticipate discharge home with home health in the next week.        VTE Risk Mitigation (From admission, onward)         Ordered     Place sequential compression device  Until discontinued         12/29/22 1612                Discharge Planning   XENA:      Code Status: Full Code   Is the patient medically ready for discharge?:     Reason for patient still in hospital (select all that apply): PT / OT recommendations  Discharge Plan A: Home with family, Home Health   Discharge Delays: None known at this time      This service was provided via telemedicine.  Type of Software: Audio/Visual.  Originating Site: [facility].  Distant Site: [place], [place].  Her exam was performed with the assistance of Donna Barroso RN.      Stephon Kearney MD  Department of Hospital Medicine   [facility]

## 2023-01-05 PROCEDURE — 97530 THERAPEUTIC ACTIVITIES: CPT

## 2023-01-05 PROCEDURE — 11000004 HC SNF PRIVATE

## 2023-01-05 PROCEDURE — 94760 N-INVAS EAR/PLS OXIMETRY 1: CPT

## 2023-01-05 PROCEDURE — 97535 SELF CARE MNGMENT TRAINING: CPT

## 2023-01-05 PROCEDURE — 97116 GAIT TRAINING THERAPY: CPT | Mod: CQ

## 2023-01-05 PROCEDURE — 97110 THERAPEUTIC EXERCISES: CPT | Mod: CQ

## 2023-01-05 PROCEDURE — 25000003 PHARM REV CODE 250: Performed by: STUDENT IN AN ORGANIZED HEALTH CARE EDUCATION/TRAINING PROGRAM

## 2023-01-05 RX ADMIN — HYDROCODONE BITARTRATE AND ACETAMINOPHEN 1 TABLET: 5; 325 TABLET ORAL at 09:01

## 2023-01-05 RX ADMIN — MICONAZOLE NITRATE 2 % TOPICAL POWDER: at 09:01

## 2023-01-05 RX ADMIN — FAMOTIDINE 20 MG: 20 TABLET ORAL at 09:01

## 2023-01-05 RX ADMIN — ASPIRIN 81 MG: 81 TABLET, COATED ORAL at 09:01

## 2023-01-05 RX ADMIN — DOCUSATE SODIUM 100 MG: 100 CAPSULE, LIQUID FILLED ORAL at 09:01

## 2023-01-05 RX ADMIN — BUSPIRONE HYDROCHLORIDE 7.5 MG: 7.5 TABLET ORAL at 09:01

## 2023-01-05 RX ADMIN — POLYETHYLENE GLYCOL 3350 17 G: 17 POWDER, FOR SOLUTION ORAL at 09:01

## 2023-01-05 RX ADMIN — METHOCARBAMOL 500 MG: 500 TABLET ORAL at 03:01

## 2023-01-05 RX ADMIN — METHOCARBAMOL 500 MG: 500 TABLET ORAL at 09:01

## 2023-01-05 RX ADMIN — METHOCARBAMOL 500 MG: 500 TABLET ORAL at 02:01

## 2023-01-05 RX ADMIN — VENLAFAXINE 37.5 MG: 37.5 TABLET ORAL at 06:01

## 2023-01-05 NOTE — PT/OT/SLP PROGRESS
Occupational Therapy  Treatment    Name: Tanya Linda    : 1960 (62 y.o.)  MRN: [id number]           TREATMENT SUMMARY AND RECOMMENDATIONS:      OT Date of Treatment: 23  OT Start Time: 1000  OT Stop Time: 1025  OT Total Time (min): 25 min      Subjective Assessment:   No complaints  Lethargic   x Awake, alert, cooperative  Impulsive    Uncooperative   Flat affect    Agitated x c/o pain    Appropriate  c/o fatigue    Confused x Treated at bedside     Emotionally labile  Treated in gym/dept.      Other:        Therapy Precautions:    Cognitive deficits  Spinal precautions    Collar - hard  Sternal precautions    Collar - soft   TLSO   x Fall risk  LSO    Hip precautions - posterior  Knee immobilizer    Hip precautions - anterior x WBAT    Impaired communication  Partial weightbearing    Oxygen  TTWB    PEG tube  NWB    Visual deficits      Hearing deficits   Other:        Treatment Objectives:     Mobility Training:    Mobility task Assist level Comments    Bed mobility Min A  Supine>EOB min A for LLE    Transfer CGA Stand/pivot t/f with RW EOB>Bschair    Sit to stands transitions CGA From Bschair x3 reps   Functional mobility Min A  2x5 feet with RW; Pt very fearful and c/o L knee pain    Sitting balance     Standing balance      Other:        ADL Training:    ADL Assist level Comments    Feeding     Grooming/hygiene Mod (I)  Pt brushed teeth while seated in chair at sink    Bathing     Upper body dressing     Lower body dressing     Toileting Min A Pt required clothing assist to pull down diaper; Pt remained on toilet at end of session as Pt requesting to sit for a while to have BM.   Toilet transfer CGA Stand/pivot t/f with RW Bschair>bariBSC   Adaptive equipment training     Other:           Additional Comments:      Assessment: Patient tolerated session well. Pt continues to make progress AEB increased gait distance and ability to lift LLE higher during.    OT Plan: Continue POC  Revisions made to  plan of care: No    GOALS:   Multidisciplinary Problems       Occupational Therapy Goals          Problem: Occupational Therapy    Goal Priority Disciplines Outcome Interventions   Occupational Therapy Goal     OT, PT/OT Ongoing, Progressing    Description: Goals to be met by: 1/20/22     Patient will increase functional independence with ADLs by performing:    UE Dressing with Modified [place].  LE Dressing with Minimal Assistance.  Grooming while seated at sink with Modified [place].  Toileting from bedside commode with Minimal Assistance for hygiene and clothing management.   Bathing from bench with Minimal Assistance.  Toilet transfer to bedside commode with Contact Guard Assistance.                         Skilled OT Minutes Provided: 25  Communication with Treatment Team: co-tx with PT    Equipment recommendations:       At end of treatment, patient remained:   Up in chair     Up in wheelchair in room    In bed    With alarm activated    Bed rails up    Call bell in reach     Family/friends present    Restraints secured properly   x In bathroom with CNA/RN notified    In gym with PT/PTA/tech    Nurse aware    Other:

## 2023-01-05 NOTE — PT/OT/SLP PROGRESS
Physical Therapy Treatment Note           Name: Tanya Linda    : 1960 (62 y.o.)  MRN: [id number]           TREATMENT SUMMARY AND RECOMMENDATIONS:    PT Received On: 23  PT Start Time: 1300     PT Stop Time: 1325  PT Total Time (min): 25 min     Subjective Assessment:   No complaints  Lethargic    Awake, alert, cooperative  Uncooperative    Agitated x c/o pain- L knee    Appropriate  c/o fatigue    Confused  Treated at bedside     Emotionally labile  Treated in gym/dept.    Impulsive  Other:    Flat affect       Therapy Precautions:    Cognitive deficits  Spinal precautions    Collar - hard  Sternal precautions    Collar - soft   TLSO   x Fall risk  LSO    Hip precautions - posterior  Knee immobilizer    Hip precautions - anterior  WBAT    Impaired communication  Partial weightbearing    Oxygen  TTWB    PEG tube  NWB    Visual deficits  Other:    Hearing deficits          Treatment Objectives:     Mobility Training:   Assist level Comments    Bed mobility     Transfer     Gait Yadi Amb on firm surface with RW 5 ft x 2 with VC for correct technique   Sit to stand transitions Yadi Sit-stand with B UE use   Sitting balance     Standing balance      Wheelchair mobility     Car transfer     Other:          Therapeutic Exercise:   Exercise Sets Reps Comments   B LE exer long and short sitting  10 reps  In all planes to promote muscle strength and ROM                          Additional Comments:  Pt improving mobility - slow progress    Assessment: Patient tolerated session better.    PT Plan: continue  Revisions made to plan of care: No    GOALS:   Multidisciplinary Problems       Physical Therapy Goals          Problem: Physical Therapy    Goal Priority Disciplines Outcome Goal Variances Interventions   Physical Therapy Goal     PT, PT/OT Ongoing, Progressing     Description: Goals to be met by: Discharge     Patient will increase functional independence with mobility by performin. Supine to  sit with Modified [place]  2. Sit to supine with Modified [place]  3. Sit to stand transfer with Modified [place]  4. Bed to chair transfer with Stand-by Assistance using Rolling Walker  5. Gait  x 25 feet with Stand-by Assistance using Rolling Walker.                          Skilled PT Minutes Provided: 25   Communication with Treatment Team:     Equipment recommendations:       At end of treatment, patient remained:  x Up in chair     Up in wheelchair in room    In bed   x With alarm activated    Bed rails up   x Call bell in reach     Family/friends present    Restraints secured properly    In bathroom with CNA/RN notified    Nurse aware    In gym with therapist/tech    Other:

## 2023-01-05 NOTE — PLAN OF CARE
Problem: Adult Inpatient Plan of Care  Goal: Plan of Care Review  Outcome: Ongoing, Progressing  Flowsheets (Taken 1/4/2023 2000)  Plan of Care Reviewed With: patient  Goal: Absence of Hospital-Acquired Illness or Injury  Outcome: Ongoing, Progressing  Intervention: Identify and Manage Fall Risk  Flowsheets (Taken 1/4/2023 2000)  Safety Promotion/Fall Prevention:   assistive device/personal item within reach   bed alarm set   bedside commode chair   Fall Risk reviewed with patient/family   gait belt with ambulation   muscle strengthening facilitated   nonskid shoes/socks when out of bed   side rails raised x 3   supervised activity   Supervised toileting - stay within arms reach   instructed to call staff for mobility  Intervention: Prevent Skin Injury  Flowsheets (Taken 1/4/2023 2000)  Body Position: weight shifting  Skin Protection:   hydrocolloids used   incontinence pads utilized  Intervention: Prevent and Manage VTE (Venous Thromboembolism) Risk  Flowsheets (Taken 1/4/2023 2000)  Activity Management:   Standing - L3   Step forward and back - L3  VTE Prevention/Management: ambulation promoted  Range of Motion: active ROM (range of motion) encouraged  Intervention: Prevent Infection  Flowsheets (Taken 1/4/2023 2000)  Infection Prevention:   cohorting utilized   environmental surveillance performed   hand hygiene promoted   rest/sleep promoted   single patient room provided     Problem: Impaired Wound Healing  Goal: Optimal Wound Healing  Outcome: Ongoing, Progressing  Intervention: Promote Wound Healing  Flowsheets (Taken 1/4/2023 2000)  Oral Nutrition Promotion:   calorie-dense foods provided   physical activity promoted   safe use of adaptive equipment encouraged   calorie-dense liquids provided  Sleep/Rest Enhancement:   awakenings minimized   family presence promoted   noise level reduced   regular sleep/rest pattern promoted   room darkened  Activity Management:   Standing - L3   Step forward and back -  L3  Pain Management Interventions: medication offered     Problem: Skin Injury Risk Increased  Goal: Skin Health and Integrity  Outcome: Ongoing, Progressing  Intervention: Optimize Skin Protection  Flowsheets (Taken 1/4/2023 2000)  Pressure Reduction Techniques:   frequent weight shift encouraged   heels elevated off bed   positioned off wounds   pressure points protected   weight shift assistance provided  Pressure Reduction Devices: specialty bed utilized  Skin Protection:   hydrocolloids used   incontinence pads utilized  Head of Bed (HOB) Positioning: HOB at 20-30 degrees  Intervention: Promote and Optimize Oral Intake  Flowsheets (Taken 1/4/2023 2000)  Oral Nutrition Promotion:   calorie-dense foods provided   physical activity promoted   safe use of adaptive equipment encouraged   calorie-dense liquids provided     Problem: Fall Injury Risk  Goal: Absence of Fall and Fall-Related Injury  Outcome: Ongoing, Progressing  Intervention: Identify and Manage Contributors  Flowsheets (Taken 1/4/2023 2000)  Self-Care Promotion:   independence encouraged   BADL personal objects within reach   BADL personal routines maintained   safe use of adaptive equipment encouraged  Intervention: Promote Injury-Free Environment  Flowsheets (Taken 1/4/2023 2000)  Safety Promotion/Fall Prevention:   assistive device/personal item within reach   bed alarm set   bedside commode chair   Fall Risk reviewed with patient/family   gait belt with ambulation   muscle strengthening facilitated   nonskid shoes/socks when out of bed   side rails raised x 3   supervised activity   Supervised toileting - stay within arms reach   instructed to call staff for mobility     Problem: Surgical Site Infection  Goal: Absence of Infection Signs and Symptoms  Outcome: Ongoing, Progressing  Intervention: Prevent or Manage Infection  Flowsheets (Taken 1/5/2023 0052)  Fluid/Electrolyte Management: fluids provided  Infection Management: aseptic technique  maintained

## 2023-01-05 NOTE — PT/OT/SLP PROGRESS
Physical Therapy Treatment Note           Name: Tanya Linda    : 1960 (62 y.o.)  MRN: [id number]           TREATMENT SUMMARY AND RECOMMENDATIONS:    PT Received On: 23  PT Start Time: 1000     PT Stop Time: 1025  PT Total Time (min): 25 min     Subjective Assessment:   No complaints  Lethargic   x Awake, alert, cooperative  Uncooperative    Agitated x c/o pain    Appropriate  c/o fatigue    Confused x Treated at bedside     Emotionally labile  Treated in gym/dept.    Impulsive  Other:    Flat affect       Therapy Precautions:    Cognitive deficits  Spinal precautions    Collar - hard  Sternal precautions    Collar - soft   TLSO   x Fall risk  LSO    Hip precautions - posterior  Knee immobilizer    Hip precautions - anterior x WBAT    Impaired communication  Partial weightbearing    Oxygen  TTWB    PEG tube  NWB    Visual deficits  Other:    Hearing deficits          Treatment Objectives:     Mobility Training:   Assist level Comments    Bed mobility MIN A Supine > sit  MIN A with LLE   Transfer CGA Step transfer bed > bedside chair and then bedside chair > BSC   Gait CGA 2x5ft using RW; pt with antalgic gait 2/2 L knee and hip pain; pt with shuffling gait as she is unable to fully clear feet from ground   Sit to stand transitions CGA Multiple sit to stands during session   Sitting balance     Standing balance      Wheelchair mobility     Car transfer     Other:          Therapeutic Exercise:   Exercise Sets Reps Comments                               Additional Comments:      Assessment: Patient tolerated session well. Patient still limited 2/2 LLE pain and weakness, however she is improving each day. Today vs eval, pt has been able to take a few steps.    PT Plan: continue POC  Revisions made to plan of care: No    GOALS:   Multidisciplinary Problems       Physical Therapy Goals          Problem: Physical Therapy    Goal Priority Disciplines Outcome Goal Variances Interventions   Physical  Therapy Goal     PT, PT/OT Ongoing, Progressing     Description: Goals to be met by: Discharge     Patient will increase functional independence with mobility by performin. Supine to sit with Modified [place]  2. Sit to supine with Modified [place]  3. Sit to stand transfer with Modified [place]  4. Bed to chair transfer with Stand-by Assistance using Rolling Walker  5. Gait  x 25 feet with Stand-by Assistance using Rolling Walker.                          Skilled PT Minutes Provided: 25 minutes   Communication with Treatment Team:     Equipment recommendations:       At end of treatment, patient remained:   Up in chair     Up in wheelchair in room    In bed    With alarm activated    Bed rails up   x Call bell in reach    x Family/friends present    Restraints secured properly   x In bathroom with CNA/RN notified    Nurse aware    In gym with therapist/tech    Other:

## 2023-01-05 NOTE — PLAN OF CARE
Problem: Bariatric Environmental Safety  Goal: Safety Maintained with Care  Outcome: Ongoing, Progressing  Intervention: Promote Safety and Comfort  Flowsheets (Taken 1/5/2023 0800)  Bariatric Safety: bariatric commode at bedside     Problem: Impaired Wound Healing  Goal: Optimal Wound Healing  Outcome: Ongoing, Progressing  Intervention: Promote Wound Healing  Flowsheets (Taken 1/5/2023 0800)  Oral Nutrition Promotion: rest periods promoted  Sleep/Rest Enhancement: consistent schedule promoted  Pain Management Interventions: care clustered     Problem: Skin Injury Risk Increased  Goal: Skin Health and Integrity  Outcome: Ongoing, Progressing  Intervention: Promote and Optimize Oral Intake  Flowsheets (Taken 1/5/2023 0800)  Oral Nutrition Promotion: rest periods promoted     Problem: Fall Injury Risk  Goal: Absence of Fall and Fall-Related Injury  Outcome: Ongoing, Progressing  Intervention: Identify and Manage Contributors  Flowsheets (Taken 1/5/2023 0800)  Self-Care Promotion: independence encouraged  Medication Review/Management: medications reviewed     Problem: Surgical Site Infection  Goal: Absence of Infection Signs and Symptoms  Outcome: Ongoing, Progressing  Intervention: Prevent or Manage Infection  Flowsheets (Taken 1/5/2023 0800)  Fever Reduction/Comfort Measures: lightweight bedding

## 2023-01-05 NOTE — SUBJECTIVE & OBJECTIVE
Interval History: She reports left hip pain 5/10 in severity this morning. She ambulated 5 feet with minimal assistance yesterday and she required moderate assistance with bed mobility, contact guard assistance with transfers, standby assistance with standing balance, and minimal assistance with sit to stand transitions.      Review of Systems   All other systems reviewed and are negative.  Objective:     Vital Signs (Most Recent):  Temp: 97.8 °F (36.6 °C) (01/05/23 0722)  Pulse: 65 (01/05/23 0722)  Resp: 18 (01/05/23 0722)  BP: 128/72 (01/05/23 0722)  SpO2: 97 % (01/05/23 0722) Vital Signs (24h Range):  Temp:  [97.4 °F (36.3 °C)-98.8 °F (37.1 °C)] 97.8 °F (36.6 °C)  Pulse:  [62-80] 65  Resp:  [18-20] 18  SpO2:  [95 %-99 %] 97 %  BP: (108-133)/(59-76) 128/72     Weight: (!) 156.5 kg (345 lb 0.3 oz)  Body mass index is 57.41 kg/m².    Intake/Output Summary (Last 24 hours) at 1/5/2023 0839  Last data filed at 1/5/2023 0648  Gross per 24 hour   Intake 720 ml   Output 1700 ml   Net -980 ml      Physical Exam  General - Morbidly obese  female in no acute distress.  HEENT - NCAT. No scleral icterus. Oropharynx clear. Mucous membranes moist.  Neck - No lymphadenopathy, thyromegaly, or JVD.  CVS - Regular rate and rhythm. No murmurs, rubs, or gallops.  Resp - Lungs are clear to auscultation bilaterally. No rales, wheeze, or rhonchi.   GI - Soft, nontender, nondistended, normoactive bowel sounds present.   Extremities - No clubbing, cyanosis, or edema.   Neuro - CN II through XII are grossly intact. No focal neurological deficits. Alert and oriented times four.   Psych - Normal affect.  Skin - Left hip surgical site c/d/i. Slight edema and ecchymosis noted to periwounds.     Significant Labs: All pertinent labs within the past 24 hours have been reviewed.     1/4/2023:  CBC: WBC count 10.4, hemoglobin 10.2, hematocrit 33.6, platelet count 307.  BMP: Sodium 141, potassium 4.7, chloride 102, CO2 28, BUN 20.8,  creatinine 0.79, glucose 114, calcium 9.7, magnesium 2.0.     12/30/2022:   CBC:  WBC count 10.4, hemoglobin 10.1, hematocrit 33.6, platelet count 259 food   BMP: Sodium 137, potassium 4.4, chloride 100, CO2 25, BUN 14.3, creatinine 0.68, glucose 100, calcium 9.1, magnesium 2.0.      12/29/2022:  CBC:  WBC count 10.9, hemoglobin 9.8, hematocrit 31.7, glucose 241.    BMP: Sodium 136, potassium 4.7, chloride 100, CO2 26, BUN 14.4, creatinine 0.73, glucose 107, calcium 8.9.    12/27/2022:   CBC:  WBC count 1.5, hemoglobin 10.7, hematocrit 34.6, platelet count 202.    CMP: Sodium 137, potassium 4.3, chloride 105, CO2 25, BUN 16.1, creatinine 0.81, glucose 121, calcium 9.0, magnesium 2.1, alkaline phosphatase 73, total protein 6.4, albumin 3.0, total bilirubin 0.8, AST 39, ALT 22.     12/26/2022:   CBC:  WBC count 13.7, hemoglobin 10.7, hematocrit 34.3, platelet count 264.  CMP:  Sodium 137, potassium 4.4, chloride 103, CO2 25, BUN 18.9, creatinine 0.85, glucose 118, calcium 8.71 magnesium 1.9, alkaline phosphatase 72, total protein 5.7, albumin 3.1, total bilirubin 0.8, AST 56, ALT 26.     12/24/2022:   CMP: Sodium 140, potassium 3.9, chloride 105, CO2 25, BUN 17.2 creatinine 0.97, glucose 117, calcium 9.3, alkaline phosphatase 98, total protein 7.2, albumin 3.8, total bilirubin 0.5, AST 14, ALT 17.    CBC: WBC count 10.6, hemoglobin 12.9, hematocrit 41.2, platelet count 223.     Significant Imaging: I have reviewed all pertinent imaging results/findings within the past 24 hours.     X-ray left hip 12/25/2022: There has been placement of left femoral intramedullary taz and compression screw traverses into the femoral head transfixing the intertrochanteric fracture.  Positioning and alignment is satisfactory.     CT left hip 12/25/2022: Comminuted intertrochanteric left femoral fracture.     CXR 12/24/2022: No acute chest disease is identified.     X-ray left hip 12/24/2022:  Displaced intertrochanteric fracture of the  left femur

## 2023-01-05 NOTE — PLAN OF CARE
Problem: Physical Therapy  Goal: Physical Therapy Goal  Description: Goals to be met by: Discharge     Patient will increase functional independence with mobility by performin. Supine to sit with Modified [place]  2. Sit to supine with Modified [place]  3. Sit to stand transfer with Modified [place]  4. Bed to chair transfer with Stand-by Assistance using Rolling Walker  5. Gait  x 25 feet with Stand-by Assistance using Rolling Walker.     Outcome: Ongoing, Progressing

## 2023-01-05 NOTE — PROGRESS NOTES
[facility] - Medical Surgical Unit  [facility] Medicine  Telehospitalist Progress Note    Patient Name: Tanya Linda  MRN: [id number]  Patient Class: IP- Swing   Admission Date: 12/29/2022  Length of Stay: 7 days  Attending Physician: Stephon Kearnye MD  Primary Care Provider: MARION Salazar      Subjective:     Principal Problem:Closed intertrochanteric fracture of hip, left, initial encounter      HPI:  Ms. Linda is a 62-year-old  female with history of morbid obesity, hypertension, depression, GERD, and anxiety who presented to [facility] ER on 12/24/2022 with left hip pain following a fall.  X-ray of the left hip and femur showed a displaced intertrochanteric fracture of the left femur and chest x-ray revealed no acute cardiopulmonary process. CT of the left hip confirmed a comminuted intertrochanteric left femoral fracture and orthopedics was consulted.  She was taken to the OR on 12/25/2022 for intramedullary nail of left intertrochanteric femur fracture by Dr. Ronnell Olivares and she did relatively well postoperatively.  Her lisinopril was held due to mild hypotension and her blood pressure stabilized.  She had no other complications throughout her hospital course and she was transferred to [facility] swing bed on 12/29/2022 for PT/OT and management of her multiple medical comorbidities.      Overview/Hospital Course:  She was admitted to [facility] swing bed on 12/29/2022 for rehab following intramedullary nail of left intertrochanteric femur fracture.  She is being treated with a 4 week course of aspirin 81 mg PO BID for DVT prophylaxis and norco 5/325 mg PO every 6 hours as needed for pain.        Interval History: She reports left hip pain 5/10 in severity this morning. She ambulated 5 feet with minimal assistance yesterday and she required moderate assistance with bed mobility, contact guard assistance with transfers, standby assistance with standing balance, and minimal  assistance with sit to stand transitions.      Review of Systems   All other systems reviewed and are negative.  Objective:     Vital Signs (Most Recent):  Temp: 97.8 °F (36.6 °C) (01/05/23 0722)  Pulse: 65 (01/05/23 0722)  Resp: 18 (01/05/23 0722)  BP: 128/72 (01/05/23 0722)  SpO2: 97 % (01/05/23 0722) Vital Signs (24h Range):  Temp:  [97.4 °F (36.3 °C)-98.8 °F (37.1 °C)] 97.8 °F (36.6 °C)  Pulse:  [62-80] 65  Resp:  [18-20] 18  SpO2:  [95 %-99 %] 97 %  BP: (108-133)/(59-76) 128/72     Weight: (!) 156.5 kg (345 lb 0.3 oz)  Body mass index is 57.41 kg/m².    Intake/Output Summary (Last 24 hours) at 1/5/2023 0839  Last data filed at 1/5/2023 0648  Gross per 24 hour   Intake 720 ml   Output 1700 ml   Net -980 ml      Physical Exam  General - Morbidly obese  female in no acute distress.  HEENT - NCAT. No scleral icterus. Oropharynx clear. Mucous membranes moist.  Neck - No lymphadenopathy, thyromegaly, or JVD.  CVS - Regular rate and rhythm. No murmurs, rubs, or gallops.  Resp - Lungs are clear to auscultation bilaterally. No rales, wheeze, or rhonchi.   GI - Soft, nontender, nondistended, normoactive bowel sounds present.   Extremities - No clubbing, cyanosis, or edema.   Neuro - CN II through XII are grossly intact. No focal neurological deficits. Alert and oriented times four.   Psych - Normal affect.  Skin - Left hip surgical site c/d/i. Slight edema and ecchymosis noted to periwounds.     Significant Labs: All pertinent labs within the past 24 hours have been reviewed.     1/4/2023:  CBC: WBC count 10.4, hemoglobin 10.2, hematocrit 33.6, platelet count 307.  BMP: Sodium 141, potassium 4.7, chloride 102, CO2 28, BUN 20.8, creatinine 0.79, glucose 114, calcium 9.7, magnesium 2.0.     12/30/2022:   CBC:  WBC count 10.4, hemoglobin 10.1, hematocrit 33.6, platelet count 259 food   BMP: Sodium 137, potassium 4.4, chloride 100, CO2 25, BUN 14.3, creatinine 0.68, glucose 100, calcium 9.1, magnesium 2.0.       12/29/2022:  CBC:  WBC count 10.9, hemoglobin 9.8, hematocrit 31.7, glucose 241.    BMP: Sodium 136, potassium 4.7, chloride 100, CO2 26, BUN 14.4, creatinine 0.73, glucose 107, calcium 8.9.    12/27/2022:   CBC:  WBC count 1.5, hemoglobin 10.7, hematocrit 34.6, platelet count 202.    CMP: Sodium 137, potassium 4.3, chloride 105, CO2 25, BUN 16.1, creatinine 0.81, glucose 121, calcium 9.0, magnesium 2.1, alkaline phosphatase 73, total protein 6.4, albumin 3.0, total bilirubin 0.8, AST 39, ALT 22.     12/26/2022:   CBC:  WBC count 13.7, hemoglobin 10.7, hematocrit 34.3, platelet count 264.  CMP:  Sodium 137, potassium 4.4, chloride 103, CO2 25, BUN 18.9, creatinine 0.85, glucose 118, calcium 8.71 magnesium 1.9, alkaline phosphatase 72, total protein 5.7, albumin 3.1, total bilirubin 0.8, AST 56, ALT 26.     12/24/2022:   CMP: Sodium 140, potassium 3.9, chloride 105, CO2 25, BUN 17.2 creatinine 0.97, glucose 117, calcium 9.3, alkaline phosphatase 98, total protein 7.2, albumin 3.8, total bilirubin 0.5, AST 14, ALT 17.    CBC: WBC count 10.6, hemoglobin 12.9, hematocrit 41.2, platelet count 223.     Significant Imaging: I have reviewed all pertinent imaging results/findings within the past 24 hours.     X-ray left hip 12/25/2022: There has been placement of left femoral intramedullary taz and compression screw traverses into the femoral head transfixing the intertrochanteric fracture.  Positioning and alignment is satisfactory.     CT left hip 12/25/2022: Comminuted intertrochanteric left femoral fracture.     CXR 12/24/2022: No acute chest disease is identified.     X-ray left hip 12/24/2022:  Displaced intertrochanteric fracture of the left femur      Assessment/Plan:      * Left intertrochanteric femur fracture  Continue 4 week course of aspirin 81 mg PO BID for DVT prophylaxis, PT/OT, norco as needed for pain control, and weight-bearing as tolerated to the left lower extremity.  She is status post  intramedullary nail by Dr. Ronnell Olivares on 12/25/2022.     Hypertension  Her blood pressure has been stable off antihypertensives.  She is no longer on lisinopril 5 mg PO daily which was held at [facility] due to hypotension.    Anxiety  Continue buspirone.    Depression  Continue venlafaxine.    GERD (gastroesophageal reflux disease)  Continue famotidine.    Morbid obesity  She was counseled on the importance of weight loss and diet.  She was noted to have a BMI of 57.41.      Disposition  Anticipate discharge home with home health in the next week.        VTE Risk Mitigation (From admission, onward)         Ordered     Place sequential compression device  Until discontinued         12/29/22 1612                Discharge Planning   XENA:      Code Status: Full Code   Is the patient medically ready for discharge?:     Reason for patient still in hospital (select all that apply): PT / OT recommendations  Discharge Plan A: Home with family, Home Health   Discharge Delays: None known at this time      This service was provided via telemedicine.  Type of Software: Audio/Visual.  Originating Site: [facility].  Distant Site: [place], [place].  Her exam was performed with the assistance of Sherice Christie RN.      Stephon Kearney MD  Department of Hospital Medicine   [facility] - Medical Surgical Unit

## 2023-01-06 PROCEDURE — 97110 THERAPEUTIC EXERCISES: CPT

## 2023-01-06 PROCEDURE — 94760 N-INVAS EAR/PLS OXIMETRY 1: CPT

## 2023-01-06 PROCEDURE — 97116 GAIT TRAINING THERAPY: CPT | Mod: CQ

## 2023-01-06 PROCEDURE — 25000003 PHARM REV CODE 250: Performed by: INTERNAL MEDICINE

## 2023-01-06 PROCEDURE — 97530 THERAPEUTIC ACTIVITIES: CPT

## 2023-01-06 PROCEDURE — 11000004 HC SNF PRIVATE

## 2023-01-06 PROCEDURE — 25000003 PHARM REV CODE 250: Performed by: STUDENT IN AN ORGANIZED HEALTH CARE EDUCATION/TRAINING PROGRAM

## 2023-01-06 PROCEDURE — 97110 THERAPEUTIC EXERCISES: CPT | Mod: CQ

## 2023-01-06 RX ORDER — DICLOFENAC SODIUM 10 MG/G
2 GEL TOPICAL 2 TIMES DAILY PRN
Status: DISCONTINUED | OUTPATIENT
Start: 2023-01-06 | End: 2023-01-20 | Stop reason: HOSPADM

## 2023-01-06 RX ADMIN — DOCUSATE SODIUM 100 MG: 100 CAPSULE, LIQUID FILLED ORAL at 08:01

## 2023-01-06 RX ADMIN — DICLOFENAC 2 G: 10 GEL TOPICAL at 09:01

## 2023-01-06 RX ADMIN — HYDROCODONE BITARTRATE AND ACETAMINOPHEN 1 TABLET: 5; 325 TABLET ORAL at 09:01

## 2023-01-06 RX ADMIN — METHOCARBAMOL 500 MG: 500 TABLET ORAL at 09:01

## 2023-01-06 RX ADMIN — ERGOCALCIFEROL 50000 UNITS: 1.25 CAPSULE ORAL at 09:01

## 2023-01-06 RX ADMIN — FAMOTIDINE 20 MG: 20 TABLET ORAL at 09:01

## 2023-01-06 RX ADMIN — DOCUSATE SODIUM 100 MG: 100 CAPSULE, LIQUID FILLED ORAL at 09:01

## 2023-01-06 RX ADMIN — HYDROCODONE BITARTRATE AND ACETAMINOPHEN 1 TABLET: 5; 325 TABLET ORAL at 11:01

## 2023-01-06 RX ADMIN — VENLAFAXINE 37.5 MG: 37.5 TABLET ORAL at 05:01

## 2023-01-06 RX ADMIN — ASPIRIN 81 MG: 81 TABLET, COATED ORAL at 09:01

## 2023-01-06 RX ADMIN — HYDROCODONE BITARTRATE AND ACETAMINOPHEN 1 TABLET: 5; 325 TABLET ORAL at 02:01

## 2023-01-06 RX ADMIN — MICONAZOLE NITRATE 2 % TOPICAL POWDER: at 09:01

## 2023-01-06 RX ADMIN — MICONAZOLE NITRATE 2 % TOPICAL POWDER: at 08:01

## 2023-01-06 RX ADMIN — BUSPIRONE HYDROCHLORIDE 7.5 MG: 7.5 TABLET ORAL at 08:01

## 2023-01-06 RX ADMIN — METHOCARBAMOL 500 MG: 500 TABLET ORAL at 05:01

## 2023-01-06 RX ADMIN — ASPIRIN 81 MG: 81 TABLET, COATED ORAL at 08:01

## 2023-01-06 NOTE — PT/OT/SLP PROGRESS
Physical Therapy Treatment Note           Name: Tanya Linda    : 1960 (62 y.o.)  MRN: [id number]           TREATMENT SUMMARY AND RECOMMENDATIONS:    PT Received On: 23  PT Start Time: 1400     PT Stop Time: 1425  PT Total Time (min): 25 min     Subjective Assessment:  x No complaints  Lethargic    Awake, alert, cooperative  Uncooperative    Agitated  c/o pain    Appropriate  c/o fatigue    Confused  Treated at bedside     Emotionally labile  Treated in gym/dept.    Impulsive  Other:    Flat affect       Therapy Precautions:    Cognitive deficits  Spinal precautions    Collar - hard  Sternal precautions    Collar - soft   TLSO   x Fall risk  LSO    Hip precautions - posterior  Knee immobilizer    Hip precautions - anterior  WBAT    Impaired communication  Partial weightbearing    Oxygen  TTWB    PEG tube  NWB    Visual deficits  Other:    Hearing deficits          Treatment Objectives:     Mobility Training:   Assist level Comments    Bed mobility     Transfer     Gait SBA Amb on firm surface with RW 5 ft x 2    Sit-stand  CGA Sit-stand x 5 with B UE use   Sitting balance     Standing balance      Wheelchair mobility     Car transfer     Other:          Therapeutic Exercise:   Exercise Sets Reps Comments   B LE exer sitting long and short  20 reps  In all planes to promote muscle strength and ROM    UBE 5 min  UBE with B LE use                   Additional Comments:  Improving over all status    Assessment: Patient tolerated session well.    PT Plan: continue  Revisions made to plan of care: No    GOALS:   Multidisciplinary Problems       Physical Therapy Goals          Problem: Physical Therapy    Goal Priority Disciplines Outcome Goal Variances Interventions   Physical Therapy Goal     PT, PT/OT Ongoing, Progressing     Description: Goals to be met by: Discharge     Patient will increase functional independence with mobility by performin. Supine to sit with Modified [place]  2. Sit  to supine with Modified [place]  3. Sit to stand transfer with Modified [place]  4. Bed to chair transfer with Stand-by Assistance using Rolling Walker  5. Gait  x 25 feet with Stand-by Assistance using Rolling Walker.                          Skilled PT Minutes Provided: 25   Communication with Treatment Team:     Equipment recommendations:       At end of treatment, patient remained:   Up in chair     Up in wheelchair in room    In bed    With alarm activated    Bed rails up    Call bell in reach     Family/friends present    Restraints secured properly    In bathroom with CNA/RN notified    Nurse aware   x In gym with therapist/tech    Other:

## 2023-01-06 NOTE — PLAN OF CARE
Problem: Adult Inpatient Plan of Care  Goal: Plan of Care Review  Outcome: Ongoing, Progressing  Flowsheets (Taken 1/5/2023 2000)  Plan of Care Reviewed With: patient  Goal: Absence of Hospital-Acquired Illness or Injury  Outcome: Ongoing, Progressing  Intervention: Identify and Manage Fall Risk  Flowsheets (Taken 1/5/2023 2000)  Safety Promotion/Fall Prevention:   assistive device/personal item within reach   bed alarm set   bedside commode chair   Fall Risk reviewed with patient/family   gait belt with ambulation   muscle strengthening facilitated   nonskid shoes/socks when out of bed   side rails raised x 3   supervised activity   Supervised toileting - stay within arms reach   instructed to call staff for mobility  Intervention: Prevent Skin Injury  Flowsheets (Taken 1/5/2023 2000)  Body Position: weight shifting  Skin Protection:   hydrocolloids used   incontinence pads utilized  Intervention: Prevent and Manage VTE (Venous Thromboembolism) Risk  Flowsheets (Taken 1/5/2023 2000)  Activity Management: Walk with assistive devise and /or staff member - L3  VTE Prevention/Management: ambulation promoted  Range of Motion: active ROM (range of motion) encouraged  Intervention: Prevent Infection  Flowsheets (Taken 1/5/2023 2000)  Infection Prevention:   cohorting utilized   environmental surveillance performed   hand hygiene promoted   rest/sleep promoted   single patient room provided     Problem: Bariatric Environmental Safety  Goal: Safety Maintained with Care  Outcome: Ongoing, Progressing  Intervention: Promote Safety and Comfort  Flowsheets (Taken 1/5/2023 2000)  Bariatric Safety: specialty bed utilized     Problem: Impaired Wound Healing  Goal: Optimal Wound Healing  Outcome: Ongoing, Progressing  Intervention: Promote Wound Healing  Flowsheets (Taken 1/5/2023 2000)  Oral Nutrition Promotion:   calorie-dense foods provided   calorie-dense liquids provided   physical activity promoted   safe use of adaptive  equipment encouraged  Sleep/Rest Enhancement:   awakenings minimized   family presence promoted   room darkened   regular sleep/rest pattern promoted  Activity Management: Walk with assistive devise and /or staff member - L3  Pain Management Interventions: medication offered     Problem: Skin Injury Risk Increased  Goal: Skin Health and Integrity  Outcome: Ongoing, Progressing  Intervention: Optimize Skin Protection  Flowsheets (Taken 1/5/2023 2000)  Pressure Reduction Techniques:   frequent weight shift encouraged   positioned off wounds   pressure points protected   weight shift assistance provided  Pressure Reduction Devices: specialty bed utilized  Skin Protection:   hydrocolloids used   incontinence pads utilized  Head of Bed (HOB) Positioning: HOB at 20-30 degrees  Intervention: Promote and Optimize Oral Intake  Flowsheets (Taken 1/5/2023 2000)  Oral Nutrition Promotion:   calorie-dense foods provided   calorie-dense liquids provided   physical activity promoted   safe use of adaptive equipment encouraged     Problem: Fall Injury Risk  Goal: Absence of Fall and Fall-Related Injury  Outcome: Ongoing, Progressing  Intervention: Identify and Manage Contributors  Flowsheets (Taken 1/5/2023 2000)  Self-Care Promotion:   independence encouraged   BADL personal objects within reach   BADL personal routines maintained   safe use of adaptive equipment encouraged  Intervention: Promote Injury-Free Environment  Flowsheets (Taken 1/5/2023 2000)  Safety Promotion/Fall Prevention:   assistive device/personal item within reach   bed alarm set   bedside commode chair   Fall Risk reviewed with patient/family   gait belt with ambulation   muscle strengthening facilitated   nonskid shoes/socks when out of bed   side rails raised x 3   supervised activity   Supervised toileting - stay within arms reach   instructed to call staff for mobility

## 2023-01-06 NOTE — PT/OT/SLP PROGRESS
Occupational Therapy  Treatment    Name: Tanya Linda    : 1960 (62 y.o.)  MRN: [id number]           TREATMENT SUMMARY AND RECOMMENDATIONS:      OT Date of Treatment: 23  OT Start Time:   OT Stop Time:   OT Total Time (min): 30 min      Subjective Assessment:   No complaints  Lethargic   x Awake, alert, cooperative  Impulsive    Uncooperative   Flat affect    Agitated  c/o pain    Appropriate  c/o fatigue    Confused  Treated at bedside     Emotionally labile x Treated in gym/dept.      Other:        Therapy Precautions:    Cognitive deficits  Spinal precautions    Collar - hard  Sternal precautions    Collar - soft   TLSO   x Fall risk  LSO    Hip precautions - posterior  Knee immobilizer    Hip precautions - anterior  WBAT    Impaired communication  Partial weightbearing    Oxygen  TTWB    PEG tube  NWB    Visual deficits      Hearing deficits   Other:        Treatment Objectives:     Mobility Training:    Mobility task Assist level Comments    Bed mobility     Transfer     Sit to stands transitions CGA Multiple sit to stands from chair   Functional mobility     Sitting balance     Standing balance  CGA Pt stood with RW and performed mini squat/reach to low stool to grasp bean bags and then toss into bucket anterior. Pt performed with BUEs with seated rest break between sets.    Other:            Therapeutic Exercise:   Exercise Sets Reps Comments   Arm bike 2 5 min Level 1; forwards/backwards                         Additional Comments:      Assessment: Patient tolerated session well.    OT Plan: Continue POC  Revisions made to plan of care: No    GOALS:   Multidisciplinary Problems       Occupational Therapy Goals          Problem: Occupational Therapy    Goal Priority Disciplines Outcome Interventions   Occupational Therapy Goal     OT, PT/OT Ongoing, Progressing    Description: Goals to be met by: 22     Patient will increase functional independence with ADLs by performing:    UE  Dressing with Modified [place].  LE Dressing with Minimal Assistance.  Grooming while seated at sink with Modified [place].  Toileting from bedside commode with Minimal Assistance for hygiene and clothing management.   Bathing from bench with Minimal Assistance.  Toilet transfer to bedside commode with Contact Guard Assistance.                         Skilled OT Minutes Provided: 30  Communication with Treatment Team:     Equipment recommendations:       At end of treatment, patient remained:  x Up in chair     Up in wheelchair in room    In bed   x With alarm activated    Bed rails up   x Call bell in reach    x Family/friends present    Restraints secured properly    In bathroom with CNA/RN notified    In gym with PT/PTA/tech    Nurse aware    Other:

## 2023-01-06 NOTE — PT/OT/SLP PROGRESS
Physical Therapy Treatment Note           Name: Tanya Linda    : 1960 (62 y.o.)  MRN: [id number]           TREATMENT SUMMARY AND RECOMMENDATIONS:    PT Received On: 23  PT Start Time: 1000     PT Stop Time: 1025  PT Total Time (min): 25 min     Subjective Assessment:  x No complaints  Lethargic    Awake, alert, cooperative  Uncooperative    Agitated  c/o pain    Appropriate  c/o fatigue    Confused x Treated at bedside     Emotionally labile  Treated in gym/dept.    Impulsive  Other:    Flat affect       Therapy Precautions:    Cognitive deficits  Spinal precautions    Collar - hard  Sternal precautions    Collar - soft   TLSO    Fall risk  LSO    Hip precautions - posterior  Knee immobilizer    Hip precautions - anterior  WBAT    Impaired communication  Partial weightbearing    Oxygen  TTWB    PEG tube  NWB    Visual deficits  Other:    Hearing deficits          Treatment Objectives:     Mobility Training:   Assist level Comments    Bed mobility     Transfer     Gait CGA Amb on firm surface with RW 10 ft and 15 ft    Sit to stand transitions CGA Sit-stand 5 reps with B UE use   Sitting balance     Standing balance      Wheelchair mobility     Car transfer     Other:          Therapeutic Exercise:   Exercise Sets Reps Comments   B LE exer sitting long and short  20 reps  In all planes to promote muscle strength and ROM                          Additional Comments:  Pt improving mobility - L LE increased strength     Assessment: Patient tolerated session well.    PT Plan: continue  Revisions made to plan of care: No    GOALS:   Multidisciplinary Problems       Physical Therapy Goals          Problem: Physical Therapy    Goal Priority Disciplines Outcome Goal Variances Interventions   Physical Therapy Goal     PT, PT/OT Ongoing, Progressing     Description: Goals to be met by: Discharge     Patient will increase functional independence with mobility by performin. Supine to sit with Modified  [place]  2. Sit to supine with Modified [place]  3. Sit to stand transfer with Modified [place]  4. Bed to chair transfer with Stand-by Assistance using Rolling Walker  5. Gait  x 25 feet with Stand-by Assistance using Rolling Walker.                          Skilled PT Minutes Provided: 25   Communication with Treatment Team:     Equipment recommendations:       At end of treatment, patient remained:  x Up in chair     Up in wheelchair in room    In bed   x With alarm activated    Bed rails up   x Call bell in reach     Family/friends present    Restraints secured properly    In bathroom with CNA/RN notified    Nurse aware    In gym with therapist/tech    Other:

## 2023-01-06 NOTE — PLAN OF CARE
[facility] - [facility] Surgical Unit  Discharge Reassessment    Primary Care Provider: MARION Salazar    Expected Discharge Date:     Reassessment (most recent)       Discharge Reassessment - 01/05/23 1759          Discharge Reassessment    Assessment Type Discharge Planning Reassessment     Did the patient's condition or plan change since previous assessment? No     Discharge Plan discussed with: Adult children     Communicated XENA with patient/caregiver Date not available/Unable to determine     Discharge Plan A Home with family;Home Health     DME Needed Upon Discharge  hospital bed;walker, standard;wheelchair     Discharge Barriers Identified None     Why the patient remains in the hospital Requires continued medical care        Post-Acute Status    Post-Acute Authorization Home Health     Home Health Status Pending medical clearance/testing     Hospital Resources/Appts/Education Provided Provided patient/caregiver with written discharge plan information     Discharge Delays None known at this time

## 2023-01-06 NOTE — PROGRESS NOTES
[facility]  [facility] Medicine  Telehospitalist Progress Note    Patient Name: Tanya Linda  MRN: [id number]  Patient Class: IP- Swing   Admission Date: 12/29/2022  Length of Stay: 8 days  Attending Physician: Stephon Kearney MD  Primary Care Provider: MARION Salazar      Subjective:     Principal Problem:Closed intertrochanteric fracture of hip, left, initial encounter      HPI:  Ms. Linda is a 62-year-old  female with history of morbid obesity, hypertension, depression, GERD, and anxiety who presented to [facility] ER on 12/24/2022 with left hip pain following a fall.  X-ray of the left hip and femur showed a displaced intertrochanteric fracture of the left femur and chest x-ray revealed no acute cardiopulmonary process. CT of the left hip confirmed a comminuted intertrochanteric left femoral fracture and orthopedics was consulted.  She was taken to the OR on 12/25/2022 for intramedullary nail of left intertrochanteric femur fracture by Dr. Ronnell Olivares and she did relatively well postoperatively.  Her lisinopril was held due to mild hypotension and her blood pressure stabilized.  She had no other complications throughout her hospital course and she was transferred to [facility] swing bed on 12/29/2022 for PT/OT and management of her multiple medical comorbidities.      Overview/Hospital Course:  She was admitted to [facility] swing bed on 12/29/2022 for rehab following intramedullary nail of left intertrochanteric femur fracture.  She is being treated with a 4 week course of aspirin 81 mg PO BID for DVT prophylaxis and norco 5/325 mg PO every 6 hours as needed for pain.        Interval History: She reports left knee stiffness this morning and she denies any left hip pain. She ambulated 5 feet x 2 with minimal assistance yesterday and she required minimal assistance with sit to stand transitions.      Review of Systems   All other systems reviewed and are  negative.  Objective:     Vital Signs (Most Recent):  Temp: 97.7 °F (36.5 °C) (01/06/23 0700)  Pulse: 88 (01/06/23 0700)  Resp: 20 (01/05/23 2118)  BP: 128/72 (01/06/23 0700)  SpO2: 98 % (01/06/23 0700) Vital Signs (24h Range):  Temp:  [97.7 °F (36.5 °C)-98.6 °F (37 °C)] 97.7 °F (36.5 °C)  Pulse:  [67-88] 88  Resp:  [18-20] 20  SpO2:  [97 %-100 %] 98 %  BP: (126-143)/(72-79) 128/72     Weight: (!) 156.5 kg (345 lb 0.3 oz)  Body mass index is 57.41 kg/m².    Intake/Output Summary (Last 24 hours) at 1/6/2023 0830  Last data filed at 1/6/2023 0646  Gross per 24 hour   Intake 1840 ml   Output 3500 ml   Net -1660 ml      Physical Exam  General - Morbidly obese  female in no acute distress.  HEENT - NCAT. No scleral icterus. Oropharynx clear. Mucous membranes moist.  Neck - No lymphadenopathy, thyromegaly, or JVD.  CVS - Regular rate and rhythm. No murmurs, rubs, or gallops.  Resp - Lungs are clear to auscultation bilaterally. No rales, wheeze, or rhonchi.   GI - Soft, nontender, nondistended, normoactive bowel sounds present.   Extremities - No clubbing, cyanosis, or edema.   Neuro - CN II through XII are grossly intact. No focal neurological deficits. Alert and oriented times four.   Psych - Normal affect.  Skin - Left hip surgical site c/d/i. Slight edema and ecchymosis noted to periwounds.     Significant Labs: All pertinent labs within the past 24 hours have been reviewed.     1/4/2023:  CBC: WBC count 10.4, hemoglobin 10.2, hematocrit 33.6, platelet count 307.  BMP: Sodium 141, potassium 4.7, chloride 102, CO2 28, BUN 20.8, creatinine 0.79, glucose 114, calcium 9.7, magnesium 2.0.     12/30/2022:   CBC:  WBC count 10.4, hemoglobin 10.1, hematocrit 33.6, platelet count 259 food   BMP: Sodium 137, potassium 4.4, chloride 100, CO2 25, BUN 14.3, creatinine 0.68, glucose 100, calcium 9.1, magnesium 2.0.      12/29/2022:  CBC:  WBC count 10.9, hemoglobin 9.8, hematocrit 31.7, glucose 241.    BMP: Sodium 136,  potassium 4.7, chloride 100, CO2 26, BUN 14.4, creatinine 0.73, glucose 107, calcium 8.9.    12/27/2022:   CBC:  WBC count 1.5, hemoglobin 10.7, hematocrit 34.6, platelet count 202.    CMP: Sodium 137, potassium 4.3, chloride 105, CO2 25, BUN 16.1, creatinine 0.81, glucose 121, calcium 9.0, magnesium 2.1, alkaline phosphatase 73, total protein 6.4, albumin 3.0, total bilirubin 0.8, AST 39, ALT 22.     12/26/2022:   CBC:  WBC count 13.7, hemoglobin 10.7, hematocrit 34.3, platelet count 264.  CMP:  Sodium 137, potassium 4.4, chloride 103, CO2 25, BUN 18.9, creatinine 0.85, glucose 118, calcium 8.71 magnesium 1.9, alkaline phosphatase 72, total protein 5.7, albumin 3.1, total bilirubin 0.8, AST 56, ALT 26.     12/24/2022:   CMP: Sodium 140, potassium 3.9, chloride 105, CO2 25, BUN 17.2 creatinine 0.97, glucose 117, calcium 9.3, alkaline phosphatase 98, total protein 7.2, albumin 3.8, total bilirubin 0.5, AST 14, ALT 17.    CBC: WBC count 10.6, hemoglobin 12.9, hematocrit 41.2, platelet count 223.     Significant Imaging: I have reviewed all pertinent imaging results/findings within the past 24 hours.     X-ray left hip 12/25/2022: There has been placement of left femoral intramedullary taz and compression screw traverses into the femoral head transfixing the intertrochanteric fracture.  Positioning and alignment is satisfactory.     CT left hip 12/25/2022: Comminuted intertrochanteric left femoral fracture.     CXR 12/24/2022: No acute chest disease is identified.     X-ray left hip 12/24/2022:  Displaced intertrochanteric fracture of the left femur      Assessment/Plan:      * Left intertrochanteric femur fracture  Continue 4 week course of aspirin 81 mg PO BID for DVT prophylaxis, PT/OT, norco as needed for pain control, and weight-bearing as tolerated to the left lower extremity.  She is status post intramedullary nail by Dr. Ronnell Olivares on 12/25/2022.     Hypertension  Her blood pressure has been stable off  antihypertensives.  She is no longer on lisinopril 5 mg PO daily which was held at [facility] due to hypotension.    Anxiety  Continue buspirone.    Depression  Continue venlafaxine.    GERD (gastroesophageal reflux disease)  Continue famotidine.    Morbid obesity  She was counseled on the importance of weight loss and diet.  She was noted to have a BMI of 57.41.      Disposition  Anticipate discharge home with home health in the next week.        VTE Risk Mitigation (From admission, onward)         Ordered     Place sequential compression device  Until discontinued         12/29/22 1612                Discharge Planning   XENA:      Code Status: Full Code   Is the patient medically ready for discharge?:     Reason for patient still in hospital (select all that apply): PT / OT recommendations  Discharge Plan A: Home with family, Home Health   Discharge Delays: None known at this time      This service was provided via telemedicine.  Type of Software: Audio/Visual.  Originating Site: [facility].  Distant Site: [place], [place].  Her exam was performed with the assitance of Elvira Diaz RN.      Stephon Kearney MD  Department of Hospital Medicine   [facility] - [facility]

## 2023-01-06 NOTE — PT/OT/SLP PROGRESS
Occupational Therapy  Treatment    Name: Tanya Linda    : 1960 (62 y.o.)  MRN: [id number]           TREATMENT SUMMARY AND RECOMMENDATIONS:      OT Date of Treatment: 23  OT Start Time: 1000  OT Stop Time: 1030  OT Total Time (min): 30 min      Subjective Assessment:   No complaints  Lethargic   x Awake, alert, cooperative  Impulsive    Uncooperative   Flat affect    Agitated x c/o pain    Appropriate  c/o fatigue    Confused x Treated at bedside     Emotionally labile  Treated in gym/dept.      Other:        Therapy Precautions:    Cognitive deficits  Spinal precautions    Collar - hard  Sternal precautions    Collar - soft   TLSO   x Fall risk  LSO    Hip precautions - posterior  Knee immobilizer    Hip precautions - anterior  WBAT    Impaired communication  Partial weightbearing    Oxygen  TTWB    PEG tube  NWB    Visual deficits      Hearing deficits   Other:        Treatment Objectives:     Mobility Training:    Mobility task Assist level Comments    Bed mobility Min A  Supine>EOB    Transfer CGA Stand/pivot t/f with RW EOB>w/c   Sit to stands transitions CGA    Functional mobility CGA 2x5 feet, 1x10 feet with RW; pt able to lift LLE more during gait today   Sitting balance     Standing balance      Other:            Therapeutic Exercise:   Exercise Sets Reps Comments   2# dowel 3 10 Shoulder press, chest press, straight arm raises, bicep curls, forward rows, backwards rows                         Additional Comments:      Assessment: Patient tolerated session well.    OT Plan: Continue POC  Revisions made to plan of care: No    GOALS:   Multidisciplinary Problems       Occupational Therapy Goals          Problem: Occupational Therapy    Goal Priority Disciplines Outcome Interventions   Occupational Therapy Goal     OT, PT/OT Ongoing, Progressing    Description: Goals to be met by: 22     Patient will increase functional independence with ADLs by performing:    UE Dressing with Modified  [place].  LE Dressing with Minimal Assistance.  Grooming while seated at sink with Modified [place].  Toileting from bedside commode with Minimal Assistance for hygiene and clothing management.   Bathing from bench with Minimal Assistance.  Toilet transfer to bedside commode with Contact Guard Assistance.                         Skilled OT Minutes Provided: 30  Communication with Treatment Team:     Equipment recommendations:       At end of treatment, patient remained:  x Up in chair     Up in wheelchair in room    In bed   x With alarm activated    Bed rails up   x Call bell in reach     Family/friends present    Restraints secured properly    In bathroom with CNA/RN notified    In gym with PT/PTA/tech    Nurse aware    Other:

## 2023-01-06 NOTE — SUBJECTIVE & OBJECTIVE
Interval History: She reports left knee stiffness this morning and she denies any left hip pain. She ambulated 5 feet x 2 with minimal assistance yesterday and she required minimal assistance with sit to stand transitions.      Review of Systems   All other systems reviewed and are negative.  Objective:     Vital Signs (Most Recent):  Temp: 97.7 °F (36.5 °C) (01/06/23 0700)  Pulse: 88 (01/06/23 0700)  Resp: 20 (01/05/23 2118)  BP: 128/72 (01/06/23 0700)  SpO2: 98 % (01/06/23 0700) Vital Signs (24h Range):  Temp:  [97.7 °F (36.5 °C)-98.6 °F (37 °C)] 97.7 °F (36.5 °C)  Pulse:  [67-88] 88  Resp:  [18-20] 20  SpO2:  [97 %-100 %] 98 %  BP: (126-143)/(72-79) 128/72     Weight: (!) 156.5 kg (345 lb 0.3 oz)  Body mass index is 57.41 kg/m².    Intake/Output Summary (Last 24 hours) at 1/6/2023 0830  Last data filed at 1/6/2023 0646  Gross per 24 hour   Intake 1840 ml   Output 3500 ml   Net -1660 ml      Physical Exam  General - Morbidly obese  female in no acute distress.  HEENT - NCAT. No scleral icterus. Oropharynx clear. Mucous membranes moist.  Neck - No lymphadenopathy, thyromegaly, or JVD.  CVS - Regular rate and rhythm. No murmurs, rubs, or gallops.  Resp - Lungs are clear to auscultation bilaterally. No rales, wheeze, or rhonchi.   GI - Soft, nontender, nondistended, normoactive bowel sounds present.   Extremities - No clubbing, cyanosis, or edema.   Neuro - CN II through XII are grossly intact. No focal neurological deficits. Alert and oriented times four.   Psych - Normal affect.  Skin - Left hip surgical site c/d/i. Slight edema and ecchymosis noted to periwounds.     Significant Labs: All pertinent labs within the past 24 hours have been reviewed.     1/4/2023:  CBC: WBC count 10.4, hemoglobin 10.2, hematocrit 33.6, platelet count 307.  BMP: Sodium 141, potassium 4.7, chloride 102, CO2 28, BUN 20.8, creatinine 0.79, glucose 114, calcium 9.7, magnesium 2.0.     12/30/2022:   CBC:  WBC count 10.4,  hemoglobin 10.1, hematocrit 33.6, platelet count 259 food   BMP: Sodium 137, potassium 4.4, chloride 100, CO2 25, BUN 14.3, creatinine 0.68, glucose 100, calcium 9.1, magnesium 2.0.      12/29/2022:  CBC:  WBC count 10.9, hemoglobin 9.8, hematocrit 31.7, glucose 241.    BMP: Sodium 136, potassium 4.7, chloride 100, CO2 26, BUN 14.4, creatinine 0.73, glucose 107, calcium 8.9.    12/27/2022:   CBC:  WBC count 1.5, hemoglobin 10.7, hematocrit 34.6, platelet count 202.    CMP: Sodium 137, potassium 4.3, chloride 105, CO2 25, BUN 16.1, creatinine 0.81, glucose 121, calcium 9.0, magnesium 2.1, alkaline phosphatase 73, total protein 6.4, albumin 3.0, total bilirubin 0.8, AST 39, ALT 22.     12/26/2022:   CBC:  WBC count 13.7, hemoglobin 10.7, hematocrit 34.3, platelet count 264.  CMP:  Sodium 137, potassium 4.4, chloride 103, CO2 25, BUN 18.9, creatinine 0.85, glucose 118, calcium 8.71 magnesium 1.9, alkaline phosphatase 72, total protein 5.7, albumin 3.1, total bilirubin 0.8, AST 56, ALT 26.     12/24/2022:   CMP: Sodium 140, potassium 3.9, chloride 105, CO2 25, BUN 17.2 creatinine 0.97, glucose 117, calcium 9.3, alkaline phosphatase 98, total protein 7.2, albumin 3.8, total bilirubin 0.5, AST 14, ALT 17.    CBC: WBC count 10.6, hemoglobin 12.9, hematocrit 41.2, platelet count 223.     Significant Imaging: I have reviewed all pertinent imaging results/findings within the past 24 hours.     X-ray left hip 12/25/2022: There has been placement of left femoral intramedullary taz and compression screw traverses into the femoral head transfixing the intertrochanteric fracture.  Positioning and alignment is satisfactory.     CT left hip 12/25/2022: Comminuted intertrochanteric left femoral fracture.     CXR 12/24/2022: No acute chest disease is identified.     X-ray left hip 12/24/2022:  Displaced intertrochanteric fracture of the left femur

## 2023-01-07 PROCEDURE — 11000004 HC SNF PRIVATE

## 2023-01-07 PROCEDURE — 94761 N-INVAS EAR/PLS OXIMETRY MLT: CPT

## 2023-01-07 PROCEDURE — 97530 THERAPEUTIC ACTIVITIES: CPT

## 2023-01-07 PROCEDURE — 25000003 PHARM REV CODE 250: Performed by: STUDENT IN AN ORGANIZED HEALTH CARE EDUCATION/TRAINING PROGRAM

## 2023-01-07 PROCEDURE — 97110 THERAPEUTIC EXERCISES: CPT

## 2023-01-07 RX ADMIN — DOCUSATE SODIUM 100 MG: 100 CAPSULE, LIQUID FILLED ORAL at 08:01

## 2023-01-07 RX ADMIN — HYDROCODONE BITARTRATE AND ACETAMINOPHEN 1 TABLET: 5; 325 TABLET ORAL at 09:01

## 2023-01-07 RX ADMIN — MICONAZOLE NITRATE 2 % TOPICAL POWDER: at 08:01

## 2023-01-07 RX ADMIN — MICONAZOLE NITRATE 2 % TOPICAL POWDER: at 09:01

## 2023-01-07 RX ADMIN — ASPIRIN 81 MG: 81 TABLET, COATED ORAL at 09:01

## 2023-01-07 RX ADMIN — ASPIRIN 81 MG: 81 TABLET, COATED ORAL at 08:01

## 2023-01-07 RX ADMIN — DOCUSATE SODIUM 100 MG: 100 CAPSULE, LIQUID FILLED ORAL at 09:01

## 2023-01-07 RX ADMIN — BUSPIRONE HYDROCHLORIDE 7.5 MG: 7.5 TABLET ORAL at 08:01

## 2023-01-07 RX ADMIN — VENLAFAXINE 37.5 MG: 37.5 TABLET ORAL at 04:01

## 2023-01-07 RX ADMIN — METHOCARBAMOL 500 MG: 500 TABLET ORAL at 08:01

## 2023-01-07 RX ADMIN — METHOCARBAMOL 500 MG: 500 TABLET ORAL at 09:01

## 2023-01-07 RX ADMIN — FAMOTIDINE 20 MG: 20 TABLET ORAL at 09:01

## 2023-01-07 NOTE — PLAN OF CARE
Problem: Adult Inpatient Plan of Care  Goal: Plan of Care Review  Outcome: Ongoing, Progressing  Flowsheets (Taken 1/6/2023 2220)  Plan of Care Reviewed With: patient     Problem: Impaired Wound Healing  Goal: Optimal Wound Healing  Outcome: Ongoing, Progressing  Intervention: Promote Wound Healing  Flowsheets (Taken 1/6/2023 2220)  Sleep/Rest Enhancement: awakenings minimized     Problem: Fall Injury Risk  Goal: Absence of Fall and Fall-Related Injury  Outcome: Ongoing, Progressing  Intervention: Identify and Manage Contributors  Flowsheets (Taken 1/6/2023 2220)  Self-Care Promotion: independence encouraged  Medication Review/Management: medications reviewed

## 2023-01-07 NOTE — SUBJECTIVE & OBJECTIVE
Interval History: She is doing well this morning and she denies any left hip pain. She ambulated 10 feet followed by 15 feet with her rolling walker and contact guard assistance and she required contact guard assistance with sit to stand transitions.      Review of Systems   All other systems reviewed and are negative.  Objective:     Vital Signs (Most Recent):  Temp: 97.5 °F (36.4 °C) (01/07/23 0625)  Pulse: 71 (01/07/23 0625)  Resp: 18 (01/07/23 0920)  BP: 135/77 (01/07/23 0625)  SpO2: 96 % (01/07/23 0625) Vital Signs (24h Range):  Temp:  [97.5 °F (36.4 °C)-98.1 °F (36.7 °C)] 97.5 °F (36.4 °C)  Pulse:  [71-82] 71  Resp:  [18-20] 18  SpO2:  [95 %-98 %] 96 %  BP: (102-147)/(68-80) 135/77     Weight: (!) 156.5 kg (345 lb 0.3 oz)  Body mass index is 57.41 kg/m².    Intake/Output Summary (Last 24 hours) at 1/7/2023 0939  Last data filed at 1/7/2023 0656  Gross per 24 hour   Intake 360 ml   Output 1450 ml   Net -1090 ml      Physical Exam  General - Morbidly obese  female in no acute distress.  HEENT - NCAT. No scleral icterus. Oropharynx clear. Mucous membranes moist.  Neck - No lymphadenopathy, thyromegaly, or JVD.  CVS - Regular rate and rhythm. No murmurs, rubs, or gallops.  Resp - Lungs are clear to auscultation bilaterally. No rales, wheeze, or rhonchi.   GI - Soft, nontender, nondistended, normoactive bowel sounds present.   Extremities - No clubbing, cyanosis, or edema.   Neuro - CN II through XII are grossly intact. No focal neurological deficits. Alert and oriented times four.   Psych - Normal affect.  Skin - Left hip surgical site c/d/i. Slight edema and ecchymosis noted to periwounds.     Significant Labs: All pertinent labs within the past 24 hours have been reviewed.     1/4/2023:  CBC: WBC count 10.4, hemoglobin 10.2, hematocrit 33.6, platelet count 307.  BMP: Sodium 141, potassium 4.7, chloride 102, CO2 28, BUN 20.8, creatinine 0.79, glucose 114, calcium 9.7, magnesium 2.0.     12/30/2022:    CBC:  WBC count 10.4, hemoglobin 10.1, hematocrit 33.6, platelet count 259 food   BMP: Sodium 137, potassium 4.4, chloride 100, CO2 25, BUN 14.3, creatinine 0.68, glucose 100, calcium 9.1, magnesium 2.0.      12/29/2022:  CBC:  WBC count 10.9, hemoglobin 9.8, hematocrit 31.7, glucose 241.    BMP: Sodium 136, potassium 4.7, chloride 100, CO2 26, BUN 14.4, creatinine 0.73, glucose 107, calcium 8.9.    12/27/2022:   CBC:  WBC count 1.5, hemoglobin 10.7, hematocrit 34.6, platelet count 202.    CMP: Sodium 137, potassium 4.3, chloride 105, CO2 25, BUN 16.1, creatinine 0.81, glucose 121, calcium 9.0, magnesium 2.1, alkaline phosphatase 73, total protein 6.4, albumin 3.0, total bilirubin 0.8, AST 39, ALT 22.     12/26/2022:   CBC:  WBC count 13.7, hemoglobin 10.7, hematocrit 34.3, platelet count 264.  CMP:  Sodium 137, potassium 4.4, chloride 103, CO2 25, BUN 18.9, creatinine 0.85, glucose 118, calcium 8.71 magnesium 1.9, alkaline phosphatase 72, total protein 5.7, albumin 3.1, total bilirubin 0.8, AST 56, ALT 26.     12/24/2022:   CMP: Sodium 140, potassium 3.9, chloride 105, CO2 25, BUN 17.2 creatinine 0.97, glucose 117, calcium 9.3, alkaline phosphatase 98, total protein 7.2, albumin 3.8, total bilirubin 0.5, AST 14, ALT 17.    CBC: WBC count 10.6, hemoglobin 12.9, hematocrit 41.2, platelet count 223.     Significant Imaging: I have reviewed all pertinent imaging results/findings within the past 24 hours.     X-ray left hip 12/25/2022: There has been placement of left femoral intramedullary taz and compression screw traverses into the femoral head transfixing the intertrochanteric fracture.  Positioning and alignment is satisfactory.     CT left hip 12/25/2022: Comminuted intertrochanteric left femoral fracture.     CXR 12/24/2022: No acute chest disease is identified.     X-ray left hip 12/24/2022:  Displaced intertrochanteric fracture of the left femur.

## 2023-01-07 NOTE — PROGRESS NOTES
[facility]  [facility] Medicine  Telehospitalist Progress Note    Patient Name: Tanya Linda  MRN: [id number]  Patient Class: IP- Swing   Admission Date: 12/29/2022  Length of Stay: 9 days  Attending Physician: Stephon Kearney MD  Primary Care Provider: MARION Salazar      Subjective:     Principal Problem:Closed intertrochanteric fracture of hip, left, initial encounter      HPI:  Ms. Linda is a 62-year-old  female with history of morbid obesity, hypertension, depression, GERD, and anxiety who presented to [facility] ER on 12/24/2022 with left hip pain following a fall.  X-ray of the left hip and femur showed a displaced intertrochanteric fracture of the left femur and chest x-ray revealed no acute cardiopulmonary process. CT of the left hip confirmed a comminuted intertrochanteric left femoral fracture and orthopedics was consulted.  She was taken to the OR on 12/25/2022 for intramedullary nail of left intertrochanteric femur fracture by Dr. Ronnell Olivares and she did relatively well postoperatively.  Her lisinopril was held due to mild hypotension and her blood pressure stabilized.  She had no other complications throughout her hospital course and she was transferred to [facility] swing bed on 12/29/2022 for PT/OT and management of her multiple medical comorbidities.      Overview/Hospital Course:  She was admitted to [facility] swing bed on 12/29/2022 for rehab following intramedullary nail of left intertrochanteric femur fracture.  She is being treated with a 4 week course of aspirin 81 mg PO BID for DVT prophylaxis and norco 5/325 mg PO every 6 hours as needed for pain.  She was started on diclofenac 1% gel 2 grams topical BID prn for left knee pain and stiffness on 1/6/2023.      Interval History: She is doing well this morning and she denies any left hip pain. She ambulated 10 feet followed by 15 feet with her rolling walker and contact guard  assistance and she required contact guard assistance with sit to stand transitions.      Review of Systems   All other systems reviewed and are negative.  Objective:     Vital Signs (Most Recent):  Temp: 97.5 °F (36.4 °C) (01/07/23 0625)  Pulse: 71 (01/07/23 0625)  Resp: 18 (01/07/23 0920)  BP: 135/77 (01/07/23 0625)  SpO2: 96 % (01/07/23 0625) Vital Signs (24h Range):  Temp:  [97.5 °F (36.4 °C)-98.1 °F (36.7 °C)] 97.5 °F (36.4 °C)  Pulse:  [71-82] 71  Resp:  [18-20] 18  SpO2:  [95 %-98 %] 96 %  BP: (102-147)/(68-80) 135/77     Weight: (!) 156.5 kg (345 lb 0.3 oz)  Body mass index is 57.41 kg/m².    Intake/Output Summary (Last 24 hours) at 1/7/2023 0939  Last data filed at 1/7/2023 0656  Gross per 24 hour   Intake 360 ml   Output 1450 ml   Net -1090 ml      Physical Exam  General - Morbidly obese  female in no acute distress.  HEENT - NCAT. No scleral icterus. Oropharynx clear. Mucous membranes moist.  Neck - No lymphadenopathy, thyromegaly, or JVD.  CVS - Regular rate and rhythm. No murmurs, rubs, or gallops.  Resp - Lungs are clear to auscultation bilaterally. No rales, wheeze, or rhonchi.   GI - Soft, nontender, nondistended, normoactive bowel sounds present.   Extremities - No clubbing, cyanosis, or edema.   Neuro - CN II through XII are grossly intact. No focal neurological deficits. Alert and oriented times four.   Psych - Normal affect.  Skin - Left hip surgical site c/d/i. Slight edema and ecchymosis noted to periwounds.     Significant Labs: All pertinent labs within the past 24 hours have been reviewed.     1/4/2023:  CBC: WBC count 10.4, hemoglobin 10.2, hematocrit 33.6, platelet count 307.  BMP: Sodium 141, potassium 4.7, chloride 102, CO2 28, BUN 20.8, creatinine 0.79, glucose 114, calcium 9.7, magnesium 2.0.     12/30/2022:   CBC:  WBC count 10.4, hemoglobin 10.1, hematocrit 33.6, platelet count 259 food   BMP: Sodium 137, potassium 4.4, chloride 100, CO2 25, BUN 14.3, creatinine 0.68, glucose  100, calcium 9.1, magnesium 2.0.      12/29/2022:  CBC:  WBC count 10.9, hemoglobin 9.8, hematocrit 31.7, glucose 241.    BMP: Sodium 136, potassium 4.7, chloride 100, CO2 26, BUN 14.4, creatinine 0.73, glucose 107, calcium 8.9.    12/27/2022:   CBC:  WBC count 1.5, hemoglobin 10.7, hematocrit 34.6, platelet count 202.    CMP: Sodium 137, potassium 4.3, chloride 105, CO2 25, BUN 16.1, creatinine 0.81, glucose 121, calcium 9.0, magnesium 2.1, alkaline phosphatase 73, total protein 6.4, albumin 3.0, total bilirubin 0.8, AST 39, ALT 22.     12/26/2022:   CBC:  WBC count 13.7, hemoglobin 10.7, hematocrit 34.3, platelet count 264.  CMP:  Sodium 137, potassium 4.4, chloride 103, CO2 25, BUN 18.9, creatinine 0.85, glucose 118, calcium 8.71 magnesium 1.9, alkaline phosphatase 72, total protein 5.7, albumin 3.1, total bilirubin 0.8, AST 56, ALT 26.     12/24/2022:   CMP: Sodium 140, potassium 3.9, chloride 105, CO2 25, BUN 17.2 creatinine 0.97, glucose 117, calcium 9.3, alkaline phosphatase 98, total protein 7.2, albumin 3.8, total bilirubin 0.5, AST 14, ALT 17.    CBC: WBC count 10.6, hemoglobin 12.9, hematocrit 41.2, platelet count 223.     Significant Imaging: I have reviewed all pertinent imaging results/findings within the past 24 hours.     X-ray left hip 12/25/2022: There has been placement of left femoral intramedullary taz and compression screw traverses into the femoral head transfixing the intertrochanteric fracture.  Positioning and alignment is satisfactory.     CT left hip 12/25/2022: Comminuted intertrochanteric left femoral fracture.     CXR 12/24/2022: No acute chest disease is identified.     X-ray left hip 12/24/2022:  Displaced intertrochanteric fracture of the left femur.      Assessment/Plan:      * Left intertrochanteric femur fracture  Continue 4 week course of aspirin 81 mg PO BID for DVT prophylaxis, PT/OT, norco as needed for pain control, and weight-bearing as tolerated to the left lower  extremity.  She is status post intramedullary nail by Dr. Ronnell Olivares on 12/25/2022.     Hypertension  Her blood pressure has been stable off antihypertensives.  She is no longer on lisinopril 5 mg PO daily which was held at [facility] due to hypotension.    Anxiety  Continue buspirone.    Depression  Continue venlafaxine.    GERD (gastroesophageal reflux disease)  Continue famotidine.    Morbid obesity  She was counseled on the importance of weight loss and diet.  She was noted to have a BMI of 57.41.      Disposition  Anticipate discharge home with home health in the next week.        VTE Risk Mitigation (From admission, onward)         Ordered     Place sequential compression device  Until discontinued         12/29/22 1612                Discharge Planning   XENA:      Code Status: Full Code   Is the patient medically ready for discharge?:     Reason for patient still in hospital (select all that apply): PT / OT recommendations  Discharge Plan A: Home with family, Home Health   Discharge Delays: None known at this time      This service was provided via telemedicine.  Type of Software: Audio/Visual.  Originating Site: [facility].  Distant Site: [place], [place].  Her exam was performed with the assitance of Sveta Barroso RN.      Stephon Kearney MD  Department of Hospital Medicine   [facility]

## 2023-01-07 NOTE — PT/OT/SLP PROGRESS
Occupational Therapy  Treatment    Name: Tanya Linda    : 1960 (62 y.o.)  MRN: [id number]           TREATMENT SUMMARY AND RECOMMENDATIONS:      OT Date of Treatment: 23  OT Start Time: 1011  OT Stop Time: 1045  OT Total Time (min): 34 min      Subjective Assessment:   No complaints  Lethargic   x Awake, alert, cooperative  Impulsive    Uncooperative   Flat affect    Agitated x c/o pain    Appropriate  c/o fatigue    Confused x Treated at bedside     Emotionally labile  Treated in gym/dept.      Other:        Therapy Precautions:    Cognitive deficits  Spinal precautions    Collar - hard  Sternal precautions    Collar - soft   TLSO   x Fall risk  LSO    Hip precautions - posterior  Knee immobilizer    Hip precautions - anterior x WBAT    Impaired communication  Partial weightbearing    Oxygen  TTWB    PEG tube  NWB    Visual deficits      Hearing deficits   Other:        Treatment Objectives:     Mobility Training:    Mobility task Assist level Comments    Bed mobility Mod A  Supine>EOB mod A for LLE and trunk    Transfer CGA Stand/pivot t/f with RW EOB>BSchair   Sit to stands transitions CGA X5 reps from [place]   Functional mobility CGA 2x10 feet with RW    Sitting balance     Standing balance      Other:        ADL Training:    ADL Assist level Comments    Feeding     Grooming/hygiene     Bathing     Upper body dressing     Lower body dressing     Toileting Max A  (+) void in diaper; required assist for all parts    Toilet transfer     Adaptive equipment training     Other:           Therapeutic Exercise:   Exercise Sets Reps Comments   BLE exercises 2 10 Hip flex/ext, hip abd/add, knee flex/ext, ankle pumps   2# dowel 2 10 Shoulder press, chest press, straight arm raises, bicep curls, forward rows, backwards rows                   Additional Comments:      Assessment: Patient tolerated session well.    OT Plan: continue POC  Revisions made to plan of care: No    GOALS:   Multidisciplinary Problems        Occupational Therapy Goals          Problem: Occupational Therapy    Goal Priority Disciplines Outcome Interventions   Occupational Therapy Goal     OT, PT/OT Ongoing, Progressing    Description: Goals to be met by: 1/20/22     Patient will increase functional independence with ADLs by performing:    UE Dressing with Modified [place].  LE Dressing with Minimal Assistance.  Grooming while seated at sink with Modified [place].  Toileting from bedside commode with Minimal Assistance for hygiene and clothing management.   Bathing from bench with Minimal Assistance.  Toilet transfer to bedside commode with Contact Guard Assistance.                         Skilled OT Minutes Provided: 34  Communication with Treatment Team:     Equipment recommendations:       At end of treatment, patient remained:  x Up in chair     Up in wheelchair in room    In bed   x With alarm activated    Bed rails up   x Call bell in reach     Family/friends present    Restraints secured properly    In bathroom with CNA/RN notified    In gym with PT/PTA/tech    Nurse aware    Other:

## 2023-01-07 NOTE — PLAN OF CARE
Problem: Adult Inpatient Plan of Care  Goal: Plan of Care Review  Outcome: Ongoing, Progressing  Flowsheets (Taken 1/7/2023 1248)  Plan of Care Reviewed With: patient  Goal: Absence of Hospital-Acquired Illness or Injury  Outcome: Ongoing, Progressing  Intervention: Identify and Manage Fall Risk  Flowsheets (Taken 1/7/2023 1248)  Safety Promotion/Fall Prevention:   assistive device/personal item within reach   bed alarm set   room near unit station   nonskid shoes/socks when out of bed   in recliner, wheels locked   instructed to call staff for mobility  Intervention: Prevent Skin Injury  Flowsheets (Taken 1/7/2023 1248)  Body Position: position changed independently  Skin Protection:   adhesive use limited   incontinence pads utilized   pectin skin barriers applied   skin-to-skin areas padded  Intervention: Prevent and Manage VTE (Venous Thromboembolism) Risk  Flowsheets (Taken 1/7/2023 1248)  Activity Management: Walk with assistive devise and /or staff member - L3  VTE Prevention/Management:   fluids promoted   ambulation promoted  Intervention: Prevent Infection  Flowsheets (Taken 1/7/2023 1248)  Infection Prevention:   equipment surfaces disinfected   hand hygiene promoted     Problem: Bariatric Environmental Safety  Goal: Safety Maintained with Care  Outcome: Ongoing, Progressing  Intervention: Promote Safety and Comfort  Flowsheets (Taken 1/7/2023 1248)  Bariatric Safety: specialty bed utilized     Problem: Impaired Wound Healing  Goal: Optimal Wound Healing  Outcome: Ongoing, Progressing  Intervention: Promote Wound Healing  Flowsheets (Taken 1/7/2023 1248)  Oral Nutrition Promotion:   physical activity promoted   rest periods promoted   medicated   safe use of adaptive equipment encouraged  Sleep/Rest Enhancement:   awakenings minimized   natural light exposure provided   noise level reduced  Activity Management: Walk with assistive devise and /or staff member - L3  Pain Management Interventions:   care  clustered   medication offered   pain management plan reviewed with patient/caregiver   premedicated for activity   position adjusted   pillow support provided   quiet environment facilitated   relaxation techniques promoted

## 2023-01-08 PROCEDURE — 94760 N-INVAS EAR/PLS OXIMETRY 1: CPT

## 2023-01-08 PROCEDURE — 11000004 HC SNF PRIVATE

## 2023-01-08 PROCEDURE — 25000003 PHARM REV CODE 250: Performed by: INTERNAL MEDICINE

## 2023-01-08 PROCEDURE — 25000003 PHARM REV CODE 250: Performed by: STUDENT IN AN ORGANIZED HEALTH CARE EDUCATION/TRAINING PROGRAM

## 2023-01-08 RX ORDER — POLYETHYLENE GLYCOL 3350 17 G/17G
17 POWDER, FOR SOLUTION ORAL DAILY PRN
Status: DISCONTINUED | OUTPATIENT
Start: 2023-01-08 | End: 2023-01-20 | Stop reason: HOSPADM

## 2023-01-08 RX ADMIN — VENLAFAXINE 37.5 MG: 37.5 TABLET ORAL at 05:01

## 2023-01-08 RX ADMIN — MICONAZOLE NITRATE 2 % TOPICAL POWDER: at 09:01

## 2023-01-08 RX ADMIN — METHOCARBAMOL 500 MG: 500 TABLET ORAL at 02:01

## 2023-01-08 RX ADMIN — ASPIRIN 81 MG: 81 TABLET, COATED ORAL at 08:01

## 2023-01-08 RX ADMIN — ASPIRIN 81 MG: 81 TABLET, COATED ORAL at 10:01

## 2023-01-08 RX ADMIN — DOCUSATE SODIUM 100 MG: 100 CAPSULE, LIQUID FILLED ORAL at 10:01

## 2023-01-08 RX ADMIN — HYDROCODONE BITARTRATE AND ACETAMINOPHEN 1 TABLET: 5; 325 TABLET ORAL at 02:01

## 2023-01-08 RX ADMIN — BUSPIRONE HYDROCHLORIDE 7.5 MG: 7.5 TABLET ORAL at 08:01

## 2023-01-08 RX ADMIN — MICONAZOLE NITRATE 2 % TOPICAL POWDER: at 08:01

## 2023-01-08 RX ADMIN — POLYETHYLENE GLYCOL 3350 17 G: 17 POWDER, FOR SOLUTION ORAL at 10:01

## 2023-01-08 RX ADMIN — FAMOTIDINE 20 MG: 20 TABLET ORAL at 10:01

## 2023-01-08 NOTE — PLAN OF CARE
Problem: Adult Inpatient Plan of Care  Goal: Plan of Care Review  Outcome: Ongoing, Progressing  Flowsheets (Taken 1/7/2023 2154)  Plan of Care Reviewed With: patient     Problem: Impaired Wound Healing  Goal: Optimal Wound Healing  Outcome: Ongoing, Progressing  Intervention: Promote Wound Healing  Flowsheets (Taken 1/7/2023 2154)  Sleep/Rest Enhancement: awakenings minimized     Problem: Fall Injury Risk  Goal: Absence of Fall and Fall-Related Injury  Outcome: Ongoing, Progressing  Intervention: Identify and Manage Contributors  Flowsheets (Taken 1/7/2023 2154)  Self-Care Promotion: independence encouraged  Medication Review/Management: medications reviewed

## 2023-01-08 NOTE — PROGRESS NOTES
[facility]  [facility] Medicine  Telehospitalist Progress Note    Patient Name: Tanya Linda  MRN: [id number]  Patient Class: IP- Swing   Admission Date: 12/29/2022  Length of Stay: 10 days  Attending Physician: Stephon Kearney MD  Primary Care Provider: MARION Salazar      Subjective:     Principal Problem:Closed intertrochanteric fracture of hip, left, initial encounter      HPI:  Ms. Linda is a 62-year-old  female with history of morbid obesity, hypertension, depression, GERD, and anxiety who presented to [facility] ER on 12/24/2022 with left hip pain following a fall.  X-ray of the left hip and femur showed a displaced intertrochanteric fracture of the left femur and chest x-ray revealed no acute cardiopulmonary process. CT of the left hip confirmed a comminuted intertrochanteric left femoral fracture and orthopedics was consulted.  She was taken to the OR on 12/25/2022 for intramedullary nail of left intertrochanteric femur fracture by Dr. Ronnell Olivares and she did relatively well postoperatively.  Her lisinopril was held due to mild hypotension and her blood pressure stabilized.  She had no other complications throughout her hospital course and she was transferred to [facility] swing bed on 12/29/2022 for PT/OT and management of her multiple medical comorbidities.      Overview/Hospital Course:  She was admitted to [facility] swing bed on 12/29/2022 for rehab following intramedullary nail of left intertrochanteric femur fracture.  She is being treated with a 4 week course of aspirin 81 mg PO BID for DVT prophylaxis and norco 5/325 mg PO every 6 hours as needed for pain.  She was started on diclofenac 1% gel 2 grams topical BID prn for left knee pain and stiffness on 1/6/2023. Her miralax was changed from 17 gm PO daily to prn on 1/8/2023.      Interval History: She is doing well this morning and she denies any left hip pain. She requests that her  miralax be changed to prn and she states her last bowel movement was 2 days ago. She ambulated 10 feet x 2 with her rolling walker and contact guard assistance and she required moderate assistance with bed mobility and contact guard assistance with tranfers and sit to stand transitions.      Review of Systems   All other systems reviewed and are negative.  Objective:     Vital Signs (Most Recent):  Temp: 97.6 °F (36.4 °C) (01/08/23 0400)  Pulse: 70 (01/08/23 0400)  Resp: 18 (01/08/23 0400)  BP: (!) 147/78 (01/08/23 0400)  SpO2: 96 % (01/08/23 0400)   Vital Signs (24h Range):  Temp:  [97.6 °F (36.4 °C)-98 °F (36.7 °C)] 97.6 °F (36.4 °C)  Pulse:  [70-77] 70  Resp:  [18-20] 18  SpO2:  [96 %-98 %] 96 %  BP: (117-147)/(57-84) 147/78     Weight: (!) 156.5 kg (345 lb 0.3 oz)  Body mass index is 57.41 kg/m².    Intake/Output Summary (Last 24 hours) at 1/8/2023 0827  Last data filed at 1/8/2023 0656  Gross per 24 hour   Intake --   Output 1700 ml   Net -1700 ml      Physical Exam  General - Morbidly obese  female in no acute distress.  HEENT - NCAT. No scleral icterus. Oropharynx clear. Mucous membranes moist.  Neck - No lymphadenopathy, thyromegaly, or JVD.  CVS - Regular rate and rhythm. No murmurs, rubs, or gallops.  Resp - Lungs are clear to auscultation bilaterally. No rales, wheeze, or rhonchi.   GI - Soft, nontender, nondistended, normoactive bowel sounds present.   Extremities - No clubbing, cyanosis, or edema.   Neuro - CN II through XII are grossly intact. No focal neurological deficits. Alert and oriented times four.   Psych - Normal affect.  Skin - Left hip surgical site c/d/i. Slight edema and ecchymosis noted to periwounds.     Significant Labs: All pertinent labs within the past 24 hours have been reviewed.     1/4/2023:  CBC: WBC count 10.4, hemoglobin 10.2, hematocrit 33.6, platelet count 307.  BMP: Sodium 141, potassium 4.7, chloride 102, CO2 28, BUN 20.8, creatinine 0.79, glucose 114, calcium 9.7,  magnesium 2.0.     12/30/2022:   CBC:  WBC count 10.4, hemoglobin 10.1, hematocrit 33.6, platelet count 259 food   BMP: Sodium 137, potassium 4.4, chloride 100, CO2 25, BUN 14.3, creatinine 0.68, glucose 100, calcium 9.1, magnesium 2.0.      12/29/2022:  CBC:  WBC count 10.9, hemoglobin 9.8, hematocrit 31.7, glucose 241.    BMP: Sodium 136, potassium 4.7, chloride 100, CO2 26, BUN 14.4, creatinine 0.73, glucose 107, calcium 8.9.    12/27/2022:   CBC:  WBC count 1.5, hemoglobin 10.7, hematocrit 34.6, platelet count 202.    CMP: Sodium 137, potassium 4.3, chloride 105, CO2 25, BUN 16.1, creatinine 0.81, glucose 121, calcium 9.0, magnesium 2.1, alkaline phosphatase 73, total protein 6.4, albumin 3.0, total bilirubin 0.8, AST 39, ALT 22.     12/26/2022:   CBC:  WBC count 13.7, hemoglobin 10.7, hematocrit 34.3, platelet count 264.  CMP:  Sodium 137, potassium 4.4, chloride 103, CO2 25, BUN 18.9, creatinine 0.85, glucose 118, calcium 8.71 magnesium 1.9, alkaline phosphatase 72, total protein 5.7, albumin 3.1, total bilirubin 0.8, AST 56, ALT 26.     12/24/2022:   CMP: Sodium 140, potassium 3.9, chloride 105, CO2 25, BUN 17.2 creatinine 0.97, glucose 117, calcium 9.3, alkaline phosphatase 98, total protein 7.2, albumin 3.8, total bilirubin 0.5, AST 14, ALT 17.    CBC: WBC count 10.6, hemoglobin 12.9, hematocrit 41.2, platelet count 223.     Significant Imaging: I have reviewed all pertinent imaging results/findings within the past 24 hours.     X-ray left hip 12/25/2022: There has been placement of left femoral intramedullary taz and compression screw traverses into the femoral head transfixing the intertrochanteric fracture.  Positioning and alignment is satisfactory.     CT left hip 12/25/2022: Comminuted intertrochanteric left femoral fracture.     CXR 12/24/2022: No acute chest disease is identified.     X-ray left hip 12/24/2022:  Displaced intertrochanteric fracture of the left femur.      Assessment/Plan:      *  Left intertrochanteric femur fracture  Continue 4 week course of aspirin 81 mg PO BID for DVT prophylaxis, PT/OT, norco as needed for pain control, and weight-bearing as tolerated to the left lower extremity.  She is status post intramedullary nail by Dr. Ronnell Olivares on 12/25/2022.     Hypertension  Her blood pressure has been stable off antihypertensives.  She is no longer on lisinopril 5 mg PO daily which was held at [facility] due to hypotension.    Anxiety  Continue buspirone.    Depression  Continue venlafaxine.    GERD (gastroesophageal reflux disease)  Continue famotidine.    Morbid obesity  She was counseled on the importance of weight loss and diet.  She was noted to have a BMI of 57.41.      Disposition  Anticipate discharge home with home health in the next week.        VTE Risk Mitigation (From admission, onward)         Ordered     Place sequential compression device  Until discontinued         12/29/22 1612                Discharge Planning   XENA:      Code Status: Full Code   Is the patient medically ready for discharge?:     Reason for patient still in hospital (select all that apply): PT / OT recommendations  Discharge Plan A: Home with family, Home Health   Discharge Delays: None known at this time      This service was provided via telemedicine.  Type of Software: Audio/Visual.  Originating Site: [facility].  Distant Site: [place], [place].  Her exam was performed with the assitance of Sveta Barroso RN.      Stephon Kearney MD  Department of Hospital Medicine   [facility]

## 2023-01-08 NOTE — PLAN OF CARE
[facility]  Discharge Reassessment    Primary Care Provider: MARION Salazar    Expected Discharge Date:     Reassessment (most recent)       Discharge Reassessment - 01/08/23 0924          Discharge Reassessment    Assessment Type Discharge Planning Reassessment     Did the patient's condition or plan change since previous assessment? No     Discharge Plan discussed with: Adult children     Communicated XENA with patient/caregiver Date not available/Unable to determine     Discharge Plan A Home with family;Home Health     DME Needed Upon Discharge  bedside commode;wheelchair;walker, rolling     Discharge Barriers Identified None     Why the patient remains in the hospital Requires continued medical care        Post-Acute Status    Post-Acute Authorization Home Health     Home Health Status Pending medical clearance/testing     Hospital Resources/Appts/Education Provided Provided patient/caregiver with written discharge plan information     Discharge Delays None known at this time

## 2023-01-08 NOTE — SUBJECTIVE & OBJECTIVE
Interval History: She is doing well this morning and she denies any left hip pain. She requests that her miralax be changed to prn and she states her last bowel movement was 2 days ago. She ambulated 10 feet x 2 with her rolling walker and contact guard assistance and she required moderate assistance with bed mobility and contact guard assistance with tranfers and sit to stand transitions.      Review of Systems   All other systems reviewed and are negative.  Objective:     Vital Signs (Most Recent):  Temp: 97.6 °F (36.4 °C) (01/08/23 0400)  Pulse: 70 (01/08/23 0400)  Resp: 18 (01/08/23 0400)  BP: (!) 147/78 (01/08/23 0400)  SpO2: 96 % (01/08/23 0400)   Vital Signs (24h Range):  Temp:  [97.6 °F (36.4 °C)-98 °F (36.7 °C)] 97.6 °F (36.4 °C)  Pulse:  [70-77] 70  Resp:  [18-20] 18  SpO2:  [96 %-98 %] 96 %  BP: (117-147)/(57-84) 147/78     Weight: (!) 156.5 kg (345 lb 0.3 oz)  Body mass index is 57.41 kg/m².    Intake/Output Summary (Last 24 hours) at 1/8/2023 0827  Last data filed at 1/8/2023 0656  Gross per 24 hour   Intake --   Output 1700 ml   Net -1700 ml      Physical Exam  General - Morbidly obese  female in no acute distress.  HEENT - NCAT. No scleral icterus. Oropharynx clear. Mucous membranes moist.  Neck - No lymphadenopathy, thyromegaly, or JVD.  CVS - Regular rate and rhythm. No murmurs, rubs, or gallops.  Resp - Lungs are clear to auscultation bilaterally. No rales, wheeze, or rhonchi.   GI - Soft, nontender, nondistended, normoactive bowel sounds present.   Extremities - No clubbing, cyanosis, or edema.   Neuro - CN II through XII are grossly intact. No focal neurological deficits. Alert and oriented times four.   Psych - Normal affect.  Skin - Left hip surgical site c/d/i. Slight edema and ecchymosis noted to periwounds.     Significant Labs: All pertinent labs within the past 24 hours have been reviewed.     1/4/2023:  CBC: WBC count 10.4, hemoglobin 10.2, hematocrit 33.6, platelet count  307.  BMP: Sodium 141, potassium 4.7, chloride 102, CO2 28, BUN 20.8, creatinine 0.79, glucose 114, calcium 9.7, magnesium 2.0.     12/30/2022:   CBC:  WBC count 10.4, hemoglobin 10.1, hematocrit 33.6, platelet count 259 food   BMP: Sodium 137, potassium 4.4, chloride 100, CO2 25, BUN 14.3, creatinine 0.68, glucose 100, calcium 9.1, magnesium 2.0.      12/29/2022:  CBC:  WBC count 10.9, hemoglobin 9.8, hematocrit 31.7, glucose 241.    BMP: Sodium 136, potassium 4.7, chloride 100, CO2 26, BUN 14.4, creatinine 0.73, glucose 107, calcium 8.9.    12/27/2022:   CBC:  WBC count 1.5, hemoglobin 10.7, hematocrit 34.6, platelet count 202.    CMP: Sodium 137, potassium 4.3, chloride 105, CO2 25, BUN 16.1, creatinine 0.81, glucose 121, calcium 9.0, magnesium 2.1, alkaline phosphatase 73, total protein 6.4, albumin 3.0, total bilirubin 0.8, AST 39, ALT 22.     12/26/2022:   CBC:  WBC count 13.7, hemoglobin 10.7, hematocrit 34.3, platelet count 264.  CMP:  Sodium 137, potassium 4.4, chloride 103, CO2 25, BUN 18.9, creatinine 0.85, glucose 118, calcium 8.71 magnesium 1.9, alkaline phosphatase 72, total protein 5.7, albumin 3.1, total bilirubin 0.8, AST 56, ALT 26.     12/24/2022:   CMP: Sodium 140, potassium 3.9, chloride 105, CO2 25, BUN 17.2 creatinine 0.97, glucose 117, calcium 9.3, alkaline phosphatase 98, total protein 7.2, albumin 3.8, total bilirubin 0.5, AST 14, ALT 17.    CBC: WBC count 10.6, hemoglobin 12.9, hematocrit 41.2, platelet count 223.     Significant Imaging: I have reviewed all pertinent imaging results/findings within the past 24 hours.     X-ray left hip 12/25/2022: There has been placement of left femoral intramedullary taz and compression screw traverses into the femoral head transfixing the intertrochanteric fracture.  Positioning and alignment is satisfactory.     CT left hip 12/25/2022: Comminuted intertrochanteric left femoral fracture.     CXR 12/24/2022: No acute chest disease is identified.      X-ray left hip 12/24/2022:  Displaced intertrochanteric fracture of the left femur.

## 2023-01-08 NOTE — PLAN OF CARE
Problem: Bariatric Environmental Safety  Goal: Safety Maintained with Care  Outcome: Ongoing, Progressing  Intervention: Promote Safety and Comfort  Flowsheets (Taken 1/8/2023 1309)  Bariatric Safety:   mechanical lift utilized   bariatric commode at bedside     Problem: Impaired Wound Healing  Goal: Optimal Wound Healing  Outcome: Ongoing, Progressing  Intervention: Promote Wound Healing  Flowsheets (Taken 1/8/2023 1309)  Oral Nutrition Promotion:   medicated   physical activity promoted  Sleep/Rest Enhancement: awakenings minimized  Activity Management: Rolling - L1  Pain Management Interventions:   care clustered   quiet environment facilitated   pillow support provided   position adjusted   relaxation techniques promoted     Problem: Skin Injury Risk Increased  Goal: Skin Health and Integrity  Outcome: Ongoing, Progressing  Intervention: Optimize Skin Protection  Flowsheets (Taken 1/8/2023 1309)  Pressure Reduction Techniques: frequent weight shift encouraged  Pressure Reduction Devices: positioning supports utilized  Skin Protection:   adhesive use limited   incontinence pads utilized  Head of Bed (HOB) Positioning: HOB at 30-45 degrees  Intervention: Promote and Optimize Oral Intake  Flowsheets (Taken 1/8/2023 1309)  Oral Nutrition Promotion:   medicated   physical activity promoted

## 2023-01-09 LAB
ALBUMIN SERPL-MCNC: 3.3 G/DL (ref 3.4–4.8)
ALBUMIN/GLOB SERPL: 1 RATIO (ref 1.1–2)
ALP SERPL-CCNC: 223 UNIT/L (ref 40–150)
ALT SERPL-CCNC: 35 UNIT/L (ref 0–55)
AST SERPL-CCNC: 27 UNIT/L (ref 5–34)
BASOPHILS # BLD AUTO: 0.04 X10(3)/MCL (ref 0–0.2)
BASOPHILS NFR BLD AUTO: 0.4 %
BILIRUBIN DIRECT+TOT PNL SERPL-MCNC: 0.8 MG/DL
BUN SERPL-MCNC: 17.8 MG/DL (ref 9.8–20.1)
CALCIUM SERPL-MCNC: 9.5 MG/DL (ref 8.4–10.2)
CHLORIDE SERPL-SCNC: 98 MMOL/L (ref 98–107)
CO2 SERPL-SCNC: 28 MMOL/L (ref 23–31)
CREAT SERPL-MCNC: 0.77 MG/DL (ref 0.55–1.02)
EOSINOPHIL # BLD AUTO: 0.3 X10(3)/MCL (ref 0–0.9)
EOSINOPHIL NFR BLD AUTO: 2.9 %
ERYTHROCYTE [DISTWIDTH] IN BLOOD BY AUTOMATED COUNT: 14.6 % (ref 11–14.5)
GFR SERPLBLD CREATININE-BSD FMLA CKD-EPI: 88 MLS/MIN/1.73/M2
GLOBULIN SER-MCNC: 3.3 GM/DL (ref 2.4–3.5)
GLUCOSE SERPL-MCNC: 94 MG/DL (ref 82–115)
HCT VFR BLD AUTO: 37.9 % (ref 37–47)
HGB BLD-MCNC: 11.1 GM/DL (ref 12–16)
IMM GRANULOCYTES # BLD AUTO: 0.09 X10(3)/MCL (ref 0–0.04)
IMM GRANULOCYTES NFR BLD AUTO: 0.9 %
LYMPHOCYTES # BLD AUTO: 2.51 X10(3)/MCL (ref 0.6–4.6)
LYMPHOCYTES NFR BLD AUTO: 24.1 %
MAGNESIUM SERPL-MCNC: 2.1 MG/DL (ref 1.6–2.6)
MCH RBC QN AUTO: 26.6 PG
MCHC RBC AUTO-ENTMCNC: 29.3 MG/DL (ref 33–36)
MCV RBC AUTO: 90.7 FL (ref 80–94)
MONOCYTES # BLD AUTO: 0.58 X10(3)/MCL (ref 0.1–1.3)
MONOCYTES NFR BLD AUTO: 5.6 %
NEUTROPHILS # BLD AUTO: 6.89 X10(3)/MCL (ref 2.1–9.2)
NEUTROPHILS NFR BLD AUTO: 66.1 %
PLATELET # BLD AUTO: 316 X10(3)/MCL (ref 140–371)
PMV BLD AUTO: 11.1 FL (ref 9.4–12.4)
POTASSIUM SERPL-SCNC: 4.8 MMOL/L (ref 3.5–5.1)
PROT SERPL-MCNC: 6.6 GM/DL (ref 5.8–7.6)
RBC # BLD AUTO: 4.18 X10(6)/MCL (ref 4.2–5.4)
SODIUM SERPL-SCNC: 137 MMOL/L (ref 136–145)
WBC # SPEC AUTO: 10.4 X10(3)/MCL (ref 4.5–11.5)

## 2023-01-09 PROCEDURE — 36415 COLL VENOUS BLD VENIPUNCTURE: CPT | Performed by: INTERNAL MEDICINE

## 2023-01-09 PROCEDURE — 97530 THERAPEUTIC ACTIVITIES: CPT

## 2023-01-09 PROCEDURE — 11000004 HC SNF PRIVATE

## 2023-01-09 PROCEDURE — 94760 N-INVAS EAR/PLS OXIMETRY 1: CPT

## 2023-01-09 PROCEDURE — 85025 COMPLETE CBC W/AUTO DIFF WBC: CPT | Performed by: INTERNAL MEDICINE

## 2023-01-09 PROCEDURE — 83735 ASSAY OF MAGNESIUM: CPT | Performed by: INTERNAL MEDICINE

## 2023-01-09 PROCEDURE — 97110 THERAPEUTIC EXERCISES: CPT

## 2023-01-09 PROCEDURE — 97116 GAIT TRAINING THERAPY: CPT | Mod: CQ

## 2023-01-09 PROCEDURE — 25000003 PHARM REV CODE 250: Performed by: STUDENT IN AN ORGANIZED HEALTH CARE EDUCATION/TRAINING PROGRAM

## 2023-01-09 PROCEDURE — 80053 COMPREHEN METABOLIC PANEL: CPT | Performed by: INTERNAL MEDICINE

## 2023-01-09 RX ADMIN — ASPIRIN 81 MG: 81 TABLET, COATED ORAL at 08:01

## 2023-01-09 RX ADMIN — ASPIRIN 81 MG: 81 TABLET, COATED ORAL at 09:01

## 2023-01-09 RX ADMIN — HYDROCODONE BITARTRATE AND ACETAMINOPHEN 1 TABLET: 5; 325 TABLET ORAL at 09:01

## 2023-01-09 RX ADMIN — FAMOTIDINE 20 MG: 20 TABLET ORAL at 09:01

## 2023-01-09 RX ADMIN — HYDROCODONE BITARTRATE AND ACETAMINOPHEN 1 TABLET: 5; 325 TABLET ORAL at 01:01

## 2023-01-09 RX ADMIN — DOCUSATE SODIUM 100 MG: 100 CAPSULE, LIQUID FILLED ORAL at 09:01

## 2023-01-09 RX ADMIN — METHOCARBAMOL 500 MG: 500 TABLET ORAL at 01:01

## 2023-01-09 RX ADMIN — VENLAFAXINE 37.5 MG: 37.5 TABLET ORAL at 05:01

## 2023-01-09 RX ADMIN — MICONAZOLE NITRATE 2 % TOPICAL POWDER: at 09:01

## 2023-01-09 RX ADMIN — MICONAZOLE NITRATE 2 % TOPICAL POWDER: at 08:01

## 2023-01-09 RX ADMIN — HYDROCODONE BITARTRATE AND ACETAMINOPHEN 1 TABLET: 5; 325 TABLET ORAL at 05:01

## 2023-01-09 RX ADMIN — BUSPIRONE HYDROCHLORIDE 7.5 MG: 7.5 TABLET ORAL at 08:01

## 2023-01-09 NOTE — PLAN OF CARE
Problem: Adult Inpatient Plan of Care  Goal: Plan of Care Review  Outcome: Ongoing, Progressing  Flowsheets (Taken 1/8/2023 2233)  Plan of Care Reviewed With: patient     Problem: Impaired Wound Healing  Goal: Optimal Wound Healing  Outcome: Ongoing, Progressing  Intervention: Promote Wound Healing  Flowsheets (Taken 1/8/2023 2233)  Sleep/Rest Enhancement: awakenings minimized     Problem: Fall Injury Risk  Goal: Absence of Fall and Fall-Related Injury  Outcome: Ongoing, Progressing  Intervention: Identify and Manage Contributors  Flowsheets (Taken 1/8/2023 2233)  Self-Care Promotion: independence encouraged  Medication Review/Management: medications reviewed

## 2023-01-09 NOTE — SUBJECTIVE & OBJECTIVE
Interval History: She is doing well this morning except for left hip pain 4/10 in severity. She had 2 bowel movements yesterday.      Review of Systems   All other systems reviewed and are negative.  Objective:     Vital Signs (Most Recent):  Temp: 97.5 °F (36.4 °C) (01/09/23 0748)  Pulse: (!) 58 (01/09/23 0748)  Resp: 18 (01/09/23 0347)  BP: (!) 144/85 (01/09/23 0748)  SpO2: 97 % (01/09/23 0748)   Vital Signs (24h Range):  Temp:  [97.4 °F (36.3 °C)-98.1 °F (36.7 °C)] 97.5 °F (36.4 °C)  Pulse:  [57-70] 58  Resp:  [16-20] 18  SpO2:  [96 %-98 %] 97 %  BP: (124-151)/(66-95) 144/85     Weight: (!) 156.5 kg (345 lb 0.3 oz)  Body mass index is 57.41 kg/m².    Intake/Output Summary (Last 24 hours) at 1/9/2023 0828  Last data filed at 1/9/2023 0639  Gross per 24 hour   Intake --   Output 1350 ml   Net -1350 ml      Physical Exam  General - Morbidly obese  female in no acute distress.  HEENT - NCAT. No scleral icterus. Oropharynx clear. Mucous membranes moist.  Neck - No lymphadenopathy, thyromegaly, or JVD.  CVS - Regular rate and rhythm. No murmurs, rubs, or gallops.  Resp - Lungs are clear to auscultation bilaterally. No rales, wheeze, or rhonchi.   GI - Soft, nontender, nondistended, normoactive bowel sounds present.   Extremities - No clubbing, cyanosis, or edema.   Neuro - CN II through XII are grossly intact. No focal neurological deficits. Alert and oriented times four.   Psych - Normal affect.  Skin - Left hip surgical site c/d/i. Slight edema and ecchymosis noted to periwounds.     Significant Labs: All pertinent labs within the past 24 hours have been reviewed.     1/9/2023:  CBC: WBC count 10.4, hemoglobin 11.1, hematocrit 37.9, platelet count 316.  CMP: Sodium 137, potassium 4.8, chloride 98, CO2 28, BUN 17.8, creatinine 0.77, glucose 94, calcium 9.5, magnesium 2.1, alkaline phosphatase 223, total protein 6.6, albumin 3.3, total bilirubin 0.8, AST 27, ALT 35.    1/4/2023:  CBC: WBC count 10.4,  hemoglobin 10.2, hematocrit 33.6, platelet count 307.  BMP: Sodium 141, potassium 4.7, chloride 102, CO2 28, BUN 20.8, creatinine 0.79, glucose 114, calcium 9.7, magnesium 2.0.     12/30/2022:   CBC:  WBC count 10.4, hemoglobin 10.1, hematocrit 33.6, platelet count 259 food   BMP: Sodium 137, potassium 4.4, chloride 100, CO2 25, BUN 14.3, creatinine 0.68, glucose 100, calcium 9.1, magnesium 2.0.      12/29/2022:  CBC:  WBC count 10.9, hemoglobin 9.8, hematocrit 31.7, glucose 241.    BMP: Sodium 136, potassium 4.7, chloride 100, CO2 26, BUN 14.4, creatinine 0.73, glucose 107, calcium 8.9.    12/27/2022:   CBC:  WBC count 1.5, hemoglobin 10.7, hematocrit 34.6, platelet count 202.    CMP: Sodium 137, potassium 4.3, chloride 105, CO2 25, BUN 16.1, creatinine 0.81, glucose 121, calcium 9.0, magnesium 2.1, alkaline phosphatase 73, total protein 6.4, albumin 3.0, total bilirubin 0.8, AST 39, ALT 22.     12/26/2022:   CBC:  WBC count 13.7, hemoglobin 10.7, hematocrit 34.3, platelet count 264.  CMP:  Sodium 137, potassium 4.4, chloride 103, CO2 25, BUN 18.9, creatinine 0.85, glucose 118, calcium 8.71 magnesium 1.9, alkaline phosphatase 72, total protein 5.7, albumin 3.1, total bilirubin 0.8, AST 56, ALT 26.     12/24/2022:   CMP: Sodium 140, potassium 3.9, chloride 105, CO2 25, BUN 17.2 creatinine 0.97, glucose 117, calcium 9.3, alkaline phosphatase 98, total protein 7.2, albumin 3.8, total bilirubin 0.5, AST 14, ALT 17.    CBC: WBC count 10.6, hemoglobin 12.9, hematocrit 41.2, platelet count 223.     Significant Imaging: I have reviewed all pertinent imaging results/findings within the past 24 hours.     X-ray left hip 12/25/2022: There has been placement of left femoral intramedullary taz and compression screw traverses into the femoral head transfixing the intertrochanteric fracture.  Positioning and alignment is satisfactory.     CT left hip 12/25/2022: Comminuted intertrochanteric left femoral fracture.     CXR  12/24/2022: No acute chest disease is identified.     X-ray left hip 12/24/2022:  Displaced intertrochanteric fracture of the left femur.

## 2023-01-09 NOTE — PT/OT/SLP PROGRESS
Physical Therapy Treatment Note           Name: Tanya Linda    : 1960 (62 y.o.)  MRN: [id number]           TREATMENT SUMMARY AND RECOMMENDATIONS:    PT Received On: 23  PT Start Time: 1100     PT Stop Time: 1125  PT Total Time (min): 25 min     Subjective Assessment:   No complaints  Lethargic   x Awake, alert, cooperative  Uncooperative    Agitated x c/o pain    Appropriate  c/o fatigue    Confused  Treated at bedside     Emotionally labile x Treated in gym/dept.    Impulsive  Other:    Flat affect       Therapy Precautions:    Cognitive deficits  Spinal precautions    Collar - hard  Sternal precautions    Collar - soft   TLSO   x Fall risk  LSO    Hip precautions - posterior  Knee immobilizer    Hip precautions - anterior x WBAT    Impaired communication  Partial weightbearing    Oxygen  TTWB    PEG tube  NWB    Visual deficits  Other:    Hearing deficits          Treatment Objectives:     Mobility Training:   Assist level Comments    Bed mobility     Transfer     Gait CGA 2x5ft using RW; better advancement of LLE and coordination of gait today   Sit to stand transitions CGA X5 sit to stands from bedside chair surface   Sitting balance     Standing balance      Wheelchair mobility     Car transfer     Other:          Therapeutic Exercise:   Exercise Sets Reps Comments   Hip flexion, hip ABD, LAQ, ankle PF/DF 1 20 Performed short sitting                         Additional Comments:      Assessment: Patient tolerated session well. She is pleased with how well she is doing thus far.     PT Plan: continue POC  Revisions made to plan of care: No    GOALS:   Multidisciplinary Problems       Physical Therapy Goals          Problem: Physical Therapy    Goal Priority Disciplines Outcome Goal Variances Interventions   Physical Therapy Goal     PT, PT/OT Ongoing, Progressing     Description: Goals to be met by: Discharge     Patient will increase functional independence with mobility by performin.  Supine to sit with Modified [place]  2. Sit to supine with Modified [place]  3. Sit to stand transfer with Modified [place]  4. Bed to chair transfer with Stand-by Assistance using Rolling Walker  5. Gait  x 25 feet with Stand-by Assistance using Rolling Walker.                          Skilled PT Minutes Provided: 25 minutes   Communication with Treatment Team:     Equipment recommendations:       At end of treatment, patient remained:   Up in chair     Up in wheelchair in room    In bed    With alarm activated    Bed rails up    Call bell in reach     Family/friends present    Restraints secured properly    In bathroom with CNA/RN notified    Nurse aware   x In gym with therapist/tech    Other:

## 2023-01-09 NOTE — PT/OT/SLP PROGRESS
Occupational Therapy  Treatment    Name: Tanya Linda    : 1960 (62 y.o.)  MRN: [id number]           TREATMENT SUMMARY AND RECOMMENDATIONS:      OT Date of Treatment: 23  OT Start Time: 1125  OT Stop Time: 1155  OT Total Time (min): 30 min      Subjective Assessment:   No complaints  Lethargic   x Awake, alert, cooperative  Impulsive    Uncooperative   Flat affect    Agitated  c/o pain    Appropriate  c/o fatigue    Confused  Treated at bedside     Emotionally labile x Treated in gym/dept.      Other:        Therapy Precautions:    Cognitive deficits  Spinal precautions    Collar - hard  Sternal precautions    Collar - soft   TLSO   x Fall risk  LSO    Hip precautions - posterior  Knee immobilizer    Hip precautions - anterior  WBAT    Impaired communication  Partial weightbearing    Oxygen  TTWB    PEG tube  NWB    Visual deficits      Hearing deficits   Other:        Treatment Objectives:     Mobility Training:    Mobility task Assist level Comments    Bed mobility     Transfer     Sit to stands transitions CGA From [facility]air   Functional mobility     Sitting balance     Standing balance  SBA Pt stood with 1 UE supported on RW; participated in balloon volleyball ax to hit back and forth with therapist. Pt able to tolerate standing x2 min before seated rest break.    Other:          Therapeutic Exercise:   Exercise Sets Reps Comments   3# dowel 2 10 Shoulder press, chest press, straight arm raises, bicep curls, forward rows, backwards rows   Red flexbar 3 10 Wrist flex/ext, sup/pro   Leg bike 2 20 cycles Forwards/backwards             Additional Comments:      Assessment: Patient tolerated session well.    OT Plan: continue POC  Revisions made to plan of care: No    GOALS:   Multidisciplinary Problems       Occupational Therapy Goals          Problem: Occupational Therapy    Goal Priority Disciplines Outcome Interventions   Occupational Therapy Goal     OT, PT/OT Ongoing, Progressing    Description: Goals  to be met by: 1/20/22     Patient will increase functional independence with ADLs by performing:    UE Dressing with Modified [place].  LE Dressing with Minimal Assistance.  Grooming while seated at sink with Modified [place].  Toileting from bedside commode with Minimal Assistance for hygiene and clothing management.   Bathing from bench with Minimal Assistance.  Toilet transfer to bedside commode with Contact Guard Assistance.                         Skilled OT Minutes Provided: 30  Communication with Treatment Team:     Equipment recommendations:       At end of treatment, patient remained:  x Up in chair     Up in wheelchair in room    In bed   x With alarm activated    Bed rails up   x Call bell in reach     Family/friends present    Restraints secured properly    In bathroom with CNA/RN notified    In gym with PT/PTA/tech    Nurse aware    Other:

## 2023-01-09 NOTE — PLAN OF CARE
Problem: Adult Inpatient Plan of Care  Goal: Plan of Care Review  Outcome: Ongoing, Progressing  Goal: Absence of Hospital-Acquired Illness or Injury  Outcome: Ongoing, Progressing  Intervention: Prevent Skin Injury  Flowsheets (Taken 1/9/2023 0800)  Body Position:   sitting up in bed   neutral body alignment   position maintained  Skin Protection:   adhesive use limited   incontinence pads utilized   protective footwear used   skin sealant/moisture barrier applied  Intervention: Prevent and Manage VTE (Venous Thromboembolism) Risk  Flowsheets (Taken 1/9/2023 0800)  VTE Prevention/Management:   ambulation promoted   bleeding precations maintained   bleeding risk assessed   fluids promoted     Problem: Bariatric Environmental Safety  Goal: Safety Maintained with Care  Outcome: Ongoing, Progressing     Problem: Impaired Wound Healing  Goal: Optimal Wound Healing  Outcome: Ongoing, Progressing  Intervention: Promote Wound Healing  Flowsheets (Taken 1/9/2023 0800)  Oral Nutrition Promotion:   physical activity promoted   rest periods promoted  Sleep/Rest Enhancement:   awakenings minimized   regular sleep/rest pattern promoted   relaxation techniques promoted   room darkened   therapeutic touch utilized   noise level reduced   natural light exposure provided  Pain Management Interventions:   care clustered   medication offered   premedicated for activity   relaxation techniques promoted   quiet environment facilitated   position adjusted   pillow support provided     Problem: Skin Injury Risk Increased  Goal: Skin Health and Integrity  Outcome: Ongoing, Progressing  Intervention: Optimize Skin Protection  Flowsheets (Taken 1/9/2023 0800)  Pressure Reduction Techniques: frequent weight shift encouraged  Pressure Reduction Devices:   positioning supports utilized   specialty bed utilized  Skin Protection:   adhesive use limited   incontinence pads utilized   protective footwear used   skin sealant/moisture barrier  applied  Head of Bed (HOB) Positioning: HOB at 30-45 degrees  Intervention: Promote and Optimize Oral Intake  Flowsheets (Taken 1/9/2023 0800)  Oral Nutrition Promotion:   physical activity promoted   rest periods promoted     Problem: Fall Injury Risk  Goal: Absence of Fall and Fall-Related Injury  Outcome: Ongoing, Progressing  Intervention: Identify and Manage Contributors  Flowsheets (Taken 1/9/2023 0800)  Self-Care Promotion:   independence encouraged   BADL personal objects within reach   BADL personal routines maintained   safe use of adaptive equipment encouraged     Problem: Surgical Site Infection  Goal: Absence of Infection Signs and Symptoms  Outcome: Ongoing, Progressing

## 2023-01-09 NOTE — PT/OT/SLP PROGRESS
Occupational Therapy  Treatment    Name: Tanya Linda    : 1960 (62 y.o.)  MRN: [id number]           TREATMENT SUMMARY AND RECOMMENDATIONS:      OT Date of Treatment: 23  OT Start Time:   OT Stop Time:   OT Total Time (min): 30 min      Subjective Assessment:   No complaints  Lethargic   x Awake, alert, cooperative  Impulsive    Uncooperative   Flat affect    Agitated x c/o pain    Appropriate  c/o fatigue    Confused  Treated at bedside     Emotionally labile x Treated in gym/dept.      Other:        Therapy Precautions:    Cognitive deficits  Spinal precautions    Collar - hard  Sternal precautions    Collar - soft   TLSO   x Fall risk  LSO    Hip precautions - posterior  Knee immobilizer    Hip precautions - anterior  WBAT    Impaired communication  Partial weightbearing    Oxygen  TTWB    PEG tube  NWB    Visual deficits      Hearing deficits   Other:        Treatment Objectives:     Mobility Training:    Mobility task Assist level Comments    Bed mobility     Transfer CGA Stand/pivot t/f with RW    Sit to stands transitions SBA/CGA From [place]    Functional mobility CGA X10 feet with RW in room   Sitting balance     Standing balance  SBA Pt stood with RW anterior and 1UE supported, performed mini squat to low stool to grasp bean bags and toss into bucket anterior. 2x15 reps with seated rest break between sets.    Other:            Therapeutic Exercise:   Exercise Sets Reps Comments   2# dumbbells 2 10 Shoulder press, chest press, straight arm raises, bicep curls, shoulder abd/add                         Additional Comments:      Assessment: Patient tolerated session well.    OT Plan: Continue POC  Revisions made to plan of care: No    GOALS:   Multidisciplinary Problems       Occupational Therapy Goals          Problem: Occupational Therapy    Goal Priority Disciplines Outcome Interventions   Occupational Therapy Goal     OT, PT/OT Ongoing, Progressing    Description: Goals to be met by:  1/20/22     Patient will increase functional independence with ADLs by performing:    UE Dressing with Modified [place].  LE Dressing with Minimal Assistance.  Grooming while seated at sink with Modified [place].  Toileting from bedside commode with Minimal Assistance for hygiene and clothing management.   Bathing from bench with Minimal Assistance.  Toilet transfer to bedside commode with Contact Guard Assistance.                         Skilled OT Minutes Provided: 30  Communication with Treatment Team:     Equipment recommendations:       At end of treatment, patient remained:   Up in chair     Up in wheelchair in room    In bed    With alarm activated    Bed rails up    Call bell in reach     Family/friends present    Restraints secured properly    In bathroom with CNA/RN notified   x In gym with PT/PTA/tech    Nurse aware    Other:

## 2023-01-09 NOTE — PROGRESS NOTES
Inpatient Nutrition Evaluation    Admit Date: 12/29/2022   Total duration of encounter: 11 days    Nutrition Recommendation/Prescription     Continue regular diet.     Nutrition Assessment     Chart Review    Reason Seen: continuous nutrition monitoring, length of stay, and follow-up    Malnutrition Screening Tool Results   Have you recently lost weight without trying?: No  Have you been eating poorly because of a decreased appetite?: No   MST Score: 0     Diagnosis:  Left intertrochanteric femur fx    Relevant Medical History: colon polyps    Nutrition-Related Medications: ergocalciferol    Nutrition-Related Labs:   Latest Reference Range & Units 01/09/23 04:00   Sodium 136 - 145 mmol/L 137   Potassium 3.5 - 5.1 mmol/L 4.8   Chloride 98 - 107 mmol/L 98   CO2 23 - 31 mmol/L 28   BUN 9.8 - 20.1 mg/dL 17.8   Creatinine 0.55 - 1.02 mg/dL 0.77   eGFR mls/min/1.73/m2 88   Glucose 82 - 115 mg/dL 94   Calcium 8.4 - 10.2 mg/dL 9.5   Magnesium 1.60 - 2.60 mg/dL 2.10   Alkaline Phosphatase 40 - 150 unit/L 223 (H)   PROTEIN TOTAL 5.8 - 7.6 gm/dL 6.6   Albumin 3.4 - 4.8 g/dL 3.3 (L)   Albumin/Globulin Ratio 1.1 - 2.0 ratio 1.0 (L)   BILIRUBIN TOTAL <=1.5 mg/dL 0.8   AST 5 - 34 unit/L 27   ALT 0 - 55 unit/L 35   Globulin, Total 2.4 - 3.5 gm/dL 3.3   (H): Data is abnormally high  (L): Data is abnormally low      Diet Order: Diet Adult Regular  Oral Supplement Order: none  Appetite/Oral Intake: good/% of meals  Factors Affecting Nutritional Intake: none identified  Food/Sabianist/Cultural Preferences: none reported  Food Allergies: none reported    Skin Integrity: incision  Wound(s):      Altered Skin Integrity 12/28/22 1052 Coccyx-Tissue loss description: Not applicable       Altered Skin Integrity 12/29/22 0830 Left lower Breast Moisture associated dermatitis-Tissue loss description: Partial thickness     Comments  Pt reports intake is good, 100% of lunch. Marked menus. No new wt recorded. Notified clinical nurse manager.  "    Anthropometrics    Height: 5' 5" (165.1 cm) Height Method: Stated  Last Weight: (!) 156.5 kg (345 lb 0.3 oz) (12/29/22 1520) Weight Method: Bed Scale  BMI (Calculated): 57.4  BMI Classification: obese grade III (BMI >/=40)     Ideal Body Weight (IBW), Female: 125 lb     % Ideal Body Weight, Female (lb): 276.02 %                             Usual Weight Provided By: not applicable    Wt Readings from Last 3 Encounters:   12/29/22 1520 (!) 156.5 kg (345 lb 0.3 oz)   12/25/22 0157 (!) 156.5 kg (345 lb)   12/24/22 2204 (!) 156.5 kg (345 lb)   11/17/22 0943 (!) 154.4 kg (340 lb 4.8 oz)      Weight Change(s) Since Admission:  Admit Weight: (!) 156.5 kg (345 lb 0.3 oz) (12/29/22 1520)      Patient Education    Not applicable.    Monitoring & Evaluation     Dietitian will monitor food and beverage intake, weight, weight change, glucose/endocrine profile, and gastrointestinal profile.  Nutrition Risk/Follow-Up: low (follow-up in 5-7 days)  Patients assigned 'low nutrition risk' status do not qualify for a full nutritional assessment but will be monitored and re-evaluated in a 5-7 day time period. Please consult if re-evaluation needed sooner.   "

## 2023-01-10 PROCEDURE — 97530 THERAPEUTIC ACTIVITIES: CPT

## 2023-01-10 PROCEDURE — 25000003 PHARM REV CODE 250: Performed by: STUDENT IN AN ORGANIZED HEALTH CARE EDUCATION/TRAINING PROGRAM

## 2023-01-10 PROCEDURE — 97110 THERAPEUTIC EXERCISES: CPT

## 2023-01-10 PROCEDURE — 11000004 HC SNF PRIVATE

## 2023-01-10 PROCEDURE — 94760 N-INVAS EAR/PLS OXIMETRY 1: CPT

## 2023-01-10 PROCEDURE — 97535 SELF CARE MNGMENT TRAINING: CPT

## 2023-01-10 RX ADMIN — METHOCARBAMOL 500 MG: 500 TABLET ORAL at 09:01

## 2023-01-10 RX ADMIN — HYDROCODONE BITARTRATE AND ACETAMINOPHEN 1 TABLET: 5; 325 TABLET ORAL at 01:01

## 2023-01-10 RX ADMIN — MICONAZOLE NITRATE 2 % TOPICAL POWDER: at 09:01

## 2023-01-10 RX ADMIN — BUSPIRONE HYDROCHLORIDE 7.5 MG: 7.5 TABLET ORAL at 09:01

## 2023-01-10 RX ADMIN — HYDROCODONE BITARTRATE AND ACETAMINOPHEN 1 TABLET: 5; 325 TABLET ORAL at 09:01

## 2023-01-10 RX ADMIN — FAMOTIDINE 20 MG: 20 TABLET ORAL at 09:01

## 2023-01-10 RX ADMIN — DOCUSATE SODIUM 100 MG: 100 CAPSULE, LIQUID FILLED ORAL at 09:01

## 2023-01-10 RX ADMIN — ASPIRIN 81 MG: 81 TABLET, COATED ORAL at 09:01

## 2023-01-10 RX ADMIN — VENLAFAXINE 37.5 MG: 37.5 TABLET ORAL at 05:01

## 2023-01-10 NOTE — PROGRESS NOTES
[facility] - Medical Surgical Unit  [facility] Medicine  Telehospitalist Progress Note    Patient Name: Tanya Linda  MRN: [id number]  Patient Class: IP- Swing   Admission Date: 12/29/2022  Length of Stay: 12 days  Attending Physician: Stephon Kearney MD  Primary Care Provider: MARION Salazar      Subjective:     Principal Problem:Closed intertrochanteric fracture of hip, left, initial encounter      HPI:  Ms. Linda is a 62-year-old  female with history of morbid obesity, hypertension, depression, GERD, and anxiety who presented to [facility] ER on 12/24/2022 with left hip pain following a fall.  X-ray of the left hip and femur showed a displaced intertrochanteric fracture of the left femur and chest x-ray revealed no acute cardiopulmonary process. CT of the left hip confirmed a comminuted intertrochanteric left femoral fracture and orthopedics was consulted.  She was taken to the OR on 12/25/2022 for intramedullary nail of left intertrochanteric femur fracture by Dr. Ronnell Olivares and she did relatively well postoperatively.  Her lisinopril was held due to mild hypotension and her blood pressure stabilized.  She had no other complications throughout her hospital course and she was transferred to [facility] swing bed on 12/29/2022 for PT/OT and management of her multiple medical comorbidities.      Overview/Hospital Course:  She was admitted to [facility] swing bed on 12/29/2022 for rehab following intramedullary nail of left intertrochanteric femur fracture.  She is being treated with a 4 week course of aspirin 81 mg PO BID for DVT prophylaxis and norco 5/325 mg PO every 6 hours as needed for pain.  She was started on diclofenac 1% gel 2 grams topical BID prn for left knee pain and stiffness on 1/6/2023. Her miralax was changed from 17 gm PO daily to prn on 1/8/2023.      Interval History: She ambulated 5 feet x 2 with her rolling walker and contact guard assistance and she  required contact guard assistance with sit to stand transitions. She is doing well this morning and she denies any left hip pain. She is not currently on any stool softener or laxative and she had 3 bowel movements in the last 24 hours.      Review of Systems   All other systems reviewed and are negative.  Objective:     Vital Signs (Most Recent):  Temp: 97.7 °F (36.5 °C) (01/10/23 0719)  Pulse: 72 (01/10/23 0719)  Resp: 17 (01/10/23 0414)  BP: (!) 142/83 (01/10/23 0719)  SpO2: 96 % (01/10/23 0719)   Vital Signs (24h Range):  Temp:  [97.6 °F (36.4 °C)-98.1 °F (36.7 °C)] 97.7 °F (36.5 °C)  Pulse:  [60-81] 72  Resp:  [16-20] 17  SpO2:  [94 %-98 %] 96 %  BP: (121-142)/(68-83) 142/83     Weight: (!) 156.5 kg (345 lb 0.3 oz)  Body mass index is 57.41 kg/m².    Intake/Output Summary (Last 24 hours) at 1/10/2023 0841  Last data filed at 1/9/2023 1650  Gross per 24 hour   Intake 360 ml   Output 1302 ml   Net -942 ml      Physical Exam  General - Morbidly obese  female in no acute distress.  HEENT - NCAT. No scleral icterus. Oropharynx clear. Mucous membranes moist.  Neck - No lymphadenopathy, thyromegaly, or JVD.  CVS - Regular rate and rhythm. No murmurs, rubs, or gallops.  Resp - Lungs are clear to auscultation bilaterally. No rales, wheeze, or rhonchi.   GI - Soft, nontender, nondistended, normoactive bowel sounds present.   Extremities - No clubbing, cyanosis, or edema.   Neuro - CN II through XII are grossly intact. No focal neurological deficits. Alert and oriented times four.   Psych - Normal affect.  Skin - Left hip surgical site c/d/i. Slight edema and ecchymosis noted to periwounds.     Significant Labs: All pertinent labs within the past 24 hours have been reviewed.     1/9/2023:  CBC: WBC count 10.4, hemoglobin 11.1, hematocrit 37.9, platelet count 316.  CMP: Sodium 137, potassium 4.8, chloride 98, CO2 28, BUN 17.8, creatinine 0.77, glucose 94, calcium 9.5, magnesium 2.1, alkaline phosphatase 223, total  protein 6.6, albumin 3.3, total bilirubin 0.8, AST 27, ALT 35.     1/4/2023:  CBC: WBC count 10.4, hemoglobin 10.2, hematocrit 33.6, platelet count 307.  BMP: Sodium 141, potassium 4.7, chloride 102, CO2 28, BUN 20.8, creatinine 0.79, glucose 114, calcium 9.7, magnesium 2.0.     12/30/2022:   CBC:  WBC count 10.4, hemoglobin 10.1, hematocrit 33.6, platelet count 259 food   BMP: Sodium 137, potassium 4.4, chloride 100, CO2 25, BUN 14.3, creatinine 0.68, glucose 100, calcium 9.1, magnesium 2.0.      12/29/2022:  CBC:  WBC count 10.9, hemoglobin 9.8, hematocrit 31.7, glucose 241.    BMP: Sodium 136, potassium 4.7, chloride 100, CO2 26, BUN 14.4, creatinine 0.73, glucose 107, calcium 8.9.    12/27/2022:   CBC:  WBC count 1.5, hemoglobin 10.7, hematocrit 34.6, platelet count 202.    CMP: Sodium 137, potassium 4.3, chloride 105, CO2 25, BUN 16.1, creatinine 0.81, glucose 121, calcium 9.0, magnesium 2.1, alkaline phosphatase 73, total protein 6.4, albumin 3.0, total bilirubin 0.8, AST 39, ALT 22.     12/26/2022:   CBC:  WBC count 13.7, hemoglobin 10.7, hematocrit 34.3, platelet count 264.  CMP:  Sodium 137, potassium 4.4, chloride 103, CO2 25, BUN 18.9, creatinine 0.85, glucose 118, calcium 8.71 magnesium 1.9, alkaline phosphatase 72, total protein 5.7, albumin 3.1, total bilirubin 0.8, AST 56, ALT 26.     12/24/2022:   CMP: Sodium 140, potassium 3.9, chloride 105, CO2 25, BUN 17.2 creatinine 0.97, glucose 117, calcium 9.3, alkaline phosphatase 98, total protein 7.2, albumin 3.8, total bilirubin 0.5, AST 14, ALT 17.    CBC: WBC count 10.6, hemoglobin 12.9, hematocrit 41.2, platelet count 223.     Significant Imaging: I have reviewed all pertinent imaging results/findings within the past 24 hours.     X-ray left hip 12/25/2022: There has been placement of left femoral intramedullary taz and compression screw traverses into the femoral head transfixing the intertrochanteric fracture.  Positioning and alignment is  satisfactory.     CT left hip 12/25/2022: Comminuted intertrochanteric left femoral fracture.     CXR 12/24/2022: No acute chest disease is identified.     X-ray left hip 12/24/2022:  Displaced intertrochanteric fracture of the left femur.      Assessment/Plan:      * Left intertrochanteric femur fracture  Continue 4 week course of aspirin 81 mg PO BID for DVT prophylaxis, PT/OT, norco as needed for pain control, and weight-bearing as tolerated to the left lower extremity.  She is status post intramedullary nail by Dr. Ronnell Olivares on 12/25/2022.     Hypertension  Her blood pressure has been stable off antihypertensives.  She is no longer on lisinopril 5 mg PO daily which was held at [facility] due to hypotension.    Anxiety  Continue buspirone.    Depression  Continue venlafaxine.    GERD (gastroesophageal reflux disease)  Continue famotidine.    Morbid obesity  She was counseled on the importance of weight loss and diet.  She was noted to have a BMI of 57.41.      Disposition  Anticipate discharge home with home health in the next week.        VTE Risk Mitigation (From admission, onward)         Ordered     Place sequential compression device  Until discontinued         12/29/22 1612                Discharge Planning   XENA:      Code Status: Full Code   Is the patient medically ready for discharge?:     Reason for patient still in hospital (select all that apply): PT / OT recommendations  Discharge Plan A: Home with family, Home Health   Discharge Delays: None known at this time      This service was provided via telemedicine.  Type of Software: Audio/Visual.  Originating Site: [facility].  Distant Site: [place], [place].  Her exam was performed with the assitance of Donna Barroso RN.      Stephon Kearney MD  Department of Hospital Medicine   [facility] - Medical Surgical Unit

## 2023-01-10 NOTE — PLAN OF CARE
Problem: Adult Inpatient Plan of Care  Goal: Plan of Care Review  Outcome: Ongoing, Progressing  Flowsheets (Taken 1/9/2023 2050)  Plan of Care Reviewed With: patient     Problem: Impaired Wound Healing  Goal: Optimal Wound Healing  Outcome: Ongoing, Progressing  Intervention: Promote Wound Healing  Flowsheets (Taken 1/9/2023 2050)  Sleep/Rest Enhancement: awakenings minimized     Problem: Fall Injury Risk  Goal: Absence of Fall and Fall-Related Injury  Outcome: Ongoing, Progressing  Intervention: Identify and Manage Contributors  Flowsheets (Taken 1/9/2023 2050)  Self-Care Promotion: independence encouraged  Medication Review/Management: medications reviewed

## 2023-01-10 NOTE — PLAN OF CARE
Problem: Occupational Therapy  Goal: Occupational Therapy Goal  Description: Goals to be met by: 1/20/22     Patient will increase functional independence with ADLs by performing:    UE Dressing with Modified [place]. - met 1/10/23  LE Dressing with Minimal Assistance. - met 1/10/23  Grooming while seated at sink with Modified [place].  Toileting from bedside commode with Minimal Assistance for hygiene and clothing management.   Bathing from bench with Minimal Assistance. - met 1/10/23  Toilet transfer to bedside commode with Contact Guard Assistance.    Outcome: Ongoing, Progressing

## 2023-01-10 NOTE — PT/OT/SLP PROGRESS
"Occupational Therapy  Treatment    Name: Tanya Linda    : 1960 (62 y.o.)  MRN: [id number]           TREATMENT SUMMARY AND RECOMMENDATIONS:      OT Date of Treatment: 01/10/23  OT Start Time: 1300  OT Stop Time: 1335  OT Total Time (min): 35 min      Subjective Assessment:   No complaints  Lethargic   x Awake, alert, cooperative  Impulsive    Uncooperative   Flat affect    Agitated  c/o pain    Appropriate  c/o fatigue    Confused x Treated at bedside     Emotionally labile  Treated in gym/dept.      Other:        Therapy Precautions:    Cognitive deficits  Spinal precautions    Collar - hard  Sternal precautions    Collar - soft   TLSO   x Fall risk  LSO    Hip precautions - posterior  Knee immobilizer    Hip precautions - anterior  WBAT    Impaired communication  Partial weightbearing    Oxygen  TTWB    PEG tube  NWB    Visual deficits      Hearing deficits   Other:        Treatment Objectives:     Mobility Training:    Mobility task Assist level Comments    Bed mobility     Transfer CGA/SBA Stand/pivot t/f with RW to B[place]air   Sit to stands transitions     Functional mobility CGA FM from chair>bathroom with RW to complete shower   Sitting balance     Standing balance      Other:        ADL Training:    ADL Assist level Comments    Feeding     Grooming/hygiene     Bathing Min A  Pt able to bath 9/10 body parts with long handled sponge for B feet and anterior vivien area. Required assist for posterior vivien area.    Upper body dressing     Lower body dressing SBA Pt donned B socks using sock aide   Toileting     Toilet transfer     Adaptive equipment training     Other: shower transfer CGA Stand/pivot t/f with RW to wet shower with BSC inside shower        Additional Comments:  Pt left without diaper in chair and notified of Pt needing bandages changed from shower. Pt tearful following shower reporting, "I didn't think I would be able to do all of that by myself"    Assessment: Patient tolerated session " well.    OT Plan: Continue POC  Revisions made to plan of care: No    GOALS:   Multidisciplinary Problems       Occupational Therapy Goals          Problem: Occupational Therapy    Goal Priority Disciplines Outcome Interventions   Occupational Therapy Goal     OT, PT/OT Ongoing, Progressing    Description: Goals to be met by: 1/20/22     Patient will increase functional independence with ADLs by performing:    UE Dressing with Modified [place]. - met 1/10/23  LE Dressing with Minimal Assistance. - met 1/10/23  Grooming while seated at sink with Modified [place].  Toileting from bedside commode with Minimal Assistance for hygiene and clothing management.   Bathing from bench with Minimal Assistance. - met 1/10/23  Toilet transfer to bedside commode with Contact Guard Assistance.                         Skilled OT Minutes Provided: 35  Communication with Treatment Team:     Equipment recommendations:       At end of treatment, patient remained:  x Up in chair     Up in wheelchair in room    In bed   x With alarm activated    Bed rails up   x Call bell in reach     Family/friends present    Restraints secured properly    In bathroom with CNA/RN notified    In gym with PT/PTA/tech    Nurse aware    Other:

## 2023-01-10 NOTE — PT/OT/SLP PROGRESS
"         Physical Therapy Treatment Note           Name: Tanya Linda    : 1960 (62 y.o.)  MRN: [id number]           TREATMENT SUMMARY AND RECOMMENDATIONS:    PT Received On: 23  PT Start Time: 1500     PT Stop Time: 1525  PT Total Time (min): 25 min     Subjective Assessment:   No complaints  Lethargic    Awake, alert, cooperative  Uncooperative    Agitated x c/o pain    Appropriate  c/o fatigue    Confused  Treated at bedside     Emotionally labile x Treated in gym/dept.    Impulsive  Other:    Flat affect       Therapy Precautions:    Cognitive deficits  Spinal precautions    Collar - hard  Sternal precautions    Collar - soft   TLSO   x Fall risk  LSO    Hip precautions - posterior  Knee immobilizer    Hip precautions - anterior  WBAT    Impaired communication  Partial weightbearing    Oxygen  TTWB    PEG tube  NWB    Visual deficits  Other:    Hearing deficits          Treatment Objectives:     Mobility Training:   Assist level Comments    Bed mobility     Transfer     Gait CGA Amb on firm surface with RW 10 ft x 3   Sit to stand transitions CGA Sit-stand with B UE use   Sitting balance     Standing balance      Wheelchair mobility     Car transfer     Other:          Therapeutic Exercise:   Exercise Sets Reps Comments   B LE exer sitting long and short  10 reps x 2  In all planes to promote muscle strength and ROM                          Additional Comments:  Pt reports "L knee pain this PM"    Assessment: Patient tolerated session well.    PT Plan: continue  Revisions made to plan of care: No    GOALS:   Multidisciplinary Problems       Physical Therapy Goals          Problem: Physical Therapy    Goal Priority Disciplines Outcome Goal Variances Interventions   Physical Therapy Goal     PT, PT/OT Ongoing, Progressing     Description: Goals to be met by: Discharge     Patient will increase functional independence with mobility by performin. Supine to sit with Modified [place]  2. Sit to " supine with Modified [place]  3. Sit to stand transfer with Modified [place]  4. Bed to chair transfer with Stand-by Assistance using Rolling Walker  5. Gait  x 25 feet with Stand-by Assistance using Rolling Walker.                          Skilled PT Minutes Provided: 25   Communication with Treatment Team:     Equipment recommendations:       At end of treatment, patient remained:  x Up in chair     Up in wheelchair in room    In bed   x With alarm activated    Bed rails up   x Call bell in reach     Family/friends present    Restraints secured properly    In bathroom with CNA/RN notified    Nurse aware    In gym with therapist/tech    Other:

## 2023-01-10 NOTE — PT/OT/SLP PROGRESS
Physical Therapy Treatment Note           Name: Tanya Linda    : 1960 (62 y.o.)  MRN: [id number]           TREATMENT SUMMARY AND RECOMMENDATIONS:    PT Received On: 01/10/23  PT Start Time: 1130     PT Stop Time: 1200  PT Total Time (min): 30 min     Subjective Assessment:   No complaints  Lethargic   x Awake, alert, cooperative  Uncooperative    Agitated  c/o pain    Appropriate  c/o fatigue    Confused  Treated at bedside     Emotionally labile x Treated in gym/dept.    Impulsive  Other:    Flat affect       Therapy Precautions:    Cognitive deficits  Spinal precautions    Collar - hard  Sternal precautions    Collar - soft   TLSO    Fall risk  LSO    Hip precautions - posterior  Knee immobilizer    Hip precautions - anterior x WBAT    Impaired communication  Partial weightbearing    Oxygen  TTWB    PEG tube  NWB    Visual deficits  Other:    Hearing deficits          Treatment Objectives:     Mobility Training:   Assist level Comments    Bed mobility     Transfer     Gait CGA X20ft using RW   Sit to stand transitions CGA X4 sit to stands from bedside chair surface   Sitting balance     Standing balance      Wheelchair mobility     Car transfer     Other:          Therapeutic Exercise:   Exercise Sets Reps Comments   LE cycling   X5' F/B   Hip flexion, hip ABD, LAQ, ankle PF/DF 1 20 Performed short sitting                    Additional Comments:      Assessment: Patient tolerated session well. Pt reporting less pain today and was able to ambulate further with increased coordination and increased advancement of LLE    PT Plan: continue POC  Revisions made to plan of care: No    GOALS:   Multidisciplinary Problems       Physical Therapy Goals          Problem: Physical Therapy    Goal Priority Disciplines Outcome Goal Variances Interventions   Physical Therapy Goal     PT, PT/OT Ongoing, Progressing     Description: Goals to be met by: Discharge     Patient will increase functional independence with  mobility by performin. Supine to sit with Modified [place]  2. Sit to supine with Modified [place]  3. Sit to stand transfer with Modified [place]  4. Bed to chair transfer with Stand-by Assistance using Rolling Walker  5. Gait  x 25 feet with Stand-by Assistance using Rolling Walker.                          Skilled PT Minutes Provided: 30 minutes   Communication with Treatment Team:     Equipment recommendations:       At end of treatment, patient remained:  x Up in chair     Up in wheelchair in room    In bed   x With alarm activated    Bed rails up   x Call bell in reach    x Family/friends present    Restraints secured properly    In bathroom with CNA/RN notified    Nurse aware    In gym with therapist/tech    Other:

## 2023-01-10 NOTE — SUBJECTIVE & OBJECTIVE
Interval History: She ambulated 5 feet x 2 with her rolling walker and contact guard assistance and she required contact guard assistance with sit to stand transitions. She is doing well this morning and she denies any left hip pain. She is not currently on any stool softener or laxative and she had 3 bowel movements in the last 24 hours.      Review of Systems   All other systems reviewed and are negative.  Objective:     Vital Signs (Most Recent):  Temp: 97.7 °F (36.5 °C) (01/10/23 0719)  Pulse: 72 (01/10/23 0719)  Resp: 17 (01/10/23 0414)  BP: (!) 142/83 (01/10/23 0719)  SpO2: 96 % (01/10/23 0719)   Vital Signs (24h Range):  Temp:  [97.6 °F (36.4 °C)-98.1 °F (36.7 °C)] 97.7 °F (36.5 °C)  Pulse:  [60-81] 72  Resp:  [16-20] 17  SpO2:  [94 %-98 %] 96 %  BP: (121-142)/(68-83) 142/83     Weight: (!) 156.5 kg (345 lb 0.3 oz)  Body mass index is 57.41 kg/m².    Intake/Output Summary (Last 24 hours) at 1/10/2023 0841  Last data filed at 1/9/2023 1650  Gross per 24 hour   Intake 360 ml   Output 1302 ml   Net -942 ml      Physical Exam  General - Morbidly obese  female in no acute distress.  HEENT - NCAT. No scleral icterus. Oropharynx clear. Mucous membranes moist.  Neck - No lymphadenopathy, thyromegaly, or JVD.  CVS - Regular rate and rhythm. No murmurs, rubs, or gallops.  Resp - Lungs are clear to auscultation bilaterally. No rales, wheeze, or rhonchi.   GI - Soft, nontender, nondistended, normoactive bowel sounds present.   Extremities - No clubbing, cyanosis, or edema.   Neuro - CN II through XII are grossly intact. No focal neurological deficits. Alert and oriented times four.   Psych - Normal affect.  Skin - Left hip surgical site c/d/i. Slight edema and ecchymosis noted to periwounds.     Significant Labs: All pertinent labs within the past 24 hours have been reviewed.     1/9/2023:  CBC: WBC count 10.4, hemoglobin 11.1, hematocrit 37.9, platelet count 316.  CMP: Sodium 137, potassium 4.8, chloride 98,  CO2 28, BUN 17.8, creatinine 0.77, glucose 94, calcium 9.5, magnesium 2.1, alkaline phosphatase 223, total protein 6.6, albumin 3.3, total bilirubin 0.8, AST 27, ALT 35.     1/4/2023:  CBC: WBC count 10.4, hemoglobin 10.2, hematocrit 33.6, platelet count 307.  BMP: Sodium 141, potassium 4.7, chloride 102, CO2 28, BUN 20.8, creatinine 0.79, glucose 114, calcium 9.7, magnesium 2.0.     12/30/2022:   CBC:  WBC count 10.4, hemoglobin 10.1, hematocrit 33.6, platelet count 259 food   BMP: Sodium 137, potassium 4.4, chloride 100, CO2 25, BUN 14.3, creatinine 0.68, glucose 100, calcium 9.1, magnesium 2.0.      12/29/2022:  CBC:  WBC count 10.9, hemoglobin 9.8, hematocrit 31.7, glucose 241.    BMP: Sodium 136, potassium 4.7, chloride 100, CO2 26, BUN 14.4, creatinine 0.73, glucose 107, calcium 8.9.    12/27/2022:   CBC:  WBC count 1.5, hemoglobin 10.7, hematocrit 34.6, platelet count 202.    CMP: Sodium 137, potassium 4.3, chloride 105, CO2 25, BUN 16.1, creatinine 0.81, glucose 121, calcium 9.0, magnesium 2.1, alkaline phosphatase 73, total protein 6.4, albumin 3.0, total bilirubin 0.8, AST 39, ALT 22.     12/26/2022:   CBC:  WBC count 13.7, hemoglobin 10.7, hematocrit 34.3, platelet count 264.  CMP:  Sodium 137, potassium 4.4, chloride 103, CO2 25, BUN 18.9, creatinine 0.85, glucose 118, calcium 8.71 magnesium 1.9, alkaline phosphatase 72, total protein 5.7, albumin 3.1, total bilirubin 0.8, AST 56, ALT 26.     12/24/2022:   CMP: Sodium 140, potassium 3.9, chloride 105, CO2 25, BUN 17.2 creatinine 0.97, glucose 117, calcium 9.3, alkaline phosphatase 98, total protein 7.2, albumin 3.8, total bilirubin 0.5, AST 14, ALT 17.    CBC: WBC count 10.6, hemoglobin 12.9, hematocrit 41.2, platelet count 223.     Significant Imaging: I have reviewed all pertinent imaging results/findings within the past 24 hours.     X-ray left hip 12/25/2022: There has been placement of left femoral intramedullary taz and compression screw traverses  into the femoral head transfixing the intertrochanteric fracture.  Positioning and alignment is satisfactory.     CT left hip 12/25/2022: Comminuted intertrochanteric left femoral fracture.     CXR 12/24/2022: No acute chest disease is identified.     X-ray left hip 12/24/2022:  Displaced intertrochanteric fracture of the left femur.

## 2023-01-10 NOTE — NURSING
Called Dr. Olivares office for recommendations regarding f/u. No answer. Left Voicemail.      1654: Spoke with the nurse at Dr. Olivares office. She stated f/u appoinment to be made at time of d/c. Does not need to be seen at this time.

## 2023-01-10 NOTE — PT/OT/SLP PROGRESS
"Occupational Therapy  Treatment    Tanya Linda   MRN: [id number]   Admitting Diagnosis: Closed intertrochanteric fracture of hip, left, initial encounter    OT Date of Treatment: 01/10/23   OT Start Time: 1105  OT Stop Time: 1130  OT Total Time (min): 25 min    Billable Minutes:  Self Care/Home Management 15 and Therapeutic Activity 10    OT/FAUSTO: OT          General Precautions: Standard, fall  Orthopedic Precautions: LLE non weight bearing  Braces: N/A  Respiratory Status: Room air         Subjective:    Pt awake and alert, agreeable to getting up to chair for therapy in gym.  Pt stated: "I need to start having them get me up before y'all come so I'm ready to go", good motivation.  Pain/Comfort  Pain Rating 1: 0/10    Objective:  Patient found with: Lynnette     Functional Mobility:  Bed Mobility:  CGA with cues for technique and BUE placement during tf supine, rolling to R side, and sitting up to EOB with OT managing bed position       Transfers:  min A sit to stand from EOB, CGA to tf with RW to recliner chair and good standing endurance to change to dry diaper in standing at RW        Functional Ambulation: NT    Activities of Daily Living:     Pt dressed UB with setup seated at recliner chair, performed all grooming tasks seated at chair at sink (putting hair up, brushing teeth, washing face) with mod I.      AM-PAC 6 CLICK ADL   How much help from another person does this patient currently need?   1 = Unable, Total/Dependent Assistance  2 = A lot, Maximum/Moderate Assistance  3 = A little, Minimum/Contact Guard/Supervision  4 = None, Modified [place]/Independent          AM-PAC Raw Score CMS "G-Code Modifier Level of Impairment Assistance   6 % Total / Unable   7 - 8 CM 80 - 100% Maximal Assist   9-13 CL 60 - 80% Moderate Assist   14 - 19 CK 40 - 60% Moderate Assist   20 - 22 CJ 20 - 40% Minimal Assist   23 CI 1-20% SBA / CGA   24 CH 0% Independent/ Mod I       Patient left  up in chair in therapy " gym  with  PT present    ASSESSMENT:  Tanya Linda is a 62 y.o. female with a medical diagnosis of Closed intertrochanteric fracture of hip, left, initial encounter and presents with functional decline since L hip ORIF.    Rehab identified problem list/impairments:  weakness, impaired endurance, impaired self care skills, impaired functional mobility, decreased lower extremity function, pain, decreased ROM    Rehab potential is excellent.    Activity tolerance: Excellent    Discharge recommendations: rehabilitation facility   Barriers to discharge: Barriers to Discharge: Other (Comment) (severity of deficits, slow progress with ambulation)    Equipment recommendations: bedside commode, walker, rolling, hip kit    GOALS:   Multidisciplinary Problems       Occupational Therapy Goals          Problem: Occupational Therapy    Goal Priority Disciplines Outcome Interventions   Occupational Therapy Goal     OT, PT/OT Ongoing, Progressing    Description: Goals to be met by: 1/20/22     Patient will increase functional independence with ADLs by performing:    UE Dressing with Modified [place].  LE Dressing with Minimal Assistance.  Grooming while seated at sink with Modified [place].  Toileting from bedside commode with Minimal Assistance for hygiene and clothing management.   Bathing from bench with Minimal Assistance.  Toilet transfer to bedside commode with Contact Guard Assistance.                         Plan:  Patient to be seen 5 x/week, 6 x/week to address the above listed problems via self-care/home management, therapeutic activities, therapeutic exercises  Plan of Care expires:    Plan of Care reviewed with: patient         01/10/2023

## 2023-01-11 PROCEDURE — 97530 THERAPEUTIC ACTIVITIES: CPT

## 2023-01-11 PROCEDURE — 97535 SELF CARE MNGMENT TRAINING: CPT

## 2023-01-11 PROCEDURE — 97530 THERAPEUTIC ACTIVITIES: CPT | Mod: CQ

## 2023-01-11 PROCEDURE — 97110 THERAPEUTIC EXERCISES: CPT

## 2023-01-11 PROCEDURE — 97116 GAIT TRAINING THERAPY: CPT | Mod: CQ

## 2023-01-11 PROCEDURE — 94760 N-INVAS EAR/PLS OXIMETRY 1: CPT

## 2023-01-11 PROCEDURE — 97110 THERAPEUTIC EXERCISES: CPT | Mod: CQ

## 2023-01-11 PROCEDURE — 11000004 HC SNF PRIVATE

## 2023-01-11 PROCEDURE — 25000003 PHARM REV CODE 250: Performed by: STUDENT IN AN ORGANIZED HEALTH CARE EDUCATION/TRAINING PROGRAM

## 2023-01-11 RX ADMIN — METHOCARBAMOL 500 MG: 500 TABLET ORAL at 09:01

## 2023-01-11 RX ADMIN — MICONAZOLE NITRATE 2 % TOPICAL POWDER: at 09:01

## 2023-01-11 RX ADMIN — HYDROCODONE BITARTRATE AND ACETAMINOPHEN 1 TABLET: 5; 325 TABLET ORAL at 11:01

## 2023-01-11 RX ADMIN — VENLAFAXINE 37.5 MG: 37.5 TABLET ORAL at 05:01

## 2023-01-11 RX ADMIN — ASPIRIN 81 MG: 81 TABLET, COATED ORAL at 09:01

## 2023-01-11 RX ADMIN — HYDROCODONE BITARTRATE AND ACETAMINOPHEN 1 TABLET: 5; 325 TABLET ORAL at 09:01

## 2023-01-11 RX ADMIN — FAMOTIDINE 20 MG: 20 TABLET ORAL at 09:01

## 2023-01-11 RX ADMIN — METHOCARBAMOL 500 MG: 500 TABLET ORAL at 03:01

## 2023-01-11 RX ADMIN — DOCUSATE SODIUM 100 MG: 100 CAPSULE, LIQUID FILLED ORAL at 09:01

## 2023-01-11 RX ADMIN — BUSPIRONE HYDROCHLORIDE 7.5 MG: 7.5 TABLET ORAL at 09:01

## 2023-01-11 NOTE — PT/OT/SLP PROGRESS
Physical Therapy Treatment Note           Name: Tanya Linda    : 1960 (62 y.o.)  MRN: [id number]           TREATMENT SUMMARY AND RECOMMENDATIONS:    PT Received On: 23  PT Start Time: 1130     PT Stop Time: 1155  PT Total Time (min): 25 min     Subjective Assessment:  x No complaints  Lethargic    Awake, alert, cooperative  Uncooperative    Agitated  c/o pain    Appropriate  c/o fatigue    Confused  Treated at bedside     Emotionally labile  Treated in gym/dept.    Impulsive  Other:    Flat affect       Therapy Precautions:    Cognitive deficits  Spinal precautions    Collar - hard  Sternal precautions    Collar - soft   TLSO   x Fall risk  LSO    Hip precautions - posterior  Knee immobilizer    Hip precautions - anterior  WBAT    Impaired communication  Partial weightbearing    Oxygen  TTWB    PEG tube  NWB    Visual deficits  Other:    Hearing deficits          Treatment Objectives:     Mobility Training:   Assist level Comments    Bed mobility     Transfer CGA Commode transfer with RW   Gait CGA Amb on firm surface with RW 50 ft x 2 with seated rest break   Sit to stand transitions SBA Sit-stand    Sitting balance     Standing balance      Wheelchair mobility     Car transfer     Other:          Therapeutic Exercise:   Exercise Sets Reps Comments                               Additional Comments:  Pt improving overall status    Assessment: Patient tolerated session well.    PT Plan: continue  Revisions made to plan of care: No    GOALS:   Multidisciplinary Problems       Physical Therapy Goals          Problem: Physical Therapy    Goal Priority Disciplines Outcome Goal Variances Interventions   Physical Therapy Goal     PT, PT/OT Ongoing, Progressing     Description: Goals to be met by: Discharge     Patient will increase functional independence with mobility by performin. Supine to sit with Modified [place]  2. Sit to supine with Modified [place]  3. Sit to stand transfer  with Modified [place]  4. Bed to chair transfer with Stand-by Assistance using Rolling Walker  5. Gait  x 25 feet with Stand-by Assistance using Rolling Walker.                          Skilled PT Minutes Provided: 25   Communication with Treatment Team:     Equipment recommendations:       At end of treatment, patient remained:  x Up in chair     Up in wheelchair in room    In bed   x With alarm activated    Bed rails up   x Call bell in reach     Family/friends present    Restraints secured properly    In bathroom with CNA/RN notified    Nurse aware    In gym with therapist/tech    Other:

## 2023-01-11 NOTE — ASSESSMENT & PLAN NOTE
Anticipate discharge home with home health in the next week. A ramp is being arranged at her house. Her next review date is on 1/12/2023.

## 2023-01-11 NOTE — PT/OT/SLP PROGRESS
Occupational Therapy  Treatment    Name: Tanya Linda    : 1960 (62 y.o.)  MRN: [id number]           TREATMENT SUMMARY AND RECOMMENDATIONS:      OT Date of Treatment: 23  OT Start Time: 900  OT Stop Time: 930  OT Total Time (min): 30 min      Subjective Assessment:   No complaints  Lethargic   x Awake, alert, cooperative  Impulsive    Uncooperative   Flat affect    Agitated  c/o pain    Appropriate  c/o fatigue    Confused x Treated at bedside     Emotionally labile  Treated in gym/dept.      Other:        Therapy Precautions:    Cognitive deficits  Spinal precautions    Collar - hard  Sternal precautions    Collar - soft   TLSO   x Fall risk  LSO    Hip precautions - posterior  Knee immobilizer    Hip precautions - anterior  WBAT    Impaired communication  Partial weightbearing    Oxygen  TTWB    PEG tube  NWB    Visual deficits      Hearing deficits   Other:        Treatment Objectives:     Mobility Training:    Mobility task Assist level Comments    Bed mobility Min A  Supine>EOB for trunk lifting   Transfer SBA/CGA Stand/pivot t/f with RW bed>chair   Sit to stands transitions     Functional mobility SBA/CGA X15 feet with RW in room    Sitting balance     Standing balance      Other:        ADL Training:    ADL Assist level Comments    Feeding     Grooming/hygiene Mod I Pt performed oral hygiene while seated in chair at sink   Bathing     Upper body dressing Mod I  Pt able to herson pullover shirt sitting EOB    Lower body dressing SBA Pt able to thread BLEs into underwear ad pants and manage over hips in standing    Toileting     Toilet transfer     Adaptive equipment training     Other:         Additional Comments:  Pt progressing with gait and gaining more confidence in LLE; able to perform ADLs tasks with less assistance today.     Assessment: Patient tolerated session well.    OT Plan: Continue POC  Revisions made to plan of care: Yes    GOALS:   Multidisciplinary Problems       Occupational  Therapy Goals          Problem: Occupational Therapy    Goal Priority Disciplines Outcome Interventions   Occupational Therapy Goal     OT, PT/OT Ongoing, Progressing    Description: Goals to be met by: 1/20/22     Patient will increase functional independence with ADLs by performing:    UE Dressing with Modified [place]. - met 1/10/23  LE Dressing with Minimal Assistance. - met 1/10/23  Grooming while seated at sink with Modified [place].  Toileting from bedside commode with Minimal Assistance for hygiene and clothing management.   Bathing from bench with Minimal Assistance. - met 1/10/23  Toilet transfer to bedside commode with Contact Guard Assistance.                         Skilled OT Minutes Provided: 30  Communication with Treatment Team:     Equipment recommendations:       At end of treatment, patient remained:  x Up in chair     Up in wheelchair in room    In bed   x With alarm activated    Bed rails up   x Call bell in reach     Family/friends present    Restraints secured properly    In bathroom with CNA/RN notified    In gym with PT/PTA/tech    Nurse aware    Other:

## 2023-01-11 NOTE — PT/OT/SLP PROGRESS
Name: Tanya Linda    : 1960 (62 y.o.)  MRN: [id number]           Patient Unavailable      Patient unable to be seen at this time secondary to: pt asleep upon arrival- had shower this PM and fatigued.. will resume tomorrow..

## 2023-01-11 NOTE — PLAN OF CARE
Weekly Staffing Report      Date Admitted: 12/29/2022 :   Staffing Date: 1/11/2023     Patient Active Problem List   Diagnosis    Dysuria    Morbid obesity    Flank pain    Back muscle spasm    Hypertension    Depression    Left intertrochanteric femur fracture    Femur fracture, left    Anxiety    GERD (gastroesophageal reflux disease)    Disposition          Team Members Present:       Nursing Present     Yes [x]    No []    Physical Therapy Present    Yes [x]    No []    Occupational Therapy Present     Yes [x]    No []    Speech Therapy Present    Yes []    No []    NA []    Dietary Present    Yes [x]    No []        Physician Present   [] Prem Machado    [] Thairy Reyes    [] MIGUEL Arita    [x] ЕЛЕНА [place]     [] LORNA Delacruz      Family Present    [x] Adult Children daughter on phone    [] Spouse    [] POA    [] Friend/ Caregiver    [] Other       Interdisciplinary Meeting Notes:  PT- progressing everyday. Walked 20ft with RW. CGA. CGA for all activities at this time. Will continue to work with her to get more independent. OT- Min to CGA for all ADLs. Mod assist for toileting. Making progress will continue to work with her. Nutritionally- doing well. Medically- no acute issues. Continue to progress with therapy. Patient states she will be going to her daughter's home on discharge. NRD 01/12/2022 all questions answered at this time                Please see Documented PT/OT/ST, Dietary, Nursing, and Physician notes for detailed treatment information.

## 2023-01-11 NOTE — PT/OT/SLP PROGRESS
Occupational Therapy  Treatment    Name: Tanya Linda    : 1960 (62 y.o.)  MRN: [id number]           TREATMENT SUMMARY AND RECOMMENDATIONS:      OT Date of Treatment: 23  OT Start Time: 1500  OT Stop Time: 1520  OT Total Time (min): 20 min      Subjective Assessment:   No complaints  Lethargic   x Awake, alert, cooperative  Impulsive    Uncooperative   Flat affect    Agitated  c/o pain   x Appropriate  c/o fatigue    Confused x Treated at bedside     Emotionally labile  Treated in gym/dept.      Other:        Therapy Precautions:    Cognitive deficits  Spinal precautions    Collar - hard  Sternal precautions    Collar - soft   TLSO   x Fall risk  LSO    Hip precautions - posterior  Knee immobilizer    Hip precautions - anterior  WBAT    Impaired communication  Partial weightbearing    Oxygen  TTWB    PEG tube  NWB    Visual deficits      Hearing deficits   Other:        Treatment Objectives:     Mobility Training:    Mobility task Assist level Comments    Bed mobility SVP EOB<supine; rolling L and R   Transfer     Sit to stands transitions CGA Recliner,standing using GB and RW   Functional mobility CGA FM in room from recliner to BSC using GB and RW   Sitting balance     Standing balance      Other:        ADL Training:    ADL Assist level Comments    Feeding     Grooming/hygiene     Bathing     Upper body dressing Set-up  Doff pull-over shirt and don hospital gown   Lower body dressing Min A Doff pants; assist to unthread at ankles   Toileting Min A +BM; assist form posterior hygiene   Toilet transfer     Adaptive equipment training     Other:           Therapeutic Exercise:   Exercise Sets Reps Comments                               Additional Comments:      Assessment: Patient tolerated session well.    OT Plan: Continue with POC  Revisions made to plan of care: No    GOALS:   Multidisciplinary Problems       Occupational Therapy Goals          Problem: Occupational Therapy    Goal Priority Disciplines  Outcome Interventions   Occupational Therapy Goal     OT, PT/OT Ongoing, Progressing    Description: Goals to be met by: 1/20/22     Patient will increase functional independence with ADLs by performing:    UE Dressing with Modified [place]. - met 1/10/23  LE Dressing with Minimal Assistance. - met 1/10/23  Grooming while seated at sink with Modified [place].  Toileting from bedside commode with Minimal Assistance for hygiene and clothing management.   Bathing from bench with Minimal Assistance. - met 1/10/23  Toilet transfer to bedside commode with Contact Guard Assistance.                         Skilled OT Minutes Provided: 20  Communication with Treatment Team:     Equipment recommendations:       At end of treatment, patient remained:   Up in chair     Up in wheelchair in room   x In bed   x With alarm activated   x Bed rails up   x Call bell in reach     Family/friends present    Restraints secured properly    In bathroom with CNA/RN notified    In gym with PT/PTA/tech    Nurse aware    Other:

## 2023-01-11 NOTE — PT/OT/SLP PROGRESS
Occupational Therapy  Treatment    Name: Tanya Linda    : 1960 (62 y.o.)  MRN: [id number]           TREATMENT SUMMARY AND RECOMMENDATIONS:      OT Date of Treatment: 23  OT Start Time: 1300  OT Stop Time: 1330  OT Total Time (min): 30 min      Subjective Assessment:   No complaints  Lethargic   x Awake, alert, cooperative  Impulsive    Uncooperative   Flat affect    Agitated  c/o pain    Appropriate  c/o fatigue    Confused  Treated at bedside     Emotionally labile x Treated in gym/dept.      Other:        Therapy Precautions:    Cognitive deficits  Spinal precautions    Collar - hard  Sternal precautions    Collar - soft   TLSO   x Fall risk  LSO    Hip precautions - posterior  Knee immobilizer    Hip precautions - anterior  WBAT    Impaired communication  Partial weightbearing    Oxygen  TTWB    PEG tube  NWB    Visual deficits      Hearing deficits   Other:        Treatment Objectives:     Mobility Training:    Mobility task Assist level Comments    Bed mobility     Transfer SBA/CGA Stand/pivot t/f with RW    Sit to stands transitions SBA    Functional mobility SBA X40 feet, x20 feet with seated rest break between    Sitting balance     Standing balance  SBA Pt stood with no UE support and participated in balloon volleyball ax to hit back and forth with therapist. No LOB noted.    Other:          Therapeutic Exercise:   Exercise Sets Reps Comments   3# dowel 3 10 Shoulder press, chest press, straight arm raises, bicep curls, forward rows, backwards rows                         Additional Comments:      Assessment: Patient tolerated session well.    OT Plan: Continue POC  Revisions made to plan of care: No    GOALS:   Multidisciplinary Problems       Occupational Therapy Goals          Problem: Occupational Therapy    Goal Priority Disciplines Outcome Interventions   Occupational Therapy Goal     OT, PT/OT Ongoing, Progressing    Description: Goals to be met by: 22     Patient will increase  functional independence with ADLs by performing:    UE Dressing with Modified [place]. - met 1/10/23  LE Dressing with Minimal Assistance. - met 1/10/23  Grooming while seated at sink with Modified [place].  Toileting from bedside commode with Minimal Assistance for hygiene and clothing management.   Bathing from bench with Minimal Assistance. - met 1/10/23  Toilet transfer to bedside commode with Contact Guard Assistance.                         Skilled OT Minutes Provided: 30  Communication with Treatment Team:     Equipment recommendations:       At end of treatment, patient remained:   Up in chair     Up in wheelchair in room    In bed    With alarm activated    Bed rails up    Call bell in reach     Family/friends present    Restraints secured properly    In bathroom with CNA/RN notified   x In gym with PT/PTA/tech    Nurse aware    Other:

## 2023-01-11 NOTE — PROGRESS NOTES
[facility]  [facility] Medicine  Telehospitalist Progress Note    Patient Name: Tanya Linda  MRN: [id number]  Patient Class: IP- Swing   Admission Date: 12/29/2022  Length of Stay: 13 days  Attending Physician: Stephon Kearney MD  Primary Care Provider: MARION Salazar      Subjective:     Principal Problem:Closed intertrochanteric fracture of hip, left, initial encounter      HPI:  Ms. Linda is a 62-year-old  female with history of morbid obesity, hypertension, depression, GERD, and anxiety who presented to [facility] ER on 12/24/2022 with left hip pain following a fall.  X-ray of the left hip and femur showed a displaced intertrochanteric fracture of the left femur and chest x-ray revealed no acute cardiopulmonary process. CT of the left hip confirmed a comminuted intertrochanteric left femoral fracture and orthopedics was consulted.  She was taken to the OR on 12/25/2022 for intramedullary nail of left intertrochanteric femur fracture by Dr. Ronnell Olivares and she did relatively well postoperatively.  Her lisinopril was held due to mild hypotension and her blood pressure stabilized.  She had no other complications throughout her hospital course and she was transferred to [facility] swing bed on 12/29/2022 for PT/OT and management of her multiple medical comorbidities.      Overview/Hospital Course:  She was admitted to [facility] swing bed on 12/29/2022 for rehab following intramedullary nail of left intertrochanteric femur fracture.  She is being treated with a 4 week course of aspirin 81 mg PO BID for DVT prophylaxis and norco 5/325 mg PO every 6 hours as needed for pain.  She was started on diclofenac 1% gel 2 grams topical BID prn for left knee pain and stiffness on 1/6/2023. Her miralax was changed from 17 gm PO daily to prn on 1/8/2023.      Interval History:  She ambulated 20 feet with her rolling walker and contact guard assistance yesterday morning  and she required contact guard assistance with sit to stand transitions and contact guard to standby assistance with transfers. She is doing well today without complaints.      Review of Systems   All other systems reviewed and are negative.  Objective:     Vital Signs (Most Recent):  Temp: 98 °F (36.7 °C) (01/11/23 0726)  Pulse: 69 (01/11/23 0726)  Resp: 19 (01/10/23 2352)  BP: 124/71 (01/11/23 0726)  SpO2: (!) 93 % (01/11/23 0726)   Vital Signs (24h Range):  Temp:  [97 °F (36.1 °C)-98.3 °F (36.8 °C)] 98 °F (36.7 °C)  Pulse:  [67-78] 69  Resp:  [16-19] 19  SpO2:  [93 %-99 %] 93 %  BP: (109-147)/(62-77) 124/71     Weight: (!) 156.5 kg (345 lb 0.3 oz)  Body mass index is 57.41 kg/m².    Intake/Output Summary (Last 24 hours) at 1/11/2023 0909  Last data filed at 1/11/2023 0811  Gross per 24 hour   Intake 480 ml   Output 2750 ml   Net -2270 ml      Physical Exam  General - Morbidly obese  female in no acute distress.  HEENT - NCAT. No scleral icterus. Oropharynx clear. Mucous membranes moist.  Neck - No lymphadenopathy, thyromegaly, or JVD.  CVS - Regular rate and rhythm. No murmurs, rubs, or gallops.  Resp - Lungs are clear to auscultation bilaterally. No rales, wheeze, or rhonchi.   GI - Soft, nontender, nondistended, normoactive bowel sounds present.   Extremities - No clubbing, cyanosis, or edema.   Neuro - CN II through XII are grossly intact. No focal neurological deficits. Alert and oriented times four.   Psych - Normal affect.  Skin - Left hip surgical site c/d/i. Slight edema and ecchymosis noted to periwounds.     Significant Labs: All pertinent labs within the past 24 hours have been reviewed.     1/9/2023:  CBC: WBC count 10.4, hemoglobin 11.1, hematocrit 37.9, platelet count 316.  CMP: Sodium 137, potassium 4.8, chloride 98, CO2 28, BUN 17.8, creatinine 0.77, glucose 94, calcium 9.5, magnesium 2.1, alkaline phosphatase 223, total protein 6.6, albumin 3.3, total bilirubin 0.8, AST 27, ALT 35.      1/4/2023:  CBC: WBC count 10.4, hemoglobin 10.2, hematocrit 33.6, platelet count 307.  BMP: Sodium 141, potassium 4.7, chloride 102, CO2 28, BUN 20.8, creatinine 0.79, glucose 114, calcium 9.7, magnesium 2.0.     12/30/2022:   CBC:  WBC count 10.4, hemoglobin 10.1, hematocrit 33.6, platelet count 259 food   BMP: Sodium 137, potassium 4.4, chloride 100, CO2 25, BUN 14.3, creatinine 0.68, glucose 100, calcium 9.1, magnesium 2.0.      12/29/2022:  CBC:  WBC count 10.9, hemoglobin 9.8, hematocrit 31.7, glucose 241.    BMP: Sodium 136, potassium 4.7, chloride 100, CO2 26, BUN 14.4, creatinine 0.73, glucose 107, calcium 8.9.    12/27/2022:   CBC:  WBC count 1.5, hemoglobin 10.7, hematocrit 34.6, platelet count 202.    CMP: Sodium 137, potassium 4.3, chloride 105, CO2 25, BUN 16.1, creatinine 0.81, glucose 121, calcium 9.0, magnesium 2.1, alkaline phosphatase 73, total protein 6.4, albumin 3.0, total bilirubin 0.8, AST 39, ALT 22.     12/26/2022:   CBC:  WBC count 13.7, hemoglobin 10.7, hematocrit 34.3, platelet count 264.  CMP:  Sodium 137, potassium 4.4, chloride 103, CO2 25, BUN 18.9, creatinine 0.85, glucose 118, calcium 8.71 magnesium 1.9, alkaline phosphatase 72, total protein 5.7, albumin 3.1, total bilirubin 0.8, AST 56, ALT 26.     12/24/2022:   CMP: Sodium 140, potassium 3.9, chloride 105, CO2 25, BUN 17.2 creatinine 0.97, glucose 117, calcium 9.3, alkaline phosphatase 98, total protein 7.2, albumin 3.8, total bilirubin 0.5, AST 14, ALT 17.    CBC: WBC count 10.6, hemoglobin 12.9, hematocrit 41.2, platelet count 223.     Significant Imaging: I have reviewed all pertinent imaging results/findings within the past 24 hours.     X-ray left hip 12/25/2022: There has been placement of left femoral intramedullary taz and compression screw traverses into the femoral head transfixing the intertrochanteric fracture.  Positioning and alignment is satisfactory.     CT left hip 12/25/2022: Comminuted intertrochanteric left  femoral fracture.     CXR 12/24/2022: No acute chest disease is identified.     X-ray left hip 12/24/2022:  Displaced intertrochanteric fracture of the left femur.      Assessment/Plan:      * Left intertrochanteric femur fracture  Continue 4 week course of aspirin 81 mg PO BID for DVT prophylaxis, PT/OT, norco as needed for pain control, and weight-bearing as tolerated to the left lower extremity.  She is status post intramedullary nail by Dr. Ronnell Olivares on 12/25/2022.     Hypertension  Her blood pressure has been stable off antihypertensives.  She is no longer on lisinopril 5 mg PO daily which was held at [facility] due to hypotension.    Anxiety  Continue buspirone.    Depression  Continue venlafaxine.    GERD (gastroesophageal reflux disease)  Continue famotidine.    Morbid obesity  She was counseled on the importance of weight loss and diet.  She was noted to have a BMI of 57.41.      Disposition  Anticipate discharge home with home health in the next week. A ramp is being arranged at her house. Her next review date is on 1/12/2023.        VTE Risk Mitigation (From admission, onward)         Ordered     Place sequential compression device  Until discontinued         12/29/22 1612                Discharge Planning   XENA:      Code Status: Full Code   Is the patient medically ready for discharge?:     Reason for patient still in hospital (select all that apply): PT / OT recommendations  Discharge Plan A: Home with family, Home Health   Discharge Delays: None known at this time      This service was provided via telemedicine.  Type of Software: Audio/Visual.  Originating Site: [facility].  Distant Site: [place], [place].  Her exam was performed with the assitance of Donna Barroso RN.      Stephon Kearney MD  Department of Hospital Medicine   [facility]

## 2023-01-11 NOTE — SUBJECTIVE & OBJECTIVE
Interval History:  She ambulated 20 feet with her rolling walker and contact guard assistance yesterday morning and she required contact guard assistance with sit to stand transitions and contact guard to standby assistance with transfers. She is doing well today without complaints.      Review of Systems   All other systems reviewed and are negative.  Objective:     Vital Signs (Most Recent):  Temp: 98 °F (36.7 °C) (01/11/23 0726)  Pulse: 69 (01/11/23 0726)  Resp: 19 (01/10/23 2352)  BP: 124/71 (01/11/23 0726)  SpO2: (!) 93 % (01/11/23 0726)   Vital Signs (24h Range):  Temp:  [97 °F (36.1 °C)-98.3 °F (36.8 °C)] 98 °F (36.7 °C)  Pulse:  [67-78] 69  Resp:  [16-19] 19  SpO2:  [93 %-99 %] 93 %  BP: (109-147)/(62-77) 124/71     Weight: (!) 156.5 kg (345 lb 0.3 oz)  Body mass index is 57.41 kg/m².    Intake/Output Summary (Last 24 hours) at 1/11/2023 0909  Last data filed at 1/11/2023 0811  Gross per 24 hour   Intake 480 ml   Output 2750 ml   Net -2270 ml      Physical Exam  General - Morbidly obese  female in no acute distress.  HEENT - NCAT. No scleral icterus. Oropharynx clear. Mucous membranes moist.  Neck - No lymphadenopathy, thyromegaly, or JVD.  CVS - Regular rate and rhythm. No murmurs, rubs, or gallops.  Resp - Lungs are clear to auscultation bilaterally. No rales, wheeze, or rhonchi.   GI - Soft, nontender, nondistended, normoactive bowel sounds present.   Extremities - No clubbing, cyanosis, or edema.   Neuro - CN II through XII are grossly intact. No focal neurological deficits. Alert and oriented times four.   Psych - Normal affect.  Skin - Left hip surgical site c/d/i. Slight edema and ecchymosis noted to periwounds.     Significant Labs: All pertinent labs within the past 24 hours have been reviewed.     1/9/2023:  CBC: WBC count 10.4, hemoglobin 11.1, hematocrit 37.9, platelet count 316.  CMP: Sodium 137, potassium 4.8, chloride 98, CO2 28, BUN 17.8, creatinine 0.77, glucose 94, calcium 9.5,  magnesium 2.1, alkaline phosphatase 223, total protein 6.6, albumin 3.3, total bilirubin 0.8, AST 27, ALT 35.     1/4/2023:  CBC: WBC count 10.4, hemoglobin 10.2, hematocrit 33.6, platelet count 307.  BMP: Sodium 141, potassium 4.7, chloride 102, CO2 28, BUN 20.8, creatinine 0.79, glucose 114, calcium 9.7, magnesium 2.0.     12/30/2022:   CBC:  WBC count 10.4, hemoglobin 10.1, hematocrit 33.6, platelet count 259 food   BMP: Sodium 137, potassium 4.4, chloride 100, CO2 25, BUN 14.3, creatinine 0.68, glucose 100, calcium 9.1, magnesium 2.0.      12/29/2022:  CBC:  WBC count 10.9, hemoglobin 9.8, hematocrit 31.7, glucose 241.    BMP: Sodium 136, potassium 4.7, chloride 100, CO2 26, BUN 14.4, creatinine 0.73, glucose 107, calcium 8.9.    12/27/2022:   CBC:  WBC count 1.5, hemoglobin 10.7, hematocrit 34.6, platelet count 202.    CMP: Sodium 137, potassium 4.3, chloride 105, CO2 25, BUN 16.1, creatinine 0.81, glucose 121, calcium 9.0, magnesium 2.1, alkaline phosphatase 73, total protein 6.4, albumin 3.0, total bilirubin 0.8, AST 39, ALT 22.     12/26/2022:   CBC:  WBC count 13.7, hemoglobin 10.7, hematocrit 34.3, platelet count 264.  CMP:  Sodium 137, potassium 4.4, chloride 103, CO2 25, BUN 18.9, creatinine 0.85, glucose 118, calcium 8.71 magnesium 1.9, alkaline phosphatase 72, total protein 5.7, albumin 3.1, total bilirubin 0.8, AST 56, ALT 26.     12/24/2022:   CMP: Sodium 140, potassium 3.9, chloride 105, CO2 25, BUN 17.2 creatinine 0.97, glucose 117, calcium 9.3, alkaline phosphatase 98, total protein 7.2, albumin 3.8, total bilirubin 0.5, AST 14, ALT 17.    CBC: WBC count 10.6, hemoglobin 12.9, hematocrit 41.2, platelet count 223.     Significant Imaging: I have reviewed all pertinent imaging results/findings within the past 24 hours.     X-ray left hip 12/25/2022: There has been placement of left femoral intramedullary taz and compression screw traverses into the femoral head transfixing the intertrochanteric  fracture.  Positioning and alignment is satisfactory.     CT left hip 12/25/2022: Comminuted intertrochanteric left femoral fracture.     CXR 12/24/2022: No acute chest disease is identified.     X-ray left hip 12/24/2022:  Displaced intertrochanteric fracture of the left femur.

## 2023-01-11 NOTE — PROGRESS NOTES
Name: Tanya Linda    : 1960 (62 y.o.)  MRN: [id number]            Interdisciplinary Team Conference     Case conference held with patient/family and care team to discuss progress, plan of care, barriers to be addressed for safe return home, equipment recommendations, and discharge planning. Communicated therapy progress with MD, RN, therapy clinicians and case management. All questions/concerns answered.

## 2023-01-11 NOTE — PT/OT/SLP PROGRESS
Name: Tanya Linda    : 1960 (62 y.o.)  MRN: [id number]            Interdisciplinary Team Conference     Case conference held with patient/family and care team to discuss progress, plan of care, barriers to be addressed for safe return home, equipment recommendations, and discharge planning. Communicated therapy progress with MD, RN, therapy clinicians and case management. All questions/concerns answered.

## 2023-01-11 NOTE — PT/OT/SLP PROGRESS
Physical Therapy Treatment Note           Name: Tanya Linda    : 1960 (62 y.o.)  MRN: [id number]           TREATMENT SUMMARY AND RECOMMENDATIONS:    PT Received On: 23  PT Start Time: 1330     PT Stop Time: 1355  PT Total Time (min): 25 min     Subjective Assessment:  x No complaints  Lethargic    Awake, alert, cooperative  Uncooperative    Agitated  c/o pain    Appropriate  c/o fatigue    Confused  Treated at bedside     Emotionally labile  Treated in gym/dept.    Impulsive  Other:    Flat affect       Therapy Precautions:    Cognitive deficits  Spinal precautions    Collar - hard  Sternal precautions    Collar - soft   TLSO    Fall risk  LSO    Hip precautions - posterior  Knee immobilizer    Hip precautions - anterior  WBAT    Impaired communication  Partial weightbearing    Oxygen  TTWB    PEG tube  NWB    Visual deficits  Other:    Hearing deficits          Treatment Objectives:     Mobility Training:   Assist level Comments    Bed mobility     Transfer     Gait CGA Amb on firm surface with RW 40 ft x 2   Sit to stand transitions CGA Sit-stand x 5 with B UE use   Sitting balance     Standing balance      Wheelchair mobility     Car transfer     Other:          Therapeutic Exercise:   Exercise Sets Reps Comments   B LE exer standing  10 reps x 2 B UE supported Knee lifts, abd/add   B LE exer sitting  20 reps  Ankle pumps, TKE, abd/add,knee lifts                    Additional Comments:  Improving over all status    Assessment: Patient tolerated session well.    PT Plan: continue  Revisions made to plan of care: No    GOALS:   Multidisciplinary Problems       Physical Therapy Goals          Problem: Physical Therapy    Goal Priority Disciplines Outcome Goal Variances Interventions   Physical Therapy Goal     PT, PT/OT Ongoing, Progressing     Description: Goals to be met by: Discharge     Patient will increase functional independence with mobility by performin. Supine to sit with  Modified [place]  2. Sit to supine with Modified [place]  3. Sit to stand transfer with Modified [place]  4. Bed to chair transfer with Stand-by Assistance using Rolling Walker  5. Gait  x 25 feet with Stand-by Assistance using Rolling Walker.                          Skilled PT Minutes Provided: 25   Communication with Treatment Team:     Equipment recommendations:       At end of treatment, patient remained:  x Up in chair     Up in wheelchair in room    In bed   x With alarm activated    Bed rails up   x Call bell in reach     Family/friends present    Restraints secured properly    In bathroom with CNA/RN notified    Nurse aware    In gym with therapist/tech    Other:

## 2023-01-11 NOTE — PLAN OF CARE
Problem: Adult Inpatient Plan of Care  Goal: Plan of Care Review  Outcome: Ongoing, Progressing  Flowsheets (Taken 1/10/2023 1168)  Plan of Care Reviewed With: patient  Goal: Absence of Hospital-Acquired Illness or Injury  Outcome: Ongoing, Progressing     Problem: Bariatric Environmental Safety  Goal: Safety Maintained with Care  Outcome: Ongoing, Progressing     Problem: Impaired Wound Healing  Goal: Optimal Wound Healing  Outcome: Ongoing, Progressing     Problem: Skin Injury Risk Increased  Goal: Skin Health and Integrity  Outcome: Ongoing, Progressing     Problem: Fall Injury Risk  Goal: Absence of Fall and Fall-Related Injury  Outcome: Ongoing, Progressing     Problem: Surgical Site Infection  Goal: Absence of Infection Signs and Symptoms  Outcome: Ongoing, Progressing

## 2023-01-12 PROCEDURE — 97110 THERAPEUTIC EXERCISES: CPT

## 2023-01-12 PROCEDURE — 97530 THERAPEUTIC ACTIVITIES: CPT

## 2023-01-12 PROCEDURE — 97116 GAIT TRAINING THERAPY: CPT

## 2023-01-12 PROCEDURE — 25000003 PHARM REV CODE 250: Performed by: STUDENT IN AN ORGANIZED HEALTH CARE EDUCATION/TRAINING PROGRAM

## 2023-01-12 PROCEDURE — 11000004 HC SNF PRIVATE

## 2023-01-12 RX ADMIN — MICONAZOLE NITRATE 2 % TOPICAL POWDER: at 10:01

## 2023-01-12 RX ADMIN — ASPIRIN 81 MG: 81 TABLET, COATED ORAL at 09:01

## 2023-01-12 RX ADMIN — ACETAMINOPHEN 650 MG: 325 TABLET ORAL at 10:01

## 2023-01-12 RX ADMIN — DOCUSATE SODIUM 100 MG: 100 CAPSULE, LIQUID FILLED ORAL at 10:01

## 2023-01-12 RX ADMIN — MICONAZOLE NITRATE 2 % TOPICAL POWDER: at 09:01

## 2023-01-12 RX ADMIN — BUSPIRONE HYDROCHLORIDE 7.5 MG: 7.5 TABLET ORAL at 10:01

## 2023-01-12 RX ADMIN — VENLAFAXINE 37.5 MG: 37.5 TABLET ORAL at 05:01

## 2023-01-12 RX ADMIN — ASPIRIN 81 MG: 81 TABLET, COATED ORAL at 10:01

## 2023-01-12 RX ADMIN — HYDROCODONE BITARTRATE AND ACETAMINOPHEN 1 TABLET: 5; 325 TABLET ORAL at 01:01

## 2023-01-12 RX ADMIN — DOCUSATE SODIUM 100 MG: 100 CAPSULE, LIQUID FILLED ORAL at 09:01

## 2023-01-12 RX ADMIN — FAMOTIDINE 20 MG: 20 TABLET ORAL at 09:01

## 2023-01-12 NOTE — PLAN OF CARE
[facility]  Discharge Reassessment    Primary Care Provider: MARION Salazar    Expected Discharge Date:     Reassessment (most recent)       Discharge Reassessment - 01/11/23 1825          Discharge Reassessment    Assessment Type Discharge Planning Reassessment     Did the patient's condition or plan change since previous assessment? No     Discharge Plan discussed with: Adult children     Communicated XENA with patient/caregiver Date not available/Unable to determine     Discharge Plan A Home with family;Home Health     DME Needed Upon Discharge  bedside commode;hospital bed;wheelchair;walker, standard     Discharge Barriers Identified None     Why the patient remains in the hospital Requires continued medical care        Post-Acute Status    Post-Acute Authorization Home Health     Home Health Status Pending medical clearance/testing     Hospital Resources/Appts/Education Provided Provided patient/caregiver with written discharge plan information     Discharge Delays None known at this time

## 2023-01-12 NOTE — SUBJECTIVE & OBJECTIVE
Interval History: She is doing well this morning and she denies any left hip pain. She is tolerating a regular diet and she had a bowel movement yesterday. She ambulated 50 feet x 2 with her rolling walker and contact guard assistance and she required standby assistance with sit to stand transitions and contact guard assistance with transfers.      Review of Systems   All other systems reviewed and are negative.  Objective:     Vital Signs (Most Recent):  Temp: 97.6 °F (36.4 °C) (01/12/23 0700)  Pulse: 69 (01/12/23 0700)  Resp: 18 (01/12/23 0417)  BP: 120/77 (01/12/23 0700)  SpO2: 95 % (01/12/23 0700) Vital Signs (24h Range):  Temp:  [97.4 °F (36.3 °C)-98.3 °F (36.8 °C)] 97.6 °F (36.4 °C)  Pulse:  [59-74] 69  Resp:  [16-19] 18  SpO2:  [94 %-99 %] 95 %  BP: (113-147)/(71-78) 120/77     Weight: (!) 156.5 kg (345 lb 0.3 oz)  Body mass index is 57.41 kg/m².    Intake/Output Summary (Last 24 hours) at 1/12/2023 0848  Last data filed at 1/12/2023 0542  Gross per 24 hour   Intake 480 ml   Output 1450 ml   Net -970 ml      Physical Exam  General - Morbidly obese  female in no acute distress.  HEENT - NCAT. No scleral icterus. Oropharynx clear. Mucous membranes moist.  Neck - No lymphadenopathy, thyromegaly, or JVD.  CVS - Regular rate and rhythm. No murmurs, rubs, or gallops.  Resp - Lungs are clear to auscultation bilaterally. No rales, wheeze, or rhonchi.   GI - Soft, nontender, nondistended, normoactive bowel sounds present.   Extremities - No clubbing, cyanosis, or edema.   Neuro - CN II through XII are grossly intact. No focal neurological deficits. Alert and oriented times four.   Psych - Normal affect.  Skin - Left hip surgical site c/d/i. Slight edema and ecchymosis noted to periwounds.     Significant Labs: All pertinent labs within the past 24 hours have been reviewed.     1/9/2023:  CBC: WBC count 10.4, hemoglobin 11.1, hematocrit 37.9, platelet count 316.  CMP: Sodium 137, potassium 4.8, chloride 98,  CO2 28, BUN 17.8, creatinine 0.77, glucose 94, calcium 9.5, magnesium 2.1, alkaline phosphatase 223, total protein 6.6, albumin 3.3, total bilirubin 0.8, AST 27, ALT 35.     1/4/2023:  CBC: WBC count 10.4, hemoglobin 10.2, hematocrit 33.6, platelet count 307.  BMP: Sodium 141, potassium 4.7, chloride 102, CO2 28, BUN 20.8, creatinine 0.79, glucose 114, calcium 9.7, magnesium 2.0.     12/30/2022:   CBC:  WBC count 10.4, hemoglobin 10.1, hematocrit 33.6, platelet count 259 food   BMP: Sodium 137, potassium 4.4, chloride 100, CO2 25, BUN 14.3, creatinine 0.68, glucose 100, calcium 9.1, magnesium 2.0.      12/29/2022:  CBC:  WBC count 10.9, hemoglobin 9.8, hematocrit 31.7, glucose 241.    BMP: Sodium 136, potassium 4.7, chloride 100, CO2 26, BUN 14.4, creatinine 0.73, glucose 107, calcium 8.9.    12/27/2022:   CBC:  WBC count 1.5, hemoglobin 10.7, hematocrit 34.6, platelet count 202.    CMP: Sodium 137, potassium 4.3, chloride 105, CO2 25, BUN 16.1, creatinine 0.81, glucose 121, calcium 9.0, magnesium 2.1, alkaline phosphatase 73, total protein 6.4, albumin 3.0, total bilirubin 0.8, AST 39, ALT 22.     12/26/2022:   CBC:  WBC count 13.7, hemoglobin 10.7, hematocrit 34.3, platelet count 264.  CMP:  Sodium 137, potassium 4.4, chloride 103, CO2 25, BUN 18.9, creatinine 0.85, glucose 118, calcium 8.71 magnesium 1.9, alkaline phosphatase 72, total protein 5.7, albumin 3.1, total bilirubin 0.8, AST 56, ALT 26.     12/24/2022:   CMP: Sodium 140, potassium 3.9, chloride 105, CO2 25, BUN 17.2 creatinine 0.97, glucose 117, calcium 9.3, alkaline phosphatase 98, total protein 7.2, albumin 3.8, total bilirubin 0.5, AST 14, ALT 17.    CBC: WBC count 10.6, hemoglobin 12.9, hematocrit 41.2, platelet count 223.     Significant Imaging: I have reviewed all pertinent imaging results/findings within the past 24 hours.     X-ray left hip 12/25/2022: There has been placement of left femoral intramedullary taz and compression screw traverses  into the femoral head transfixing the intertrochanteric fracture.  Positioning and alignment is satisfactory.     CT left hip 12/25/2022: Comminuted intertrochanteric left femoral fracture.     CXR 12/24/2022: No acute chest disease is identified.     X-ray left hip 12/24/2022:  Displaced intertrochanteric fracture of the left femur.

## 2023-01-12 NOTE — PROGRESS NOTES
[facility] - Medical Surgical Unit  [facility] Medicine  Telehospitalist Progress Note    Patient Name: Tanya Linda  MRN: [id number]  Patient Class: IP- Swing   Admission Date: 12/29/2022  Length of Stay: 14 days  Attending Physician: Stephon Kearney MD  Primary Care Provider: MARION Salazar      Subjective:     Principal Problem:Closed intertrochanteric fracture of hip, left, initial encounter      HPI:  Ms. Linda is a 62-year-old  female with history of morbid obesity, hypertension, depression, GERD, and anxiety who presented to [facility] ER on 12/24/2022 with left hip pain following a fall.  X-ray of the left hip and femur showed a displaced intertrochanteric fracture of the left femur and chest x-ray revealed no acute cardiopulmonary process. CT of the left hip confirmed a comminuted intertrochanteric left femoral fracture and orthopedics was consulted.  She was taken to the OR on 12/25/2022 for intramedullary nail of left intertrochanteric femur fracture by Dr. Ronnell Olivares and she did relatively well postoperatively.  Her lisinopril was held due to mild hypotension and her blood pressure stabilized.  She had no other complications throughout her hospital course and she was transferred to [facility] swing bed on 12/29/2022 for PT/OT and management of her multiple medical comorbidities.      Overview/Hospital Course:  She was admitted to [facility] swing bed on 12/29/2022 for rehab following intramedullary nail of left intertrochanteric femur fracture.  She is being treated with a 4 week course of aspirin 81 mg PO BID for DVT prophylaxis and norco 5/325 mg PO every 6 hours as needed for pain.  She was started on diclofenac 1% gel 2 grams topical BID prn for left knee pain and stiffness on 1/6/2023. Her miralax was changed from 17 gm PO daily to prn on 1/8/2023.      Interval History: She is doing well this morning and she denies any left hip pain. She is tolerating a regular  diet and she had a bowel movement yesterday. She ambulated 50 feet x 2 with her rolling walker and contact guard assistance and she required standby assistance with sit to stand transitions and contact guard assistance with transfers.      Review of Systems   All other systems reviewed and are negative.  Objective:     Vital Signs (Most Recent):  Temp: 97.6 °F (36.4 °C) (01/12/23 0700)  Pulse: 69 (01/12/23 0700)  Resp: 18 (01/12/23 0417)  BP: 120/77 (01/12/23 0700)  SpO2: 95 % (01/12/23 0700) Vital Signs (24h Range):  Temp:  [97.4 °F (36.3 °C)-98.3 °F (36.8 °C)] 97.6 °F (36.4 °C)  Pulse:  [59-74] 69  Resp:  [16-19] 18  SpO2:  [94 %-99 %] 95 %  BP: (113-147)/(71-78) 120/77     Weight: (!) 156.5 kg (345 lb 0.3 oz)  Body mass index is 57.41 kg/m².    Intake/Output Summary (Last 24 hours) at 1/12/2023 0848  Last data filed at 1/12/2023 0542  Gross per 24 hour   Intake 480 ml   Output 1450 ml   Net -970 ml      Physical Exam  General - Morbidly obese  female in no acute distress.  HEENT - NCAT. No scleral icterus. Oropharynx clear. Mucous membranes moist.  Neck - No lymphadenopathy, thyromegaly, or JVD.  CVS - Regular rate and rhythm. No murmurs, rubs, or gallops.  Resp - Lungs are clear to auscultation bilaterally. No rales, wheeze, or rhonchi.   GI - Soft, nontender, nondistended, normoactive bowel sounds present.   Extremities - No clubbing, cyanosis, or edema.   Neuro - CN II through XII are grossly intact. No focal neurological deficits. Alert and oriented times four.   Psych - Normal affect.  Skin - Left hip surgical site c/d/i. Slight edema and ecchymosis noted to periwounds.     Significant Labs: All pertinent labs within the past 24 hours have been reviewed.     1/9/2023:  CBC: WBC count 10.4, hemoglobin 11.1, hematocrit 37.9, platelet count 316.  CMP: Sodium 137, potassium 4.8, chloride 98, CO2 28, BUN 17.8, creatinine 0.77, glucose 94, calcium 9.5, magnesium 2.1, alkaline phosphatase 223, total  protein 6.6, albumin 3.3, total bilirubin 0.8, AST 27, ALT 35.     1/4/2023:  CBC: WBC count 10.4, hemoglobin 10.2, hematocrit 33.6, platelet count 307.  BMP: Sodium 141, potassium 4.7, chloride 102, CO2 28, BUN 20.8, creatinine 0.79, glucose 114, calcium 9.7, magnesium 2.0.     12/30/2022:   CBC:  WBC count 10.4, hemoglobin 10.1, hematocrit 33.6, platelet count 259 food   BMP: Sodium 137, potassium 4.4, chloride 100, CO2 25, BUN 14.3, creatinine 0.68, glucose 100, calcium 9.1, magnesium 2.0.      12/29/2022:  CBC:  WBC count 10.9, hemoglobin 9.8, hematocrit 31.7, glucose 241.    BMP: Sodium 136, potassium 4.7, chloride 100, CO2 26, BUN 14.4, creatinine 0.73, glucose 107, calcium 8.9.    12/27/2022:   CBC:  WBC count 1.5, hemoglobin 10.7, hematocrit 34.6, platelet count 202.    CMP: Sodium 137, potassium 4.3, chloride 105, CO2 25, BUN 16.1, creatinine 0.81, glucose 121, calcium 9.0, magnesium 2.1, alkaline phosphatase 73, total protein 6.4, albumin 3.0, total bilirubin 0.8, AST 39, ALT 22.     12/26/2022:   CBC:  WBC count 13.7, hemoglobin 10.7, hematocrit 34.3, platelet count 264.  CMP:  Sodium 137, potassium 4.4, chloride 103, CO2 25, BUN 18.9, creatinine 0.85, glucose 118, calcium 8.71 magnesium 1.9, alkaline phosphatase 72, total protein 5.7, albumin 3.1, total bilirubin 0.8, AST 56, ALT 26.     12/24/2022:   CMP: Sodium 140, potassium 3.9, chloride 105, CO2 25, BUN 17.2 creatinine 0.97, glucose 117, calcium 9.3, alkaline phosphatase 98, total protein 7.2, albumin 3.8, total bilirubin 0.5, AST 14, ALT 17.    CBC: WBC count 10.6, hemoglobin 12.9, hematocrit 41.2, platelet count 223.     Significant Imaging: I have reviewed all pertinent imaging results/findings within the past 24 hours.     X-ray left hip 12/25/2022: There has been placement of left femoral intramedullary taz and compression screw traverses into the femoral head transfixing the intertrochanteric fracture.  Positioning and alignment is  satisfactory.     CT left hip 12/25/2022: Comminuted intertrochanteric left femoral fracture.     CXR 12/24/2022: No acute chest disease is identified.     X-ray left hip 12/24/2022:  Displaced intertrochanteric fracture of the left femur.      Assessment/Plan:      * Left intertrochanteric femur fracture  Continue 4 week course of aspirin 81 mg PO BID for DVT prophylaxis, PT/OT, norco as needed for pain control, and weight-bearing as tolerated to the left lower extremity.  She is status post intramedullary nail by Dr. Ronnell Olivares on 12/25/2022.     Hypertension  Her blood pressure has been stable off antihypertensives.  She is no longer on lisinopril 5 mg PO daily which was held at [facility] due to hypotension.    Anxiety  Continue buspirone.    Depression  Continue venlafaxine.    GERD (gastroesophageal reflux disease)  Continue famotidine.    Morbid obesity  She was counseled on the importance of weight loss and diet.  She was noted to have a BMI of 57.41.      Disposition  Anticipate discharge home with home health in the next week. A ramp is being arranged at her house. Her next review date is on 1/12/2023.        VTE Risk Mitigation (From admission, onward)         Ordered     Place sequential compression device  Until discontinued         12/29/22 1612                Discharge Planning   XENA:      Code Status: Full Code   Is the patient medically ready for discharge?:     Reason for patient still in hospital (select all that apply): PT / OT recommendations  Discharge Plan A: Home with family, Home Health   Discharge Delays: None known at this time      This service was provided via telemedicine.  Type of Software: Audio/Visual.  Originating Site: [facility].  Distant Site: [place], [place].  Her exam was performed with the assitance of Elvira Diaz RN.      Stephon Kearney MD  Department of Hospital Medicine   [facility]

## 2023-01-12 NOTE — PT/OT/SLP PROGRESS
Physical Therapy Treatment Note           Name: Tanya Linda    : 1960 (62 y.o.)  MRN: [id number]           TREATMENT SUMMARY AND RECOMMENDATIONS:    PT Received On: 23  PT Start Time: 1008     PT Stop Time: 1040  PT Total Time (min): 32 min     Subjective Assessment:   No complaints  Lethargic   x Awake, alert, cooperative  Uncooperative    Agitated  c/o pain    Appropriate  c/o fatigue    Confused  Treated at bedside     Emotionally labile x Treated in gym/dept.    Impulsive  Other:    Flat affect       Therapy Precautions:    Cognitive deficits  Spinal precautions    Collar - hard  Sternal precautions    Collar - soft   TLSO    Fall risk  LSO    Hip precautions - posterior  Knee immobilizer    Hip precautions - anterior x WBAT    Impaired communication  Partial weightbearing    Oxygen  TTWB    PEG tube  NWB    Visual deficits  Other:    Hearing deficits          Treatment Objectives:     Mobility Training:   Assist level Comments    Bed mobility     Transfer     Gait CGA 2X30ft using RW   Sit to stand transitions CGA Multiple sit to stands during session from bedside chair    Sitting balance     Standing balance      Wheelchair mobility     Car transfer     Other:          Therapeutic Exercise:   Exercise Sets Reps Comments   Hip flexion, hip ABD, LAQ, ankle PF/DF 1 20 Performed short sitting   LE cycling   X10 minutes alternating F/B                   Additional Comments:      Assessment: Patient tolerated session well. She continues to progress and is making functional progress towards set goals. She will continue to benefit from skilled PT services.     PT Plan: continue POC  Revisions made to plan of care: No    GOALS:   Multidisciplinary Problems       Physical Therapy Goals          Problem: Physical Therapy    Goal Priority Disciplines Outcome Goal Variances Interventions   Physical Therapy Goal     PT, PT/OT Ongoing, Progressing     Description: Goals to be met by: Discharge      Patient will increase functional independence with mobility by performin. Supine to sit with Modified [place]  2. Sit to supine with Modified [place]  3. Sit to stand transfer with Modified [place]  4. Bed to chair transfer with Stand-by Assistance using Rolling Walker  5. Gait  x 25 feet with Stand-by Assistance using Rolling Walker.                          Skilled PT Minutes Provided: 32 minutes   Communication with Treatment Team:     Equipment recommendations:       At end of treatment, patient remained:   Up in chair     Up in wheelchair in room    In bed    With alarm activated    Bed rails up    Call bell in reach     Family/friends present    Restraints secured properly    In bathroom with CNA/RN notified    Nurse aware   x In gym with therapist/tech    Other:

## 2023-01-12 NOTE — PT/OT/SLP PROGRESS
Occupational Therapy  Treatment    Name: Tanya Linda    : 1960 (62 y.o.)  MRN: [id number]           TREATMENT SUMMARY AND RECOMMENDATIONS:      OT Date of Treatment: 23  OT Start Time: 1330  OT Stop Time: 1400  OT Total Time (min): 30 min      Subjective Assessment:   No complaints  Lethargic   x Awake, alert, cooperative  Impulsive    Uncooperative   Flat affect    Agitated  c/o pain    Appropriate  c/o fatigue    Confused  Treated at bedside     Emotionally labile x Treated in gym/dept.      Other:        Therapy Precautions:    Cognitive deficits  Spinal precautions    Collar - hard  Sternal precautions    Collar - soft   TLSO   x Fall risk  LSO    Hip precautions - posterior  Knee immobilizer    Hip precautions - anterior  WBAT    Impaired communication  Partial weightbearing    Oxygen  TTWB    PEG tube  NWB    Visual deficits      Hearing deficits   Other:        Treatment Objectives:     Mobility Training:    Mobility task Assist level Comments    Bed mobility     Transfer     Sit to stands transitions SBA From BSChair    Functional mobility     Sitting balance     Standing balance  SBA Pt stood with RW and table anterior to perform pipe tree ax; pt able to tolerate standing x3-4 min before requiring seated rest break. No LOB noted.    Other:          Therapeutic Exercise:   Exercise Sets Reps Comments   2# dumbbells 2 10 Shoulder press, chest press, straight arm raises, bicep curls, shoulder abd/add                         Additional Comments:      Assessment: Patient tolerated session well.    OT Plan: Continue POC  Revisions made to plan of care: No    GOALS:   Multidisciplinary Problems       Occupational Therapy Goals          Problem: Occupational Therapy    Goal Priority Disciplines Outcome Interventions   Occupational Therapy Goal     OT, PT/OT Ongoing, Progressing    Description: Goals to be met by: 22     Patient will increase functional independence with ADLs by performing:    UE  Dressing with Modified [place]. - met 1/10/23  LE Dressing with Minimal Assistance. - met 1/10/23  Grooming while seated at sink with Modified [place].  Toileting from bedside commode with Minimal Assistance for hygiene and clothing management.   Bathing from bench with Minimal Assistance. - met 1/10/23  Toilet transfer to bedside commode with Contact Guard Assistance.                         Skilled OT Minutes Provided: 30  Communication with Treatment Team:     Equipment recommendations:       At end of treatment, patient remained:   Up in chair     Up in wheelchair in room    In bed    With alarm activated    Bed rails up    Call bell in reach     Family/friends present    Restraints secured properly    In bathroom with CNA/RN notified   x In gym with PT/PTA/tech    Nurse aware    Other:

## 2023-01-12 NOTE — PT/OT/SLP PROGRESS
Physical Therapy Treatment Note           Name: Tanya Linda    : 1960 (62 y.o.)  MRN: [id number]           TREATMENT SUMMARY AND RECOMMENDATIONS:    PT Received On: 23  PT Start Time: 1355     PT Stop Time: 1425  PT Total Time (min): 30 min     Subjective Assessment:   No complaints  Lethargic   x Awake, alert, cooperative  Uncooperative    Agitated x c/o pain    Appropriate  c/o fatigue    Confused  Treated at bedside     Emotionally labile x Treated in gym/dept.    Impulsive  Other:    Flat affect       Therapy Precautions:    Cognitive deficits  Spinal precautions    Collar - hard  Sternal precautions    Collar - soft   TLSO    Fall risk  LSO    Hip precautions - posterior  Knee immobilizer    Hip precautions - anterior x WBAT    Impaired communication  Partial weightbearing    Oxygen  TTWB    PEG tube  NWB    Visual deficits  Other:    Hearing deficits          Treatment Objectives:     Mobility Training:   Assist level Comments    Bed mobility     Transfer SBA-CGA Bathroom transfer using RW   Gait SBA-CGA Pt ambulated ~10ft wc > bathroom using RW   Sit to stand transitions SBA X5 sit to stands from bedside chair surface   Sitting balance     Standing balance      Wheelchair mobility     Car transfer     Other:          Therapeutic Exercise:   Exercise Sets Reps Comments   Hip flexion, hip ABD, LAQ, ankle PF/DF 1 20 Performed short sitting                          Additional Comments:      Assessment: Patient tolerated session well. Patient reports not taking her pain medication    PT Plan: continue POC  Revisions made to plan of care: No    GOALS:   Multidisciplinary Problems       Physical Therapy Goals          Problem: Physical Therapy    Goal Priority Disciplines Outcome Goal Variances Interventions   Physical Therapy Goal     PT, PT/OT Ongoing, Progressing     Description: Goals to be met by: Discharge     Patient will increase functional independence with mobility by performin.  Supine to sit with Modified [place]  2. Sit to supine with Modified [place]  3. Sit to stand transfer with Modified [place]  4. Bed to chair transfer with Stand-by Assistance using Rolling Walker  5. Gait  x 25 feet with Stand-by Assistance using Rolling Walker.                          Skilled PT Minutes Provided: 30 minutes   Communication with Treatment Team:     Equipment recommendations:       At end of treatment, patient remained:   Up in chair     Up in wheelchair in room    In bed    With alarm activated    Bed rails up    Call bell in reach     Family/friends present    Restraints secured properly   x In bathroom with CNA/RN notified    Nurse aware    In gym with therapist/tech    Other:

## 2023-01-12 NOTE — PT/OT/SLP PROGRESS
Occupational Therapy  Treatment    Name: Tanya Linda    : 1960 (62 y.o.)  MRN: [id number]           TREATMENT SUMMARY AND RECOMMENDATIONS:      OT Date of Treatment: 23  OT Start Time: 1040  OT Stop Time: 1113  OT Total Time (min): 33 min      Subjective Assessment:   No complaints  Lethargic   x Awake, alert, cooperative  Impulsive    Uncooperative   Flat affect    Agitated  c/o pain    Appropriate  c/o fatigue    Confused  Treated at bedside     Emotionally labile x Treated in gym/dept.      Other:        Therapy Precautions:    Cognitive deficits  Spinal precautions    Collar - hard  Sternal precautions    Collar - soft   TLSO   x Fall risk  LSO    Hip precautions - posterior  Knee immobilizer    Hip precautions - anterior  WBAT    Impaired communication  Partial weightbearing    Oxygen  TTWB    PEG tube  NWB    Visual deficits      Hearing deficits   Other:        Treatment Objectives:     Mobility Training:    Mobility task Assist level Comments    Bed mobility     Transfer     Sit to stands transitions SBA/CGA    Functional mobility CGA X60 feet with RW    Sitting balance     Standing balance      Other:        ADL Training:    ADL Assist level Comments    Feeding     Grooming/hygiene     Bathing     Upper body dressing     Lower body dressing     Toileting Mod A  (+) BM; Pt able to manage clothing. Requires assist for vivien care.    Toilet transfer CGA Stand/pivot t/f with RW to [facility] [place] toilet    Adaptive equipment training     Other:           Therapeutic Exercise:   Exercise Sets Reps Comments   3# dowel 3 10 Shoulder press, chest press, straight arm raises, bicep curls, forward rows, backwards rows   Arm bike 1 5 min Forwards/backwards                   Additional Comments:      Assessment: Patient tolerated session well.    OT Plan: continue POC  Revisions made to plan of care: No    GOALS:   Multidisciplinary Problems       Occupational Therapy Goals          Problem: Occupational Therapy     Goal Priority Disciplines Outcome Interventions   Occupational Therapy Goal     OT, PT/OT Ongoing, Progressing    Description: Goals to be met by: 1/20/22     Patient will increase functional independence with ADLs by performing:    UE Dressing with Modified [place]. - met 1/10/23  LE Dressing with Minimal Assistance. - met 1/10/23  Grooming while seated at sink with Modified [place].  Toileting from bedside commode with Minimal Assistance for hygiene and clothing management.   Bathing from bench with Minimal Assistance. - met 1/10/23  Toilet transfer to bedside commode with Contact Guard Assistance.                         Skilled OT Minutes Provided: 33  Communication with Treatment Team:     Equipment recommendations:       At end of treatment, patient remained:   Up in chair     Up in wheelchair in room    In bed    With alarm activated    Bed rails up    Call bell in reach     Family/friends present    Restraints secured properly   x In bathroom with CNA/RN notified    In gym with PT/PTA/tech    Nurse aware    Other:

## 2023-01-12 NOTE — PLAN OF CARE
Problem: Physical Therapy  Goal: Physical Therapy Goal  Description: Goals to be met by: Discharge     Patient will increase functional independence with mobility by performin. Supine to sit with Modified [place]  2. Sit to supine with Modified [place]  3. Sit to stand transfer with Modified [place]  4. Bed to chair transfer with Stand-by Assistance using Rolling Walker  5. Gait  x 25 feet with Stand-by Assistance using Rolling Walker.     Outcome: Ongoing, Progressing

## 2023-01-12 NOTE — PLAN OF CARE
Problem: Adult Inpatient Plan of Care  Goal: Plan of Care Review  Outcome: Ongoing, Progressing  Flowsheets (Taken 1/11/2023 2333)  Plan of Care Reviewed With: patient     Problem: Impaired Wound Healing  Goal: Optimal Wound Healing  Outcome: Ongoing, Progressing  Intervention: Promote Wound Healing  Flowsheets (Taken 1/11/2023 2333)  Activity Management: Arm raise - L1  Pain Management Interventions: medication offered     Problem: Fall Injury Risk  Goal: Absence of Fall and Fall-Related Injury  Outcome: Ongoing, Progressing  Intervention: Identify and Manage Contributors  Flowsheets (Taken 1/11/2023 2333)  Self-Care Promotion:   independence encouraged   BADL personal objects within reach  Medication Review/Management: medications reviewed

## 2023-01-12 NOTE — ASSESSMENT & PLAN NOTE
Anticipate discharge home with home health in the next week. A ramp is being arranged at her house. She has been approved through 1/18/2023.

## 2023-01-13 PROCEDURE — 97530 THERAPEUTIC ACTIVITIES: CPT

## 2023-01-13 PROCEDURE — 97110 THERAPEUTIC EXERCISES: CPT

## 2023-01-13 PROCEDURE — 11000004 HC SNF PRIVATE

## 2023-01-13 PROCEDURE — 97116 GAIT TRAINING THERAPY: CPT

## 2023-01-13 PROCEDURE — 25000003 PHARM REV CODE 250: Performed by: STUDENT IN AN ORGANIZED HEALTH CARE EDUCATION/TRAINING PROGRAM

## 2023-01-13 RX ADMIN — DOCUSATE SODIUM 100 MG: 100 CAPSULE, LIQUID FILLED ORAL at 09:01

## 2023-01-13 RX ADMIN — FAMOTIDINE 20 MG: 20 TABLET ORAL at 08:01

## 2023-01-13 RX ADMIN — HYDROCODONE BITARTRATE AND ACETAMINOPHEN 1 TABLET: 5; 325 TABLET ORAL at 08:01

## 2023-01-13 RX ADMIN — MICONAZOLE NITRATE 2 % TOPICAL POWDER: at 09:01

## 2023-01-13 RX ADMIN — ERGOCALCIFEROL 50000 UNITS: 1.25 CAPSULE ORAL at 08:01

## 2023-01-13 RX ADMIN — ACETAMINOPHEN 650 MG: 325 TABLET ORAL at 09:01

## 2023-01-13 RX ADMIN — VENLAFAXINE 37.5 MG: 37.5 TABLET ORAL at 05:01

## 2023-01-13 RX ADMIN — BUSPIRONE HYDROCHLORIDE 7.5 MG: 7.5 TABLET ORAL at 09:01

## 2023-01-13 RX ADMIN — MICONAZOLE NITRATE 2 % TOPICAL POWDER: at 08:01

## 2023-01-13 RX ADMIN — ASPIRIN 81 MG: 81 TABLET, COATED ORAL at 08:01

## 2023-01-13 RX ADMIN — ASPIRIN 81 MG: 81 TABLET, COATED ORAL at 09:01

## 2023-01-13 NOTE — PT/OT/SLP PROGRESS
Occupational Therapy  Treatment    Name: Tanya Linda    : 1960 (62 y.o.)  MRN: [id number]           TREATMENT SUMMARY AND RECOMMENDATIONS:      OT Date of Treatment: 23  OT Start Time: 1400  OT Stop Time: 1430  OT Total Time (min): 30 min      Subjective Assessment:   No complaints  Lethargic   x Awake, alert, cooperative  Impulsive    Uncooperative   Flat affect    Agitated  c/o pain    Appropriate  c/o fatigue    Confused  Treated at bedside     Emotionally labile x Treated in gym/dept.      Other:        Therapy Precautions:    Cognitive deficits  Spinal precautions    Collar - hard  Sternal precautions    Collar - soft   TLSO   x Fall risk  LSO    Hip precautions - posterior  Knee immobilizer    Hip precautions - anterior  WBAT    Impaired communication  Partial weightbearing    Oxygen  TTWB    PEG tube  NWB    Visual deficits      Hearing deficits   Other:        Treatment Objectives:     Mobility Training:    Mobility task Assist level Comments    Bed mobility     Transfer CGA/SBA Stand/pivot t/f with RW to BSChair   Sit to stands transitions     Functional mobility SBA/CGA X60 feet with RW   Sitting balance     Standing balance  SBA Pt stood with RW and performed functional reaching with RUE to write on white board during tic-tac-toe game. Able to tolerate standing x3 min before seated rest break.    Other:          Therapeutic Exercise:   Exercise Sets Reps Comments   Arm bike 2 5 min Level 1; forwards/backwards                         Additional Comments:      Assessment: Patient tolerated session well.    OT Plan: continue POC  Revisions made to plan of care: No    GOALS:   Multidisciplinary Problems       Occupational Therapy Goals          Problem: Occupational Therapy    Goal Priority Disciplines Outcome Interventions   Occupational Therapy Goal     OT, PT/OT Ongoing, Progressing    Description: Goals to be met by: 22     Patient will increase functional independence with ADLs by  performing:    UE Dressing with Modified [place]. - met 1/10/23  LE Dressing with Minimal Assistance. - met 1/10/23  Grooming while seated at sink with Modified [place].  Toileting from bedside commode with Minimal Assistance for hygiene and clothing management.   Bathing from bench with Minimal Assistance. - met 1/10/23  Toilet transfer to bedside commode with Contact Guard Assistance.                         Skilled OT Minutes Provided: 30  Communication with Treatment Team:     Equipment recommendations:       At end of treatment, patient remained:  x Up in chair     Up in wheelchair in room    In bed   x With alarm activated    Bed rails up   x Call bell in reach    x Family/friends present    Restraints secured properly    In bathroom with CNA/RN notified    In gym with PT/PTA/tech    Nurse aware    Other:

## 2023-01-13 NOTE — PT/OT/SLP PROGRESS
Physical Therapy Treatment Note           Name: Tanya Linda    : 1960 (62 y.o.)  MRN: [id number]           TREATMENT SUMMARY AND RECOMMENDATIONS:    PT Received On: 23  PT Start Time: 1330     PT Stop Time: 1400  PT Total Time (min): 30 min     Subjective Assessment:   No complaints  Lethargic   x Awake, alert, cooperative  Uncooperative    Agitated  c/o pain    Appropriate  c/o fatigue    Confused  Treated at bedside     Emotionally labile x Treated in gym/dept.    Impulsive  Other:    Flat affect       Therapy Precautions:    Cognitive deficits  Spinal precautions    Collar - hard  Sternal precautions    Collar - soft   TLSO    Fall risk  LSO    Hip precautions - posterior  Knee immobilizer    Hip precautions - anterior x WBAT    Impaired communication  Partial weightbearing    Oxygen  TTWB    PEG tube  NWB    Visual deficits  Other:    Hearing deficits          Treatment Objectives:     Mobility Training:   Assist level Comments    Bed mobility     Transfer     Gait SBA-CGA RW 2 x 80 ft. Min VC to increase stride and wt shift to Left. Improved endurance .    Sit to stand transitions SBA Multiple attempts from recliner during session. Working on wt shift forward   Sitting balance Good  Seated in recliner    Standing balance  Fair + Utilizes walker for support    Wheelchair mobility     Car transfer     Other:          Therapeutic Exercise:   Exercise Sets Reps Comments   AP, QS, heel slides , LAQ, Hip flexion (assisted)  2 10 Seated                          Additional Comments:  - utilized eccentric exercises to improve L LE strength . Able to perform hip flexion with min A from seated position.     Assessment: Patient tolerated session well this afternoon demonstrating improved L LE strength and endurance.     PT Plan: continue POC. Increase L LE strength   Revisions made to plan of care: No    GOALS:   Multidisciplinary Problems       Physical Therapy Goals          Problem: Physical Therapy     Goal Priority Disciplines Outcome Goal Variances Interventions   Physical Therapy Goal     PT, PT/OT Ongoing, Progressing     Description: Goals to be met by: Discharge     Patient will increase functional independence with mobility by performin. Supine to sit with Modified [place]  2. Sit to supine with Modified [place]  3. Sit to stand transfer with Modified [place]  4. Bed to chair transfer with Stand-by Assistance using Rolling Walker  5. Gait  x 25 feet with Stand-by Assistance using Rolling Walker.                          Skilled PT Minutes Provided: 30 minutes   Communication with Treatment Team:     Equipment recommendations:       At end of treatment, patient remained:   Up in chair     Up in wheelchair in room    In bed    With alarm activated    Bed rails up    Call bell in reach     Family/friends present    Restraints secured properly    In bathroom with CNA/RN notified    Nurse aware   x In gym with therapist/tech    Other:

## 2023-01-13 NOTE — PT/OT/SLP PROGRESS
Occupational Therapy  Treatment    Name: Tanya Linda    : 1960 (62 y.o.)  MRN: [id number]           TREATMENT SUMMARY AND RECOMMENDATIONS:      OT Date of Treatment: 23  OT Start Time: 940  OT Stop Time: 101  OT Total Time (min): 30 min      Subjective Assessment:   No complaints  Lethargic   x Awake, alert, cooperative  Impulsive    Uncooperative   Flat affect    Agitated  c/o pain    Appropriate  c/o fatigue    Confused  Treated at bedside     Emotionally labile x Treated in gym/dept.      Other:        Therapy Precautions:    Cognitive deficits  Spinal precautions    Collar - hard  Sternal precautions    Collar - soft   TLSO   x Fall risk  LSO    Hip precautions - posterior  Knee immobilizer    Hip precautions - anterior  WBAT    Impaired communication  Partial weightbearing    Oxygen  TTWB    PEG tube  NWB    Visual deficits      Hearing deficits   Other:        Treatment Objectives:     Mobility Training:    Mobility task Assist level Comments    Bed mobility     Transfer     Sit to stands transitions SBA From [place]   Functional mobility     Sitting balance     Standing balance  SBA Pt stood with RW and performed functional reaching tasks to challenge balance out of ISABELLA. Pt able to tolerate standing x2 min per round. No LOB noted.    Other:          Therapeutic Exercise:   Exercise Sets Reps Comments   2# dumbbells 2 10 Shoulder press, chest press, straight arm raises, bicep curls, shoulder abd/add   Red flexbar 2 10 Wrist flex/ext, pro/sup                   Additional Comments:      Assessment: Patient tolerated session well.    OT Plan: Continue POC  Revisions made to plan of care: No    GOALS:   Multidisciplinary Problems       Occupational Therapy Goals          Problem: Occupational Therapy    Goal Priority Disciplines Outcome Interventions   Occupational Therapy Goal     OT, PT/OT Ongoing, Progressing    Description: Goals to be met by: 22     Patient will increase functional  independence with ADLs by performing:    UE Dressing with Modified [place]. - met 1/10/23  LE Dressing with Minimal Assistance. - met 1/10/23  Grooming while seated at sink with Modified [place].  Toileting from bedside commode with Minimal Assistance for hygiene and clothing management.   Bathing from bench with Minimal Assistance. - met 1/10/23  Toilet transfer to bedside commode with Contact Guard Assistance.                         Skilled OT Minutes Provided: 30  Communication with Treatment Team:     Equipment recommendations:       At end of treatment, patient remained:  x Up in chair     Up in wheelchair in room    In bed   x With alarm activated    Bed rails up   x Call bell in reach     Family/friends present    Restraints secured properly    In bathroom with CNA/RN notified    In gym with PT/PTA/tech    Nurse aware    Other:

## 2023-01-13 NOTE — PLAN OF CARE
[facility] Unit  Discharge Reassessment    Primary Care Provider: MARION Salazar    Expected Discharge Date:     Reassessment (most recent)       Discharge Reassessment - 01/13/23 1611          Discharge Reassessment    Assessment Type Discharge Planning Reassessment     Did the patient's condition or plan change since previous assessment? No     Discharge Plan discussed with: Adult children     Communicated XENA with patient/caregiver Date not available/Unable to determine     Discharge Plan A Home with family;Home Health     DME Needed Upon Discharge  walker, standard;wheelchair;bedside commode     Discharge Barriers Identified None     Why the patient remains in the hospital Requires continued medical care        Post-Acute Status    Post-Acute Authorization Home Health     Home Health Status Pending medical clearance/testing     Hospital Resources/Appts/Education Provided Provided patient/caregiver with written discharge plan information     Discharge Delays None known at this time

## 2023-01-13 NOTE — PROGRESS NOTES
[facility] - [facility] Surgical [facility]  [facility] Medicine  Telehospitalist Progress Note    Patient Name: Tanya Linda  MRN: [id number]  Patient Class: IP- Swing   Admission Date: 12/29/2022  Length of Stay: 15 days  Attending Physician: Stephon Kearney MD  Primary Care Provider: MARION Salazar      Subjective:     Principal Problem:Closed intertrochanteric fracture of hip, left, initial encounter      HPI:  Ms. Linda is a 62-year-old  female with history of morbid obesity, hypertension, depression, GERD, and anxiety who presented to [facility] on 12/24/2022 with left hip pain following a fall.  X-ray of the left hip and femur showed a displaced intertrochanteric fracture of the left femur and chest x-ray revealed no acute cardiopulmonary process. CT of the left hip confirmed a comminuted intertrochanteric left femoral fracture and orthopedics was consulted.  She was taken to the OR on 12/25/2022 for intramedullary nail of left intertrochanteric femur fracture by Dr. Ronnell Olivares and she did relatively well postoperatively.  Her lisinopril was held due to mild hypotension and her blood pressure stabilized.  She had no other complications throughout her hospital course and she was transferred to [facility] swing bed on 12/29/2022 for PT/OT and management of her multiple medical comorbidities.      Overview/Hospital Course:  She was admitted to [facility] swing bed on 12/29/2022 for rehab following intramedullary nail of left intertrochanteric femur fracture.  She is being treated with a 4 week course of aspirin 81 mg PO BID for DVT prophylaxis and norco 5/325 mg PO every 6 hours as needed for pain.  She was started on diclofenac 1% gel 2 grams topical BID prn for left knee pain and stiffness on 1/6/2023. Her miralax was changed from 17 gm PO daily to prn on 1/8/2023.      Interval History: She is doing well this morning and she denies any left hip pain. She ambulated 30 feet x 2  with contact guard assistance and her rolling walker followed by 10 feet with standby to contact guard assistance yesterday and she required contact guard to standby assistance with sit to stand transitions and transfers.      Review of Systems   All other systems reviewed and are negative.  Objective:     Vital Signs (Most Recent):  Temp: 97.5 °F (36.4 °C) (01/13/23 0747)  Pulse: 61 (01/13/23 0747)  Resp: 18 (01/13/23 0417)  BP: (!) 141/65 (01/13/23 0747)  SpO2: 95 % (01/13/23 0747)   Vital Signs (24h Range):  Temp:  [97.5 °F (36.4 °C)-98.4 °F (36.9 °C)] 97.5 °F (36.4 °C)  Pulse:  [58-91] 61  Resp:  [18] 18  SpO2:  [92 %-99 %] 95 %  BP: (101-151)/(51-72) 141/65     Weight: (!) 156.5 kg (345 lb 0.3 oz)  Body mass index is 57.41 kg/m².    Intake/Output Summary (Last 24 hours) at 1/13/2023 0822  Last data filed at 1/13/2023 0551  Gross per 24 hour   Intake 480 ml   Output 1100 ml   Net -620 ml         Physical Exam  General - Morbidly obese  female in no acute distress.  HEENT - NCAT. No scleral icterus. Oropharynx clear. Mucous membranes moist.  Neck - No lymphadenopathy, thyromegaly, or JVD.  CVS - Regular rate and rhythm. No murmurs, rubs, or gallops.  Resp - Lungs are clear to auscultation bilaterally. No rales, wheeze, or rhonchi.   GI - Soft, nontender, nondistended, normoactive bowel sounds present.   Extremities - No clubbing, cyanosis, or edema.   Neuro - CN II through XII are grossly intact. No focal neurological deficits. Alert and oriented times four.   Psych - Normal affect.  Skin - Left hip surgical site c/d/i. Slight edema and ecchymosis noted to periwounds.     Significant Labs: All pertinent labs within the past 24 hours have been reviewed.     1/9/2023:  CBC: WBC count 10.4, hemoglobin 11.1, hematocrit 37.9, platelet count 316.  CMP: Sodium 137, potassium 4.8, chloride 98, CO2 28, BUN 17.8, creatinine 0.77, glucose 94, calcium 9.5, magnesium 2.1, alkaline phosphatase 223, total protein 6.6,  albumin 3.3, total bilirubin 0.8, AST 27, ALT 35.     1/4/2023:  CBC: WBC count 10.4, hemoglobin 10.2, hematocrit 33.6, platelet count 307.  BMP: Sodium 141, potassium 4.7, chloride 102, CO2 28, BUN 20.8, creatinine 0.79, glucose 114, calcium 9.7, magnesium 2.0.     12/30/2022:   CBC:  WBC count 10.4, hemoglobin 10.1, hematocrit 33.6, platelet count 259 food   BMP: Sodium 137, potassium 4.4, chloride 100, CO2 25, BUN 14.3, creatinine 0.68, glucose 100, calcium 9.1, magnesium 2.0.      12/29/2022:  CBC:  WBC count 10.9, hemoglobin 9.8, hematocrit 31.7, glucose 241.    BMP: Sodium 136, potassium 4.7, chloride 100, CO2 26, BUN 14.4, creatinine 0.73, glucose 107, calcium 8.9.    12/27/2022:   CBC:  WBC count 1.5, hemoglobin 10.7, hematocrit 34.6, platelet count 202.    CMP: Sodium 137, potassium 4.3, chloride 105, CO2 25, BUN 16.1, creatinine 0.81, glucose 121, calcium 9.0, magnesium 2.1, alkaline phosphatase 73, total protein 6.4, albumin 3.0, total bilirubin 0.8, AST 39, ALT 22.     12/26/2022:   CBC:  WBC count 13.7, hemoglobin 10.7, hematocrit 34.3, platelet count 264.  CMP:  Sodium 137, potassium 4.4, chloride 103, CO2 25, BUN 18.9, creatinine 0.85, glucose 118, calcium 8.71 magnesium 1.9, alkaline phosphatase 72, total protein 5.7, albumin 3.1, total bilirubin 0.8, AST 56, ALT 26.     12/24/2022:   CMP: Sodium 140, potassium 3.9, chloride 105, CO2 25, BUN 17.2 creatinine 0.97, glucose 117, calcium 9.3, alkaline phosphatase 98, total protein 7.2, albumin 3.8, total bilirubin 0.5, AST 14, ALT 17.    CBC: WBC count 10.6, hemoglobin 12.9, hematocrit 41.2, platelet count 223.     Significant Imaging: I have reviewed all pertinent imaging results/findings within the past 24 hours.     X-ray left hip 12/25/2022: There has been placement of left femoral intramedullary taz and compression screw traverses into the femoral head transfixing the intertrochanteric fracture.  Positioning and alignment is satisfactory.     CT  left hip 12/25/2022: Comminuted intertrochanteric left femoral fracture.     CXR 12/24/2022: No acute chest disease is identified.     X-ray left hip 12/24/2022:  Displaced intertrochanteric fracture of the left femur.      Assessment/Plan:      * Left intertrochanteric femur fracture  Continue 4 week course of aspirin 81 mg PO BID for DVT prophylaxis, PT/OT, norco as needed for pain control, and weight-bearing as tolerated to the left lower extremity.  She is status post intramedullary nail by Dr. Ronnell Olivares on 12/25/2022.     Hypertension  Her blood pressure has been stable off antihypertensives.  She is no longer on lisinopril 5 mg PO daily which was held at [facility] due to hypotension.    Anxiety  Continue buspirone.    Depression  Continue venlafaxine.    GERD (gastroesophageal reflux disease)  Continue famotidine.    Morbid obesity  She was counseled on the importance of weight loss and diet.  She was noted to have a BMI of 57.41.      Disposition  Anticipate discharge home with home health in the next week. A ramp is being arranged at her house. She has been approved through 1/18/2023.      VTE Risk Mitigation (From admission, onward)         Ordered     Place sequential compression device  Until discontinued         12/29/22 1612                Discharge Planning   XENA:      Code Status: Full Code   Is the patient medically ready for discharge?:     Reason for patient still in hospital (select all that apply): PT / OT recommendations  Discharge Plan A: Home with family, Home Health   Discharge Delays: None known at this time      This service was provided via telemedicine.  Type of Software: Audio/Visual.  Originating Site: [facility].  Distant Site: [place], [place].  Her exam was performed with the assitance of Elvira Diaz RN.      Stephon Kearney MD  Department of Hospital Medicine   [facility]

## 2023-01-13 NOTE — PT/OT/SLP PROGRESS
Physical Therapy Treatment Note           Name: Tanya Linda    : 1960 (62 y.o.)  MRN: [id number]           TREATMENT SUMMARY AND RECOMMENDATIONS:    PT Received On: 23  PT Start Time: 09     PT Stop Time: 930  PT Total Time (min): 30 min     Subjective Assessment:   No complaints  Lethargic   x Awake, alert, cooperative  Uncooperative    Agitated  c/o pain    Appropriate  c/o fatigue    Confused  Treated at bedside     Emotionally labile x Treated in gym/dept.    Impulsive  Other:    Flat affect       Therapy Precautions:    Cognitive deficits  Spinal precautions    Collar - hard  Sternal precautions    Collar - soft   TLSO    Fall risk  LSO    Hip precautions - posterior  Knee immobilizer    Hip precautions - anterior x WBAT    Impaired communication  Partial weightbearing    Oxygen  TTWB    PEG tube  NWB    Visual deficits  Other:    Hearing deficits          Treatment Objectives:     Mobility Training:   Assist level Comments    Bed mobility SBA  Supine<>sit to EOB   Transfer SBA EOB<>Recliner with RW    Gait SBA-CGA RW 2 x 80 ft. Good pace and No Lob. Reciprocal pattern with adequate wt shift    Sit to stand transitions SBA Multiple attempts from recliner during session. Working on wt shift forward   Sitting balance     Standing balance      Wheelchair mobility     Car transfer     Other:          Therapeutic Exercise:   Exercise Sets Reps Comments                               Additional Comments:  - worked with patient on completion of grooming and dressing in room. Ambulated with RW in room to sink to brush teeth and wash face. Demonstrated good dynamic sit balance.     Assessment: Patient tolerated session well this morning. Demonstrated good balance and stability..    PT Plan: continue POC  Revisions made to plan of care: No    GOALS:   Multidisciplinary Problems       Physical Therapy Goals          Problem: Physical Therapy    Goal Priority Disciplines Outcome Goal Variances  Interventions   Physical Therapy Goal     PT, PT/OT Ongoing, Progressing     Description: Goals to be met by: Discharge     Patient will increase functional independence with mobility by performin. Supine to sit with Modified [place]  2. Sit to supine with Modified [place]  3. Sit to stand transfer with Modified [place]  4. Bed to chair transfer with Stand-by Assistance using Rolling Walker  5. Gait  x 25 feet with Stand-by Assistance using Rolling Walker.                          Skilled PT Minutes Provided: 30 minutes   Communication with Treatment Team:     Equipment recommendations:       At end of treatment, patient remained:   Up in chair     Up in wheelchair in room    In bed    With alarm activated    Bed rails up    Call bell in reach     Family/friends present    Restraints secured properly    In bathroom with CNA/RN notified    Nurse aware   x In gym with therapist/tech    Other:

## 2023-01-13 NOTE — PROGRESS NOTES
Re-assessment performed. Incision lines remaining well approximated with steri-strips intact. Slight edema remains to periwounds. Recommend continuing with island dressings to incision lines every other day or as needed for soilage/dislodgement. Improvement noted to L sided breast fold with current wound care regimen. Recommend continuing with Zguard mixed with antifungal powder to breast folds BID/PRN. Recommend applying soft, disposable washcloths to breast folds for moisture management. Pressure prevention measures discussed with pt for continued use during hospitalization. Pt verbalized understanding. Orders in place.      01/13/23 1216   WOCN Assessment   WOCN Total Time (mins) 25   Visit Date 01/13/23   Visit Time 1035   Consult Type Follow Up   Wound surgical   Intervention assessed;chart review;orders   Teaching on-going   Skin Interventions   Pressure Reduction Devices positioning supports utilized   Pressure Reduction Techniques frequent weight shift encouraged   Skin Protection incontinence pads utilized   Positioning   Body Position position changed independently   Head of Bed (HOB) Positioning HOB at 30-45 degrees   Pressure Injury Prevention    Check Moisture Management Pad Done        Altered Skin Integrity 12/29/22 0830 Left lower Breast Moisture associated dermatitis   Date First Assessed/Time First Assessed: 12/29/22 0830   Altered Skin Integrity Present on Admission: yes  Side: Left  Orientation: lower  Location: Breast  Primary Wound Type: Moisture associated dermatitis   Wound Image    Dressing Appearance No dressing   Drainage Amount None   Appearance Red;Not granulating   Tissue loss description Partial thickness   Periwound Area Intact   Wound Edges Irregular   Care Cleansed with:;Sterile normal saline   Dressing Applied;Other (comment)  (Zgaurd paste mixed with Antifungal powder)        Incision/Site 12/25/22 1530 Left Leg   Date First Assessed/Time First Assessed: 12/25/22 1530   Side: Left   Location: Leg   Wound Image     Dressing Appearance Intact;Dry   Drainage Amount None   Appearance Steri-strips intact   Periwound Area Edematous   Wound Edges Approximated   Dressing Applied;Island/border

## 2023-01-13 NOTE — SUBJECTIVE & OBJECTIVE
Interval History: She is doing well this morning and she denies any left hip pain. She ambulated 30 feet x 2 with contact guard assistance and her rolling walker followed by 10 feet with standby to contact guard assistance yesterday and she required contact guard to standby assistance with sit to stand transitions and transfers.      Review of Systems   All other systems reviewed and are negative.  Objective:     Vital Signs (Most Recent):  Temp: 97.5 °F (36.4 °C) (01/13/23 0747)  Pulse: 61 (01/13/23 0747)  Resp: 18 (01/13/23 0417)  BP: (!) 141/65 (01/13/23 0747)  SpO2: 95 % (01/13/23 0747)   Vital Signs (24h Range):  Temp:  [97.5 °F (36.4 °C)-98.4 °F (36.9 °C)] 97.5 °F (36.4 °C)  Pulse:  [58-91] 61  Resp:  [18] 18  SpO2:  [92 %-99 %] 95 %  BP: (101-151)/(51-72) 141/65     Weight: (!) 156.5 kg (345 lb 0.3 oz)  Body mass index is 57.41 kg/m².    Intake/Output Summary (Last 24 hours) at 1/13/2023 0822  Last data filed at 1/13/2023 0551  Gross per 24 hour   Intake 480 ml   Output 1100 ml   Net -620 ml         Physical Exam  General - Morbidly obese  female in no acute distress.  HEENT - NCAT. No scleral icterus. Oropharynx clear. Mucous membranes moist.  Neck - No lymphadenopathy, thyromegaly, or JVD.  CVS - Regular rate and rhythm. No murmurs, rubs, or gallops.  Resp - Lungs are clear to auscultation bilaterally. No rales, wheeze, or rhonchi.   GI - Soft, nontender, nondistended, normoactive bowel sounds present.   Extremities - No clubbing, cyanosis, or edema.   Neuro - CN II through XII are grossly intact. No focal neurological deficits. Alert and oriented times four.   Psych - Normal affect.  Skin - Left hip surgical site c/d/i. Slight edema and ecchymosis noted to periwounds.     Significant Labs: All pertinent labs within the past 24 hours have been reviewed.     1/9/2023:  CBC: WBC count 10.4, hemoglobin 11.1, hematocrit 37.9, platelet count 316.  CMP: Sodium 137, potassium 4.8, chloride 98, CO2 28, BUN  17.8, creatinine 0.77, glucose 94, calcium 9.5, magnesium 2.1, alkaline phosphatase 223, total protein 6.6, albumin 3.3, total bilirubin 0.8, AST 27, ALT 35.     1/4/2023:  CBC: WBC count 10.4, hemoglobin 10.2, hematocrit 33.6, platelet count 307.  BMP: Sodium 141, potassium 4.7, chloride 102, CO2 28, BUN 20.8, creatinine 0.79, glucose 114, calcium 9.7, magnesium 2.0.     12/30/2022:   CBC:  WBC count 10.4, hemoglobin 10.1, hematocrit 33.6, platelet count 259 food   BMP: Sodium 137, potassium 4.4, chloride 100, CO2 25, BUN 14.3, creatinine 0.68, glucose 100, calcium 9.1, magnesium 2.0.      12/29/2022:  CBC:  WBC count 10.9, hemoglobin 9.8, hematocrit 31.7, glucose 241.    BMP: Sodium 136, potassium 4.7, chloride 100, CO2 26, BUN 14.4, creatinine 0.73, glucose 107, calcium 8.9.    12/27/2022:   CBC:  WBC count 1.5, hemoglobin 10.7, hematocrit 34.6, platelet count 202.    CMP: Sodium 137, potassium 4.3, chloride 105, CO2 25, BUN 16.1, creatinine 0.81, glucose 121, calcium 9.0, magnesium 2.1, alkaline phosphatase 73, total protein 6.4, albumin 3.0, total bilirubin 0.8, AST 39, ALT 22.     12/26/2022:   CBC:  WBC count 13.7, hemoglobin 10.7, hematocrit 34.3, platelet count 264.  CMP:  Sodium 137, potassium 4.4, chloride 103, CO2 25, BUN 18.9, creatinine 0.85, glucose 118, calcium 8.71 magnesium 1.9, alkaline phosphatase 72, total protein 5.7, albumin 3.1, total bilirubin 0.8, AST 56, ALT 26.     12/24/2022:   CMP: Sodium 140, potassium 3.9, chloride 105, CO2 25, BUN 17.2 creatinine 0.97, glucose 117, calcium 9.3, alkaline phosphatase 98, total protein 7.2, albumin 3.8, total bilirubin 0.5, AST 14, ALT 17.    CBC: WBC count 10.6, hemoglobin 12.9, hematocrit 41.2, platelet count 223.     Significant Imaging: I have reviewed all pertinent imaging results/findings within the past 24 hours.     X-ray left hip 12/25/2022: There has been placement of left femoral intramedullary taz and compression screw traverses into the  femoral head transfixing the intertrochanteric fracture.  Positioning and alignment is satisfactory.     CT left hip 12/25/2022: Comminuted intertrochanteric left femoral fracture.     CXR 12/24/2022: No acute chest disease is identified.     X-ray left hip 12/24/2022:  Displaced intertrochanteric fracture of the left femur.

## 2023-01-14 PROCEDURE — 97110 THERAPEUTIC EXERCISES: CPT

## 2023-01-14 PROCEDURE — 11000004 HC SNF PRIVATE

## 2023-01-14 PROCEDURE — 97116 GAIT TRAINING THERAPY: CPT

## 2023-01-14 PROCEDURE — 25000003 PHARM REV CODE 250: Performed by: STUDENT IN AN ORGANIZED HEALTH CARE EDUCATION/TRAINING PROGRAM

## 2023-01-14 PROCEDURE — 94760 N-INVAS EAR/PLS OXIMETRY 1: CPT

## 2023-01-14 RX ADMIN — VENLAFAXINE 37.5 MG: 37.5 TABLET ORAL at 05:01

## 2023-01-14 RX ADMIN — ASPIRIN 81 MG: 81 TABLET, COATED ORAL at 08:01

## 2023-01-14 RX ADMIN — MICONAZOLE NITRATE 2 % TOPICAL POWDER: at 08:01

## 2023-01-14 RX ADMIN — ACETAMINOPHEN 650 MG: 325 TABLET ORAL at 08:01

## 2023-01-14 RX ADMIN — FAMOTIDINE 20 MG: 20 TABLET ORAL at 09:01

## 2023-01-14 RX ADMIN — MICONAZOLE NITRATE 2 % TOPICAL POWDER: at 09:01

## 2023-01-14 RX ADMIN — HYDROCODONE BITARTRATE AND ACETAMINOPHEN 1 TABLET: 5; 325 TABLET ORAL at 09:01

## 2023-01-14 RX ADMIN — ASPIRIN 81 MG: 81 TABLET, COATED ORAL at 09:01

## 2023-01-14 RX ADMIN — DOCUSATE SODIUM 100 MG: 100 CAPSULE, LIQUID FILLED ORAL at 08:01

## 2023-01-14 RX ADMIN — BUSPIRONE HYDROCHLORIDE 7.5 MG: 7.5 TABLET ORAL at 09:01

## 2023-01-14 NOTE — PT/OT/SLP PROGRESS
Physical Therapy Treatment Note           Name: Tanya Linda    : 1960 (62 y.o.)  MRN: [id number]           TREATMENT SUMMARY AND RECOMMENDATIONS:    PT Received On: 23  PT Start Time: 914     PT Stop Time: 943  PT Total Time (min): 29 min     Subjective Assessment:   No complaints  Lethargic   x Awake, alert, cooperative  Uncooperative    Agitated 5/10- before meds  c/o pain    Appropriate  c/o fatigue    Confused  Treated at bedside     Emotionally labile x Treated in gym/dept.    Impulsive  Other:    Flat affect       Therapy Precautions:    Cognitive deficits  Spinal precautions    Collar - hard  Sternal precautions    Collar - soft   TLSO    Fall risk  LSO    Hip precautions - posterior  Knee immobilizer    Hip precautions - anterior x WBAT    Impaired communication  Partial weightbearing    Oxygen  TTWB    PEG tube  NWB    Visual deficits  Other:    Hearing deficits          Treatment Objectives:     Mobility Training:   Assist level Comments    Bed mobility Rolling  Mod IND using bed rails    Transfer Min A  Supine to sit with HHA x 1    Gait SBA-CGA RW x 200 ft  requiring with 2 seated rest periods. . Improving stride and wt shift.    Sit to stand transitions SBA Multiple attempts from recliner during session. Working on wt shift forward   Sitting balance Good  Seated in recliner    Standing balance  Fair + Utilizes walker for support    Wheelchair mobility     Car transfer     Other:          Therapeutic Exercise:   Exercise Sets Reps Comments   AP, QS,  LAQ, Hip flexion (assisted)  2 10 Seated                          Additional Comments:  - received pain meds during walk     Assessment: Patient tolerated session well this afternoon demonstrating improved L LE strength and endurance.     PT Plan: continue POC. Increase L LE strength   Revisions made to plan of care: No    GOALS:   Multidisciplinary Problems       Physical Therapy Goals          Problem: Physical Therapy    Goal  Priority Disciplines Outcome Goal Variances Interventions   Physical Therapy Goal     PT, PT/OT Ongoing, Progressing     Description: Goals to be met by: Discharge     Patient will increase functional independence with mobility by performin. Supine to sit with Modified [place]  2. Sit to supine with Modified [place]  3. Sit to stand transfer with Modified [place]  4. Bed to chair transfer with Stand-by Assistance using Rolling Walker  5. Gait  x 25 feet with Stand-by Assistance using Rolling Walker.                          Skilled PT Minutes Provided: 29 minutes   Communication with Treatment Team:     Equipment recommendations:       At end of treatment, patient remained:  x Up in chair     Up in wheelchair in room    In bed    With alarm activated    Bed rails up   x Call bell in reach     Family/friends present    Restraints secured properly    In bathroom with CNA/RN notified   x Nurse aware    In gym with therapist/tech    Other:

## 2023-01-14 NOTE — PLAN OF CARE
Problem: Adult Inpatient Plan of Care  Goal: Plan of Care Review  Outcome: Ongoing, Progressing  Goal: Absence of Hospital-Acquired Illness or Injury  Outcome: Ongoing, Progressing  Intervention: Identify and Manage Fall Risk  Flowsheets (Taken 1/13/2023 1959)  Safety Promotion/Fall Prevention:   assistive device/personal item within reach   bed alarm set   medications reviewed   nonskid shoes/socks when out of bed   instructed to call staff for mobility  Intervention: Prevent Skin Injury  Flowsheets (Taken 1/13/2023 1959)  Body Position:   position changed independently   weight shifting   sitting up in bed  Skin Protection:   drying agents applied   incontinence pads utilized   tubing/devices free from skin contact  Intervention: Prevent and Manage VTE (Venous Thromboembolism) Risk  Flowsheets (Taken 1/13/2023 1959)  Activity Management:   Ambulated to bathroom - L4   Standing - L3   Up in chair - L3  VTE Prevention/Management:   ambulation promoted   bleeding precations maintained   fluids promoted  Range of Motion: active ROM (range of motion) encouraged  Intervention: Prevent Infection  Flowsheets (Taken 1/13/2023 1959)  Infection Prevention:   cohorting utilized   hand hygiene promoted   single patient room provided     Problem: Bariatric Environmental Safety  Goal: Safety Maintained with Care  Outcome: Ongoing, Progressing     Problem: Impaired Wound Healing  Goal: Optimal Wound Healing  Outcome: Ongoing, Progressing     Problem: Skin Injury Risk Increased  Goal: Skin Health and Integrity  Outcome: Ongoing, Progressing     Problem: Fall Injury Risk  Goal: Absence of Fall and Fall-Related Injury  Outcome: Ongoing, Progressing     Problem: Surgical Site Infection  Goal: Absence of Infection Signs and Symptoms  Outcome: Ongoing, Progressing

## 2023-01-14 NOTE — PT/OT/SLP PROGRESS
Physical Therapy Treatment Note           Name: Tanya Linda    : 1960 (62 y.o.)  MRN: [id number]           TREATMENT SUMMARY AND RECOMMENDATIONS:    PT Received On: 23  PT Start Time: 1411     PT Stop Time: 1440  PT Total Time (min): 29 min     Subjective Assessment:   No complaints  Lethargic   x Awake, alert, cooperative  Uncooperative    Agitated 0/10- before therapy. /10 - post therapy  c/o pain    Appropriate  c/o fatigue    Confused  Treated at bedside     Emotionally labile x Treated in gym/dept.    Impulsive  Other:    Flat affect       Therapy Precautions:    Cognitive deficits  Spinal precautions    Collar - hard  Sternal precautions    Collar - soft   TLSO    Fall risk  LSO    Hip precautions - posterior  Knee immobilizer    Hip precautions - anterior x WBAT    Impaired communication  Partial weightbearing    Oxygen  TTWB    PEG tube  NWB    Visual deficits  Other:    Hearing deficits          Treatment Objectives:     Mobility Training:   Assist level Comments    Bed mobility     Transfer     Gait SBA RW x 200 ft  requiring 3  seated rest periods. Slow pace. Decreased stance on left due to pain.   Sit to stand transitions SBA Multiple attempts from recliner during session utilizing UE for pushoff    Sitting balance Good  Seated in recliner - riding LE bike    Standing balance  Fair + Utilizes walker for support . Working on wt shifts   Wheelchair mobility     Car transfer     Other:          Therapeutic Exercise:   Exercise Sets Reps Comments   LE stationary bike  2 5 mins Fwd/bkwd seated                          Additional Comments:  - reports no pain prior to walking. States that pain is in her muscles     Assessment: Patient tolerated session well this afternoon as demonstrated by increased ambulation distance.      PT Plan: continue POC. Increase L LE strength   Revisions made to plan of care: No    GOALS:   Multidisciplinary Problems       Physical Therapy Goals           Problem: Physical Therapy    Goal Priority Disciplines Outcome Goal Variances Interventions   Physical Therapy Goal     PT, PT/OT Ongoing, Progressing     Description: Goals to be met by: Discharge     Patient will increase functional independence with mobility by performin. Supine to sit with Modified [place]  2. Sit to supine with Modified [place]  3. Sit to stand transfer with Modified [place]  4. Bed to chair transfer with Stand-by Assistance using Rolling Walker  5. Gait  x 25 feet with Stand-by Assistance using Rolling Walker.                          Skilled PT Minutes Provided: 29 minutes   Communication with Treatment Team:     Equipment recommendations:       At end of treatment, patient remained:  x Up in chair     Up in wheelchair in room    In bed    With alarm activated    Bed rails up   x Call bell in reach     Family/friends present    Restraints secured properly    In bathroom with CNA/RN notified   x Nurse aware    In gym with therapist/tech    Other:

## 2023-01-14 NOTE — PROGRESS NOTES
[facility] - Medical Surgical Unit  [facility] Medicine  Telehospitalist Progress Note    Patient Name: Tanya Linda  MRN: [id number]  Patient Class: IP- Swing   Admission Date: 12/29/2022  Length of Stay: 16 days  Attending Physician: Stephon Kearney MD  Primary Care Provider: MARION Salazar      Subjective:     Principal Problem:Closed intertrochanteric fracture of hip, left, initial encounter      HPI:  Ms. Linda is a 62-year-old  female with history of morbid obesity, hypertension, depression, GERD, and anxiety who presented to [facility] ER on 12/24/2022 with left hip pain following a fall.  X-ray of the left hip and femur showed a displaced intertrochanteric fracture of the left femur and chest x-ray revealed no acute cardiopulmonary process. CT of the left hip confirmed a comminuted intertrochanteric left femoral fracture and orthopedics was consulted.  She was taken to the OR on 12/25/2022 for intramedullary nail of left intertrochanteric femur fracture by Dr. Ronnell Olivares and she did relatively well postoperatively.  Her lisinopril was held due to mild hypotension and her blood pressure stabilized.  She had no other complications throughout her hospital course and she was transferred to [facility] swing bed on 12/29/2022 for PT/OT and management of her multiple medical comorbidities.      Overview/Hospital Course:  She was admitted to [facility] swing bed on 12/29/2022 for rehab following intramedullary nail of left intertrochanteric femur fracture.  She is being treated with a 4 week course of aspirin 81 mg PO BID for DVT prophylaxis and norco 5/325 mg PO every 6 hours as needed for pain.  She was started on diclofenac 1% gel 2 grams topical BID prn for left knee pain and stiffness on 1/6/2023. Her miralax was changed from 17 gm PO daily to prn on 1/8/2023.      Interval History: She is doing well today without complaints. She ambulated 80 feet x 2 with standby to contact  guard assistance yesterday and she required standby assistance with sit to stand transitions.      Review of Systems   All other systems reviewed and are negative.  Objective:     Vital Signs (Most Recent):  Temp: 97.4 °F (36.3 °C) (01/14/23 0724)  Pulse: 60 (01/14/23 0724)  Resp: 18 (01/14/23 0415)  BP: 138/68 (01/14/23 0724)  SpO2: 98 % (01/14/23 0724) Vital Signs (24h Range):  Temp:  [97.4 °F (36.3 °C)-98.4 °F (36.9 °C)] 97.4 °F (36.3 °C)  Pulse:  [57-79] 60  Resp:  [17-18] 18  SpO2:  [95 %-98 %] 98 %  BP: (111-145)/(65-79) 138/68     Weight: (!) 156.5 kg (345 lb 0.3 oz)  Body mass index is 57.41 kg/m².    Intake/Output Summary (Last 24 hours) at 1/14/2023 0841  Last data filed at 1/14/2023 0651  Gross per 24 hour   Intake 480 ml   Output 900 ml   Net -420 ml      Physical Exam  General - Morbidly obese  female in no acute distress.  HEENT - NCAT. No scleral icterus. Oropharynx clear. Mucous membranes moist.  Neck - No lymphadenopathy, thyromegaly, or JVD.  CVS - Regular rate and rhythm. No murmurs, rubs, or gallops.  Resp - Lungs are clear to auscultation bilaterally. No rales, wheeze, or rhonchi.   GI - Soft, nontender, nondistended, normoactive bowel sounds present.   Extremities - No clubbing, cyanosis, or edema.   Neuro - CN II through XII are grossly intact. No focal neurological deficits. Alert and oriented times four.   Psych - Normal affect.  Skin - Left hip surgical site c/d/i. Slight edema and ecchymosis noted to periwounds.     Significant Labs: All pertinent labs within the past 24 hours have been reviewed.     1/9/2023:  CBC: WBC count 10.4, hemoglobin 11.1, hematocrit 37.9, platelet count 316.  CMP: Sodium 137, potassium 4.8, chloride 98, CO2 28, BUN 17.8, creatinine 0.77, glucose 94, calcium 9.5, magnesium 2.1, alkaline phosphatase 223, total protein 6.6, albumin 3.3, total bilirubin 0.8, AST 27, ALT 35.     1/4/2023:  CBC: WBC count 10.4, hemoglobin 10.2, hematocrit 33.6, platelet count  307.  BMP: Sodium 141, potassium 4.7, chloride 102, CO2 28, BUN 20.8, creatinine 0.79, glucose 114, calcium 9.7, magnesium 2.0.     12/30/2022:   CBC:  WBC count 10.4, hemoglobin 10.1, hematocrit 33.6, platelet count 259 food   BMP: Sodium 137, potassium 4.4, chloride 100, CO2 25, BUN 14.3, creatinine 0.68, glucose 100, calcium 9.1, magnesium 2.0.      12/29/2022:  CBC:  WBC count 10.9, hemoglobin 9.8, hematocrit 31.7, glucose 241.    BMP: Sodium 136, potassium 4.7, chloride 100, CO2 26, BUN 14.4, creatinine 0.73, glucose 107, calcium 8.9.    12/27/2022:   CBC:  WBC count 1.5, hemoglobin 10.7, hematocrit 34.6, platelet count 202.    CMP: Sodium 137, potassium 4.3, chloride 105, CO2 25, BUN 16.1, creatinine 0.81, glucose 121, calcium 9.0, magnesium 2.1, alkaline phosphatase 73, total protein 6.4, albumin 3.0, total bilirubin 0.8, AST 39, ALT 22.     12/26/2022:   CBC:  WBC count 13.7, hemoglobin 10.7, hematocrit 34.3, platelet count 264.  CMP:  Sodium 137, potassium 4.4, chloride 103, CO2 25, BUN 18.9, creatinine 0.85, glucose 118, calcium 8.71 magnesium 1.9, alkaline phosphatase 72, total protein 5.7, albumin 3.1, total bilirubin 0.8, AST 56, ALT 26.     12/24/2022:   CMP: Sodium 140, potassium 3.9, chloride 105, CO2 25, BUN 17.2 creatinine 0.97, glucose 117, calcium 9.3, alkaline phosphatase 98, total protein 7.2, albumin 3.8, total bilirubin 0.5, AST 14, ALT 17.    CBC: WBC count 10.6, hemoglobin 12.9, hematocrit 41.2, platelet count 223.     Significant Imaging: I have reviewed all pertinent imaging results/findings within the past 24 hours.     X-ray left hip 12/25/2022: There has been placement of left femoral intramedullary taz and compression screw traverses into the femoral head transfixing the intertrochanteric fracture.  Positioning and alignment is satisfactory.     CT left hip 12/25/2022: Comminuted intertrochanteric left femoral fracture.     CXR 12/24/2022: No acute chest disease is identified.      X-ray left hip 12/24/2022:  Displaced intertrochanteric fracture of the left femur.      Assessment/Plan:      * Left intertrochanteric femur fracture  Continue 4 week course of aspirin 81 mg PO BID for DVT prophylaxis, PT/OT, norco as needed for pain control, and weight-bearing as tolerated to the left lower extremity.  She is status post intramedullary nail by Dr. Ronnell Olivares on 12/25/2022.     Hypertension  Her blood pressure has been stable off antihypertensives.  She is no longer on lisinopril 5 mg PO daily which was held at [facility] due to hypotension.    Anxiety  Continue buspirone.    Depression  Continue venlafaxine.    GERD (gastroesophageal reflux disease)  Continue famotidine.    Morbid obesity  She was counseled on the importance of weight loss and diet.  She was noted to have a BMI of 57.41.      Disposition  Anticipate discharge home with home health in the next week. A ramp is being arranged at her house. She has been approved through 1/18/2023.      VTE Risk Mitigation (From admission, onward)         Ordered     Place sequential compression device  Until discontinued         12/29/22 1612                Discharge Planning   XENA:      Code Status: Full Code   Is the patient medically ready for discharge?:     Reason for patient still in hospital (select all that apply): PT / OT recommendations  Discharge Plan A: Home with family, Home Health   Discharge Delays: None known at this time      This service was provided via telemedicine.  Type of Software: Audio/Visual.  Originating Site: [facility].  Distant Site: [place], [place].  Her exam was performed with the assitance of Elvira Diaz RN.      Stephon Kearney MD  Department of Hospital Medicine   [facility] - Medical Surgical Unit

## 2023-01-14 NOTE — PLAN OF CARE
Problem: Fall Injury Risk  Goal: Absence of Fall and Fall-Related Injury  Outcome: Ongoing, Progressing  Intervention: Identify and Manage Contributors  Flowsheets (Taken 1/14/2023 0405)  Self-Care Promotion: independence encouraged  Medication Review/Management: medications reviewed  Intervention: Promote Injury-Free Environment  Flowsheets (Taken 1/14/2023 0405)  Safety Promotion/Fall Prevention:   assistive device/personal item within reach   bed alarm set   Fall Risk reviewed with patient/family   gait belt with ambulation   lighting adjusted   side rails raised x 2

## 2023-01-14 NOTE — SUBJECTIVE & OBJECTIVE
Interval History: She is doing well today without complaints. She ambulated 80 feet x 2 with standby to contact guard assistance yesterday and she required standby assistance with sit to stand transitions.      Review of Systems   All other systems reviewed and are negative.  Objective:     Vital Signs (Most Recent):  Temp: 97.4 °F (36.3 °C) (01/14/23 0724)  Pulse: 60 (01/14/23 0724)  Resp: 18 (01/14/23 0415)  BP: 138/68 (01/14/23 0724)  SpO2: 98 % (01/14/23 0724) Vital Signs (24h Range):  Temp:  [97.4 °F (36.3 °C)-98.4 °F (36.9 °C)] 97.4 °F (36.3 °C)  Pulse:  [57-79] 60  Resp:  [17-18] 18  SpO2:  [95 %-98 %] 98 %  BP: (111-145)/(65-79) 138/68     Weight: (!) 156.5 kg (345 lb 0.3 oz)  Body mass index is 57.41 kg/m².    Intake/Output Summary (Last 24 hours) at 1/14/2023 0841  Last data filed at 1/14/2023 0651  Gross per 24 hour   Intake 480 ml   Output 900 ml   Net -420 ml      Physical Exam  General - Morbidly obese  female in no acute distress.  HEENT - NCAT. No scleral icterus. Oropharynx clear. Mucous membranes moist.  Neck - No lymphadenopathy, thyromegaly, or JVD.  CVS - Regular rate and rhythm. No murmurs, rubs, or gallops.  Resp - Lungs are clear to auscultation bilaterally. No rales, wheeze, or rhonchi.   GI - Soft, nontender, nondistended, normoactive bowel sounds present.   Extremities - No clubbing, cyanosis, or edema.   Neuro - CN II through XII are grossly intact. No focal neurological deficits. Alert and oriented times four.   Psych - Normal affect.  Skin - Left hip surgical site c/d/i. Slight edema and ecchymosis noted to periwounds.     Significant Labs: All pertinent labs within the past 24 hours have been reviewed.     1/9/2023:  CBC: WBC count 10.4, hemoglobin 11.1, hematocrit 37.9, platelet count 316.  CMP: Sodium 137, potassium 4.8, chloride 98, CO2 28, BUN 17.8, creatinine 0.77, glucose 94, calcium 9.5, magnesium 2.1, alkaline phosphatase 223, total protein 6.6, albumin 3.3, total  bilirubin 0.8, AST 27, ALT 35.     1/4/2023:  CBC: WBC count 10.4, hemoglobin 10.2, hematocrit 33.6, platelet count 307.  BMP: Sodium 141, potassium 4.7, chloride 102, CO2 28, BUN 20.8, creatinine 0.79, glucose 114, calcium 9.7, magnesium 2.0.     12/30/2022:   CBC:  WBC count 10.4, hemoglobin 10.1, hematocrit 33.6, platelet count 259 food   BMP: Sodium 137, potassium 4.4, chloride 100, CO2 25, BUN 14.3, creatinine 0.68, glucose 100, calcium 9.1, magnesium 2.0.      12/29/2022:  CBC:  WBC count 10.9, hemoglobin 9.8, hematocrit 31.7, glucose 241.    BMP: Sodium 136, potassium 4.7, chloride 100, CO2 26, BUN 14.4, creatinine 0.73, glucose 107, calcium 8.9.    12/27/2022:   CBC:  WBC count 1.5, hemoglobin 10.7, hematocrit 34.6, platelet count 202.    CMP: Sodium 137, potassium 4.3, chloride 105, CO2 25, BUN 16.1, creatinine 0.81, glucose 121, calcium 9.0, magnesium 2.1, alkaline phosphatase 73, total protein 6.4, albumin 3.0, total bilirubin 0.8, AST 39, ALT 22.     12/26/2022:   CBC:  WBC count 13.7, hemoglobin 10.7, hematocrit 34.3, platelet count 264.  CMP:  Sodium 137, potassium 4.4, chloride 103, CO2 25, BUN 18.9, creatinine 0.85, glucose 118, calcium 8.71 magnesium 1.9, alkaline phosphatase 72, total protein 5.7, albumin 3.1, total bilirubin 0.8, AST 56, ALT 26.     12/24/2022:   CMP: Sodium 140, potassium 3.9, chloride 105, CO2 25, BUN 17.2 creatinine 0.97, glucose 117, calcium 9.3, alkaline phosphatase 98, total protein 7.2, albumin 3.8, total bilirubin 0.5, AST 14, ALT 17.    CBC: WBC count 10.6, hemoglobin 12.9, hematocrit 41.2, platelet count 223.     Significant Imaging: I have reviewed all pertinent imaging results/findings within the past 24 hours.     X-ray left hip 12/25/2022: There has been placement of left femoral intramedullary taz and compression screw traverses into the femoral head transfixing the intertrochanteric fracture.  Positioning and alignment is satisfactory.     CT left hip 12/25/2022:  Comminuted intertrochanteric left femoral fracture.     CXR 12/24/2022: No acute chest disease is identified.     X-ray left hip 12/24/2022:  Displaced intertrochanteric fracture of the left femur.

## 2023-01-15 PROCEDURE — 25000003 PHARM REV CODE 250: Performed by: STUDENT IN AN ORGANIZED HEALTH CARE EDUCATION/TRAINING PROGRAM

## 2023-01-15 PROCEDURE — 11000004 HC SNF PRIVATE

## 2023-01-15 PROCEDURE — 94760 N-INVAS EAR/PLS OXIMETRY 1: CPT

## 2023-01-15 RX ADMIN — MICONAZOLE NITRATE 2 % TOPICAL POWDER: at 08:01

## 2023-01-15 RX ADMIN — BUSPIRONE HYDROCHLORIDE 7.5 MG: 7.5 TABLET ORAL at 08:01

## 2023-01-15 RX ADMIN — VENLAFAXINE 37.5 MG: 37.5 TABLET ORAL at 06:01

## 2023-01-15 RX ADMIN — ASPIRIN 81 MG: 81 TABLET, COATED ORAL at 09:01

## 2023-01-15 RX ADMIN — MICONAZOLE NITRATE 2 % TOPICAL POWDER: at 09:01

## 2023-01-15 RX ADMIN — ACETAMINOPHEN 650 MG: 325 TABLET ORAL at 08:01

## 2023-01-15 RX ADMIN — ASPIRIN 81 MG: 81 TABLET, COATED ORAL at 08:01

## 2023-01-15 RX ADMIN — FAMOTIDINE 20 MG: 20 TABLET ORAL at 09:01

## 2023-01-15 NOTE — SUBJECTIVE & OBJECTIVE
Interval History: She is doing well today without complaints and she denies any left hip pain. She remains on docusate 100 mg PO BID and she had a bowel movement yesterday. She ambulated 200 feet with her rolling walker and standby assistance yesterday and she required standby assistance with sit to stand transitions and minimal assistance with transfers.      Review of Systems   All other systems reviewed and are negative.  Objective:     Vital Signs (Most Recent):  Temp: 97.5 °F (36.4 °C) (01/15/23 0759)  Pulse: 60 (01/15/23 0759)  Resp: 18 (01/15/23 0353)  BP: (!) 153/80 (01/15/23 0759)  SpO2: 95 % (01/15/23 0759)   Vital Signs (24h Range):  Temp:  [97.5 °F (36.4 °C)-98.1 °F (36.7 °C)] 97.5 °F (36.4 °C)  Pulse:  [60-84] 60  Resp:  [17-18] 18  SpO2:  [95 %-100 %] 95 %  BP: (117-153)/(50-82) 153/80     Weight: (!) 156.5 kg (345 lb 0.3 oz)  Body mass index is 57.41 kg/m².    Intake/Output Summary (Last 24 hours) at 1/15/2023 0824  Last data filed at 1/15/2023 0805  Gross per 24 hour   Intake 1200 ml   Output 455 ml   Net 745 ml      Physical Exam  General - Morbidly obese  female in no acute distress.  HEENT - NCAT. No scleral icterus. Oropharynx clear. Mucous membranes moist.  Neck - No lymphadenopathy, thyromegaly, or JVD.  CVS - Regular rate and rhythm. No murmurs, rubs, or gallops.  Resp - Lungs are clear to auscultation bilaterally. No rales, wheeze, or rhonchi.   GI - Soft, nontender, nondistended, normoactive bowel sounds present.   Extremities - No clubbing, cyanosis, or edema.   Neuro - CN II through XII are grossly intact. No focal neurological deficits. Alert and oriented times four.   Psych - Normal affect.  Skin - Left hip surgical site c/d/i. Slight edema and ecchymosis noted to periwounds.     Significant Labs: All pertinent labs within the past 24 hours have been reviewed.     1/9/2023:  CBC: WBC count 10.4, hemoglobin 11.1, hematocrit 37.9, platelet count 316.  CMP: Sodium 137, potassium  4.8, chloride 98, CO2 28, BUN 17.8, creatinine 0.77, glucose 94, calcium 9.5, magnesium 2.1, alkaline phosphatase 223, total protein 6.6, albumin 3.3, total bilirubin 0.8, AST 27, ALT 35.     1/4/2023:  CBC: WBC count 10.4, hemoglobin 10.2, hematocrit 33.6, platelet count 307.  BMP: Sodium 141, potassium 4.7, chloride 102, CO2 28, BUN 20.8, creatinine 0.79, glucose 114, calcium 9.7, magnesium 2.0.     12/30/2022:   CBC:  WBC count 10.4, hemoglobin 10.1, hematocrit 33.6, platelet count 259 food   BMP: Sodium 137, potassium 4.4, chloride 100, CO2 25, BUN 14.3, creatinine 0.68, glucose 100, calcium 9.1, magnesium 2.0.      12/29/2022:  CBC:  WBC count 10.9, hemoglobin 9.8, hematocrit 31.7, glucose 241.    BMP: Sodium 136, potassium 4.7, chloride 100, CO2 26, BUN 14.4, creatinine 0.73, glucose 107, calcium 8.9.    12/27/2022:   CBC:  WBC count 1.5, hemoglobin 10.7, hematocrit 34.6, platelet count 202.    CMP: Sodium 137, potassium 4.3, chloride 105, CO2 25, BUN 16.1, creatinine 0.81, glucose 121, calcium 9.0, magnesium 2.1, alkaline phosphatase 73, total protein 6.4, albumin 3.0, total bilirubin 0.8, AST 39, ALT 22.     12/26/2022:   CBC:  WBC count 13.7, hemoglobin 10.7, hematocrit 34.3, platelet count 264.  CMP:  Sodium 137, potassium 4.4, chloride 103, CO2 25, BUN 18.9, creatinine 0.85, glucose 118, calcium 8.71 magnesium 1.9, alkaline phosphatase 72, total protein 5.7, albumin 3.1, total bilirubin 0.8, AST 56, ALT 26.     12/24/2022:   CMP: Sodium 140, potassium 3.9, chloride 105, CO2 25, BUN 17.2 creatinine 0.97, glucose 117, calcium 9.3, alkaline phosphatase 98, total protein 7.2, albumin 3.8, total bilirubin 0.5, AST 14, ALT 17.    CBC: WBC count 10.6, hemoglobin 12.9, hematocrit 41.2, platelet count 223.     Significant Imaging: I have reviewed all pertinent imaging results/findings within the past 24 hours.     X-ray left hip 12/25/2022: There has been placement of left femoral intramedullary taz and  compression screw traverses into the femoral head transfixing the intertrochanteric fracture.  Positioning and alignment is satisfactory.     CT left hip 12/25/2022: Comminuted intertrochanteric left femoral fracture.     CXR 12/24/2022: No acute chest disease is identified.     X-ray left hip 12/24/2022:  Displaced intertrochanteric fracture of the left femur.

## 2023-01-15 NOTE — PLAN OF CARE
Problem: Fall Injury Risk  Goal: Absence of Fall and Fall-Related Injury  Outcome: Ongoing, Progressing  Intervention: Identify and Manage Contributors  Flowsheets (Taken 1/15/2023 0140)  Self-Care Promotion: independence encouraged  Medication Review/Management: medications reviewed  Intervention: Promote Injury-Free Environment  Flowsheets (Taken 1/14/2023 2030)  Safety Promotion/Fall Prevention:   assistive device/personal item within reach   bed alarm set   Fall Risk reviewed with patient/family   gait belt with ambulation   high risk medications identified   lighting adjusted   nonskid shoes/socks when out of bed   side rails raised x 2

## 2023-01-15 NOTE — PLAN OF CARE
Problem: Adult Inpatient Plan of Care  Goal: Plan of Care Review  Outcome: Ongoing, Progressing  Goal: Absence of Hospital-Acquired Illness or Injury  Outcome: Ongoing, Progressing  Intervention: Prevent Skin Injury  Flowsheets (Taken 1/15/2023 0800)  Body Position:   sitting up in bed   position maintained   neutral body alignment  Skin Protection:   adhesive use limited   incontinence pads utilized   skin sealant/moisture barrier applied     Problem: Bariatric Environmental Safety  Goal: Safety Maintained with Care  Outcome: Ongoing, Progressing     Problem: Impaired Wound Healing  Goal: Optimal Wound Healing  Outcome: Ongoing, Progressing  Intervention: Promote Wound Healing  Flowsheets (Taken 1/15/2023 0800)  Sleep/Rest Enhancement:   awakenings minimized   regular sleep/rest pattern promoted   relaxation techniques promoted   room darkened  Pain Management Interventions:   care clustered   pain management plan reviewed with patient/caregiver   relaxation techniques promoted   quiet environment facilitated   pillow support provided     Problem: Skin Injury Risk Increased  Goal: Skin Health and Integrity  Outcome: Ongoing, Progressing  Intervention: Optimize Skin Protection  Flowsheets (Taken 1/15/2023 0800)  Pressure Reduction Techniques: frequent weight shift encouraged  Skin Protection:   adhesive use limited   incontinence pads utilized   skin sealant/moisture barrier applied  Head of Bed (HOB) Positioning: HOB at 30 degrees     Problem: Fall Injury Risk  Goal: Absence of Fall and Fall-Related Injury  Outcome: Ongoing, Progressing  Intervention: Identify and Manage Contributors  Flowsheets (Taken 1/15/2023 0800)  Self-Care Promotion:   independence encouraged   BADL personal objects within reach   BADL personal routines maintained   safe use of adaptive equipment encouraged  Medication Review/Management: medications reviewed     Problem: Surgical Site Infection  Goal: Absence of Infection Signs and  Symptoms  Outcome: Ongoing, Progressing  Intervention: Prevent or Manage Infection  Flowsheets (Taken 1/15/2023 0800)  Fever Reduction/Comfort Measures:   lightweight bedding   lightweight clothing

## 2023-01-15 NOTE — PLAN OF CARE
Problem: Adult Inpatient Plan of Care  Goal: Plan of Care Review  Outcome: Ongoing, Progressing  Goal: Absence of Hospital-Acquired Illness or Injury  Outcome: Ongoing, Progressing  Intervention: Identify and Manage Fall Risk  Flowsheets (Taken 1/14/2023 2036)  Safety Promotion/Fall Prevention:   assistive device/personal item within reach   bed alarm set   medications reviewed   gait belt with ambulation   instructed to call staff for mobility  Intervention: Prevent Skin Injury  Flowsheets (Taken 1/14/2023 2036)  Body Position:   weight shifting   legs elevated  Skin Protection:   adhesive use limited   drying agents applied   incontinence pads utilized   tubing/devices free from skin contact  Intervention: Prevent and Manage VTE (Venous Thromboembolism) Risk  Flowsheets (Taken 1/14/2023 2036)  Activity Management:   Standing - L3   Ambulated to bathroom - L4   Up in chair - L3  VTE Prevention/Management:   ambulation promoted   bleeding precations maintained   fluids promoted  Range of Motion: active ROM (range of motion) encouraged     Problem: Bariatric Environmental Safety  Goal: Safety Maintained with Care  Outcome: Ongoing, Progressing     Problem: Impaired Wound Healing  Goal: Optimal Wound Healing  Outcome: Ongoing, Progressing     Problem: Skin Injury Risk Increased  Goal: Skin Health and Integrity  Outcome: Ongoing, Progressing     Problem: Fall Injury Risk  Goal: Absence of Fall and Fall-Related Injury  Outcome: Ongoing, Progressing     Problem: Surgical Site Infection  Goal: Absence of Infection Signs and Symptoms  Outcome: Ongoing, Progressing

## 2023-01-15 NOTE — PROGRESS NOTES
[facility] - Medical Surgical Unit  [facility] Medicine  Telehospitalist Progress Note    Patient Name: Tanya Linda  MRN: [id number]  Patient Class: IP- Swing   Admission Date: 12/29/2022  Length of Stay: 17 days  Attending Physician: Stephon Kearney MD  Primary Care Provider: MARION Salazar      Subjective:     Principal Problem:Closed intertrochanteric fracture of hip, left, initial encounter      HPI:  Ms. Linda is a 62-year-old  female with history of morbid obesity, hypertension, depression, GERD, and anxiety who presented to [facility] ER on 12/24/2022 with left hip pain following a fall.  X-ray of the left hip and femur showed a displaced intertrochanteric fracture of the left femur and chest x-ray revealed no acute cardiopulmonary process. CT of the left hip confirmed a comminuted intertrochanteric left femoral fracture and orthopedics was consulted.  She was taken to the OR on 12/25/2022 for intramedullary nail of left intertrochanteric femur fracture by Dr. Ronnell Olivares and she did relatively well postoperatively.  Her lisinopril was held due to mild hypotension and her blood pressure stabilized.  She had no other complications throughout her hospital course and she was transferred to [facility] swing bed on 12/29/2022 for PT/OT and management of her multiple medical comorbidities.      Overview/Hospital Course:  She was admitted to [facility] swing bed on 12/29/2022 for rehab following intramedullary nail of left intertrochanteric femur fracture.  She is being treated with a 4 week course of aspirin 81 mg PO BID for DVT prophylaxis and norco 5/325 mg PO every 6 hours as needed for pain.  She was started on diclofenac 1% gel 2 grams topical BID prn for left knee pain and stiffness on 1/6/2023. Her miralax was changed from 17 gm PO daily to prn on 1/8/2023.      Interval History: She is doing well today without complaints and she denies any left hip pain. She remains on  docusate 100 mg PO BID and she had a bowel movement yesterday. She ambulated 200 feet with her rolling walker and standby assistance yesterday and she required standby assistance with sit to stand transitions and minimal assistance with transfers.      Review of Systems   All other systems reviewed and are negative.  Objective:     Vital Signs (Most Recent):  Temp: 97.5 °F (36.4 °C) (01/15/23 0759)  Pulse: 60 (01/15/23 0759)  Resp: 18 (01/15/23 0353)  BP: (!) 153/80 (01/15/23 0759)  SpO2: 95 % (01/15/23 0759)   Vital Signs (24h Range):  Temp:  [97.5 °F (36.4 °C)-98.1 °F (36.7 °C)] 97.5 °F (36.4 °C)  Pulse:  [60-84] 60  Resp:  [17-18] 18  SpO2:  [95 %-100 %] 95 %  BP: (117-153)/(50-82) 153/80     Weight: (!) 156.5 kg (345 lb 0.3 oz)  Body mass index is 57.41 kg/m².    Intake/Output Summary (Last 24 hours) at 1/15/2023 0824  Last data filed at 1/15/2023 0805  Gross per 24 hour   Intake 1200 ml   Output 455 ml   Net 745 ml      Physical Exam  General - Morbidly obese  female in no acute distress.  HEENT - NCAT. No scleral icterus. Oropharynx clear. Mucous membranes moist.  Neck - No lymphadenopathy, thyromegaly, or JVD.  CVS - Regular rate and rhythm. No murmurs, rubs, or gallops.  Resp - Lungs are clear to auscultation bilaterally. No rales, wheeze, or rhonchi.   GI - Soft, nontender, nondistended, normoactive bowel sounds present.   Extremities - No clubbing, cyanosis, or edema.   Neuro - CN II through XII are grossly intact. No focal neurological deficits. Alert and oriented times four.   Psych - Normal affect.  Skin - Left hip surgical site c/d/i. Slight edema and ecchymosis noted to periwounds.     Significant Labs: All pertinent labs within the past 24 hours have been reviewed.     1/9/2023:  CBC: WBC count 10.4, hemoglobin 11.1, hematocrit 37.9, platelet count 316.  CMP: Sodium 137, potassium 4.8, chloride 98, CO2 28, BUN 17.8, creatinine 0.77, glucose 94, calcium 9.5, magnesium 2.1, alkaline  phosphatase 223, total protein 6.6, albumin 3.3, total bilirubin 0.8, AST 27, ALT 35.     1/4/2023:  CBC: WBC count 10.4, hemoglobin 10.2, hematocrit 33.6, platelet count 307.  BMP: Sodium 141, potassium 4.7, chloride 102, CO2 28, BUN 20.8, creatinine 0.79, glucose 114, calcium 9.7, magnesium 2.0.     12/30/2022:   CBC:  WBC count 10.4, hemoglobin 10.1, hematocrit 33.6, platelet count 259 food   BMP: Sodium 137, potassium 4.4, chloride 100, CO2 25, BUN 14.3, creatinine 0.68, glucose 100, calcium 9.1, magnesium 2.0.      12/29/2022:  CBC:  WBC count 10.9, hemoglobin 9.8, hematocrit 31.7, glucose 241.    BMP: Sodium 136, potassium 4.7, chloride 100, CO2 26, BUN 14.4, creatinine 0.73, glucose 107, calcium 8.9.    12/27/2022:   CBC:  WBC count 1.5, hemoglobin 10.7, hematocrit 34.6, platelet count 202.    CMP: Sodium 137, potassium 4.3, chloride 105, CO2 25, BUN 16.1, creatinine 0.81, glucose 121, calcium 9.0, magnesium 2.1, alkaline phosphatase 73, total protein 6.4, albumin 3.0, total bilirubin 0.8, AST 39, ALT 22.     12/26/2022:   CBC:  WBC count 13.7, hemoglobin 10.7, hematocrit 34.3, platelet count 264.  CMP:  Sodium 137, potassium 4.4, chloride 103, CO2 25, BUN 18.9, creatinine 0.85, glucose 118, calcium 8.71 magnesium 1.9, alkaline phosphatase 72, total protein 5.7, albumin 3.1, total bilirubin 0.8, AST 56, ALT 26.     12/24/2022:   CMP: Sodium 140, potassium 3.9, chloride 105, CO2 25, BUN 17.2 creatinine 0.97, glucose 117, calcium 9.3, alkaline phosphatase 98, total protein 7.2, albumin 3.8, total bilirubin 0.5, AST 14, ALT 17.    CBC: WBC count 10.6, hemoglobin 12.9, hematocrit 41.2, platelet count 223.     Significant Imaging: I have reviewed all pertinent imaging results/findings within the past 24 hours.     X-ray left hip 12/25/2022: There has been placement of left femoral intramedullary taz and compression screw traverses into the femoral head transfixing the intertrochanteric fracture.  Positioning and  alignment is satisfactory.     CT left hip 12/25/2022: Comminuted intertrochanteric left femoral fracture.     CXR 12/24/2022: No acute chest disease is identified.     X-ray left hip 12/24/2022:  Displaced intertrochanteric fracture of the left femur.      Assessment/Plan:      * Left intertrochanteric femur fracture  Continue 4 week course of aspirin 81 mg PO BID for DVT prophylaxis, PT/OT, norco as needed for pain control, and weight-bearing as tolerated to the left lower extremity.  She is status post intramedullary nail by Dr. Ronnell Olivares on 12/25/2022.     Hypertension  Her blood pressure has been stable off antihypertensives.  She is no longer on lisinopril 5 mg PO daily which was held at [facility] due to hypotension.    Anxiety  Continue buspirone.    Depression  Continue venlafaxine.    GERD (gastroesophageal reflux disease)  Continue famotidine.    Morbid obesity  She was counseled on the importance of weight loss and diet.  She was noted to have a BMI of 57.41.      Disposition  Anticipate discharge home with home health in the next week. A ramp is being arranged at her house. She has been approved through 1/18/2023.      VTE Risk Mitigation (From admission, onward)         Ordered     Place sequential compression device  Until discontinued         12/29/22 1612                Discharge Planning   XENA:      Code Status: Full Code   Is the patient medically ready for discharge?:     Reason for patient still in hospital (select all that apply): PT / OT recommendations  Discharge Plan A: Home with family, Home Health   Discharge Delays: None known at this time      This service was provided via telemedicine.  Type of Software: Audio/Visual.  Originating Site: [facility].  Distant Site: [place], [place].  Her exam was performed with the assitance of Donna Barroso RN.      Stephon Kearney MD  Department of Hospital Medicine   [facility] - Medical Surgical Unit

## 2023-01-16 PROCEDURE — 25000003 PHARM REV CODE 250: Performed by: STUDENT IN AN ORGANIZED HEALTH CARE EDUCATION/TRAINING PROGRAM

## 2023-01-16 PROCEDURE — 97530 THERAPEUTIC ACTIVITIES: CPT

## 2023-01-16 PROCEDURE — 94760 N-INVAS EAR/PLS OXIMETRY 1: CPT

## 2023-01-16 PROCEDURE — 97116 GAIT TRAINING THERAPY: CPT | Mod: CQ

## 2023-01-16 PROCEDURE — 97110 THERAPEUTIC EXERCISES: CPT | Mod: CQ

## 2023-01-16 PROCEDURE — 97110 THERAPEUTIC EXERCISES: CPT

## 2023-01-16 PROCEDURE — 11000004 HC SNF PRIVATE

## 2023-01-16 RX ADMIN — VENLAFAXINE 37.5 MG: 37.5 TABLET ORAL at 05:01

## 2023-01-16 RX ADMIN — HYDROCODONE BITARTRATE AND ACETAMINOPHEN 1 TABLET: 5; 325 TABLET ORAL at 10:01

## 2023-01-16 RX ADMIN — MICONAZOLE NITRATE 2 % TOPICAL POWDER: at 09:01

## 2023-01-16 RX ADMIN — ASPIRIN 81 MG: 81 TABLET, COATED ORAL at 09:01

## 2023-01-16 RX ADMIN — ACETAMINOPHEN 650 MG: 325 TABLET ORAL at 09:01

## 2023-01-16 RX ADMIN — BUSPIRONE HYDROCHLORIDE 7.5 MG: 7.5 TABLET ORAL at 09:01

## 2023-01-16 RX ADMIN — FAMOTIDINE 20 MG: 20 TABLET ORAL at 09:01

## 2023-01-16 NOTE — PT/OT/SLP PROGRESS
Physical Therapy Treatment Note           Name: Tanya Linda    : 1960 (62 y.o.)  MRN: [id number]           TREATMENT SUMMARY AND RECOMMENDATIONS:    PT Received On: 23  PT Start Time: 1300     PT Stop Time: 1330  PT Total Time (min): 30 min     Subjective Assessment:  x No complaints  Lethargic    Awake, alert, cooperative  Uncooperative    Agitated  c/o pain    Appropriate  c/o fatigue    Confused  Treated at bedside     Emotionally labile  Treated in gym/dept.    Impulsive  Other:    Flat affect       Therapy Precautions:    Cognitive deficits  Spinal precautions    Collar - hard  Sternal precautions    Collar - soft   TLSO    Fall risk  LSO    Hip precautions - posterior  Knee immobilizer    Hip precautions - anterior  WBAT    Impaired communication  Partial weightbearing    Oxygen  TTWB    PEG tube  NWB    Visual deficits  Other:    Hearing deficits          Treatment Objectives:     Mobility Training:   Assist level Comments    Bed mobility     Transfer     Gait SBA Amb on firm surface with RW 50 ft x 4 with seated rest breaks   Sit to stand transitions SBA Sit-stand with B UE use   Sitting balance     Standing balance      Wheelchair mobility     Car transfer     Other:          Therapeutic Exercise:   Exercise Sets Reps Comments   B LE exer sitting long and short  20 reps  In all planes to promote muscle strength and ROM                          Additional Comments:  Improving mobility daily    Assessment: Patient tolerated session well.    PT Plan: continue  Revisions made to plan of care: No    GOALS:   Multidisciplinary Problems       Physical Therapy Goals          Problem: Physical Therapy    Goal Priority Disciplines Outcome Goal Variances Interventions   Physical Therapy Goal     PT, PT/OT Ongoing, Progressing     Description: Goals to be met by: Discharge     Patient will increase functional independence with mobility by performin. Supine to sit with Modified  [place]  2. Sit to supine with Modified [place]  3. Sit to stand transfer with Modified [place]  4. Bed to chair transfer with Stand-by Assistance using Rolling Walker  5. Gait  x 25 feet with Stand-by Assistance using Rolling Walker.                          Skilled PT Minutes Provided: 30   Communication with Treatment Team:     Equipment recommendations:       At end of treatment, patient remained:  x Up in chair     Up in wheelchair in room    In bed   x With alarm activated    Bed rails up   x Call bell in reach     Family/friends present    Restraints secured properly    In bathroom with CNA/RN notified    Nurse aware    In gym with therapist/tech    Other:

## 2023-01-16 NOTE — PT/OT/SLP PROGRESS
Occupational Therapy  Treatment    Name: Tanya Linda    : 1960 (62 y.o.)  MRN: [id number]           TREATMENT SUMMARY AND RECOMMENDATIONS:      OT Date of Treatment: 23  OT Start Time: 1030  OT Stop Time: 1105  OT Total Time (min): 35 min      Subjective Assessment:   No complaints  Lethargic   X Awake, alert, cooperative  Impulsive    Uncooperative   Flat affect    Agitated  c/o pain    Appropriate  c/o fatigue    Confused X Treated at bedside     Emotionally labile  Treated in gym/dept.      Other:        Therapy Precautions:    Cognitive deficits  Spinal precautions    Collar - hard  Sternal precautions    Collar - soft   TLSO   X Fall risk  LSO    Hip precautions - posterior  Knee immobilizer    Hip precautions - anterior  WBAT    Impaired communication  Partial weightbearing    Oxygen  TTWB    PEG tube  NWB    Visual deficits      Hearing deficits   Other:        Treatment Objectives:     Mobility Training:    Mobility task Assist level Comments    Bed mobility SBA  From supine to sit EOB    Transfer CGA  From EOB to bedside chair with 5 steps.    Sit to stands transitions CGA  From EOB surface   Functional mobility     Sitting balance Supervision  Pt sat EOB to complete independent hair brushing    Standing balance      Other:        ADL Training:    ADL Assist level Comments    Feeding     Grooming/hygiene     Bathing     Upper body dressing     Lower body dressing     Toileting     Toilet transfer     Adaptive equipment training     Other:           Therapeutic Exercise:   Exercise Sets Reps Comments   BUE UBE  2 5min  Forward/backward   2# dowel 3 15  Shoulder press, chest press, straight arm raises, bicep curls, forward rows, backwards rows                     Assessment: Patient tolerated session well.    OT Plan: continue with current POC   Revisions made to plan of care: No    GOALS:   Multidisciplinary Problems       Occupational Therapy Goals          Problem: Occupational Therapy    Goal  Priority Disciplines Outcome Interventions   Occupational Therapy Goal     OT, PT/OT Ongoing, Progressing    Description: Goals to be met by: 1/20/22     Patient will increase functional independence with ADLs by performing:    UE Dressing with Modified [place]. - met 1/10/23  LE Dressing with Minimal Assistance. - met 1/10/23  Grooming while seated at sink with Modified [place].  Toileting from bedside commode with Minimal Assistance for hygiene and clothing management.   Bathing from bench with Minimal Assistance. - met 1/10/23  Toilet transfer to bedside commode with Contact Guard Assistance.                         Skilled OT Minutes Provided: 35  Communication with Treatment Team:     Equipment recommendations:       At end of treatment, patient remained:  X Up in chair     Up in wheelchair in room    In bed   X With alarm activated    Bed rails up   x Call bell in reach     Family/friends present    Restraints secured properly    In bathroom with CNA/RN notified    In gym with PT/PTA/tech    Nurse aware    Other:

## 2023-01-16 NOTE — SUBJECTIVE & OBJECTIVE
Interval History: She is doing well today without complaints and she denies any left hip pain. Her last bowel movement was on 1/15/2023. She participated in PT on 1/14/2022 and she ambulated 200 feet with her rolling walker and standby assistance.      Review of Systems   All other systems reviewed and are negative.  Objective:     Vital Signs (Most Recent):  Temp: 97.7 °F (36.5 °C) (01/15/23 2357)  Pulse: 62 (01/15/23 2357)  Resp: 17 (01/15/23 2357)  BP: (!) 129/58 (01/15/23 2357)  SpO2: 96 % (01/16/23 0546)   Vital Signs (24h Range):  Temp:  [97.7 °F (36.5 °C)-98.3 °F (36.8 °C)] 97.7 °F (36.5 °C)  Pulse:  [62-76] 62  Resp:  [17-19] 17  SpO2:  [95 %-98 %] 96 %  BP: (108-149)/(58-80) 129/58     Weight:  (could not get weight due to bed)  Body mass index is 57.41 kg/m².    Intake/Output Summary (Last 24 hours) at 1/16/2023 0817  Last data filed at 1/16/2023 0648  Gross per 24 hour   Intake 720 ml   Output 900 ml   Net -180 ml      Physical Exam  General - Morbidly obese  female in no acute distress.  HEENT - NCAT. No scleral icterus. Oropharynx clear. Mucous membranes moist.  Neck - No lymphadenopathy, thyromegaly, or JVD.  CVS - Regular rate and rhythm. No murmurs, rubs, or gallops.  Resp - Lungs are clear to auscultation bilaterally. No rales, wheeze, or rhonchi.   GI - Soft, nontender, nondistended, normoactive bowel sounds present.   Extremities - No clubbing, cyanosis, or edema.   Neuro - CN II through XII are grossly intact. No focal neurological deficits. Alert and oriented times four.   Psych - Normal affect.  Skin - Left hip surgical site c/d/i. Slight edema and ecchymosis noted to periwounds.     Significant Labs: All pertinent labs within the past 24 hours have been reviewed.     1/9/2023:  CBC: WBC count 10.4, hemoglobin 11.1, hematocrit 37.9, platelet count 316.  CMP: Sodium 137, potassium 4.8, chloride 98, CO2 28, BUN 17.8, creatinine 0.77, glucose 94, calcium 9.5, magnesium 2.1, alkaline  phosphatase 223, total protein 6.6, albumin 3.3, total bilirubin 0.8, AST 27, ALT 35.     1/4/2023:  CBC: WBC count 10.4, hemoglobin 10.2, hematocrit 33.6, platelet count 307.  BMP: Sodium 141, potassium 4.7, chloride 102, CO2 28, BUN 20.8, creatinine 0.79, glucose 114, calcium 9.7, magnesium 2.0.     12/30/2022:   CBC:  WBC count 10.4, hemoglobin 10.1, hematocrit 33.6, platelet count 259 food   BMP: Sodium 137, potassium 4.4, chloride 100, CO2 25, BUN 14.3, creatinine 0.68, glucose 100, calcium 9.1, magnesium 2.0.      12/29/2022:  CBC:  WBC count 10.9, hemoglobin 9.8, hematocrit 31.7, glucose 241.    BMP: Sodium 136, potassium 4.7, chloride 100, CO2 26, BUN 14.4, creatinine 0.73, glucose 107, calcium 8.9.    12/27/2022:   CBC:  WBC count 1.5, hemoglobin 10.7, hematocrit 34.6, platelet count 202.    CMP: Sodium 137, potassium 4.3, chloride 105, CO2 25, BUN 16.1, creatinine 0.81, glucose 121, calcium 9.0, magnesium 2.1, alkaline phosphatase 73, total protein 6.4, albumin 3.0, total bilirubin 0.8, AST 39, ALT 22.     12/26/2022:   CBC:  WBC count 13.7, hemoglobin 10.7, hematocrit 34.3, platelet count 264.  CMP:  Sodium 137, potassium 4.4, chloride 103, CO2 25, BUN 18.9, creatinine 0.85, glucose 118, calcium 8.71 magnesium 1.9, alkaline phosphatase 72, total protein 5.7, albumin 3.1, total bilirubin 0.8, AST 56, ALT 26.     12/24/2022:   CMP: Sodium 140, potassium 3.9, chloride 105, CO2 25, BUN 17.2 creatinine 0.97, glucose 117, calcium 9.3, alkaline phosphatase 98, total protein 7.2, albumin 3.8, total bilirubin 0.5, AST 14, ALT 17.    CBC: WBC count 10.6, hemoglobin 12.9, hematocrit 41.2, platelet count 223.     Significant Imaging: I have reviewed all pertinent imaging results/findings within the past 24 hours.     X-ray left hip 12/25/2022: There has been placement of left femoral intramedullary taz and compression screw traverses into the femoral head transfixing the intertrochanteric fracture.  Positioning and  alignment is satisfactory.     CT left hip 12/25/2022: Comminuted intertrochanteric left femoral fracture.     CXR 12/24/2022: No acute chest disease is identified.     X-ray left hip 12/24/2022:  Displaced intertrochanteric fracture of the left femur.

## 2023-01-16 NOTE — PROGRESS NOTES
[facility] - Medical Surgical Unit  [facility] Medicine  Telehospitalist Progress Note    Patient Name: Tanya Linda  MRN: [id number]  Patient Class: IP- Swing   Admission Date: 12/29/2022  Length of Stay: 18 days  Attending Physician: Stephon Kearney MD  Primary Care Provider: MARION Salazar      Subjective:     Principal Problem:Closed intertrochanteric fracture of hip, left, initial encounter      HPI:  Ms. Linda is a 62-year-old  female with history of morbid obesity, hypertension, depression, GERD, and anxiety who presented to [facility] ER on 12/24/2022 with left hip pain following a fall.  X-ray of the left hip and femur showed a displaced intertrochanteric fracture of the left femur and chest x-ray revealed no acute cardiopulmonary process. CT of the left hip confirmed a comminuted intertrochanteric left femoral fracture and orthopedics was consulted.  She was taken to the OR on 12/25/2022 for intramedullary nail of left intertrochanteric femur fracture by Dr. Ronnell Olivares and she did relatively well postoperatively.  Her lisinopril was held due to mild hypotension and her blood pressure stabilized.  She had no other complications throughout her hospital course and she was transferred to [facility] swing bed on 12/29/2022 for PT/OT and management of her multiple medical comorbidities.      Overview/Hospital Course:  She was admitted to [facility] swing bed on 12/29/2022 for rehab following intramedullary nail of left intertrochanteric femur fracture.  She is being treated with a 4 week course of aspirin 81 mg PO BID for DVT prophylaxis and norco 5/325 mg PO every 6 hours as needed for pain.  She was started on diclofenac 1% gel 2 grams topical BID prn for left knee pain and stiffness on 1/6/2023. Her miralax was changed from 17 gm PO daily to prn on 1/8/2023.      Interval History: She is doing well today without complaints and she denies any left hip pain. Her last bowel  movement was on 1/15/2023. She participated in PT on 1/14/2022 and she ambulated 200 feet with her rolling walker and standby assistance.      Review of Systems   All other systems reviewed and are negative.  Objective:     Vital Signs (Most Recent):  Temp: 97.7 °F (36.5 °C) (01/15/23 2357)  Pulse: 62 (01/15/23 2357)  Resp: 17 (01/15/23 2357)  BP: (!) 129/58 (01/15/23 2357)  SpO2: 96 % (01/16/23 0546)   Vital Signs (24h Range):  Temp:  [97.7 °F (36.5 °C)-98.3 °F (36.8 °C)] 97.7 °F (36.5 °C)  Pulse:  [62-76] 62  Resp:  [17-19] 17  SpO2:  [95 %-98 %] 96 %  BP: (108-149)/(58-80) 129/58     Weight:  (could not get weight due to bed)  Body mass index is 57.41 kg/m².    Intake/Output Summary (Last 24 hours) at 1/16/2023 0817  Last data filed at 1/16/2023 0648  Gross per 24 hour   Intake 720 ml   Output 900 ml   Net -180 ml      Physical Exam  General - Morbidly obese  female in no acute distress.  HEENT - NCAT. No scleral icterus. Oropharynx clear. Mucous membranes moist.  Neck - No lymphadenopathy, thyromegaly, or JVD.  CVS - Regular rate and rhythm. No murmurs, rubs, or gallops.  Resp - Lungs are clear to auscultation bilaterally. No rales, wheeze, or rhonchi.   GI - Soft, nontender, nondistended, normoactive bowel sounds present.   Extremities - No clubbing, cyanosis, or edema.   Neuro - CN II through XII are grossly intact. No focal neurological deficits. Alert and oriented times four.   Psych - Normal affect.  Skin - Left hip surgical site c/d/i. Slight edema and ecchymosis noted to periwounds.     Significant Labs: All pertinent labs within the past 24 hours have been reviewed.     1/9/2023:  CBC: WBC count 10.4, hemoglobin 11.1, hematocrit 37.9, platelet count 316.  CMP: Sodium 137, potassium 4.8, chloride 98, CO2 28, BUN 17.8, creatinine 0.77, glucose 94, calcium 9.5, magnesium 2.1, alkaline phosphatase 223, total protein 6.6, albumin 3.3, total bilirubin 0.8, AST 27, ALT 35.     1/4/2023:  CBC: WBC count  10.4, hemoglobin 10.2, hematocrit 33.6, platelet count 307.  BMP: Sodium 141, potassium 4.7, chloride 102, CO2 28, BUN 20.8, creatinine 0.79, glucose 114, calcium 9.7, magnesium 2.0.     12/30/2022:   CBC:  WBC count 10.4, hemoglobin 10.1, hematocrit 33.6, platelet count 259 food   BMP: Sodium 137, potassium 4.4, chloride 100, CO2 25, BUN 14.3, creatinine 0.68, glucose 100, calcium 9.1, magnesium 2.0.      12/29/2022:  CBC:  WBC count 10.9, hemoglobin 9.8, hematocrit 31.7, glucose 241.    BMP: Sodium 136, potassium 4.7, chloride 100, CO2 26, BUN 14.4, creatinine 0.73, glucose 107, calcium 8.9.    12/27/2022:   CBC:  WBC count 1.5, hemoglobin 10.7, hematocrit 34.6, platelet count 202.    CMP: Sodium 137, potassium 4.3, chloride 105, CO2 25, BUN 16.1, creatinine 0.81, glucose 121, calcium 9.0, magnesium 2.1, alkaline phosphatase 73, total protein 6.4, albumin 3.0, total bilirubin 0.8, AST 39, ALT 22.     12/26/2022:   CBC:  WBC count 13.7, hemoglobin 10.7, hematocrit 34.3, platelet count 264.  CMP:  Sodium 137, potassium 4.4, chloride 103, CO2 25, BUN 18.9, creatinine 0.85, glucose 118, calcium 8.71 magnesium 1.9, alkaline phosphatase 72, total protein 5.7, albumin 3.1, total bilirubin 0.8, AST 56, ALT 26.     12/24/2022:   CMP: Sodium 140, potassium 3.9, chloride 105, CO2 25, BUN 17.2 creatinine 0.97, glucose 117, calcium 9.3, alkaline phosphatase 98, total protein 7.2, albumin 3.8, total bilirubin 0.5, AST 14, ALT 17.    CBC: WBC count 10.6, hemoglobin 12.9, hematocrit 41.2, platelet count 223.     Significant Imaging: I have reviewed all pertinent imaging results/findings within the past 24 hours.     X-ray left hip 12/25/2022: There has been placement of left femoral intramedullary taz and compression screw traverses into the femoral head transfixing the intertrochanteric fracture.  Positioning and alignment is satisfactory.     CT left hip 12/25/2022: Comminuted intertrochanteric left femoral fracture.     CXR  12/24/2022: No acute chest disease is identified.     X-ray left hip 12/24/2022:  Displaced intertrochanteric fracture of the left femur.      Assessment/Plan:      * Left intertrochanteric femur fracture  Continue 4 week course of aspirin 81 mg PO BID for DVT prophylaxis, PT/OT, norco as needed for pain control, and weight-bearing as tolerated to the left lower extremity.  She is status post intramedullary nail by Dr. Ronnell Olivares on 12/25/2022.     Hypertension  Her blood pressure has been stable off antihypertensives.  She is no longer on lisinopril 5 mg PO daily which was held at [facility] due to hypotension.    Anxiety  Continue buspirone.    Depression  Continue venlafaxine.    GERD (gastroesophageal reflux disease)  Continue famotidine.    Morbid obesity  She was counseled on the importance of weight loss and diet.  She was noted to have a BMI of 57.41.      Disposition  Anticipate discharge home with home health in the next week. A ramp is being arranged at her house. She has been approved through 1/18/2023.      VTE Risk Mitigation (From admission, onward)         Ordered     Place sequential compression device  Until discontinued         12/29/22 1612                Discharge Planning   XENA:      Code Status: Full Code   Is the patient medically ready for discharge?:     Reason for patient still in hospital (select all that apply): PT / OT recommendations  Discharge Plan A: Home with family, Home Health   Discharge Delays: None known at this time      This service was provided via telemedicine.  Type of Software: Audio/Visual.  Originating Site: [facility].  Distant Site: [place], [place].  Her exam was performed with the assitance of Donna Barroso RN.      Stephon Kearney MD  Department of Hospital Medicine   [facility] - Medical Surgical Unit

## 2023-01-17 LAB
ANION GAP SERPL CALC-SCNC: 11 MEQ/L
BASOPHILS # BLD AUTO: 0.03 X10(3)/MCL (ref 0–0.2)
BASOPHILS NFR BLD AUTO: 0.4 %
BUN SERPL-MCNC: 18.7 MG/DL (ref 9.8–20.1)
CALCIUM SERPL-MCNC: 9.4 MG/DL (ref 8.4–10.2)
CHLORIDE SERPL-SCNC: 102 MMOL/L (ref 98–107)
CO2 SERPL-SCNC: 26 MMOL/L (ref 23–31)
CREAT SERPL-MCNC: 0.69 MG/DL (ref 0.55–1.02)
CREAT/UREA NIT SERPL: 27
EOSINOPHIL # BLD AUTO: 0.34 X10(3)/MCL (ref 0–0.9)
EOSINOPHIL NFR BLD AUTO: 4.3 %
ERYTHROCYTE [DISTWIDTH] IN BLOOD BY AUTOMATED COUNT: 14.5 % (ref 11.5–17)
GFR SERPLBLD CREATININE-BSD FMLA CKD-EPI: >60 MLS/MIN/1.73/M2
GLUCOSE SERPL-MCNC: 99 MG/DL (ref 82–115)
HCT VFR BLD AUTO: 37.5 % (ref 37–47)
HGB BLD-MCNC: 11.1 GM/DL (ref 12–16)
IMM GRANULOCYTES # BLD AUTO: 0.01 X10(3)/MCL (ref 0–0.04)
IMM GRANULOCYTES NFR BLD AUTO: 0.1 %
LYMPHOCYTES # BLD AUTO: 2.54 X10(3)/MCL (ref 0.6–4.6)
LYMPHOCYTES NFR BLD AUTO: 32.5 %
MAGNESIUM SERPL-MCNC: 1.9 MG/DL (ref 1.6–2.6)
MCH RBC QN AUTO: 26.3 PG
MCHC RBC AUTO-ENTMCNC: 29.6 MG/DL (ref 33–36)
MCV RBC AUTO: 88.9 FL (ref 80–94)
MONOCYTES # BLD AUTO: 0.42 X10(3)/MCL (ref 0.1–1.3)
MONOCYTES NFR BLD AUTO: 5.4 %
NEUTROPHILS # BLD AUTO: 4.48 X10(3)/MCL (ref 2.1–9.2)
NEUTROPHILS NFR BLD AUTO: 57.3 %
PLATELET # BLD AUTO: 261 X10(3)/MCL (ref 130–400)
PLATELET # BLD EST: NORMAL 10*3/UL
PMV BLD AUTO: 11 FL (ref 7.4–10.4)
POTASSIUM SERPL-SCNC: 4.2 MMOL/L (ref 3.5–5.1)
RBC # BLD AUTO: 4.22 X10(6)/MCL (ref 4.2–5.4)
RBC MORPH BLD: NORMAL
SODIUM SERPL-SCNC: 139 MMOL/L (ref 136–145)
WBC # SPEC AUTO: 7.8 X10(3)/MCL (ref 4.5–11.5)

## 2023-01-17 PROCEDURE — 97110 THERAPEUTIC EXERCISES: CPT | Mod: CQ

## 2023-01-17 PROCEDURE — 97535 SELF CARE MNGMENT TRAINING: CPT

## 2023-01-17 PROCEDURE — 11000004 HC SNF PRIVATE

## 2023-01-17 PROCEDURE — 94760 N-INVAS EAR/PLS OXIMETRY 1: CPT

## 2023-01-17 PROCEDURE — 80048 BASIC METABOLIC PNL TOTAL CA: CPT | Performed by: INTERNAL MEDICINE

## 2023-01-17 PROCEDURE — 25000003 PHARM REV CODE 250: Performed by: STUDENT IN AN ORGANIZED HEALTH CARE EDUCATION/TRAINING PROGRAM

## 2023-01-17 PROCEDURE — 85025 COMPLETE CBC W/AUTO DIFF WBC: CPT | Performed by: INTERNAL MEDICINE

## 2023-01-17 PROCEDURE — 97116 GAIT TRAINING THERAPY: CPT | Mod: CQ

## 2023-01-17 PROCEDURE — 83735 ASSAY OF MAGNESIUM: CPT | Performed by: INTERNAL MEDICINE

## 2023-01-17 PROCEDURE — 97530 THERAPEUTIC ACTIVITIES: CPT

## 2023-01-17 PROCEDURE — 97110 THERAPEUTIC EXERCISES: CPT

## 2023-01-17 RX ADMIN — VENLAFAXINE 37.5 MG: 37.5 TABLET ORAL at 05:01

## 2023-01-17 RX ADMIN — ASPIRIN 81 MG: 81 TABLET, COATED ORAL at 10:01

## 2023-01-17 RX ADMIN — MICONAZOLE NITRATE 2 % TOPICAL POWDER: at 09:01

## 2023-01-17 RX ADMIN — ASPIRIN 81 MG: 81 TABLET, COATED ORAL at 09:01

## 2023-01-17 RX ADMIN — BUSPIRONE HYDROCHLORIDE 7.5 MG: 7.5 TABLET ORAL at 09:01

## 2023-01-17 RX ADMIN — ACETAMINOPHEN 650 MG: 325 TABLET ORAL at 09:01

## 2023-01-17 RX ADMIN — HYDROCODONE BITARTRATE AND ACETAMINOPHEN 1 TABLET: 5; 325 TABLET ORAL at 12:01

## 2023-01-17 RX ADMIN — FAMOTIDINE 20 MG: 20 TABLET ORAL at 10:01

## 2023-01-17 RX ADMIN — MICONAZOLE NITRATE 2 % TOPICAL POWDER: at 10:01

## 2023-01-17 NOTE — PT/OT/SLP PROGRESS
Physical Therapy Treatment Note           Name: Tanya Linda    : 1960 (62 y.o.)  MRN: [id number]           TREATMENT SUMMARY AND RECOMMENDATIONS:    PT Received On: 23  PT Start Time: 930     PT Stop Time: 955  PT Total Time (min): 25 min     Subjective Assessment:  x No complaints  Lethargic    Awake, alert, cooperative  Uncooperative    Agitated  c/o pain    Appropriate  c/o fatigue    Confused  Treated at bedside     Emotionally labile  Treated in gym/dept.    Impulsive  Other:    Flat affect       Therapy Precautions:    Cognitive deficits  Spinal precautions    Collar - hard  Sternal precautions    Collar - soft   TLSO    Fall risk  LSO    Hip precautions - posterior  Knee immobilizer    Hip precautions - anterior  WBAT    Impaired communication  Partial weightbearing    Oxygen  TTWB    PEG tube  NWB    Visual deficits  Other:    Hearing deficits          Treatment Objectives:     Mobility Training:   Assist level Comments    Bed mobility     Transfer     Gait CGA Amb on firm surface with  f x 2   Sit to stand transitions CGA Sit-stand 10 reps with B UE use   Sitting balance     Standing balance      Wheelchair mobility     Car transfer     Other:          Therapeutic Exercise:   Exercise Sets Reps Comments   B LE exer sitting  20 reps  Ankle pumps, TKE, abd/add, knee lifts    UBE 5 min  UBE with B LE use   B LE exer standing 10 reps B UE supported Abd/add,knee lifts              Additional Comments:  Improving mobility     Assessment: Patient tolerated session well.    PT Plan: continue  Revisions made to plan of care: No    GOALS:   Multidisciplinary Problems       Physical Therapy Goals          Problem: Physical Therapy    Goal Priority Disciplines Outcome Goal Variances Interventions   Physical Therapy Goal     PT, PT/OT Ongoing, Progressing     Description: Goals to be met by: Discharge     Patient will increase functional independence with mobility by performin. Supine  to sit with Modified [place]  2. Sit to supine with Modified [place]  3. Sit to stand transfer with Modified [place]  4. Bed to chair transfer with Stand-by Assistance using Rolling Walker  5. Gait  x 25 feet with Stand-by Assistance using Rolling Walker.                          Skilled PT Minutes Provided: 25   Communication with Treatment Team:     Equipment recommendations:       At end of treatment, patient remained:  x Up in chair     Up in wheelchair in room    In bed   x With alarm activated    Bed rails up   x Call bell in reach     Family/friends present    Restraints secured properly    In bathroom with CNA/RN notified    Nurse aware    In gym with therapist/tech    Other:

## 2023-01-17 NOTE — PLAN OF CARE
Problem: Fall Injury Risk  Goal: Absence of Fall and Fall-Related Injury  Outcome: Ongoing, Progressing  Intervention: Promote Injury-Free Environment  Flowsheets (Taken 1/16/2023 2030)  Safety Promotion/Fall Prevention:   assistive device/personal item within reach   bed alarm set   chair alarm set   Fall Risk reviewed with patient/family   Fall Risk signage in place   gait belt with ambulation   lighting adjusted   medications reviewed   side rails raised x 2

## 2023-01-17 NOTE — PLAN OF CARE
Problem: Adult Inpatient Plan of Care  Goal: Plan of Care Review  Outcome: Ongoing, Progressing  Goal: Absence of Hospital-Acquired Illness or Injury  Outcome: Ongoing, Progressing  Intervention: Prevent Skin Injury  Flowsheets (Taken 1/16/2023 0800)  Body Position: position maintained  Skin Protection:   adhesive use limited   incontinence pads utilized   transparent dressing maintained  Intervention: Prevent Infection  Flowsheets (Taken 1/16/2023 0800)  Infection Prevention:   cohorting utilized   environmental surveillance performed   equipment surfaces disinfected   hand hygiene promoted   personal protective equipment utilized   rest/sleep promoted   single patient room provided     Problem: Bariatric Environmental Safety  Goal: Safety Maintained with Care  Outcome: Ongoing, Progressing     Problem: Impaired Wound Healing  Goal: Optimal Wound Healing  Outcome: Ongoing, Progressing  Intervention: Promote Wound Healing  Flowsheets (Taken 1/16/2023 0800)  Oral Nutrition Promotion: safe use of adaptive equipment encouraged  Sleep/Rest Enhancement:   awakenings minimized   relaxation techniques promoted   room darkened  Pain Management Interventions:   relaxation techniques promoted   quiet environment facilitated     Problem: Skin Injury Risk Increased  Goal: Skin Health and Integrity  Outcome: Ongoing, Progressing  Intervention: Optimize Skin Protection  Flowsheets (Taken 1/16/2023 0800)  Pressure Reduction Techniques: frequent weight shift encouraged  Skin Protection:   adhesive use limited   incontinence pads utilized   transparent dressing maintained  Intervention: Promote and Optimize Oral Intake  Flowsheets (Taken 1/16/2023 0800)  Oral Nutrition Promotion: safe use of adaptive equipment encouraged     Problem: Fall Injury Risk  Goal: Absence of Fall and Fall-Related Injury  Outcome: Ongoing, Progressing  Intervention: Identify and Manage Contributors  Flowsheets (Taken 1/16/2023 0800)  Self-Care  Promotion:   independence encouraged   BADL personal objects within reach   BADL personal routines maintained   safe use of adaptive equipment encouraged  Medication Review/Management: medications reviewed     Problem: Surgical Site Infection  Goal: Absence of Infection Signs and Symptoms  Outcome: Ongoing, Progressing

## 2023-01-17 NOTE — PT/OT/SLP PROGRESS
Occupational Therapy  Treatment    Name: Tanya Linda    : 1960 (62 y.o.)  MRN: [id number]           TREATMENT SUMMARY AND RECOMMENDATIONS:      OT Date of Treatment: 23  OT Start Time: 900  OT Stop Time: 930  OT Total Time (min): 30 min      Subjective Assessment:   No complaints  Lethargic   x Awake, alert, cooperative  Impulsive    Uncooperative   Flat affect    Agitated  c/o pain    Appropriate  c/o fatigue    Confused x Treated at bedside     Emotionally labile x Treated in gym/dept.      Other:        Therapy Precautions:    Cognitive deficits  Spinal precautions    Collar - hard  Sternal precautions    Collar - soft   TLSO   x Fall risk  LSO    Hip precautions - posterior  Knee immobilizer    Hip precautions - anterior  WBAT    Impaired communication  Partial weightbearing    Oxygen  TTWB    PEG tube  NWB    Visual deficits      Hearing deficits   Other:        Treatment Objectives:     Mobility Training:    Mobility task Assist level Comments    Bed mobility     Transfer SBA Stand/pivot t/f with RW   Sit to stands transitions     Functional mobility SBA X50 feet with RW   Sitting balance     Standing balance      Other:        ADL Training:    ADL Assist level Comments    Feeding     Grooming/hygiene     Bathing     Upper body dressing     Lower body dressing     Toileting Mod A  (+) void; (+) BM - Pt required assist for vivien hygiene   Toilet transfer SBA Stand/pivot t/f with RW to BSC over toilet    Adaptive equipment training     Other:           Therapeutic Exercise:   Exercise Sets Reps Comments   3# dowel 3 10 Shoulder press, chest press, straight arm raises, bicep curls, forward rows, backwards rows                         Additional Comments:      Assessment: Patient tolerated session well.    OT Plan: Continue POC  Revisions made to plan of care: No    GOALS:   Multidisciplinary Problems       Occupational Therapy Goals          Problem: Occupational Therapy    Goal Priority Disciplines  Outcome Interventions   Occupational Therapy Goal     OT, PT/OT Ongoing, Progressing    Description: Goals to be met by: 1/20/22     Patient will increase functional independence with ADLs by performing:    UE Dressing with Modified [place]. - met 1/10/23  LE Dressing with Minimal Assistance. - met 1/10/23  Grooming while seated at sink with Modified [place].  Toileting from bedside commode with Minimal Assistance for hygiene and clothing management.   Bathing from bench with Minimal Assistance. - met 1/10/23  Toilet transfer to bedside commode with Contact Guard Assistance.                         Skilled OT Minutes Provided: 30  Communication with Treatment Team:     Equipment recommendations:       At end of treatment, patient remained:   Up in chair     Up in wheelchair in room    In bed    With alarm activated    Bed rails up    Call bell in reach     Family/friends present    Restraints secured properly    In bathroom with CNA/RN notified   x In gym with PT/PTA/tech    Nurse aware    Other:

## 2023-01-17 NOTE — PT/OT/SLP PROGRESS
Physical Therapy Treatment Note           Name: Tanya Linda    : 1960 (62 y.o.)  MRN: [id number]           TREATMENT SUMMARY AND RECOMMENDATIONS:    PT Received On: 23  PT Start Time: 1330     PT Stop Time: 1355  PT Total Time (min): 25 min     Subjective Assessment:  x No complaints  Lethargic    Awake, alert, cooperative  Uncooperative    Agitated  c/o pain    Appropriate  c/o fatigue    Confused  Treated at bedside     Emotionally labile  Treated in gym/dept.    Impulsive  Other:    Flat affect       Therapy Precautions:    Cognitive deficits  Spinal precautions    Collar - hard  Sternal precautions    Collar - soft   TLSO    Fall risk  LSO    Hip precautions - posterior  Knee immobilizer    Hip precautions - anterior  WBAT    Impaired communication  Partial weightbearing    Oxygen  TTWB    PEG tube  NWB    Visual deficits  Other:    Hearing deficits          Treatment Objectives:     Mobility Training:   Assist level Comments    Bed mobility     Transfer     Gait SBA Amb on firm surface with  ft x 4   Sit to stand transitions SBa Sit-stand 5 reps x 2 with B UE use   Sitting balance     Standing balance      Wheelchair mobility     Car transfer     Other:          Therapeutic Exercise:   Exercise Sets Reps Comments   B LE exer sitting  20 reps  Ankle pumps, TKE, abd/add, knee lifts                          Additional Comments:  Improving mobility     Assessment: Patient tolerated session well.    PT Plan: continue  Revisions made to plan of care: No    GOALS:   Multidisciplinary Problems       Physical Therapy Goals          Problem: Physical Therapy    Goal Priority Disciplines Outcome Goal Variances Interventions   Physical Therapy Goal     PT, PT/OT Ongoing, Progressing     Description: Goals to be met by: Discharge     Patient will increase functional independence with mobility by performin. Supine to sit with Modified [place]  2. Sit to supine with Modified  [place]  3. Sit to stand transfer with Modified [place]  4. Bed to chair transfer with Stand-by Assistance using Rolling Walker  5. Gait  x 25 feet with Stand-by Assistance using Rolling Walker.                          Skilled PT Minutes Provided: 25   Communication with Treatment Team:     Equipment recommendations:       At end of treatment, patient remained:  x Up in chair     Up in wheelchair in room    In bed   x With alarm activated    Bed rails up   x Call bell in reach     Family/friends present    Restraints secured properly    In bathroom with CNA/RN notified    Nurse aware    In gym with therapist/tech    Other:

## 2023-01-17 NOTE — PLAN OF CARE
[facility]  Discharge Reassessment    Primary Care Provider: MARION Salazar    Expected Discharge Date:     Reassessment (most recent)       Discharge Reassessment - 01/16/23 1809          Discharge Reassessment    Assessment Type Discharge Planning Reassessment     Did the patient's condition or plan change since previous assessment? No     Discharge Plan discussed with: Adult children     Communicated XENA with patient/caregiver Date not available/Unable to determine     Discharge Plan A Home with family;Home Health     DME Needed Upon Discharge  walker, rolling;wheelchair;bedside commode     Discharge Barriers Identified None     Why the patient remains in the hospital Requires continued medical care        Post-Acute Status    Post-Acute Authorization Home Health     Home Health Status Pending medical clearance/testing     Hospital Resources/Appts/Education Provided Provided patient/caregiver with written discharge plan information     Discharge Delays None known at this time

## 2023-01-17 NOTE — PROGRESS NOTES
[facility]  [facility] Medicine  Telehospitalist Progress Note    Patient Name: Tanya Linda  MRN: [id number]  Patient Class: IP- Swing   Admission Date: 12/29/2022  Length of Stay: 19 days  Attending Physician: Stephon Kearney MD  Primary Care Provider: MARION Salazar    Telemedicine Statement   This service was provided using Telemedicine  Audio/Visual Software: SOC Telemed  Location of Provider - [place], [place]  Location of patient: [facility]  Persons & role in encounter:  Physician - Edd Sharif MD  Nursing Staff - RN and Ancillary at [facility]     Subjective:     Principal Problem:Closed intertrochanteric fracture of hip, left, initial encounter      HPI:  Ms. Linda is a 62-year-old  female with history of morbid obesity, hypertension, depression, GERD, and anxiety who presented to [facility] ER on 12/24/2022 with left hip pain following a fall.  X-ray of the left hip and femur showed a displaced intertrochanteric fracture of the left femur and chest x-ray revealed no acute cardiopulmonary process. CT of the left hip confirmed a comminuted intertrochanteric left femoral fracture and orthopedics was consulted.  She was taken to the OR on 12/25/2022 for intramedullary nail of left intertrochanteric femur fracture by Dr. Ronnell Olivares and she did relatively well postoperatively.  Her lisinopril was held due to mild hypotension and her blood pressure stabilized.  She had no other complications throughout her hospital course and she was transferred to [facility] swing bed on 12/29/2022 for PT/OT and management of her multiple medical comorbidities.      Overview/Hospital Course:  She was admitted to [facility] swing bed on 12/29/2022 for rehab following intramedullary nail of left intertrochanteric femur fracture.  She is being treated with a 4 week course of aspirin 81 mg PO BID for DVT prophylaxis and norco 5/325 mg  PO every 6 hours as needed for pain.  She was started on diclofenac 1% gel 2 grams topical BID prn for left knee pain and stiffness on 1/6/2023. Her miralax was changed from 17 gm PO daily to prn on 1/8/2023.    Interval History:  Doing well with therapy. Currently ambulating 50ft with standby assist.Has some thigh pain but tolerable.     General: obese female, laying comfortably in bed not in any acute distress  HEENT: Head is normocephalic atraumatic  Neck: No visible JVD  Chest : moves with respiration  Extremity: Dressing clean and dry.  Neuro: She is awake and alert.  She moves all extremities.  She follows commands.  Skin: No visible rash  Psych: Mood is calm with affect is normal      Assessment/Plan:      * Left intertrochanteric femur fracture  Continue 4 week course of aspirin 81 mg PO BID for DVT prophylaxis, PT/OT, norco as needed for pain control, and weight-bearing as tolerated to the left lower extremity.  She is status post intramedullary nail by Dr. Ronnell Olivares on 12/25/2022.   Continue w therapy    Disposition  Anticipate discharge home with home health in the next week. A ramp is being arranged at her house. She has been approved through 1/18/2023.    GERD (gastroesophageal reflux disease)  Continue famotidine.    Anxiety  Continue buspirone.    Depression  Continue venlafaxine.    Hypertension  Her blood pressure has been stable off antihypertensives.  She is no longer on lisinopril 5 mg PO daily which was held at [facility] due to hypotension.    Morbid obesity  She was counseled on the importance of weight loss and diet.  She was noted to have a BMI of 57.41.        VTE Risk Mitigation (From admission, onward)           Ordered     Place sequential compression device  Until discontinued         12/29/22 1612                    Discharge Planning   XENA:      Code Status: Full Code   Is the patient medically ready for discharge?:     Reason for patient still in hospital (select all that apply): PT / OT  recommendations  Discharge Plan A: Home with family, Home Health   Discharge Delays: None known at this time      Edd Sharif MD  Department of Hospital Medicine   [facility]

## 2023-01-17 NOTE — PT/OT/SLP PROGRESS
Occupational Therapy  Treatment    Name: Tanya Linda    : 1960 (62 y.o.)  MRN: [id number]           TREATMENT SUMMARY AND RECOMMENDATIONS:      OT Date of Treatment: 23  OT Start Time: 1300  OT Stop Time: 1330  OT Total Time (min): 30 min      Subjective Assessment:   No complaints  Lethargic   x Awake, alert, cooperative  Impulsive    Uncooperative   Flat affect    Agitated  c/o pain    Appropriate  c/o fatigue    Confused  Treated at bedside     Emotionally labile x Treated in gym/dept.      Other:        Therapy Precautions:    Cognitive deficits  Spinal precautions    Collar - hard  Sternal precautions    Collar - soft   TLSO   x Fall risk  LSO    Hip precautions - posterior  Knee immobilizer    Hip precautions - anterior  WBAT    Impaired communication  Partial weightbearing    Oxygen  TTWB    PEG tube  NWB    Visual deficits      Hearing deficits   Other:        Treatment Objectives:     Mobility Training:    Mobility task Assist level Comments    Bed mobility     Transfer SBA Stand/pivot t/f with RW    Sit to stands transitions     Functional mobility     Sitting balance     Standing balance  SBA Pt stood with RW and performed mini squat to reach for resistive clothespins on low stool. Pt then placed above shoulder level onto taz. Pt able to tolerate standing x4 min with no LOB noted.    Other:            Therapeutic Exercise:   Exercise Sets Reps Comments   2# dumbbells 3 10 Shoulder press, chest press, straight arm raises, bicep curls, shoulder abd/add                         Additional Comments:      Assessment: Patient tolerated session well.    OT Plan: Continue POC  Revisions made to plan of care: No    GOALS:   Multidisciplinary Problems       Occupational Therapy Goals          Problem: Occupational Therapy    Goal Priority Disciplines Outcome Interventions   Occupational Therapy Goal     OT, PT/OT Ongoing, Progressing    Description: Goals to be met by: 22     Patient will increase  functional independence with ADLs by performing:    UE Dressing with Modified [place]. - met 1/10/23  LE Dressing with Minimal Assistance. - met 1/10/23  Grooming while seated at sink with Modified [place].  Toileting from bedside commode with Minimal Assistance for hygiene and clothing management.   Bathing from bench with Minimal Assistance. - met 1/10/23  Toilet transfer to bedside commode with Contact Guard Assistance.                         Skilled OT Minutes Provided: 30  Communication with Treatment Team:     Equipment recommendations:       At end of treatment, patient remained:   Up in chair     Up in wheelchair in room    In bed    With alarm activated    Bed rails up    Call bell in reach     Family/friends present    Restraints secured properly    In bathroom with CNA/RN notified   x In gym with PT/PTA/tech    Nurse aware    Other:

## 2023-01-17 NOTE — PROGRESS NOTES
"Inpatient Nutrition Evaluation    Admit Date: 12/29/2022   Total duration of encounter: 19 days    Nutrition Recommendation/Prescription     Continue regular diet.     Nutrition Assessment     Chart Review    Reason Seen: continuous nutrition monitoring, length of stay, and follow-up    Malnutrition Screening Tool Results   Have you recently lost weight without trying?: No  Have you been eating poorly because of a decreased appetite?: No   MST Score: 0     Diagnosis:    Left intertrochanteric femur fracture  Relevant Medical History:   Personal history of colonic polyps   Nutrition-Related Medications: ergocalciferol,     Nutrition-Related Labs:   Latest Reference Range & Units 01/17/23 03:45   Sodium 136 - 145 mmol/L 139   Potassium 3.5 - 5.1 mmol/L 4.2   Chloride 98 - 107 mmol/L 102   CO2 23 - 31 mmol/L 26   Anion Gap mEq/L 11.0   BUN 9.8 - 20.1 mg/dL 18.7   Creatinine 0.55 - 1.02 mg/dL 0.69   BUN/CREAT RATIO  27   eGFR mls/min/1.73/m2 >60   Glucose 82 - 115 mg/dL 99   Calcium 8.4 - 10.2 mg/dL 9.4   Magnesium 1.60 - 2.60 mg/dL 1.90       Diet Order: Diet Adult Regular  Oral Supplement Order: none  Appetite/Oral Intake: good/% of meals  Factors Affecting Nutritional Intake: none identified  Food/Buddhism/Cultural Preferences: none reported  Food Allergies: none reported    Skin Integrity: incision  Wound(s): [REMOVED]      Altered Skin Integrity 12/28/22 1052 Coccyx-Tissue loss description: Not applicable       Altered Skin Integrity 12/29/22 0830 Left lower Breast Moisture associated dermatitis-Tissue loss description: Partial thickness     Comments    Pt appetite is good. Marked menus with patient. Will monitor.     Anthropometrics    Height: 5' 5" (165.1 cm) Height Method: Stated  Last Weight:  (could not get weight due to bed) (01/16/23 0500) Weight Method: Bed Scale  BMI (Calculated): 57.4  BMI Classification: obese grade III (BMI >/=40)     Ideal Body Weight (IBW), Female: 125 lb     % Ideal Body " Weight, Female (lb): 276.02 %                             Usual Weight Provided By: not applicable    Wt Readings from Last 3 Encounters:   12/29/22 1520 (!) 156.5 kg (345 lb 0.3 oz)   12/25/22 0157 (!) 156.5 kg (345 lb)   12/24/22 2204 (!) 156.5 kg (345 lb)   11/17/22 0943 (!) 154.4 kg (340 lb 4.8 oz)      Weight Change(s) Since Admission:  Admit Weight: (!) 156.5 kg (345 lb 0.3 oz) (12/29/22 1520)  Unable to obtain wt due to bed not working    Patient Education    Not applicable.    Monitoring & Evaluation     Dietitian will monitor food and beverage intake, weight, weight change, glucose/endocrine profile, and gastrointestinal profile.  Nutrition Risk/Follow-Up: low (follow-up in 5-7 days)  Patients assigned 'low nutrition risk' status do not qualify for a full nutritional assessment but will be monitored and re-evaluated in a 5-7 day time period. Please consult if re-evaluation needed sooner.

## 2023-01-18 PROCEDURE — 97110 THERAPEUTIC EXERCISES: CPT

## 2023-01-18 PROCEDURE — 97535 SELF CARE MNGMENT TRAINING: CPT

## 2023-01-18 PROCEDURE — 94760 N-INVAS EAR/PLS OXIMETRY 1: CPT

## 2023-01-18 PROCEDURE — 25000003 PHARM REV CODE 250: Performed by: STUDENT IN AN ORGANIZED HEALTH CARE EDUCATION/TRAINING PROGRAM

## 2023-01-18 PROCEDURE — 11000004 HC SNF PRIVATE

## 2023-01-18 PROCEDURE — 97116 GAIT TRAINING THERAPY: CPT | Mod: CQ

## 2023-01-18 PROCEDURE — 97110 THERAPEUTIC EXERCISES: CPT | Mod: CQ

## 2023-01-18 RX ADMIN — VENLAFAXINE 37.5 MG: 37.5 TABLET ORAL at 04:01

## 2023-01-18 RX ADMIN — HYDROCODONE BITARTRATE AND ACETAMINOPHEN 1 TABLET: 5; 325 TABLET ORAL at 01:01

## 2023-01-18 RX ADMIN — BUSPIRONE HYDROCHLORIDE 7.5 MG: 7.5 TABLET ORAL at 08:01

## 2023-01-18 RX ADMIN — HYDROCODONE BITARTRATE AND ACETAMINOPHEN 1 TABLET: 5; 325 TABLET ORAL at 08:01

## 2023-01-18 RX ADMIN — DOCUSATE SODIUM 100 MG: 100 CAPSULE, LIQUID FILLED ORAL at 08:01

## 2023-01-18 RX ADMIN — FAMOTIDINE 20 MG: 20 TABLET ORAL at 08:01

## 2023-01-18 RX ADMIN — ASPIRIN 81 MG: 81 TABLET, COATED ORAL at 08:01

## 2023-01-18 RX ADMIN — MICONAZOLE NITRATE 2 % TOPICAL POWDER: at 10:01

## 2023-01-18 RX ADMIN — METHOCARBAMOL 500 MG: 500 TABLET ORAL at 01:01

## 2023-01-18 RX ADMIN — MICONAZOLE NITRATE 2 % TOPICAL POWDER: at 09:01

## 2023-01-18 NOTE — PLAN OF CARE
Problem: Adult Inpatient Plan of Care  Goal: Plan of Care Review  Outcome: Ongoing, Progressing  Flowsheets (Taken 1/18/2023 0621)  Plan of Care Reviewed With: patient  Goal: Absence of Hospital-Acquired Illness or Injury  Outcome: Ongoing, Progressing  Intervention: Identify and Manage Fall Risk  Flowsheets (Taken 1/18/2023 0621)  Safety Promotion/Fall Prevention:   assistive device/personal item within reach   bed alarm set   diversional activities provided   Fall Risk reviewed with patient/family   lighting adjusted   side rails raised x 3  Intervention: Prevent Skin Injury  Flowsheets (Taken 1/18/2023 0621)  Body Position:   position changed independently   supine  Skin Protection:   adhesive use limited   incontinence pads utilized  Intervention: Prevent and Manage VTE (Venous Thromboembolism) Risk  Flowsheets (Taken 1/18/2023 0621)  Activity Management: (pt sleeping) Patient unable to perform activities  VTE Prevention/Management:   bleeding precations maintained   bleeding risk assessed   dorsiflexion/plantar flexion performed   fluids promoted  Range of Motion: active ROM (range of motion) encouraged  Intervention: Prevent Infection  Flowsheets (Taken 1/18/2023 0621)  Infection Prevention:   rest/sleep promoted   single patient room provided     Problem: Bariatric Environmental Safety  Goal: Safety Maintained with Care  Outcome: Ongoing, Progressing  Intervention: Promote Safety and Comfort  Flowsheets (Taken 1/18/2023 0621)  Bariatric Safety: specialty bed utilized     Problem: Impaired Wound Healing  Goal: Optimal Wound Healing  Outcome: Ongoing, Progressing  Intervention: Promote Wound Healing  Flowsheets (Taken 1/18/2023 0621)  Oral Nutrition Promotion:   rest periods promoted   safe use of adaptive equipment encouraged  Sleep/Rest Enhancement:   awakenings minimized   noise level reduced   regular sleep/rest pattern promoted   relaxation techniques promoted   room darkened  Activity Management: (pt  sleeping) Patient unable to perform activities  Pain Management Interventions:   care clustered   diversional activity provided   pain management plan reviewed with patient/caregiver   pillow support provided   position adjusted   quiet environment facilitated   relaxation techniques promoted     Problem: Skin Injury Risk Increased  Goal: Skin Health and Integrity  Outcome: Ongoing, Progressing     Problem: Fall Injury Risk  Goal: Absence of Fall and Fall-Related Injury  Outcome: Ongoing, Progressing     Problem: Surgical Site Infection  Goal: Absence of Infection Signs and Symptoms  Outcome: Ongoing, Progressing

## 2023-01-18 NOTE — PT/OT/SLP PROGRESS
Occupational Therapy  Treatment    Name: Tanya Linda    : 1960 (62 y.o.)  MRN: [id number]           TREATMENT SUMMARY AND RECOMMENDATIONS:      OT Date of Treatment: 23  OT Start Time: 1130  OT Stop Time: 1155  OT Total Time (min): 25 min      Subjective Assessment:  x No complaints  Lethargic   x Awake, alert, cooperative  Impulsive    Uncooperative   Flat affect    Agitated  c/o pain   x Appropriate  c/o fatigue    Confused x Treated at bedside     Emotionally labile  Treated in gym/dept.      Other:        Therapy Precautions:    Cognitive deficits  Spinal precautions    Collar - hard  Sternal precautions    Collar - soft   TLSO   x Fall risk  LSO    Hip precautions - posterior  Knee immobilizer    Hip precautions - anterior  WBAT    Impaired communication  Partial weightbearing    Oxygen  TTWB    PEG tube  NWB    Visual deficits      Hearing deficits   Other:        Treatment Objectives:     Mobility Training:    Mobility task Assist level Comments    Bed mobility     Transfer     Sit to stands transitions     Functional mobility     Sitting balance     Standing balance      Other:        ADL Training:    ADL Assist level Comments    Feeding     Grooming/hygiene     Bathing     Upper body dressing     Lower body dressing     Toileting     Toilet transfer     Adaptive equipment training     Other:           Therapeutic Exercise:   Exercise Sets Reps Comments   UE ex with 3# bar 2 20 Shoulder press, chest press, elbow curls in 2 planes, shoulder flexion, pro/sup.                         Additional Comments:  No issues. Discussed plan for discharge with family. Reported she still needs to have a ramp installed at home but has been procrastinating.     Assessment: Patient tolerated session well.    OT Plan: continue   Revisions made to plan of care: No    GOALS:   Multidisciplinary Problems       Occupational Therapy Goals          Problem: Occupational Therapy    Goal Priority Disciplines Outcome  Interventions   Occupational Therapy Goal     OT, PT/OT Ongoing, Progressing    Description: Goals to be met by: 1/20/22     Patient will increase functional independence with ADLs by performing:    UE Dressing with Modified [place]. - met 1/10/23  LE Dressing with Minimal Assistance. - met 1/10/23  Grooming while seated at sink with Modified [place].  Toileting from bedside commode with Minimal Assistance for hygiene and clothing management.   Bathing from bench with Minimal Assistance. - met 1/10/23  Toilet transfer to bedside commode with Contact Guard Assistance.                         Skilled OT Minutes Provided: 25  Communication with Treatment Team:     Equipment recommendations:       At end of treatment, patient remained:  x Up in chair     Up in wheelchair in room    In bed    With alarm activated    Bed rails up   x Call bell in reach     Family/friends present    Restraints secured properly    In bathroom with CNA/RN notified    In gym with PT/PTA/tech    Nurse aware    Other:

## 2023-01-18 NOTE — PROGRESS NOTES
[facility]  [facility] Medicine  Telehospitalist Progress Note    Patient Name: Tanya Linda  MRN: [id number]  Patient Class: IP- Swing   Admission Date: 12/29/2022  Length of Stay: 20 days  Attending Physician: Stephon Kearney MD  Primary Care Provider: MARION Salazar    Telemedicine Statement   This service was provided using Telemedicine  Audio/Visual Software: Telephone  Location of Provider - [place], [place]  Location of patient: [facility]  Persons & role in encounter:  Physician - Edd Sharif MD  Nursing Staff - RN and Ancillary at [facility]     Subjective:     Principal Problem:Closed intertrochanteric fracture of hip, left, initial encounter      HPI:  Ms. Linda is a 62-year-old  female with history of morbid obesity, hypertension, depression, GERD, and anxiety who presented to [facility] ER on 12/24/2022 with left hip pain following a fall.  X-ray of the left hip and femur showed a displaced intertrochanteric fracture of the left femur and chest x-ray revealed no acute cardiopulmonary process. CT of the left hip confirmed a comminuted intertrochanteric left femoral fracture and orthopedics was consulted.  She was taken to the OR on 12/25/2022 for intramedullary nail of left intertrochanteric femur fracture by Dr. Ronnell Olivares and she did relatively well postoperatively.  Her lisinopril was held due to mild hypotension and her blood pressure stabilized.  She had no other complications throughout her hospital course and she was transferred to [facility] bed on 12/29/2022 for PT/OT and management of her multiple medical comorbidities.      Overview/Hospital Course:  She was admitted to [facility] swing bed on 12/29/2022 for rehab following intramedullary nail of left intertrochanteric femur fracture.  She is being treated with a 4 week course of aspirin 81 mg PO BID for DVT prophylaxis and norco 5/325 mg PO  every 6 hours as needed for pain.  She was started on diclofenac 1% gel 2 grams topical BID prn for left knee pain and stiffness on 1/6/2023. Her miralax was changed from 17 gm PO daily to prn on 1/8/2023.    Interval History:  Doing well with therapy. Currently ambulating 100ft with rolling walker. Per OT has progressed well..         Assessment/Plan:      * Left intertrochanteric femur fracture  Continue 4 week course of aspirin 81 mg PO BID for DVT prophylaxis, PT/OT, norco as needed for pain control, and weight-bearing as tolerated to the left lower extremity.  She is status post intramedullary nail by Dr. Ronnell Olivares on 12/25/2022.   Continue w therapy    Disposition  Anticipate discharge home with home health in the next week. A ramp is being arranged at her house. She has been approved through 1/18/2023.    GERD (gastroesophageal reflux disease)  Continue famotidine.    Anxiety  Continue buspirone.    Depression  Continue venlafaxine.    Hypertension  Her blood pressure has been stable off antihypertensives.  She is no longer on lisinopril 5 mg PO daily which was held at [facility] due to hypotension.    Morbid obesity  She was counseled on the importance of weight loss and diet.  She was noted to have a BMI of 57.41.        VTE Risk Mitigation (From admission, onward)           Ordered     Place sequential compression device  Until discontinued         12/29/22 1612                    Discharge Planning   XENA:      Code Status: Full Code   Is the patient medically ready for discharge?:     Reason for patient still in hospital (select all that apply): PT / OT recommendations  Discharge Plan A: Home with family, Home Health   Discharge Delays: None known at this time      Edd Sharif MD  Department of Hospital Medicine   [facility]

## 2023-01-18 NOTE — PT/OT/SLP PROGRESS
Physical Therapy Treatment Note           Name: Tanya Linda    : 1960 (62 y.o.)  MRN: [id number]           TREATMENT SUMMARY AND RECOMMENDATIONS:    PT Received On:  2023  PT Start Time:     1035   PT Stop Time:  1058  PT Total Time (min):   23    Subjective Assessment:  x No complaints  Lethargic    Awake, alert, cooperative  Uncooperative    Agitated  c/o pain    Appropriate  c/o fatigue    Confused  Treated at bedside     Emotionally labile  Treated in gym/dept.    Impulsive  Other:    Flat affect       Therapy Precautions:    Cognitive deficits  Spinal precautions    Collar - hard  Sternal precautions    Collar - soft   TLSO    Fall risk  LSO    Hip precautions - posterior  Knee immobilizer    Hip precautions - anterior  WBAT    Impaired communication  Partial weightbearing    Oxygen  TTWB    PEG tube  NWB    Visual deficits  Other:    Hearing deficits          Treatment Objectives:     Mobility Training:   Assist level Comments    Bed mobility     Transfer     Gait SBA Amb on firm surface with  ft x 4   Sit to stand transitions SBa Sit-stand 5 reps x 2 with B UE use   Sitting balance     Standing balance      Wheelchair mobility     Car transfer     Other:          Therapeutic Exercise:   Exercise Sets Reps Comments   B LE exer sitting  20 reps  Ankle pumps, TKE, abd/add, knee lifts                          Additional Comments:  Improving mobility     Assessment: Patient tolerated session well.    PT Plan: continue  Revisions made to plan of care: No    GOALS:   Multidisciplinary Problems       Physical Therapy Goals          Problem: Physical Therapy    Goal Priority Disciplines Outcome Goal Variances Interventions   Physical Therapy Goal     PT, PT/OT Ongoing, Progressing     Description: Goals to be met by: Discharge     Patient will increase functional independence with mobility by performin. Supine to sit with Modified [place]  2. Sit to supine with Modified  [place]  3. Sit to stand transfer with Modified [place]  4. Bed to chair transfer with Stand-by Assistance using Rolling Walker  5. Gait  x 25 feet with Stand-by Assistance using Rolling Walker.                          Skilled PT Minutes Provided: 25   Communication with Treatment Team:     Equipment recommendations:       At end of treatment, patient remained:  x Up in chair     Up in wheelchair in room    In bed   x With alarm activated    Bed rails up   x Call bell in reach     Family/friends present    Restraints secured properly    In bathroom with CNA/RN notified    Nurse aware    In gym with therapist/tech    Other:

## 2023-01-18 NOTE — PATIENT CARE CONFERENCE
Name: Tanya Linda    : 1960 (62 y.o.)  MRN: [id number]            Interdisciplinary Team Conference     Case conference held with patient/family and care team to discuss progress, plan of care, barriers to be addressed for safe return home, equipment recommendations, and discharge planning. Communicated therapy progress with MD, RN, therapy clinicians and case management. All questions/concerns answered.

## 2023-01-18 NOTE — PLAN OF CARE
Problem: Adult Inpatient Plan of Care  Goal: Plan of Care Review  Outcome: Ongoing, Progressing  Flowsheets (Taken 1/18/2023 1758)  Plan of Care Reviewed With: patient  Goal: Absence of Hospital-Acquired Illness or Injury  Outcome: Ongoing, Progressing  Intervention: Identify and Manage Fall Risk  Flowsheets (Taken 1/18/2023 1758)  Safety Promotion/Fall Prevention:   assistive device/personal item within reach   instructed to call staff for mobility  Intervention: Prevent Skin Injury  Flowsheets (Taken 1/18/2023 1758)  Body Position: position changed independently  Skin Protection:   adhesive use limited   incontinence pads utilized  Intervention: Prevent and Manage VTE (Venous Thromboembolism) Risk  Flowsheets (Taken 1/18/2023 1758)  Activity Management: Up in chair - L3  VTE Prevention/Management:   bleeding precations maintained   bleeding risk assessed  Range of Motion: active ROM (range of motion) encouraged  Intervention: Prevent Infection  Flowsheets (Taken 1/18/2023 1758)  Infection Prevention:   hand hygiene promoted   rest/sleep promoted     Problem: Bariatric Environmental Safety  Goal: Safety Maintained with Care  Outcome: Ongoing, Progressing  Intervention: Promote Safety and Comfort  Flowsheets (Taken 1/18/2023 1758)  Bariatric Safety: specialty bed utilized     Problem: Impaired Wound Healing  Goal: Optimal Wound Healing  Outcome: Ongoing, Progressing     Problem: Skin Injury Risk Increased  Goal: Skin Health and Integrity  Outcome: Ongoing, Progressing     Problem: Fall Injury Risk  Goal: Absence of Fall and Fall-Related Injury  Outcome: Ongoing, Progressing     Problem: Surgical Site Infection  Goal: Absence of Infection Signs and Symptoms  Outcome: Ongoing, Progressing

## 2023-01-18 NOTE — PT/OT/SLP PROGRESS
Occupational Therapy  Treatment    Name: Tanya Linda    : 1960 (62 y.o.)  MRN: [id number]           TREATMENT SUMMARY AND RECOMMENDATIONS:      OT Date of Treatment: 23  OT Start Time: 1430  OT Stop Time: 1455  OT Total Time (min): 25 min      Subjective Assessment:   No complaints  Lethargic   x Awake, alert, cooperative  Impulsive    Uncooperative   Flat affect    Agitated  c/o pain    Appropriate  c/o fatigue    Confused x Treated at bedside     Emotionally labile  Treated in gym/dept.      Other:        Therapy Precautions:    Cognitive deficits  Spinal precautions    Collar - hard  Sternal precautions    Collar - soft   TLSO   x Fall risk  LSO    Hip precautions - posterior  Knee immobilizer    Hip precautions - anterior  WBAT    Impaired communication  Partial weightbearing    Oxygen  TTWB    PEG tube  NWB    Visual deficits      Hearing deficits   Other:        Treatment Objectives:     Mobility Training:    Mobility task Assist level Comments    Bed mobility     Transfer SBA Stand/pivot t/f with RW to BSChair   Sit to stands transitions     Functional mobility SBA Around room with RW    Sitting balance     Standing balance      Other:          Therapeutic Exercise:   Exercise Sets Reps Comments   2# dumbbells 2 15 Shoulder press, chest press, straight arm raises, bicep curls, shoulder abd/add   Sit to stands 1 5 reps SBA from BSchair                   Additional Comments:      Assessment: Patient tolerated session well.    OT Plan: continue POC  Revisions made to plan of care: No    GOALS:   Multidisciplinary Problems       Occupational Therapy Goals          Problem: Occupational Therapy    Goal Priority Disciplines Outcome Interventions   Occupational Therapy Goal     OT, PT/OT Ongoing, Progressing    Description: Goals to be met by: 22     Patient will increase functional independence with ADLs by performing:    UE Dressing with Modified [place]. - met 1/10/23  LE Dressing with  Minimal Assistance. - met 1/10/23  Grooming while seated at sink with Modified [place].  Toileting from bedside commode with Minimal Assistance for hygiene and clothing management.   Bathing from bench with Minimal Assistance. - met 1/10/23  Toilet transfer to bedside commode with Contact Guard Assistance.                         Skilled OT Minutes Provided: 25  Communication with Treatment Team:     Equipment recommendations:       At end of treatment, patient remained:  x Up in chair     Up in wheelchair in room    In bed   x With alarm activated   x Bed rails up   x Call bell in reach     Family/friends present    Restraints secured properly    In bathroom with CNA/RN notified    In gym with PT/PTA/tech    Nurse aware    Other:

## 2023-01-18 NOTE — PLAN OF CARE
Problem: Adult Inpatient Plan of Care  Goal: Plan of Care Review  Outcome: Ongoing, Progressing  Goal: Absence of Hospital-Acquired Illness or Injury  Outcome: Ongoing, Progressing  Intervention: Prevent Skin Injury  Flowsheets (Taken 1/17/2023 0800)  Body Position: position maintained  Skin Protection:   adhesive use limited   incontinence pads utilized  Intervention: Prevent and Manage VTE (Venous Thromboembolism) Risk  Flowsheets (Taken 1/17/2023 0800)  VTE Prevention/Management:   bleeding precations maintained   bleeding risk assessed     Problem: Bariatric Environmental Safety  Goal: Safety Maintained with Care  Outcome: Ongoing, Progressing     Problem: Impaired Wound Healing  Goal: Optimal Wound Healing  Outcome: Ongoing, Progressing  Intervention: Promote Wound Healing  Flowsheets (Taken 1/17/2023 0800)  Oral Nutrition Promotion:   calorie-dense foods provided   safe use of adaptive equipment encouraged   rest periods promoted  Sleep/Rest Enhancement:   awakenings minimized   regular sleep/rest pattern promoted   noise level reduced   relaxation techniques promoted  Pain Management Interventions:   care clustered   quiet environment facilitated   relaxation techniques promoted     Problem: Skin Injury Risk Increased  Goal: Skin Health and Integrity  Outcome: Ongoing, Progressing  Intervention: Optimize Skin Protection  Flowsheets (Taken 1/17/2023 0800)  Pressure Reduction Techniques: frequent weight shift encouraged  Pressure Reduction Devices: positioning supports utilized  Skin Protection:   adhesive use limited   incontinence pads utilized  Head of Bed (HOB) Positioning: HOB at 60 degrees  Intervention: Promote and Optimize Oral Intake  Flowsheets (Taken 1/17/2023 0800)  Oral Nutrition Promotion:   calorie-dense foods provided   safe use of adaptive equipment encouraged   rest periods promoted     Problem: Fall Injury Risk  Goal: Absence of Fall and Fall-Related Injury  Outcome: Ongoing,  Progressing  Intervention: Identify and Manage Contributors  Flowsheets (Taken 1/17/2023 0800)  Self-Care Promotion:   independence encouraged   BADL personal objects within reach   BADL personal routines maintained   safe use of adaptive equipment encouraged     Problem: Surgical Site Infection  Goal: Absence of Infection Signs and Symptoms  Outcome: Ongoing, Progressing  Intervention: Prevent or Manage Infection  Flowsheets (Taken 1/17/2023 0800)  Fever Reduction/Comfort Measures:   lightweight bedding   lightweight clothing  Fluid/Electrolyte Management: fluids provided  Infection Management: aseptic technique maintained

## 2023-01-19 PROCEDURE — 97530 THERAPEUTIC ACTIVITIES: CPT

## 2023-01-19 PROCEDURE — 25000003 PHARM REV CODE 250: Performed by: STUDENT IN AN ORGANIZED HEALTH CARE EDUCATION/TRAINING PROGRAM

## 2023-01-19 PROCEDURE — 97110 THERAPEUTIC EXERCISES: CPT

## 2023-01-19 PROCEDURE — 11000004 HC SNF PRIVATE

## 2023-01-19 PROCEDURE — 94760 N-INVAS EAR/PLS OXIMETRY 1: CPT

## 2023-01-19 PROCEDURE — 25000003 PHARM REV CODE 250: Performed by: INTERNAL MEDICINE

## 2023-01-19 PROCEDURE — 97116 GAIT TRAINING THERAPY: CPT

## 2023-01-19 RX ADMIN — ASPIRIN 81 MG: 81 TABLET, COATED ORAL at 08:01

## 2023-01-19 RX ADMIN — FAMOTIDINE 20 MG: 20 TABLET ORAL at 08:01

## 2023-01-19 RX ADMIN — VENLAFAXINE 37.5 MG: 37.5 TABLET ORAL at 06:01

## 2023-01-19 RX ADMIN — METHOCARBAMOL 500 MG: 500 TABLET ORAL at 08:01

## 2023-01-19 RX ADMIN — DOCUSATE SODIUM 100 MG: 100 CAPSULE, LIQUID FILLED ORAL at 08:01

## 2023-01-19 RX ADMIN — POLYETHYLENE GLYCOL 3350 17 G: 17 POWDER, FOR SOLUTION ORAL at 01:01

## 2023-01-19 RX ADMIN — MICONAZOLE NITRATE 2 % TOPICAL POWDER: at 08:01

## 2023-01-19 RX ADMIN — BUSPIRONE HYDROCHLORIDE 7.5 MG: 7.5 TABLET ORAL at 08:01

## 2023-01-19 RX ADMIN — HYDROCODONE BITARTRATE AND ACETAMINOPHEN 1 TABLET: 5; 325 TABLET ORAL at 08:01

## 2023-01-19 NOTE — PLAN OF CARE
Problem: Physical Therapy  Goal: Physical Therapy Goal  Description: Goals to be met by: Discharge     Patient will increase functional independence with mobility by performin. Supine to sit with Modified [place]  2. Sit to supine with Modified [place]  3. Sit to stand transfer with Modified [place]  4. Bed to chair transfer with Stand-by Assistance using Rolling Walker  5. Gait  x 25 feet with Stand-by Assistance using Rolling Walker. --goal met 23    Outcome: Ongoing, Progressing

## 2023-01-19 NOTE — PT/OT/SLP PROGRESS
Physical Therapy Treatment Note           Name: Tanya Linda    : 1960 (62 y.o.)  MRN: [id number]           TREATMENT SUMMARY AND RECOMMENDATIONS:    PT Received On: 23  PT Start Time: 1300     PT Stop Time: 1325  PT Total Time (min): 25 min     Subjective Assessment:   No complaints  Lethargic    Awake, alert, cooperative  Uncooperative    Agitated  c/o pain    Appropriate  c/o fatigue    Confused x Treated at bedside     Emotionally labile  Treated in gym/dept.    Impulsive  Other:    Flat affect       Therapy Precautions:    Cognitive deficits  Spinal precautions    Collar - hard  Sternal precautions    Collar - soft   TLSO   x Fall risk  LSO    Hip precautions - posterior  Knee immobilizer    Hip precautions - anterior  WBAT    Impaired communication  Partial weightbearing    Oxygen  TTWB    PEG tube  NWB    Visual deficits  Other:    Hearing deficits          Treatment Objectives:     Mobility Training:   Assist level Comments    Bed mobility     Transfer     Gait CGA Amb on firm surface with  ft x 3   Sit to stand transitions     Sitting balance     Standing balance      Wheelchair mobility     Car transfer     Other:          Therapeutic Exercise:   Exercise Sets Reps Comments   R LE tap ups 10 reps x 2 B UE supported R LE tap ups on 6 inch step                         Additional Comments:  Continue to work towards stair training- pt very fearful     Assessment: Patient tolerated session better.    PT Plan: continue  Revisions made to plan of care: No    GOALS:   Multidisciplinary Problems       Physical Therapy Goals          Problem: Physical Therapy    Goal Priority Disciplines Outcome Goal Variances Interventions   Physical Therapy Goal     PT, PT/OT Ongoing, Progressing     Description: Goals to be met by: Discharge     Patient will increase functional independence with mobility by performin. Supine to sit with Modified [place]  2. Sit to supine with Modified  [place]  3. Sit to stand transfer with Modified [place]  4. Bed to chair transfer with Stand-by Assistance using Rolling Walker  5. Gait  x 25 feet with Stand-by Assistance using Rolling Walker.                          Skilled PT Minutes Provided: 25   Communication with Treatment Team:     Equipment recommendations:       At end of treatment, patient remained:  x Up in chair     Up in wheelchair in room    In bed    With alarm activated    Bed rails up   x Call bell in reach     Family/friends present    Restraints secured properly    In bathroom with CNA/RN notified    Nurse aware    In gym with therapist/tech    Other:

## 2023-01-19 NOTE — PLAN OF CARE
Problem: Adult Inpatient Plan of Care  Goal: Plan of Care Review  Outcome: Ongoing, Progressing  Flowsheets (Taken 1/19/2023 1733)  Plan of Care Reviewed With: patient  Goal: Absence of Hospital-Acquired Illness or Injury  Outcome: Ongoing, Progressing  Intervention: Identify and Manage Fall Risk  Flowsheets (Taken 1/19/2023 1733)  Safety Promotion/Fall Prevention:   assistive device/personal item within reach   bed alarm set   chair alarm set   instructed to call staff for mobility   nonskid shoes/socks when out of bed  Intervention: Prevent Skin Injury  Flowsheets (Taken 1/19/2023 1733)  Body Position: position maintained  Skin Protection:   adhesive use limited   incontinence pads utilized  Intervention: Prevent and Manage VTE (Venous Thromboembolism) Risk  Flowsheets (Taken 1/19/2023 1733)  Activity Management: Up in chair - L3  VTE Prevention/Management:   bleeding precations maintained   bleeding risk assessed  Range of Motion: active ROM (range of motion) encouraged  Intervention: Prevent Infection  Flowsheets (Taken 1/19/2023 1733)  Infection Prevention:   rest/sleep promoted   hand hygiene promoted   single patient room provided     Problem: Bariatric Environmental Safety  Goal: Safety Maintained with Care  Outcome: Ongoing, Progressing     Problem: Impaired Wound Healing  Goal: Optimal Wound Healing  Outcome: Ongoing, Progressing     Problem: Skin Injury Risk Increased  Goal: Skin Health and Integrity  Outcome: Ongoing, Progressing     Problem: Fall Injury Risk  Goal: Absence of Fall and Fall-Related Injury  Outcome: Ongoing, Progressing     Problem: Surgical Site Infection  Goal: Absence of Infection Signs and Symptoms  Outcome: Ongoing, Progressing

## 2023-01-19 NOTE — PT/OT/SLP PROGRESS
Occupational Therapy  Treatment    Name: Tanya Linda    : 1960 (62 y.o.)  MRN: [id number]           TREATMENT SUMMARY AND RECOMMENDATIONS:      OT Date of Treatment: 23  OT Start Time: 1400  OT Stop Time: 1430  OT Total Time (min): 30 min      Subjective Assessment:   No complaints  Lethargic   x Awake, alert, cooperative  Impulsive    Uncooperative   Flat affect    Agitated  c/o pain    Appropriate  c/o fatigue    Confused  Treated at bedside     Emotionally labile x Treated in gym/dept.      Other:        Therapy Precautions:    Cognitive deficits  Spinal precautions    Collar - hard  Sternal precautions    Collar - soft   TLSO   x Fall risk  LSO    Hip precautions - posterior  Knee immobilizer    Hip precautions - anterior  WBAT    Impaired communication  Partial weightbearing    Oxygen  TTWB    PEG tube  NWB    Visual deficits      Hearing deficits   Other:        Treatment Objectives:     Mobility Training:    Mobility task Assist level Comments    Bed mobility     Transfer     Sit to stands transitions     Functional mobility SBA With RW atround gym environment and in room to bathroom    Sitting balance     Standing balance      Other:        ADL Training:    ADL Assist level Comments    Feeding     Grooming/hygiene     Bathing     Upper body dressing     Lower body dressing     Toileting     Toilet transfer SBA Stand/pivot t/f with RW to BSC over toilet; pt requesting time to sit for BM   Adaptive equipment training     Other:           Therapeutic Exercise:   Exercise Sets Reps Comments   Arm bike 2 5 min Level 1; forwards/backwards   Standing UE exercises 1 15 Chest press, shoulder press, trunk rotations with 2# ball                    Additional Comments:      Assessment: Patient tolerated session well.    OT Plan: continue POC  Revisions made to plan of care: No    GOALS:   Multidisciplinary Problems       Occupational Therapy Goals          Problem: Occupational Therapy    Goal Priority  Disciplines Outcome Interventions   Occupational Therapy Goal     OT, PT/OT Ongoing, Progressing    Description: Goals to be met by: 1/20/22     Patient will increase functional independence with ADLs by performing:    UE Dressing with Modified [place]. - met 1/10/23  LE Dressing with Minimal Assistance. - met 1/10/23  Grooming while seated at sink with Modified [place].  Toileting from bedside commode with Minimal Assistance for hygiene and clothing management.   Bathing from bench with Minimal Assistance. - met 1/10/23  Toilet transfer to bedside commode with Contact Guard Assistance.                         Skilled OT Minutes Provided: 30  Communication with Treatment Team:     Equipment recommendations:       At end of treatment, patient remained:   Up in chair     Up in wheelchair in room    In bed    With alarm activated    Bed rails up    Call bell in reach     Family/friends present    Restraints secured properly   x In bathroom with CNA/RN notified    In gym with PT/PTA/tech    Nurse aware    Other:

## 2023-01-19 NOTE — PATIENT CARE CONFERENCE
Name: Tanya Linda    : 1960 (62 y.o.)  MRN: [id number]            Interdisciplinary Team Conference     Case conference held with patient/family and care team to discuss progress, plan of care, barriers to be addressed for safe return home, equipment recommendations, and discharge planning. Communicated therapy progress with MD, RN, therapy clinicians and case management. All questions/concerns answered.

## 2023-01-19 NOTE — PLAN OF CARE
[facility]  Discharge Reassessment    Primary Care Provider: MARION Salazar    Expected Discharge Date:     Reassessment (most recent)       Discharge Reassessment - 01/19/23 1103          Discharge Reassessment    Assessment Type Discharge Planning Reassessment     Discharge Plan discussed with: Adult children     Communicated XENA with patient/caregiver Date not available/Unable to determine     Discharge Plan A Home with family;Home Health     DME Needed Upon Discharge  wheelchair;walker, rolling     Discharge Barriers Identified None     Why the patient remains in the hospital Requires continued medical care        Post-Acute Status    Post-Acute Authorization Home Health     Home Health Status Pending medical clearance/testing     Hospital Resources/Appts/Education Provided Provided patient/caregiver with written discharge plan information     Patient choice form signed by patient/caregiver List with quality metrics by geographic area provided     Discharge Delays None known at this time

## 2023-01-19 NOTE — ASSESSMENT & PLAN NOTE
Anticipate discharge home with home health in the next week. A ramp is being arranged at her house. Her next review date is on 1/19/2023.

## 2023-01-19 NOTE — PROGRESS NOTES
Name: Tanya Linda    : 1960 (62 y.o.)  MRN: [id number]      [facility] [place]:    Wheelchair:  Patient would benefit from a 22 inch manual wheelchair with elevating leg rests and removable arm rests d/t patient having a mobility limitation that significantly impairs his/her ability to participate in one or more mobility related activities of daily living. The patient's mobility limitation can not be sufficiently resolved by the use of a cane or walker. The use of a manual wheelchair will significantly improve the patient's ability to participate in ADLs/mobility tasks and the patient will use it on a regular basis in the home environment. The patient is able to self propel wheelchair but also has a caregiver who is available, willing and able to provide assistance with the wheelchair.     Rolling walker: Patient would benefit from a rolling walker upon discharge to increase safety, decrease fall risk, and improve mobility/transfers. Patient is currently unable to independently and functionally walk for household distances of >100 feet without use of rolling walker. Patient would benefit from a rolling walker within his/her home environment to increase safety with transfers and gait and decrease risk of falls and subsequent injury. Patient is unable to ambulate without rolling walker at this time.     Bedside commode: Patient would benefit from a bedside commode to place near the bedside in order to improve independence and safety in toileting and toilet transfers. Patient is currently requiring the use of a bedside commode for safety with toilet transfers. Arm rails and adjustable height of a commode will reduce the difficulty and increase safety/independence for toilet transfers. Patient would benefit from the use of a bedside commode at home to increase safety and reduce risk of falls at home.     Hospital bed: Patient is currently requiring minimal assist with all bed mobility tasks including rolling R<>L  and supine<>sit transitions secondary to size and LLE weakness. A hospital bed would allow for frequent repositioning to prevent pressure wounds due to patient size, elevation of head to decrease risk of aspiration, and bed rails to decrease fall risk. Patient would benefit from a hospital bed within the home environment to reduce the burden on family/caregivers and to ensure safe bed mobility and functionality for both the patient and family/caregivers.         Patient weight: 345 pounds  Hip to hip measurements: 26 in

## 2023-01-19 NOTE — PT/OT/SLP PROGRESS
Physical Therapy Treatment Note           Name: Tanya Linda    : 1960 (62 y.o.)  MRN: [id number]           TREATMENT SUMMARY AND RECOMMENDATIONS:    PT Received On: 23  PT Start Time: 1330     PT Stop Time: 1400  PT Total Time (min): 30 min     Subjective Assessment:   No complaints  Lethargic   x Awake, alert, cooperative  Uncooperative    Agitated  c/o pain    Appropriate  c/o fatigue    Confused  Treated at bedside     Emotionally labile x Treated in gym/dept.    Impulsive  Other:    Flat affect       Therapy Precautions:    Cognitive deficits  Spinal precautions    Collar - hard  Sternal precautions    Collar - soft   TLSO    Fall risk  LSO    Hip precautions - posterior  Knee immobilizer    Hip precautions - anterior x WBAT    Impaired communication  Partial weightbearing    Oxygen  TTWB    PEG tube  NWB    Visual deficits  Other:    Hearing deficits          Treatment Objectives:     Mobility Training:   Assist level Comments    Bed mobility     Transfer     Gait SBA 100ft + 125ft using RW with swing-through gait pattern    Sit to stand transitions SBA    Sitting balance     Standing balance      Wheelchair mobility     Car transfer     Other:          Therapeutic Exercise:   Exercise Sets Reps Comments   LE cycling   X6 minutes F/B   Hip flexion, hip ABD, LAQ, ankle PF/DF 1 20 Performed short sitting   LLE: Marching 2 10 Performed supported standing              Additional Comments:      Assessment: Patient tolerated session well.    PT Plan: continue POC  Revisions made to plan of care: No    GOALS:   Multidisciplinary Problems       Physical Therapy Goals          Problem: Physical Therapy    Goal Priority Disciplines Outcome Goal Variances Interventions   Physical Therapy Goal     PT, PT/OT Ongoing, Progressing     Description: Goals to be met by: Discharge     Patient will increase functional independence with mobility by performin. Supine to sit with Modified [place]  2.  Sit to supine with Modified [place]  3. Sit to stand transfer with Modified [place]  4. Bed to chair transfer with Stand-by Assistance using Rolling Walker  5. Gait  x 25 feet with Stand-by Assistance using Rolling Walker. --goal met 1/19/23                         Skilled PT Minutes Provided: 30 minutes   Communication with Treatment Team:     Equipment recommendations:       At end of treatment, patient remained:   Up in chair     Up in wheelchair in room    In bed    With alarm activated    Bed rails up    Call bell in reach     Family/friends present    Restraints secured properly    In bathroom with CNA/RN notified    Nurse aware   x In gym with therapist/tech    Other:

## 2023-01-19 NOTE — PT/OT/SLP PROGRESS
Physical Therapy Treatment Note           Name: Tanya Linda    : 1960 (62 y.o.)  MRN: [id number]           TREATMENT SUMMARY AND RECOMMENDATIONS:    PT Received On: 23  PT Start Time: 1000     PT Stop Time: 1025  PT Total Time (min): 25 min     Subjective Assessment:   No complaints  Lethargic   x Awake, alert, cooperative  Uncooperative    Agitated  c/o pain    Appropriate  c/o fatigue    Confused  Treated at bedside     Emotionally labile x Treated in gym/dept.    Impulsive  Other:    Flat affect       Therapy Precautions:    Cognitive deficits  Spinal precautions    Collar - hard  Sternal precautions    Collar - soft   TLSO    Fall risk  LSO    Hip precautions - posterior  Knee immobilizer    Hip precautions - anterior x WBAT    Impaired communication  Partial weightbearing    Oxygen  TTWB    PEG tube  NWB    Visual deficits  Other:    Hearing deficits          Treatment Objectives:     Mobility Training:   Assist level Comments    Bed mobility     Transfer     Gait SBA 2x 25ft using RW   Sit to stand transitions SBA 2x5 sit to stands from bedside chair surface    Sitting balance     Standing balance      Wheelchair mobility     Car transfer     Other:          Therapeutic Exercise:   Exercise Sets Reps Comments   Hip flexion, hip ABD, LAQ, ankle PF/DF 1 20 Performed short sitting                          Additional Comments:      Assessment: Patient tolerated session well. Updated POC - patient is making progress towards set goals and will continue to benefit from skilled PT services to promote functional independence.     PT Plan: continue poc  Revisions made to plan of care: Yes    GOALS:   Multidisciplinary Problems       Physical Therapy Goals          Problem: Physical Therapy    Goal Priority Disciplines Outcome Goal Variances Interventions   Physical Therapy Goal     PT, PT/OT Ongoing, Progressing     Description: Goals to be met by: Discharge     Patient will increase functional  independence with mobility by performin. Supine to sit with Modified [place]  2. Sit to supine with Modified [place]  3. Sit to stand transfer with Modified [place]  4. Bed to chair transfer with Stand-by Assistance using Rolling Walker  5. Gait  x 25 feet with Stand-by Assistance using Rolling Walker. --goal met 23                         Skilled PT Minutes Provided: 25 minutes   Communication with Treatment Team:     Equipment recommendations:       At end of treatment, patient remained:  x Up in chair     Up in wheelchair in room    In bed   x With alarm activated    Bed rails up   x Call bell in reach     Family/friends present    Restraints secured properly    In bathroom with CNA/RN notified    Nurse aware    In gym with therapist/tech    Other:

## 2023-01-19 NOTE — PT/OT/SLP PROGRESS
Occupational Therapy  Treatment    Name: Tanya Linda    : 1960 (62 y.o.)  MRN: [id number]           TREATMENT SUMMARY AND RECOMMENDATIONS:      OT Date of Treatment: 23  OT Start Time: 930  OT Stop Time:   OT Total Time (min): 30 min      Subjective Assessment:   No complaints  Lethargic   x Awake, alert, cooperative  Impulsive    Uncooperative   Flat affect    Agitated  c/o pain    Appropriate  c/o fatigue    Confused  Treated at bedside     Emotionally labile x Treated in gym/dept.      Other:        Therapy Precautions:    Cognitive deficits  Spinal precautions    Collar - hard  Sternal precautions    Collar - soft   TLSO   x Fall risk  LSO    Hip precautions - posterior  Knee immobilizer    Hip precautions - anterior  WBAT    Impaired communication  Partial weightbearing    Oxygen  TTWB    PEG tube  NWB    Visual deficits      Hearing deficits   Other:        Treatment Objectives:     Mobility Training:    Mobility task Assist level Comments    Bed mobility     Transfer SBA Stand/pivot t/f with RW    Sit to stands transitions SBA    Functional mobility SBA 2x50 feet with RW   Sitting balance     Standing balance      Other:        ADL Training:    ADL Assist level Comments    Feeding     Grooming/hygiene setup Pt washed face while seated in chair    Bathing     Upper body dressing     Lower body dressing     Toileting     Toilet transfer     Adaptive equipment training     Other:           Therapeutic Exercise:   Exercise Sets Reps Comments   3# dowel 2 10 Shoulder press, chest press, straight arm raises, bicep curls, forward rows, backwards rows                         Additional Comments:      Assessment: Patient tolerated session well.    OT Plan: Continue POC  Revisions made to plan of care: No    GOALS:   Multidisciplinary Problems       Occupational Therapy Goals          Problem: Occupational Therapy    Goal Priority Disciplines Outcome Interventions   Occupational Therapy Goal     OT,  PT/OT Ongoing, Progressing    Description: Goals to be met by: 1/20/22     Patient will increase functional independence with ADLs by performing:    UE Dressing with Modified [place]. - met 1/10/23  LE Dressing with Minimal Assistance. - met 1/10/23  Grooming while seated at sink with Modified [place].  Toileting from bedside commode with Minimal Assistance for hygiene and clothing management.   Bathing from bench with Minimal Assistance. - met 1/10/23  Toilet transfer to bedside commode with Contact Guard Assistance.                         Skilled OT Minutes Provided: 30  Communication with Treatment Team:     Equipment recommendations:       At end of treatment, patient remained:   Up in chair     Up in wheelchair in room    In bed    With alarm activated    Bed rails up    Call bell in reach     Family/friends present    Restraints secured properly    In bathroom with CNA/RN notified   x In gym with PT/PTA/tech    Nurse aware    Other:

## 2023-01-19 NOTE — PLAN OF CARE
Received notice of non coverage from patient's insurance with last covered date of today. Patient notified and Peer to Peer being scheduled.

## 2023-01-19 NOTE — PLAN OF CARE
Problem: Adult Inpatient Plan of Care  Goal: Plan of Care Review  Outcome: Ongoing, Progressing  Flowsheets (Taken 1/19/2023 0340)  Plan of Care Reviewed With: patient  Goal: Absence of Hospital-Acquired Illness or Injury  Outcome: Ongoing, Progressing  Intervention: Identify and Manage Fall Risk  Flowsheets (Taken 1/19/2023 0340)  Safety Promotion/Fall Prevention:   assistive device/personal item within reach   bed alarm set   diversional activities provided   Fall Risk reviewed with patient/family   gait belt with ambulation   high risk medications identified   lighting adjusted   medications reviewed   nonskid shoes/socks when out of bed   side rails raised x 3   instructed to call staff for mobility  Intervention: Prevent Skin Injury  Flowsheets (Taken 1/19/2023 0340)  Body Position: supine  Skin Protection:   adhesive use limited   incontinence pads utilized   tubing/devices free from skin contact  Intervention: Prevent and Manage VTE (Venous Thromboembolism) Risk  Flowsheets (Taken 1/19/2023 0340)  Activity Management: Arm raise - L1  VTE Prevention/Management:   bleeding precations maintained   bleeding risk assessed   dorsiflexion/plantar flexion performed   fluids promoted  Range of Motion: active ROM (range of motion) encouraged  Intervention: Prevent Infection  Flowsheets (Taken 1/19/2023 0340)  Infection Prevention:   rest/sleep promoted   single patient room provided     Problem: Bariatric Environmental Safety  Goal: Safety Maintained with Care  Outcome: Ongoing, Progressing  Intervention: Promote Safety and Comfort  Flowsheets (Taken 1/19/2023 0340)  Bariatric Safety: specialty bed utilized     Problem: Impaired Wound Healing  Goal: Optimal Wound Healing  Outcome: Ongoing, Progressing  Intervention: Promote Wound Healing  Flowsheets (Taken 1/19/2023 0340)  Oral Nutrition Promotion:   rest periods promoted   safe use of adaptive equipment encouraged   physical activity promoted  Sleep/Rest  Enhancement:   awakenings minimized   noise level reduced   regular sleep/rest pattern promoted   relaxation techniques promoted   room darkened  Activity Management: Arm raise - L1  Pain Management Interventions:   care clustered   diversional activity provided   medication offered   pain management plan reviewed with patient/caregiver   pillow support provided   position adjusted   prescribed exercises encouraged   quiet environment facilitated   relaxation techniques promoted     Problem: Skin Injury Risk Increased  Goal: Skin Health and Integrity  Outcome: Ongoing, Progressing     Problem: Fall Injury Risk  Goal: Absence of Fall and Fall-Related Injury  Outcome: Ongoing, Progressing     Problem: Surgical Site Infection  Goal: Absence of Infection Signs and Symptoms  Outcome: Ongoing, Progressing

## 2023-01-19 NOTE — SUBJECTIVE & OBJECTIVE
Interval History: She is doing well today without complaints and she denies any left hip pain. She ambulated 100 feet x 4 with her rolling walker and standby assistance and she required standby assistance with sit to stand transitions.      Review of Systems   All other systems reviewed and are negative.  Objective:     Vital Signs (Most Recent):  Temp: 97.6 °F (36.4 °C) (01/19/23 0720)  Pulse: (!) 55 (01/19/23 0720)  Resp: 18 (01/19/23 0306)  BP: 135/80 (01/19/23 0720)  SpO2: 96 % (01/19/23 0720)   Vital Signs (24h Range):  Temp:  [97.5 °F (36.4 °C)-97.7 °F (36.5 °C)] 97.6 °F (36.4 °C)  Pulse:  [55-83] 55  Resp:  [16-18] 18  SpO2:  [94 %-100 %] 96 %  BP: (125-135)/(67-83) 135/80     Weight:  (could not get weight due to bed)  Body mass index is 57.41 kg/m².    Intake/Output Summary (Last 24 hours) at 1/19/2023 0838  Last data filed at 1/19/2023 0634  Gross per 24 hour   Intake 240 ml   Output 1600 ml   Net -1360 ml      Physical Exam  General - Morbidly obese  female in no acute distress.  HEENT - NCAT. No scleral icterus. Oropharynx clear. Mucous membranes moist.  Neck - No lymphadenopathy, thyromegaly, or JVD.  CVS - Regular rate and rhythm. No murmurs, rubs, or gallops.  Resp - Lungs are clear to auscultation bilaterally. No rales, wheeze, or rhonchi.   GI - Soft, nontender, nondistended, normoactive bowel sounds present.   Extremities - No clubbing, cyanosis, or edema.   Neuro - CN II through XII are grossly intact. No focal neurological deficits. Alert and oriented times four.   Psych - Normal affect.  Skin - Left hip surgical site c/d/i. Slight edema and ecchymosis noted to periwounds.     Significant Labs: All pertinent labs within the past 24 hours have been reviewed.    1/17/2023:  CBC: WBC count 7.8, hemoglobin 11.1, hematocrit 37.5, platelet count 261.  BMP: Sodium 139, potassium 4.2, chloride 102, CO2 26, BUN 18.7, creatinine 0.69, glucose 99, calcium 9.4, magnesium 1.9.     1/9/2023:  CBC: WBC  count 10.4, hemoglobin 11.1, hematocrit 37.9, platelet count 316.  CMP: Sodium 137, potassium 4.8, chloride 98, CO2 28, BUN 17.8, creatinine 0.77, glucose 94, calcium 9.5, magnesium 2.1, alkaline phosphatase 223, total protein 6.6, albumin 3.3, total bilirubin 0.8, AST 27, ALT 35.     1/4/2023:  CBC: WBC count 10.4, hemoglobin 10.2, hematocrit 33.6, platelet count 307.  BMP: Sodium 141, potassium 4.7, chloride 102, CO2 28, BUN 20.8, creatinine 0.79, glucose 114, calcium 9.7, magnesium 2.0.     12/30/2022:   CBC:  WBC count 10.4, hemoglobin 10.1, hematocrit 33.6, platelet count 259 food   BMP: Sodium 137, potassium 4.4, chloride 100, CO2 25, BUN 14.3, creatinine 0.68, glucose 100, calcium 9.1, magnesium 2.0.      12/29/2022:  CBC:  WBC count 10.9, hemoglobin 9.8, hematocrit 31.7, glucose 241.    BMP: Sodium 136, potassium 4.7, chloride 100, CO2 26, BUN 14.4, creatinine 0.73, glucose 107, calcium 8.9.    12/27/2022:   CBC:  WBC count 1.5, hemoglobin 10.7, hematocrit 34.6, platelet count 202.    CMP: Sodium 137, potassium 4.3, chloride 105, CO2 25, BUN 16.1, creatinine 0.81, glucose 121, calcium 9.0, magnesium 2.1, alkaline phosphatase 73, total protein 6.4, albumin 3.0, total bilirubin 0.8, AST 39, ALT 22.     12/26/2022:   CBC:  WBC count 13.7, hemoglobin 10.7, hematocrit 34.3, platelet count 264.  CMP:  Sodium 137, potassium 4.4, chloride 103, CO2 25, BUN 18.9, creatinine 0.85, glucose 118, calcium 8.71 magnesium 1.9, alkaline phosphatase 72, total protein 5.7, albumin 3.1, total bilirubin 0.8, AST 56, ALT 26.     12/24/2022:   CMP: Sodium 140, potassium 3.9, chloride 105, CO2 25, BUN 17.2 creatinine 0.97, glucose 117, calcium 9.3, alkaline phosphatase 98, total protein 7.2, albumin 3.8, total bilirubin 0.5, AST 14, ALT 17.    CBC: WBC count 10.6, hemoglobin 12.9, hematocrit 41.2, platelet count 223.     Significant Imaging: I have reviewed all pertinent imaging results/findings within the past 24 hours.     X-ray  left hip 12/25/2022: There has been placement of left femoral intramedullary taz and compression screw traverses into the femoral head transfixing the intertrochanteric fracture.  Positioning and alignment is satisfactory.     CT left hip 12/25/2022: Comminuted intertrochanteric left femoral fracture.     CXR 12/24/2022: No acute chest disease is identified.     X-ray left hip 12/24/2022:  Displaced intertrochanteric fracture of the left femur.

## 2023-01-20 VITALS
SYSTOLIC BLOOD PRESSURE: 137 MMHG | DIASTOLIC BLOOD PRESSURE: 84 MMHG | BODY MASS INDEX: 48.82 KG/M2 | HEART RATE: 78 BPM | WEIGHT: 293 LBS | RESPIRATION RATE: 17 BRPM | HEIGHT: 65 IN | OXYGEN SATURATION: 97 % | TEMPERATURE: 98 F

## 2023-01-20 PROCEDURE — 25000003 PHARM REV CODE 250: Performed by: STUDENT IN AN ORGANIZED HEALTH CARE EDUCATION/TRAINING PROGRAM

## 2023-01-20 RX ORDER — ASPIRIN 81 MG/1
81 TABLET ORAL 2 TIMES DAILY
Qty: 60 TABLET | Refills: 0 | Status: ON HOLD | OUTPATIENT
Start: 2023-01-20 | End: 2023-03-21

## 2023-01-20 RX ORDER — HYDROCODONE BITARTRATE AND ACETAMINOPHEN 5; 325 MG/1; MG/1
1 TABLET ORAL EVERY 6 HOURS PRN
Qty: 10 TABLET | Refills: 0 | Status: ON HOLD | OUTPATIENT
Start: 2023-01-20 | End: 2023-03-21 | Stop reason: HOSPADM

## 2023-01-20 RX ADMIN — ASPIRIN 81 MG: 81 TABLET, COATED ORAL at 08:01

## 2023-01-20 RX ADMIN — HYDROCODONE BITARTRATE AND ACETAMINOPHEN 1 TABLET: 5; 325 TABLET ORAL at 01:01

## 2023-01-20 RX ADMIN — METHOCARBAMOL 500 MG: 500 TABLET ORAL at 08:01

## 2023-01-20 RX ADMIN — DOCUSATE SODIUM 100 MG: 100 CAPSULE, LIQUID FILLED ORAL at 08:01

## 2023-01-20 RX ADMIN — MICONAZOLE NITRATE 2 % TOPICAL POWDER: at 09:01

## 2023-01-20 RX ADMIN — FAMOTIDINE 20 MG: 20 TABLET ORAL at 08:01

## 2023-01-20 RX ADMIN — HYDROCODONE BITARTRATE AND ACETAMINOPHEN 1 TABLET: 5; 325 TABLET ORAL at 08:01

## 2023-01-20 RX ADMIN — ERGOCALCIFEROL 50000 UNITS: 1.25 CAPSULE ORAL at 08:01

## 2023-01-20 RX ADMIN — METHOCARBAMOL 500 MG: 500 TABLET ORAL at 01:01

## 2023-01-20 NOTE — ASSESSMENT & PLAN NOTE
Anticipate discharge home with home health in the next several days. A ramp has been arranged at her house. Her last covered date was 1/19/2023 and a peer to peer is scheduled for today.

## 2023-01-20 NOTE — PT/OT/SLP DISCHARGE
Physical Therapy Discharge Summary    Name: Tanya Linda  MRN: [id number]   Principal Problem: Closed intertrochanteric fracture of hip, left, initial encounter     Patient Discharged from acute Physical Therapy on 2023.  Please refer to prior PT noted date on 2023 for functional status.     Assessment:     Patient has met all goals and is not appropriate for therapy.    Objective:     GOALS:   Multidisciplinary Problems       Physical Therapy Goals          Problem: Physical Therapy    Goal Priority Disciplines Outcome Goal Variances Interventions   Physical Therapy Goal     PT, PT/OT Ongoing, Progressing     Description: Goals to be met by: Discharge     Patient will increase functional independence with mobility by performin. Supine to sit with Modified [place]  2. Sit to supine with Modified [place]  3. Sit to stand transfer with Modified [place]  4. Bed to chair transfer with Stand-by Assistance using Rolling Walker  5. Gait  x 25 feet with Stand-by Assistance using Rolling Walker. --goal met 23                         Reasons for Discontinuation of Therapy Services  Satisfactory goal achievement.      Plan:     Patient Discharged to: Home with Home Health Service.      2023

## 2023-01-20 NOTE — PLAN OF CARE
Problem: Adult Inpatient Plan of Care  Goal: Plan of Care Review  Outcome: Ongoing, Progressing  Flowsheets (Taken 1/19/2023 2000)  Plan of Care Reviewed With: patient  Goal: Absence of Hospital-Acquired Illness or Injury  Outcome: Ongoing, Progressing  Intervention: Prevent Skin Injury  Flowsheets (Taken 1/19/2023 2000)  Skin Protection:   adhesive use limited   incontinence pads utilized  Intervention: Prevent and Manage VTE (Venous Thromboembolism) Risk  Flowsheets (Taken 1/19/2023 2000)  VTE Prevention/Management:   ambulation promoted   ROM (active) performed  Range of Motion: ROM (range of motion) performed  Intervention: Prevent Infection  Flowsheets (Taken 1/19/2023 2000)  Infection Prevention:   cohorting utilized   single patient room provided     Problem: Skin Injury Risk Increased  Goal: Skin Health and Integrity  Intervention: Optimize Skin Protection  Flowsheets (Taken 1/19/2023 2000)  Pressure Reduction Techniques: frequent weight shift encouraged  Pressure Reduction Devices: specialty bed utilized  Skin Protection:   adhesive use limited   incontinence pads utilized

## 2023-01-20 NOTE — NURSING
IV D/C'd with tip intact. Discharge instructions given to patient including medications. Patient verbalized understanding. Patient wheeled to private vehicle by hospital staff. No needs or concerns expressed by the patient.

## 2023-01-20 NOTE — SUBJECTIVE & OBJECTIVE
Interval History: She reports left hip pain 4/10 in severity this morning but she is otherwise doing well.  She takes miralax on an as-needed basis and she had a bowel movement last night.  She ambulated 225 feet using her rolling walker and standby assistance and she required standby assistance with sit to stand transitions.      Review of Systems   All other systems reviewed and are negative.  Objective:     Vital Signs (Most Recent):  Temp: 97.6 °F (36.4 °C) (01/20/23 0753)  Pulse: 61 (01/20/23 0753)  Resp: 17 (01/20/23 0842)  BP: (!) 151/75 (01/20/23 0753)  SpO2: 95 % (01/20/23 0753)   Vital Signs (24h Range):  Temp:  [97.4 °F (36.3 °C)-98.3 °F (36.8 °C)] 97.6 °F (36.4 °C)  Pulse:  [61-85] 61  Resp:  [16-19] 17  SpO2:  [92 %-98 %] 95 %  BP: (112-154)/(70-76) 151/75     Weight:  (could not get weight due to bed)  Body mass index is 57.41 kg/m².    Intake/Output Summary (Last 24 hours) at 1/20/2023 0846  Last data filed at 1/20/2023 0328  Gross per 24 hour   Intake 600 ml   Output 1200 ml   Net -600 ml      Physical Exam  General - Morbidly obese  female in no acute distress.  HEENT - NCAT. No scleral icterus. Oropharynx clear. Mucous membranes moist.  Neck - No lymphadenopathy, thyromegaly, or JVD.  CVS - Regular rate and rhythm. No murmurs, rubs, or gallops.  Resp - Lungs are clear to auscultation bilaterally. No rales, wheeze, or rhonchi.   GI - Soft, nontender, nondistended, normoactive bowel sounds present.   Extremities - No clubbing, cyanosis, or edema.   Neuro - CN II through XII are grossly intact. No focal neurological deficits. Alert and oriented times four.   Psych - Normal affect.  Skin - Left hip surgical site c/d/i. Slight edema and ecchymosis noted to periwounds.     Significant Labs: All pertinent labs within the past 24 hours have been reviewed.     1/17/2023:  CBC: WBC count 7.8, hemoglobin 11.1, hematocrit 37.5, platelet count 261.  BMP: Sodium 139, potassium 4.2, chloride 102, CO2 26,  BUN 18.7, creatinine 0.69, glucose 99, calcium 9.4, magnesium 1.9.     1/9/2023:  CBC: WBC count 10.4, hemoglobin 11.1, hematocrit 37.9, platelet count 316.  CMP: Sodium 137, potassium 4.8, chloride 98, CO2 28, BUN 17.8, creatinine 0.77, glucose 94, calcium 9.5, magnesium 2.1, alkaline phosphatase 223, total protein 6.6, albumin 3.3, total bilirubin 0.8, AST 27, ALT 35.     1/4/2023:  CBC: WBC count 10.4, hemoglobin 10.2, hematocrit 33.6, platelet count 307.  BMP: Sodium 141, potassium 4.7, chloride 102, CO2 28, BUN 20.8, creatinine 0.79, glucose 114, calcium 9.7, magnesium 2.0.     12/30/2022:   CBC:  WBC count 10.4, hemoglobin 10.1, hematocrit 33.6, platelet count 259 food   BMP: Sodium 137, potassium 4.4, chloride 100, CO2 25, BUN 14.3, creatinine 0.68, glucose 100, calcium 9.1, magnesium 2.0.      12/29/2022:  CBC:  WBC count 10.9, hemoglobin 9.8, hematocrit 31.7, glucose 241.    BMP: Sodium 136, potassium 4.7, chloride 100, CO2 26, BUN 14.4, creatinine 0.73, glucose 107, calcium 8.9.    12/27/2022:   CBC:  WBC count 1.5, hemoglobin 10.7, hematocrit 34.6, platelet count 202.    CMP: Sodium 137, potassium 4.3, chloride 105, CO2 25, BUN 16.1, creatinine 0.81, glucose 121, calcium 9.0, magnesium 2.1, alkaline phosphatase 73, total protein 6.4, albumin 3.0, total bilirubin 0.8, AST 39, ALT 22.     12/26/2022:   CBC:  WBC count 13.7, hemoglobin 10.7, hematocrit 34.3, platelet count 264.  CMP:  Sodium 137, potassium 4.4, chloride 103, CO2 25, BUN 18.9, creatinine 0.85, glucose 118, calcium 8.71 magnesium 1.9, alkaline phosphatase 72, total protein 5.7, albumin 3.1, total bilirubin 0.8, AST 56, ALT 26.     12/24/2022:   CMP: Sodium 140, potassium 3.9, chloride 105, CO2 25, BUN 17.2 creatinine 0.97, glucose 117, calcium 9.3, alkaline phosphatase 98, total protein 7.2, albumin 3.8, total bilirubin 0.5, AST 14, ALT 17.    CBC: WBC count 10.6, hemoglobin 12.9, hematocrit 41.2, platelet count 223.     Significant Imaging: I  have reviewed all pertinent imaging results/findings within the past 24 hours.     X-ray left hip 12/25/2022: There has been placement of left femoral intramedullary taz and compression screw traverses into the femoral head transfixing the intertrochanteric fracture.  Positioning and alignment is satisfactory.     CT left hip 12/25/2022: Comminuted intertrochanteric left femoral fracture.     CXR 12/24/2022: No acute chest disease is identified.     X-ray left hip 12/24/2022:  Displaced intertrochanteric fracture of the left femur.

## 2023-01-20 NOTE — PT/OT/SLP DISCHARGE
Occupational Therapy Discharge Summary    Tanya Linda  MRN: [id number]   Principal Problem: Closed intertrochanteric fracture of hip, left, initial encounter      Patient Discharged from acute Occupational Therapy on 1/20/23.    Assessment:      Goals partially met. Patient appropriate for care in another setting.    Objective:     GOALS:   Multidisciplinary Problems       Occupational Therapy Goals          Problem: Occupational Therapy    Goal Priority Disciplines Outcome Interventions   Occupational Therapy Goal     OT, PT/OT Ongoing, Progressing    Description: Goals to be met by: 1/20/22     Patient will increase functional independence with ADLs by performing:    UE Dressing with Modified [place]. - met 1/10/23  LE Dressing with Minimal Assistance. - met 1/10/23  Grooming while seated at sink with Modified [place]. - met  Toileting from bedside commode with Minimal Assistance for hygiene and clothing management. - not met   Bathing from bench with Minimal Assistance. - met 1/10/23  Toilet transfer to bedside commode with Contact Guard Assistance. - met                         Reasons for Discontinuation of Therapy Services  Satisfactory goal achievement. and Patient was given a DC date today therefore Pt being discharged home today.        Plan:     Patient Discharged to: Home with Home Health Service    1/20/2023

## 2023-01-20 NOTE — PROGRESS NOTES
Name: Tanya Linda    : 1960 (62 y.o.)  MRN: [id number]      [facility] Recommendations:     Wheelchair:  Patient would benefit from a 22 inch manual wheelchair with elevating leg rests and removable arm rests d/t patient having a mobility limitation that significantly impairs his/her ability to participate in one or more mobility related activities of daily living. The patient's mobility limitation can not be sufficiently resolved by the use of a cane or walker. The use of a manual wheelchair will significantly improve the patient's ability to participate in ADLs/mobility tasks and the patient will use it on a regular basis in the home environment. The patient is able to self propel wheelchair but also has a caregiver who is available, willing and able to provide assistance with the wheelchair.      Bariatric Rolling walker: Patient would benefit from a bariatric rolling walker upon discharge to increase safety, decrease fall risk, and improve mobility/transfers. Patient is currently unable to independently and functionally walk for household distances of >100 feet without use of rolling walker. Patient would benefit from a rolling walker within his/her home environment to increase safety with transfers and gait and decrease risk of falls and subsequent injury. Patient is unable to ambulate without rolling walker at this time.      Bariatric Bedside commode: Patient would benefit from a bariatric bedside commode to place near the bedside in order to improve independence and safety in toileting and toilet transfers. Patient is currently requiring the use of a bedside commode for safety with toilet transfers. Arm rails and adjustable height of a commode will reduce the difficulty and increase safety/independence for toilet transfers. Patient would benefit from the use of a bedside commode at home to increase safety and reduce risk of falls at home.      Hospital bed: Patient is currently requiring minimal assist with  all bed mobility tasks including rolling R<>L and supine<>sit transitions secondary to size and LLE weakness. A hospital bed would allow for frequent repositioning to prevent pressure wounds due to patient size, elevation of head to decrease risk of aspiration, and bed rails to decrease fall risk. Patient would benefit from a hospital bed within the home environment to reduce the burden on family/caregivers and to ensure safe bed mobility and functionality for both the patient and family/caregivers.            Patient weight: 345 pounds  Hip to hip measurements: 26 in

## 2023-01-20 NOTE — DISCHARGE INSTRUCTIONS
Please follow all discharge instructions as given. KEEP ALL FOLLOW UP APPOINTMENTS!        If you experience any worsening or NEW signs or symptoms please call your primary care provider or head to your nearest emergency department.           THANK YOU FOR CHOOSING [facility].  If you have any questions please call [phone].

## 2023-01-20 NOTE — PLAN OF CARE
Problem: Adult Inpatient Plan of Care  Goal: Plan of Care Review  Outcome: Ongoing, Progressing  Flowsheets (Taken 1/20/2023 1155)  Plan of Care Reviewed With:   patient   son  Goal: Absence of Hospital-Acquired Illness or Injury  Outcome: Ongoing, Progressing  Intervention: Identify and Manage Fall Risk  Flowsheets (Taken 1/20/2023 1155)  Safety Promotion/Fall Prevention:   assistive device/personal item within reach   bed alarm set   chair alarm set   in recliner, wheels locked   nonskid shoes/socks when out of bed   instructed to call staff for mobility  Intervention: Prevent Skin Injury  Flowsheets (Taken 1/20/2023 1155)  Body Position: position changed independently  Skin Protection:   adhesive use limited   incontinence pads utilized  Intervention: Prevent and Manage VTE (Venous Thromboembolism) Risk  Flowsheets (Taken 1/20/2023 1155)  Activity Management: Ambulated to bathroom - L4  VTE Prevention/Management: ambulation promoted  Range of Motion: ROM (range of motion) performed     Problem: Bariatric Environmental Safety  Goal: Safety Maintained with Care  Outcome: Ongoing, Progressing     Problem: Impaired Wound Healing  Goal: Optimal Wound Healing  Outcome: Ongoing, Progressing     Problem: Skin Injury Risk Increased  Goal: Skin Health and Integrity  Outcome: Ongoing, Progressing     Problem: Fall Injury Risk  Goal: Absence of Fall and Fall-Related Injury  Outcome: Ongoing, Progressing     Problem: Surgical Site Infection  Goal: Absence of Infection Signs and Symptoms  Outcome: Ongoing, Progressing

## 2023-01-20 NOTE — PLAN OF CARE
Peer to Peer completed and discharge date up held. Notified patient and patient's daughter. Explained that patient can discharge tomorrow but she may be responsible for the bill. Patient and daughter plan on discharging today. Home health notified. Physician notified. Explained that BSC and Walker will be delivered to the hospital today. Wheelchair will be delivered to patient's home on Monday. Explained that insurance will not fully cover the hospital bed and that the out of pocket expense will be $175 a month. Daughter stated she would call [facility] to rent the bed. Daughter will be here to  patient today.

## 2023-01-20 NOTE — PROGRESS NOTES
[facility] - Medical Surgical Unit  [facility] Medicine  Telehospitalist Progress Note    Patient Name: Tanya Linda  MRN: [id number]  Patient Class: IP- Swing   Admission Date: 12/29/2022  Length of Stay: 22 days  Attending Physician: Stephon Kearney MD  Primary Care Provider: MARION Salazar      Subjective:     Principal Problem:Closed intertrochanteric fracture of hip, left, initial encounter      HPI:  Ms. Linda is a 62-year-old  female with history of morbid obesity, hypertension, depression, GERD, and anxiety who presented to [facility] ER on 12/24/2022 with left hip pain following a fall.  X-ray of the left hip and femur showed a displaced intertrochanteric fracture of the left femur and chest x-ray revealed no acute cardiopulmonary process. CT of the left hip confirmed a comminuted intertrochanteric left femoral fracture and orthopedics was consulted.  She was taken to the OR on 12/25/2022 for intramedullary nail of left intertrochanteric femur fracture by Dr. Ronnell Olivares and she did relatively well postoperatively.  Her lisinopril was held due to mild hypotension and her blood pressure stabilized.  She had no other complications throughout her hospital course and she was transferred to [facility] swing bed on 12/29/2022 for PT/OT and management of her multiple medical comorbidities.      Overview/Hospital Course:  She was admitted to [facility] swing bed on 12/29/2022 for rehab following intramedullary nail of left intertrochanteric femur fracture.  She is being treated with a 4 week course of aspirin 81 mg PO BID for DVT prophylaxis and norco 5/325 mg PO every 6 hours as needed for pain.  She was started on diclofenac 1% gel 2 grams topical BID prn for left knee pain and stiffness on 1/6/2023. Her miralax was changed from 17 gm PO daily to prn on 1/8/2023.      Interval History: She reports left hip pain 4/10 in severity this morning but she is otherwise doing well.  She  takes miralax on an as-needed basis and she had a bowel movement last night.  She ambulated 225 feet using her rolling walker and standby assistance and she required standby assistance with sit to stand transitions.      Review of Systems   All other systems reviewed and are negative.  Objective:     Vital Signs (Most Recent):  Temp: 97.6 °F (36.4 °C) (01/20/23 0753)  Pulse: 61 (01/20/23 0753)  Resp: 17 (01/20/23 0842)  BP: (!) 151/75 (01/20/23 0753)  SpO2: 95 % (01/20/23 0753)   Vital Signs (24h Range):  Temp:  [97.4 °F (36.3 °C)-98.3 °F (36.8 °C)] 97.6 °F (36.4 °C)  Pulse:  [61-85] 61  Resp:  [16-19] 17  SpO2:  [92 %-98 %] 95 %  BP: (112-154)/(70-76) 151/75     Weight:  (could not get weight due to bed)  Body mass index is 57.41 kg/m².    Intake/Output Summary (Last 24 hours) at 1/20/2023 0846  Last data filed at 1/20/2023 0328  Gross per 24 hour   Intake 600 ml   Output 1200 ml   Net -600 ml      Physical Exam  General - Morbidly obese  female in no acute distress.  HEENT - NCAT. No scleral icterus. Oropharynx clear. Mucous membranes moist.  Neck - No lymphadenopathy, thyromegaly, or JVD.  CVS - Regular rate and rhythm. No murmurs, rubs, or gallops.  Resp - Lungs are clear to auscultation bilaterally. No rales, wheeze, or rhonchi.   GI - Soft, nontender, nondistended, normoactive bowel sounds present.   Extremities - No clubbing, cyanosis, or edema.   Neuro - CN II through XII are grossly intact. No focal neurological deficits. Alert and oriented times four.   Psych - Normal affect.  Skin - Left hip surgical site c/d/i. Slight edema and ecchymosis noted to periwounds.     Significant Labs: All pertinent labs within the past 24 hours have been reviewed.     1/17/2023:  CBC: WBC count 7.8, hemoglobin 11.1, hematocrit 37.5, platelet count 261.  BMP: Sodium 139, potassium 4.2, chloride 102, CO2 26, BUN 18.7, creatinine 0.69, glucose 99, calcium 9.4, magnesium 1.9.     1/9/2023:  CBC: WBC count 10.4,  hemoglobin 11.1, hematocrit 37.9, platelet count 316.  CMP: Sodium 137, potassium 4.8, chloride 98, CO2 28, BUN 17.8, creatinine 0.77, glucose 94, calcium 9.5, magnesium 2.1, alkaline phosphatase 223, total protein 6.6, albumin 3.3, total bilirubin 0.8, AST 27, ALT 35.     1/4/2023:  CBC: WBC count 10.4, hemoglobin 10.2, hematocrit 33.6, platelet count 307.  BMP: Sodium 141, potassium 4.7, chloride 102, CO2 28, BUN 20.8, creatinine 0.79, glucose 114, calcium 9.7, magnesium 2.0.     12/30/2022:   CBC:  WBC count 10.4, hemoglobin 10.1, hematocrit 33.6, platelet count 259 food   BMP: Sodium 137, potassium 4.4, chloride 100, CO2 25, BUN 14.3, creatinine 0.68, glucose 100, calcium 9.1, magnesium 2.0.      12/29/2022:  CBC:  WBC count 10.9, hemoglobin 9.8, hematocrit 31.7, glucose 241.    BMP: Sodium 136, potassium 4.7, chloride 100, CO2 26, BUN 14.4, creatinine 0.73, glucose 107, calcium 8.9.    12/27/2022:   CBC:  WBC count 1.5, hemoglobin 10.7, hematocrit 34.6, platelet count 202.    CMP: Sodium 137, potassium 4.3, chloride 105, CO2 25, BUN 16.1, creatinine 0.81, glucose 121, calcium 9.0, magnesium 2.1, alkaline phosphatase 73, total protein 6.4, albumin 3.0, total bilirubin 0.8, AST 39, ALT 22.     12/26/2022:   CBC:  WBC count 13.7, hemoglobin 10.7, hematocrit 34.3, platelet count 264.  CMP:  Sodium 137, potassium 4.4, chloride 103, CO2 25, BUN 18.9, creatinine 0.85, glucose 118, calcium 8.71 magnesium 1.9, alkaline phosphatase 72, total protein 5.7, albumin 3.1, total bilirubin 0.8, AST 56, ALT 26.     12/24/2022:   CMP: Sodium 140, potassium 3.9, chloride 105, CO2 25, BUN 17.2 creatinine 0.97, glucose 117, calcium 9.3, alkaline phosphatase 98, total protein 7.2, albumin 3.8, total bilirubin 0.5, AST 14, ALT 17.    CBC: WBC count 10.6, hemoglobin 12.9, hematocrit 41.2, platelet count 223.     Significant Imaging: I have reviewed all pertinent imaging results/findings within the past 24 hours.     X-ray left hip  12/25/2022: There has been placement of left femoral intramedullary taz and compression screw traverses into the femoral head transfixing the intertrochanteric fracture.  Positioning and alignment is satisfactory.     CT left hip 12/25/2022: Comminuted intertrochanteric left femoral fracture.     CXR 12/24/2022: No acute chest disease is identified.     X-ray left hip 12/24/2022:  Displaced intertrochanteric fracture of the left femur.      Assessment/Plan:      * Left intertrochanteric femur fracture  Continue 4 week course of aspirin 81 mg PO BID for DVT prophylaxis, PT/OT, norco as needed for pain control, and weight-bearing as tolerated to the left lower extremity.  She is status post intramedullary nail by Dr. Ronnell Olivares on 12/25/2022.     Hypertension  Her blood pressure has been stable off antihypertensives.  She is no longer on lisinopril 5 mg PO daily which was held at [facility] due to hypotension.    Anxiety  Continue buspirone.    Depression  Continue venlafaxine.    GERD (gastroesophageal reflux disease)  Continue famotidine.    Morbid obesity  She was counseled on the importance of weight loss and diet.  She was noted to have a BMI of 57.41.      Disposition  Anticipate discharge home with home health in the next several days. A ramp has been arranged at her house. Her last covered date was 1/19/2023 and a peer to peer is scheduled for today.      VTE Risk Mitigation (From admission, onward)           Ordered     Place sequential compression device  Until discontinued         12/29/22 1612                    Discharge Planning   XENA:      Code Status: Full Code   Is the patient medically ready for discharge?:     Reason for patient still in hospital (select all that apply): Pending disposition  Discharge Plan A: Home with family, Home Health   Discharge Delays: None known at this time      This service was provided via telemedicine.  Type of Software: Audio/Visual.  Originating Site: [place]  [facility].  Distant Site: [place], [place].  Her exam was performed with the assitance of Mere Chacko RN.      Stephon Kearney MD  Department of [facility] Medicine   [facility] - Medical Surgical Unit

## 2023-01-20 NOTE — PLAN OF CARE
Problem: Physical Therapy  Goal: Physical Therapy Goal  Description: Goals to be met by: Discharge     Patient will increase functional independence with mobility by performin. Supine to sit with Modified [place]  2. Sit to supine with Modified [place]  3. Sit to stand transfer with Modified [place]  4. Bed to chair transfer with Stand-by Assistance using Rolling Walker  5. Gait  x 25 feet with Stand-by Assistance using Rolling Walker. --goal met 23    Outcome: Met

## 2023-01-23 ENCOUNTER — OFFICE VISIT (OUTPATIENT)
Dept: FAMILY MEDICINE | Facility: CLINIC | Age: 63
End: 2023-01-23
Payer: COMMERCIAL

## 2023-01-23 VITALS
RESPIRATION RATE: 18 BRPM | OXYGEN SATURATION: 97 % | DIASTOLIC BLOOD PRESSURE: 75 MMHG | TEMPERATURE: 98 F | HEART RATE: 75 BPM | HEIGHT: 65 IN | SYSTOLIC BLOOD PRESSURE: 119 MMHG | BODY MASS INDEX: 48.82 KG/M2 | WEIGHT: 293 LBS

## 2023-01-23 DIAGNOSIS — Z09 HOSPITAL DISCHARGE FOLLOW-UP: ICD-10-CM

## 2023-01-23 DIAGNOSIS — S72.142A CLOSED INTERTROCHANTERIC FRACTURE OF HIP, LEFT, INITIAL ENCOUNTER: ICD-10-CM

## 2023-01-23 DIAGNOSIS — Z00.00 WELLNESS EXAMINATION: Primary | ICD-10-CM

## 2023-01-23 PROCEDURE — 1159F PR MEDICATION LIST DOCUMENTED IN MEDICAL RECORD: ICD-10-PCS | Mod: CPTII,,, | Performed by: NURSE PRACTITIONER

## 2023-01-23 PROCEDURE — 4010F ACE/ARB THERAPY RXD/TAKEN: CPT | Mod: CPTII,,, | Performed by: NURSE PRACTITIONER

## 2023-01-23 PROCEDURE — 99214 PR OFFICE/OUTPT VISIT, EST, LEVL IV, 30-39 MIN: ICD-10-PCS | Mod: ,,, | Performed by: NURSE PRACTITIONER

## 2023-01-23 PROCEDURE — 3074F SYST BP LT 130 MM HG: CPT | Mod: CPTII,,, | Performed by: NURSE PRACTITIONER

## 2023-01-23 PROCEDURE — 99214 OFFICE O/P EST MOD 30 MIN: CPT | Mod: ,,, | Performed by: NURSE PRACTITIONER

## 2023-01-23 PROCEDURE — 4010F PR ACE/ARB THEARPY RXD/TAKEN: ICD-10-PCS | Mod: CPTII,,, | Performed by: NURSE PRACTITIONER

## 2023-01-23 PROCEDURE — 3074F PR MOST RECENT SYSTOLIC BLOOD PRESSURE < 130 MM HG: ICD-10-PCS | Mod: CPTII,,, | Performed by: NURSE PRACTITIONER

## 2023-01-23 PROCEDURE — 3008F BODY MASS INDEX DOCD: CPT | Mod: CPTII,,, | Performed by: NURSE PRACTITIONER

## 2023-01-23 PROCEDURE — 3078F PR MOST RECENT DIASTOLIC BLOOD PRESSURE < 80 MM HG: ICD-10-PCS | Mod: CPTII,,, | Performed by: NURSE PRACTITIONER

## 2023-01-23 PROCEDURE — 3008F PR BODY MASS INDEX (BMI) DOCUMENTED: ICD-10-PCS | Mod: CPTII,,, | Performed by: NURSE PRACTITIONER

## 2023-01-23 PROCEDURE — 1160F RVW MEDS BY RX/DR IN RCRD: CPT | Mod: CPTII,,, | Performed by: NURSE PRACTITIONER

## 2023-01-23 PROCEDURE — 3078F DIAST BP <80 MM HG: CPT | Mod: CPTII,,, | Performed by: NURSE PRACTITIONER

## 2023-01-23 PROCEDURE — 1159F MED LIST DOCD IN RCRD: CPT | Mod: CPTII,,, | Performed by: NURSE PRACTITIONER

## 2023-01-23 PROCEDURE — 1160F PR REVIEW ALL MEDS BY PRESCRIBER/CLIN PHARMACIST DOCUMENTED: ICD-10-PCS | Mod: CPTII,,, | Performed by: NURSE PRACTITIONER

## 2023-01-23 RX ORDER — LISINOPRIL 5 MG/1
5 TABLET ORAL NIGHTLY
Status: ON HOLD | COMMUNITY
End: 2023-03-21 | Stop reason: HOSPADM

## 2023-01-23 NOTE — ASSESSMENT & PLAN NOTE
Progressing as expected.  Continue PT/OT as ordered. Call and schedule follow-up appointment with Ortho today.

## 2023-01-23 NOTE — PROGRESS NOTES
Subjective:       Patient ID: Tanya Linda is a 62 y.o. female.    ----------------------------  Anxiety  Back muscle spasm  Depression  GERD (gastroesophageal reflux disease)  Hypertension  Intertrochanteric fracture of left femur  Morbid obesity  Personal history of colonic polyps      Comment:  Dr. Andrey Vizcarra     Chief Complaint: Hospital Follow Up (/)    This is a 62-year-old female who presents to the clinic for a hospital discharge follow-up.  Patient sustained a fall on December 24th and presented to the ED immediately after due to left hip pain.  Patient was diagnosed with a left femur fracture and surgical intervention was performed.  Patient was admitted to inpatient rehab from 12/29-1/ 20.  Patient is currently at home with assistance from family.  She is also being followed by [facility] for wound care and PT/OT.  Patient reports she is awaiting a call from the orthopedic surgeon's office to schedule hospital follow-up appointment.  Review of Systems   Constitutional: Negative.    HENT: Negative.     Eyes: Negative.    Respiratory: Negative.     Cardiovascular: Negative.    Gastrointestinal: Negative.    Endocrine: Negative.    Genitourinary: Negative.    Musculoskeletal:  Positive for arthralgias, gait problem and leg pain.   Integumentary:  Negative.   Allergic/Immunologic: Negative.    Hematological: Negative.    Psychiatric/Behavioral: Negative.           Objective:      Physical Exam  Constitutional:       Appearance: Normal appearance. She is obese.   HENT:      Head: Normocephalic.      Right Ear: Tympanic membrane, ear canal and external ear normal.      Left Ear: Tympanic membrane, ear canal and external ear normal.      Nose: Nose normal.      Mouth/Throat:      Mouth: Mucous membranes are moist.      Pharynx: Oropharynx is clear.   Eyes:      Extraocular Movements: Extraocular movements intact.      Conjunctiva/sclera: Conjunctivae normal.      Pupils: Pupils are equal, round, and  reactive to light.   Cardiovascular:      Rate and Rhythm: Normal rate and regular rhythm.      Pulses: Normal pulses.      Heart sounds: Normal heart sounds.   Pulmonary:      Effort: Pulmonary effort is normal.      Breath sounds: Normal breath sounds.   Abdominal:      General: Bowel sounds are normal.      Palpations: Abdomen is soft.   Musculoskeletal:      Cervical back: Normal range of motion and neck supple.      Left hip: Bony tenderness present. Decreased range of motion. Decreased strength.   Skin:     General: Skin is warm and dry.      Comments: Left lateral hip surgical incision. Steri strips in place. No S&S of infection noted.    Neurological:      General: No focal deficit present.      Mental Status: She is alert and oriented to person, place, and time. Mental status is at baseline.   Psychiatric:         Mood and Affect: Mood normal.         Behavior: Behavior normal.         Thought Content: Thought content normal.         Judgment: Judgment normal.         Assessment & Plan:   1. Wellness examination  -     CBC Auto Differential; Future  -     Comprehensive Metabolic Panel; Future; Expected date: 01/23/2023  -     Hemoglobin A1C; Future; Expected date: 01/23/2023  -     Lipid Panel; Future; Expected date: 01/23/2023  -     Vitamin D; Future; Expected date: 01/23/2023  -     TSH; Future; Expected date: 01/23/2023  -     Microalbumin/Creatinine Ratio, Urine; Future; Expected date: 01/23/2023    2. Hospital discharge follow-up  Assessment & Plan:  Progressing as expected.  Continue current plan of care.      3. Left intertrochanteric femur fracture  Assessment & Plan:  Progressing as expected.  Continue PT/OT as ordered. Call and schedule follow-up appointment with Ortho today.            Follow up in about 6 weeks (around 3/6/2023) for Wellness Exam. In addition to their scheduled follow up, the patient has also been instructed to follow up on as needed basis.

## 2023-01-25 ENCOUNTER — PATIENT OUTREACH (OUTPATIENT)
Dept: ADMINISTRATIVE | Facility: CLINIC | Age: 63
End: 2023-01-25
Payer: COMMERCIAL

## 2023-01-25 NOTE — PROGRESS NOTES
C3 nurse attempted to contact Tanya Linda  for a TCC post hospital discharge follow up call. Spoke w/ daughter, Pt not available at this time. The patient has a scheduled HOSFU appointment with MD Ronnell Olivares  on 2/1/23 @ 0800.

## 2023-01-26 NOTE — DISCHARGE SUMMARY
[facility]  [facility] Medicine  Telehospitalist Discharge Summary      Patient Name: Tanya Linda  MRN: [id number]  MICHAEL: [id number]  Patient Class: - Swing  Admission Date: 12/29/2022  Hospital Length of Stay: 22 days  Discharge Date and Time: 1/20/2023  5:25 PM  Attending Physician: No att. providers found   Discharging Provider: Stephon Kearney MD  Primary Care Provider: MARION Salazar    Primary Care Team: Networked reference to record PCT     HPI:   Ms. Linda is a 62-year-old  female with history of morbid obesity, hypertension, depression, GERD, and anxiety who presented to [facility] ER on 12/24/2022 with left hip pain following a fall.  X-ray of the left hip and femur showed a displaced intertrochanteric fracture of the left femur and chest x-ray revealed no acute cardiopulmonary process. CT of the left hip confirmed a comminuted intertrochanteric left femoral fracture and orthopedics was consulted.  She was taken to the OR on 12/25/2022 for intramedullary nail of left intertrochanteric femur fracture by Dr. Ronnell Olivares and she did relatively well postoperatively.  Her lisinopril was held due to mild hypotension and her blood pressure stabilized.  She had no other complications throughout her hospital course and she was transferred to [facility] bed on 12/29/2022 for PT/OT and management of her multiple medical comorbidities.    Hospital Course:   She was admitted to [facility] bed on 12/29/2022 for rehab following intramedullary nail of left intertrochanteric femur fracture.  She is being treated with a 4 week course of aspirin 81 mg PO BID for DVT prophylaxis and norco 5/325 mg PO every 6 hours as needed for pain.  She was started on diclofenac 1% gel 2 grams topical BID prn for left knee pain and stiffness on 1/6/2023. Her miralax was changed from 17 gm PO daily to prn on 1/8/2023. She had no other complications throughout her  hospital course and she was discharged home with home health in stable condition.     Goals of Care Treatment Preferences:  Code Status: Full Code     Consults:  PT, OT.    Final Active Diagnoses:    Diagnosis Date Noted POA    PRINCIPAL PROBLEM:  Left intertrochanteric femur fracture [S72.142A] 12/25/2022 Yes    Hypertension [I10] 08/19/2022 Yes    Anxiety [F41.9] 01/01/2023 Unknown    Depression [F32.A] 08/19/2022 Yes    GERD (gastroesophageal reflux disease) [K21.9] 01/01/2023 Unknown    Morbid obesity [E66.01] 05/06/2022 Yes      Problems Resolved During this Admission:       Discharged Condition: good    Disposition: Home-Health Care [facility]    Follow Up:   Follow-up Information     MARION Salazar. Go in 3 day(s).    Specialty: Nurse Practitioner  Why: 8:40 a.m.  Spoke with Nisha.  Contact information:  [address]  [address]  [facility] [id number] [phone]             Ronnell Olivares MD Follow up in 3 day(s).    Specialty: Hand Surgery  Why: [facility] will call you once the appointment has been scheduled.  Contact information:  [address]  [facility] [address]  [phone]             [facility] Follow up.    Specialty: Home Health Services  Contact information:  [address]  [facility] [address]  [phone]             [facility] Follow up.    Why: Will deliver BSC and Walker to hospital Today 01/20/2023. Will deliver Wheelchair to daughter's home on Monday 01/23/2023.  Contact information:  [address] [facility] [address]  [phone]             [facility] Follow up.    Why: Patient and family are handling the rental for the hospital bed.  Contact information:  [address]  [facility] [id number] [phone]                     Patient Instructions:      WHEELCHAIR FOR HOME USE     Order Specific Question Answer Comments   Hours in W/C per day: 8    Type of Wheelchair:  "Standard    Size(Width): 22"    Leg Support: Elevating leg rests    Arm Height: Detachable    Lap Belt: Velcro    Accessories: Front brakes    Cushion: Basic    Height: 5' 5" (1.651 m)    Weight: 156.5kg    Does patient have medical equipment at home? none    Length of need (1-99 months): 99    Please check all that apply: Caregiver is capable and willing to operate wheelchair safely.    Please check all that apply: Patient's upper body strength is sufficient for propulsion.    Please check all that apply: The patient requires the use of a w/c for activities of daily living within the Home.      COMMODE FOR HOME USE     Order Specific Question Answer Comments   Type: Heavy duty (300+ lbs)    Height: 5' 5" (1.651 m)    Weight: 156.5kg    Does patient have medical equipment at home? none    Length of need (1-99 months): 99      WALKER FOR HOME USE     Order Specific Question Answer Comments   Type of Walker: Heavy duty (300+ lbs)    With wheels? Yes    Height: 5' 5" (1.651 m)    Weight: 156.5kg    Length of need (1-99 months): 99    Does patient have medical equipment at home? none    Please check all that apply: Patient's condition impairs ambulation.    Please check all that apply: Patient needs help to get in and out of chair.    Please check all that apply: Patient is unable to safely ambulate without equipment.      HOSPITAL BED FOR HOME USE     Order Specific Question Answer Comments   Type: Heavy duty (350-500#)    Length of need (1-99 months): 99    Does patient have medical equipment at home? none    Height: 5' 5" (1.651 m)    Weight: 156.5kg    Please check all that apply: Patient requires positioning of the body in ways not feasible in an ordinary bed due to a medical condition which is expected to last at least one month.    Please check all that apply: Patient requires, for the alleviation of pain, positioning of the body in ways not feasible in an ordinary bed.    Please check all that apply: Patient " requires a bed height different than a fixed height hospital bed to permit transfers to chair, wheelchair, or standing.      Ambulatory referral/consult to Home Health   Standing Status: Future   Referral Priority: Routine Referral Type: Home Health   Referral Reason: Specialty Services Required   Requested Specialty: Home Health Services   Number of Visits Requested: 1     Diet Cardiac     Notify your health care provider if you experience any of the following:  severe uncontrolled pain     Activity as tolerated       Medications:  Reconciled Home Medications:      Medication List      START taking these medications    aspirin 81 MG EC tablet  Commonly known as: ECOTRIN  Take 1 tablet (81 mg total) by mouth 2 (two) times a day.        CHANGE how you take these medications    HYDROcodone-acetaminophen 5-325 mg per tablet  Commonly known as: NORCO  Take 1 tablet by mouth every 6 (six) hours as needed for Pain.  What changed: when to take this        CONTINUE taking these medications    busPIRone 7.5 MG tablet  Commonly known as: BUSPAR  Take 1 tablet (7.5 mg total) by mouth every evening.     polyethylene glycol 17 gram Pwpk  Commonly known as: GLYCOLAX  Take 17 g by mouth once daily.     venlafaxine 37.5 MG Tab  Commonly known as: EFFEXOR  Take 1 tablet (37.5 mg total) by mouth Daily.            Indwelling Lines/Drains at time of discharge:   Lines/Drains/Airways     None                 Time spent on the discharge of patient: 35 minutes    This service was provided via telemedicine.  Type of Software: Audio/Visual.  Originating Site: [facility].  Distant Site: [place], [place].  Her exam was performed with the assitance of Mere Chacko RN.    Stephon Kearney MD  Department of Hospital Medicine  [facility]

## 2023-02-01 ENCOUNTER — HOSPITAL ENCOUNTER (OUTPATIENT)
Dept: RADIOLOGY | Facility: CLINIC | Age: 63
Discharge: HOME | End: 2023-02-01
Attending: STUDENT IN AN ORGANIZED HEALTH CARE EDUCATION/TRAINING PROGRAM
Payer: COMMERCIAL

## 2023-02-01 ENCOUNTER — OFFICE VISIT (OUTPATIENT)
Dept: ORTHOPEDICS | Facility: CLINIC | Age: 63
End: 2023-02-01
Payer: COMMERCIAL

## 2023-02-01 VITALS
DIASTOLIC BLOOD PRESSURE: 80 MMHG | WEIGHT: 293 LBS | SYSTOLIC BLOOD PRESSURE: 140 MMHG | BODY MASS INDEX: 48.82 KG/M2 | HEART RATE: 64 BPM | HEIGHT: 65 IN

## 2023-02-01 DIAGNOSIS — S72.142A CLOSED INTERTROCHANTERIC FRACTURE OF HIP, LEFT, INITIAL ENCOUNTER: Primary | ICD-10-CM

## 2023-02-01 DIAGNOSIS — S72.142A CLOSED INTERTROCHANTERIC FRACTURE OF HIP, LEFT, INITIAL ENCOUNTER: ICD-10-CM

## 2023-02-01 PROCEDURE — 3077F SYST BP >= 140 MM HG: CPT | Mod: CPTII,,, | Performed by: STUDENT IN AN ORGANIZED HEALTH CARE EDUCATION/TRAINING PROGRAM

## 2023-02-01 PROCEDURE — 1159F MED LIST DOCD IN RCRD: CPT | Mod: CPTII,,, | Performed by: STUDENT IN AN ORGANIZED HEALTH CARE EDUCATION/TRAINING PROGRAM

## 2023-02-01 PROCEDURE — 4010F ACE/ARB THERAPY RXD/TAKEN: CPT | Mod: CPTII,,, | Performed by: STUDENT IN AN ORGANIZED HEALTH CARE EDUCATION/TRAINING PROGRAM

## 2023-02-01 PROCEDURE — 99024 PR POST-OP FOLLOW-UP VISIT: ICD-10-PCS | Mod: ,,, | Performed by: STUDENT IN AN ORGANIZED HEALTH CARE EDUCATION/TRAINING PROGRAM

## 2023-02-01 PROCEDURE — 3008F BODY MASS INDEX DOCD: CPT | Mod: CPTII,,, | Performed by: STUDENT IN AN ORGANIZED HEALTH CARE EDUCATION/TRAINING PROGRAM

## 2023-02-01 PROCEDURE — 99024 POSTOP FOLLOW-UP VISIT: CPT | Mod: ,,, | Performed by: STUDENT IN AN ORGANIZED HEALTH CARE EDUCATION/TRAINING PROGRAM

## 2023-02-01 PROCEDURE — 3079F PR MOST RECENT DIASTOLIC BLOOD PRESSURE 80-89 MM HG: ICD-10-PCS | Mod: CPTII,,, | Performed by: STUDENT IN AN ORGANIZED HEALTH CARE EDUCATION/TRAINING PROGRAM

## 2023-02-01 PROCEDURE — 1159F PR MEDICATION LIST DOCUMENTED IN MEDICAL RECORD: ICD-10-PCS | Mod: CPTII,,, | Performed by: STUDENT IN AN ORGANIZED HEALTH CARE EDUCATION/TRAINING PROGRAM

## 2023-02-01 PROCEDURE — 4010F PR ACE/ARB THEARPY RXD/TAKEN: ICD-10-PCS | Mod: CPTII,,, | Performed by: STUDENT IN AN ORGANIZED HEALTH CARE EDUCATION/TRAINING PROGRAM

## 2023-02-01 PROCEDURE — 3077F PR MOST RECENT SYSTOLIC BLOOD PRESSURE >= 140 MM HG: ICD-10-PCS | Mod: CPTII,,, | Performed by: STUDENT IN AN ORGANIZED HEALTH CARE EDUCATION/TRAINING PROGRAM

## 2023-02-01 PROCEDURE — 3079F DIAST BP 80-89 MM HG: CPT | Mod: CPTII,,, | Performed by: STUDENT IN AN ORGANIZED HEALTH CARE EDUCATION/TRAINING PROGRAM

## 2023-02-01 PROCEDURE — 3008F PR BODY MASS INDEX (BMI) DOCUMENTED: ICD-10-PCS | Mod: CPTII,,, | Performed by: STUDENT IN AN ORGANIZED HEALTH CARE EDUCATION/TRAINING PROGRAM

## 2023-02-01 PROCEDURE — 73552 XR FEMUR 2 VIEW LEFT: ICD-10-PCS | Mod: LT,,, | Performed by: STUDENT IN AN ORGANIZED HEALTH CARE EDUCATION/TRAINING PROGRAM

## 2023-02-01 PROCEDURE — 73552 X-RAY EXAM OF FEMUR 2/>: CPT | Mod: LT,,, | Performed by: STUDENT IN AN ORGANIZED HEALTH CARE EDUCATION/TRAINING PROGRAM

## 2023-02-01 RX ORDER — METHOCARBAMOL 500 MG/1
500 TABLET, FILM COATED ORAL
Status: ON HOLD | COMMUNITY
Start: 2023-01-23 | End: 2023-03-21 | Stop reason: HOSPADM

## 2023-02-01 NOTE — PROGRESS NOTES
"Postop Clinic Note    Surgery:  Left intramedullary nail for intertrochanteric hip fracture 12/25/2022    History of present illness:    Patient is doing well.  She is been discharged from rehab facility and is now living at home.  She is been walking around the house with her son.  She has some soreness over the lateral thigh but no significant pain when she is walking.  She is able to stand and use a rolling walker to ambulate.  She has therapy come to the house to work with her.  She is not returned back to work yet.  She denies any numbness or tingling radiating distally into the hand.  No calf pain, shortness of breath, chest pain.      Past Surgical History:   Procedure Laterality Date    ANTEGRADE INTRAMEDULLARY RODDING OF FEMUR Left 12/25/2022    Procedure: INSERTION, INTRAMEDULLARY JARETH, FEMUR, ANTEGRADE;  Surgeon: Ronnell Olivares MD;  Location: [facility];  Service: Orthopedics;  Laterality: Left;    COLONOSCOPY  12/02/2021    Dr. Andrey Vizcarra           Examination:    Vital Signs:    Vitals:    02/01/23 0810   BP: (!) 140/80   Pulse: 64   Weight: (!) 150.6 kg (332 lb)   Height: 5' 5" (1.651 m)   PainSc:   2       Body mass index is 55.25 kg/m².    Constitution:   Well-developed, well nourished patient in no acute distress.  Neurological:   Alert and oriented x 3 and cooperative to examination.     Psychiatric/Behavior: Normal mental status.  Respiratory:   No shortness of breath.  Eyes:    Extraoccular muscles intact  Skin:    No scars, rash or suspicious lesions.    MSK:   Left lower extremity:  Surgical incisions over the lateral thigh are healing well without any drainage or signs of infection.  There is mild tenderness to palpation over the lateral thigh over the greater trochanter.  She is able to stand and bear weight.  She is neurovascularly intact throughout the foot.  The foot is warm well perfused    Imaging:   X-ray of the left hip and femur show intertrochanteric femur fracture treated with " intramedullary implant without signs of loosening or failure     Assessment:  Status post above surgery    Plan:  Patient is doing well.  She can continue therapy and weight-bearing as tolerated.  I encouraged her to continue ambulating as much as possible.  I can see her back in 6 weeks to see how she is doing and then likely clear her to return back to work.    Follow Up:  6 weeks  Xray at next visit:  Left hip and left femur

## 2023-02-01 NOTE — LETTER
[facility]  [address] [place] [address]  Phone: [phone]  Fax: [phone]      Name:Tanya Linda  :1960   Date:2023     The above mentioned patient was seen by me on 2023 and is able to return to work on 23.     [_] Restricted from P.E.   [_] Not able to participate in P.E. or sports.   [xx] Was seen in office today, please excuse absence.   [_] Please allow extra time to change classes.   [_] Please allow to take medication as prescribed below.   [_] No restrictions.    If you should have any questions, please contact my office at [phone]    Ronnell Olivares MD

## 2023-02-09 ENCOUNTER — PATIENT OUTREACH (OUTPATIENT)
Dept: ADMINISTRATIVE | Facility: HOSPITAL | Age: 63
End: 2023-02-09
Payer: COMMERCIAL

## 2023-02-09 NOTE — PROGRESS NOTES
Population Health Outreach. Spoke to patient's daughter and asked for patient to give me a call back. Last PAP found was 10/29/2015.

## 2023-02-10 NOTE — PLAN OF CARE
[facility]  Discharge Final Note    Primary Care Provider: MARION Salazar    Expected Discharge Date: 1/20/2023    Final Discharge Note (most recent)       Final Note - 02/10/23 1325          Final Note    Assessment Type Final Discharge Note     Anticipated Discharge Disposition Home-Health Care Svc     What phone number can be called within the next 1-3 days to see how you are doing after discharge? [phone]     Hospital Resources/Appts/Education Provided Provided patient/caregiver with written discharge plan information        Post-Acute Status    Post-Acute Authorization Home Health     Home Health Status Set-up Complete/Auth obtained     Discharge Delays None known at this time                     Important Message from Medicare             Contact Info       MARION Salazar   Specialty: Nurse Practitioner   Relationship: PCP - [place] [id number] [facility]  [address]  [facility] [address]   Phone: [phone]       Next Steps: Go in 3 day(s)    Instructions: 8:40 a.m.  Spoke with Nisha.    Ronnell Olivares MD   Specialty: Hand Surgery, Orthopedic Surgery    [address]  [facility] [address]   Phone: [phone]       Next Steps: Follow up in 3 day(s)    Instructions: [facility] will call you once the appointment has been scheduled.    [facility]   Specialty: Home Health Services    [address] [facility]  [facility] [address]   Phone: [phone]       Next Steps: Follow up    [facility]    [address] [place] [address]   Phone: [phone]       Next Steps: Follow up    Instructions: Will deliver BSC and Walker to hospital Today 01/20/2023. Will deliver Wheelchair to daughter's home on Monday 01/23/2023.    [facility]    [facility]  [facility] [address]   Phone: [phone]       Next Steps: Follow up    Instructions: Patient and  family are handling the rental for the hospital bed.

## 2023-03-03 ENCOUNTER — LAB VISIT (OUTPATIENT)
Dept: LAB | Facility: HOSPITAL | Age: 63
End: 2023-03-03
Attending: NURSE PRACTITIONER
Payer: COMMERCIAL

## 2023-03-03 DIAGNOSIS — Z00.00 WELLNESS EXAMINATION: ICD-10-CM

## 2023-03-03 LAB
ALBUMIN SERPL-MCNC: 3.4 G/DL (ref 3.4–4.8)
ALBUMIN/GLOB SERPL: 0.8 RATIO (ref 1.1–2)
ALP SERPL-CCNC: 105 UNIT/L (ref 40–150)
ALT SERPL-CCNC: 10 UNIT/L (ref 0–55)
AST SERPL-CCNC: 8 UNIT/L (ref 5–34)
BASOPHILS # BLD AUTO: 0.04 X10(3)/MCL (ref 0–0.2)
BASOPHILS NFR BLD AUTO: 0.3 %
BILIRUBIN DIRECT+TOT PNL SERPL-MCNC: 0.5 MG/DL
BUN SERPL-MCNC: 12.5 MG/DL (ref 9.8–20.1)
CALCIUM SERPL-MCNC: 9.9 MG/DL (ref 8.4–10.2)
CHLORIDE SERPL-SCNC: 103 MMOL/L (ref 98–107)
CHOLEST SERPL-MCNC: 219 MG/DL
CHOLEST/HDLC SERPL: 5 {RATIO} (ref 0–5)
CO2 SERPL-SCNC: 27 MMOL/L (ref 23–31)
CREAT SERPL-MCNC: 0.75 MG/DL (ref 0.55–1.02)
CREAT UR-MCNC: 51.9 MG/DL (ref 47–110)
DEPRECATED CALCIDIOL+CALCIFEROL SERPL-MC: 12.3 NG/ML (ref 30–80)
EOSINOPHIL # BLD AUTO: 0.36 X10(3)/MCL (ref 0–0.9)
EOSINOPHIL NFR BLD AUTO: 2.9 %
ERYTHROCYTE [DISTWIDTH] IN BLOOD BY AUTOMATED COUNT: 14.6 % (ref 11.5–17)
EST. AVERAGE GLUCOSE BLD GHB EST-MCNC: 102.5 MG/DL
GFR SERPLBLD CREATININE-BSD FMLA CKD-EPI: >60 MLS/MIN/1.73/M2
GLOBULIN SER-MCNC: 4.3 GM/DL (ref 2.4–3.5)
GLUCOSE SERPL-MCNC: 95 MG/DL (ref 82–115)
HBA1C MFR BLD: 5.2 %
HCT VFR BLD AUTO: 41.3 % (ref 37–47)
HDLC SERPL-MCNC: 45 MG/DL (ref 35–60)
HGB BLD-MCNC: 12.4 G/DL (ref 12–16)
IMM GRANULOCYTES # BLD AUTO: 0.03 X10(3)/MCL (ref 0–0.04)
IMM GRANULOCYTES NFR BLD AUTO: 0.2 %
LDLC SERPL CALC-MCNC: 139 MG/DL (ref 50–140)
LYMPHOCYTES # BLD AUTO: 2.2 X10(3)/MCL (ref 0.6–4.6)
LYMPHOCYTES NFR BLD AUTO: 17.6 %
MCH RBC QN AUTO: 26 PG
MCHC RBC AUTO-ENTMCNC: 30 G/DL (ref 33–36)
MCV RBC AUTO: 86.6 FL (ref 80–94)
MICROALBUMIN UR-MCNC: 15.8 UG/ML
MICROALBUMIN/CREAT RATIO PNL UR: 30.4 MG/GM CR (ref 0–30)
MONOCYTES # BLD AUTO: 0.64 X10(3)/MCL (ref 0.1–1.3)
MONOCYTES NFR BLD AUTO: 5.1 %
NEUTROPHILS # BLD AUTO: 9.24 X10(3)/MCL (ref 2.1–9.2)
NEUTROPHILS NFR BLD AUTO: 73.9 %
PLATELET # BLD AUTO: 294 X10(3)/MCL (ref 130–400)
PMV BLD AUTO: 10.6 FL (ref 7.4–10.4)
POTASSIUM SERPL-SCNC: 4 MMOL/L (ref 3.5–5.1)
PROT SERPL-MCNC: 7.7 GM/DL (ref 5.8–7.6)
RBC # BLD AUTO: 4.77 X10(6)/MCL (ref 4.2–5.4)
SODIUM SERPL-SCNC: 140 MMOL/L (ref 136–145)
TRIGL SERPL-MCNC: 174 MG/DL (ref 37–140)
TSH SERPL-ACNC: 0.9 UIU/ML (ref 0.35–4.94)
VLDLC SERPL CALC-MCNC: 35 MG/DL
WBC # SPEC AUTO: 12.5 X10(3)/MCL (ref 4.5–11.5)

## 2023-03-03 PROCEDURE — 36415 COLL VENOUS BLD VENIPUNCTURE: CPT

## 2023-03-03 PROCEDURE — 80061 LIPID PANEL: CPT

## 2023-03-03 PROCEDURE — 82043 UR ALBUMIN QUANTITATIVE: CPT

## 2023-03-03 PROCEDURE — 80053 COMPREHEN METABOLIC PANEL: CPT

## 2023-03-03 PROCEDURE — 83036 HEMOGLOBIN GLYCOSYLATED A1C: CPT

## 2023-03-03 PROCEDURE — 85025 COMPLETE CBC W/AUTO DIFF WBC: CPT

## 2023-03-03 PROCEDURE — 84443 ASSAY THYROID STIM HORMONE: CPT

## 2023-03-03 PROCEDURE — 82306 VITAMIN D 25 HYDROXY: CPT

## 2023-03-03 RX ORDER — IBUPROFEN 800 MG/1
800 TABLET ORAL 3 TIMES DAILY PRN
Qty: 90 TABLET | Refills: 6 | Status: ON HOLD | OUTPATIENT
Start: 2023-03-03 | End: 2023-03-21 | Stop reason: HOSPADM

## 2023-03-13 ENCOUNTER — HOSPITAL ENCOUNTER (OUTPATIENT)
Dept: RADIOLOGY | Facility: CLINIC | Age: 63
Discharge: HOME | End: 2023-03-13
Attending: STUDENT IN AN ORGANIZED HEALTH CARE EDUCATION/TRAINING PROGRAM
Payer: COMMERCIAL

## 2023-03-13 ENCOUNTER — OFFICE VISIT (OUTPATIENT)
Dept: ORTHOPEDICS | Facility: CLINIC | Age: 63
End: 2023-03-13
Payer: COMMERCIAL

## 2023-03-13 VITALS — SYSTOLIC BLOOD PRESSURE: 136 MMHG | DIASTOLIC BLOOD PRESSURE: 77 MMHG | HEART RATE: 86 BPM

## 2023-03-13 DIAGNOSIS — S72.142A CLOSED INTERTROCHANTERIC FRACTURE OF HIP, LEFT, INITIAL ENCOUNTER: Primary | ICD-10-CM

## 2023-03-13 DIAGNOSIS — S72.142A CLOSED INTERTROCHANTERIC FRACTURE OF HIP, LEFT, INITIAL ENCOUNTER: ICD-10-CM

## 2023-03-13 PROCEDURE — 99024 POSTOP FOLLOW-UP VISIT: CPT | Mod: ,,, | Performed by: STUDENT IN AN ORGANIZED HEALTH CARE EDUCATION/TRAINING PROGRAM

## 2023-03-13 PROCEDURE — 3060F PR POS MICROALBUMINURIA RESULT DOCUMENTED/REVIEW: ICD-10-PCS | Mod: CPTII,,, | Performed by: STUDENT IN AN ORGANIZED HEALTH CARE EDUCATION/TRAINING PROGRAM

## 2023-03-13 PROCEDURE — 73552 XR FEMUR 2 VIEW LEFT: ICD-10-PCS | Mod: LT,,, | Performed by: STUDENT IN AN ORGANIZED HEALTH CARE EDUCATION/TRAINING PROGRAM

## 2023-03-13 PROCEDURE — 99024 PR POST-OP FOLLOW-UP VISIT: ICD-10-PCS | Mod: ,,, | Performed by: STUDENT IN AN ORGANIZED HEALTH CARE EDUCATION/TRAINING PROGRAM

## 2023-03-13 PROCEDURE — 3066F NEPHROPATHY DOC TX: CPT | Mod: CPTII,,, | Performed by: STUDENT IN AN ORGANIZED HEALTH CARE EDUCATION/TRAINING PROGRAM

## 2023-03-13 PROCEDURE — 3075F SYST BP GE 130 - 139MM HG: CPT | Mod: CPTII,,, | Performed by: STUDENT IN AN ORGANIZED HEALTH CARE EDUCATION/TRAINING PROGRAM

## 2023-03-13 PROCEDURE — 1159F PR MEDICATION LIST DOCUMENTED IN MEDICAL RECORD: ICD-10-PCS | Mod: CPTII,,, | Performed by: STUDENT IN AN ORGANIZED HEALTH CARE EDUCATION/TRAINING PROGRAM

## 2023-03-13 PROCEDURE — 4010F PR ACE/ARB THEARPY RXD/TAKEN: ICD-10-PCS | Mod: CPTII,,, | Performed by: STUDENT IN AN ORGANIZED HEALTH CARE EDUCATION/TRAINING PROGRAM

## 2023-03-13 PROCEDURE — 4010F ACE/ARB THERAPY RXD/TAKEN: CPT | Mod: CPTII,,, | Performed by: STUDENT IN AN ORGANIZED HEALTH CARE EDUCATION/TRAINING PROGRAM

## 2023-03-13 PROCEDURE — 3060F POS MICROALBUMINURIA REV: CPT | Mod: CPTII,,, | Performed by: STUDENT IN AN ORGANIZED HEALTH CARE EDUCATION/TRAINING PROGRAM

## 2023-03-13 PROCEDURE — 1159F MED LIST DOCD IN RCRD: CPT | Mod: CPTII,,, | Performed by: STUDENT IN AN ORGANIZED HEALTH CARE EDUCATION/TRAINING PROGRAM

## 2023-03-13 PROCEDURE — 73552 X-RAY EXAM OF FEMUR 2/>: CPT | Mod: LT,,, | Performed by: STUDENT IN AN ORGANIZED HEALTH CARE EDUCATION/TRAINING PROGRAM

## 2023-03-13 PROCEDURE — 3075F PR MOST RECENT SYSTOLIC BLOOD PRESS GE 130-139MM HG: ICD-10-PCS | Mod: CPTII,,, | Performed by: STUDENT IN AN ORGANIZED HEALTH CARE EDUCATION/TRAINING PROGRAM

## 2023-03-13 PROCEDURE — 3066F PR DOCUMENTATION OF TREATMENT FOR NEPHROPATHY: ICD-10-PCS | Mod: CPTII,,, | Performed by: STUDENT IN AN ORGANIZED HEALTH CARE EDUCATION/TRAINING PROGRAM

## 2023-03-13 PROCEDURE — 3078F DIAST BP <80 MM HG: CPT | Mod: CPTII,,, | Performed by: STUDENT IN AN ORGANIZED HEALTH CARE EDUCATION/TRAINING PROGRAM

## 2023-03-13 PROCEDURE — 3078F PR MOST RECENT DIASTOLIC BLOOD PRESSURE < 80 MM HG: ICD-10-PCS | Mod: CPTII,,, | Performed by: STUDENT IN AN ORGANIZED HEALTH CARE EDUCATION/TRAINING PROGRAM

## 2023-03-13 NOTE — LETTER
[facility]  [address] [place] [address]  Phone: [phone]  Fax: [phone]    Name:Tanya Linda  :   Date:2023     To whom it may concern this patient was accompanied by a family member who is part of your staff. Please excuse him from work on 23. He may return on 23.     The above mentioned patient was seen by me on 23.       If you should have any questions, please contact my office at [phone]        Ronnell Olivares MD/Sammie Palm MA

## 2023-03-13 NOTE — PROGRESS NOTES
Postop Clinic Note    Surgery:  Left intramedullary nail for intertrochanteric hip fracture 12/25/2022    History of present illness:    Patient is doing well.  She is no pain today.  She is now completed her physical therapy.  She walks around the house with a rolling walker without any issues.  She is able to walk without the walker although she still feels slightly nervous without the walker.  No calf pain, shortness of breath, chest pain.      Past Surgical History:   Procedure Laterality Date    ANTEGRADE INTRAMEDULLARY RODDING OF FEMUR Left 12/25/2022    Procedure: INSERTION, INTRAMEDULLARY JARETH, FEMUR, ANTEGRADE;  Surgeon: Ronnell Olivares MD;  Location: [facility];  Service: Orthopedics;  Laterality: Left;    COLONOSCOPY  12/02/2021    Dr. Andrey Vizcarra           Examination:    Vital Signs:    Vitals:    03/13/23 1036 03/13/23 1038   BP: 136/77    Pulse: 86    PainSc:    1       There is no height or weight on file to calculate BMI.    Constitution:   Well-developed, well nourished patient in no acute distress.  Neurological:   Alert and oriented x 3 and cooperative to examination.     Psychiatric/Behavior: Normal mental status.  Respiratory:   No shortness of breath.  Eyes:    Extraoccular muscles intact  Skin:    No scars, rash or suspicious lesions.    MSK:   Left lower extremity:  Surgical incisions over the lateral thigh are healed without any drainage or signs of infection.  There is no tenderness to palpation over the lateral thigh over the greater trochanter.  She is able to stand and bear weight.  She is neurovascularly intact throughout the foot.  The foot is warm well perfused.  While ambulating without the walker there is a Trendelenburg gait    Imaging:   X-ray of the left hip and femur show intertrochanteric femur fracture treated with intramedullary implant without signs of loosening or failure     Assessment:  Status post above surgery    Plan:  Patient is doing well.  She can continue therapy and  weight-bearing as tolerated.  I encouraged her to continue ambulating as much as possible.  She can remain out of work for another 2 weeks all she gets comfortable with walking without the walker.  She then returned to work in 2 weeks on light duty.  She should take frequent breaks and sit down often.  She should not stand for extended periods of time.  I can see her back in 3 months for another x-ray    Follow Up:  3 months  Xray at next visit:  Left hip and left femur

## 2023-03-13 NOTE — LETTER
[facility]  [address] [place] [address]. [address]  [place], [address]  Phone: [phone]  Fax: [phone]    Name:Tanya Linda  :1960   Date:2023     Patient is unable to return to work until 3/27/23. Please excuse her from work until then.     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 Please allow the patient to take frequent breaks from standing. Patient should not stand longer than 1 hour without a break.         COMMENTS     Ronnell Olivares MD/Sammie Palm MA

## 2023-03-18 ENCOUNTER — HOSPITAL ENCOUNTER (OUTPATIENT)
Facility: HOSPITAL | Age: 63
Discharge: HOME | End: 2023-03-21
Attending: STUDENT IN AN ORGANIZED HEALTH CARE EDUCATION/TRAINING PROGRAM | Admitting: GENERAL PRACTICE
Payer: COMMERCIAL

## 2023-03-18 DIAGNOSIS — R06.02 SOB (SHORTNESS OF BREATH): ICD-10-CM

## 2023-03-18 DIAGNOSIS — R07.9 CHEST PAIN: ICD-10-CM

## 2023-03-18 DIAGNOSIS — R79.89 ELEVATED TROPONIN: ICD-10-CM

## 2023-03-18 LAB
ALBUMIN SERPL-MCNC: 2.8 G/DL (ref 3.4–4.8)
ALBUMIN/GLOB SERPL: 0.6 RATIO (ref 1.1–2)
ALP SERPL-CCNC: 98 UNIT/L (ref 40–150)
ALT SERPL-CCNC: 24 UNIT/L (ref 0–55)
APPEARANCE UR: ABNORMAL
AST SERPL-CCNC: 26 UNIT/L (ref 5–34)
BACTERIA #/AREA URNS AUTO: ABNORMAL /HPF
BASOPHILS # BLD AUTO: 0.02 X10(3)/MCL (ref 0–0.2)
BASOPHILS NFR BLD AUTO: 0.1 %
BILIRUB UR QL STRIP.AUTO: NEGATIVE MG/DL
BILIRUBIN DIRECT+TOT PNL SERPL-MCNC: 0.7 MG/DL
BNP BLD-MCNC: 94.6 PG/ML
BUN SERPL-MCNC: 16.4 MG/DL (ref 9.8–20.1)
CALCIUM SERPL-MCNC: 9.6 MG/DL (ref 8.4–10.2)
CHLORIDE SERPL-SCNC: 100 MMOL/L (ref 98–107)
CO2 SERPL-SCNC: 24 MMOL/L (ref 23–31)
COLOR UR AUTO: YELLOW
CREAT SERPL-MCNC: 1.14 MG/DL (ref 0.55–1.02)
D DIMER PPP IA.FEU-MCNC: 4.02 UG/ML FEU (ref 0–0.5)
EOSINOPHIL # BLD AUTO: 0.05 X10(3)/MCL (ref 0–0.9)
EOSINOPHIL NFR BLD AUTO: 0.3 %
ERYTHROCYTE [DISTWIDTH] IN BLOOD BY AUTOMATED COUNT: 15.1 % (ref 11.5–17)
FLUAV AG UPPER RESP QL IA.RAPID: NOT DETECTED
FLUBV AG UPPER RESP QL IA.RAPID: NOT DETECTED
GFR SERPLBLD CREATININE-BSD FMLA CKD-EPI: 55 MLS/MIN/1.73/M2
GLOBULIN SER-MCNC: 4.9 GM/DL (ref 2.4–3.5)
GLUCOSE SERPL-MCNC: 96 MG/DL (ref 82–115)
GLUCOSE UR QL STRIP.AUTO: NEGATIVE MG/DL
HCT VFR BLD AUTO: 33.9 % (ref 37–47)
HGB BLD-MCNC: 10.6 G/DL (ref 12–16)
IMM GRANULOCYTES # BLD AUTO: 0.04 X10(3)/MCL (ref 0–0.04)
IMM GRANULOCYTES NFR BLD AUTO: 0.3 %
KETONES UR QL STRIP.AUTO: NEGATIVE MG/DL
LACTATE SERPL-SCNC: 1.5 MMOL/L (ref 0.5–2.2)
LEUKOCYTE ESTERASE UR QL STRIP.AUTO: ABNORMAL UNIT/L
LYMPHOCYTES # BLD AUTO: 1.41 X10(3)/MCL (ref 0.6–4.6)
LYMPHOCYTES NFR BLD AUTO: 9.5 %
MAGNESIUM SERPL-MCNC: 1.9 MG/DL (ref 1.6–2.6)
MCH RBC QN AUTO: 26 PG
MCHC RBC AUTO-ENTMCNC: 31.3 G/DL (ref 33–36)
MCV RBC AUTO: 83.1 FL (ref 80–94)
MONOCYTES # BLD AUTO: 1.15 X10(3)/MCL (ref 0.1–1.3)
MONOCYTES NFR BLD AUTO: 7.7 %
NEUTROPHILS # BLD AUTO: 12.18 X10(3)/MCL (ref 2.1–9.2)
NEUTROPHILS NFR BLD AUTO: 82.1 %
NITRITE UR QL STRIP.AUTO: NEGATIVE
PH UR STRIP.AUTO: 7 [PH]
PHOSPHATE SERPL-MCNC: 2.7 MG/DL (ref 2.3–4.7)
PLATELET # BLD AUTO: 262 X10(3)/MCL (ref 130–400)
PMV BLD AUTO: 11.1 FL (ref 7.4–10.4)
POC CARDIAC TROPONIN I: 0.33 NG/ML (ref 0–0.08)
POTASSIUM SERPL-SCNC: 3.5 MMOL/L (ref 3.5–5.1)
PROT SERPL-MCNC: 7.7 GM/DL (ref 5.8–7.6)
PROT UR QL STRIP.AUTO: 100 MG/DL
RBC # BLD AUTO: 4.08 X10(6)/MCL (ref 4.2–5.4)
RBC #/AREA URNS AUTO: ABNORMAL /HPF
RBC UR QL AUTO: ABNORMAL UNIT/L
SAMPLE: ABNORMAL
SARS-COV-2 RNA RESP QL NAA+PROBE: NOT DETECTED
SODIUM SERPL-SCNC: 135 MMOL/L (ref 136–145)
SP GR UR STRIP.AUTO: 1.01
SQUAMOUS #/AREA URNS AUTO: ABNORMAL /HPF
UROBILINOGEN UR STRIP-ACNC: 0.2 MG/DL
WBC # SPEC AUTO: 14.9 X10(3)/MCL (ref 4.5–11.5)
WBC #/AREA URNS AUTO: >=100 /HPF

## 2023-03-18 PROCEDURE — 25500020 PHARM REV CODE 255: Performed by: GENERAL PRACTICE

## 2023-03-18 PROCEDURE — 81001 URINALYSIS AUTO W/SCOPE: CPT | Performed by: STUDENT IN AN ORGANIZED HEALTH CARE EDUCATION/TRAINING PROGRAM

## 2023-03-18 PROCEDURE — 87040 BLOOD CULTURE FOR BACTERIA: CPT | Mod: 91 | Performed by: STUDENT IN AN ORGANIZED HEALTH CARE EDUCATION/TRAINING PROGRAM

## 2023-03-18 PROCEDURE — G0378 HOSPITAL OBSERVATION PER HR: HCPCS

## 2023-03-18 PROCEDURE — 83880 ASSAY OF NATRIURETIC PEPTIDE: CPT | Performed by: GENERAL PRACTICE

## 2023-03-18 PROCEDURE — 63600175 PHARM REV CODE 636 W HCPCS: Performed by: GENERAL PRACTICE

## 2023-03-18 PROCEDURE — 0240U COVID/FLU A&B PCR: CPT | Performed by: STUDENT IN AN ORGANIZED HEALTH CARE EDUCATION/TRAINING PROGRAM

## 2023-03-18 PROCEDURE — 80053 COMPREHEN METABOLIC PANEL: CPT | Performed by: STUDENT IN AN ORGANIZED HEALTH CARE EDUCATION/TRAINING PROGRAM

## 2023-03-18 PROCEDURE — 87040 BLOOD CULTURE FOR BACTERIA: CPT | Performed by: STUDENT IN AN ORGANIZED HEALTH CARE EDUCATION/TRAINING PROGRAM

## 2023-03-18 PROCEDURE — 96365 THER/PROPH/DIAG IV INF INIT: CPT

## 2023-03-18 PROCEDURE — 83735 ASSAY OF MAGNESIUM: CPT | Performed by: STUDENT IN AN ORGANIZED HEALTH CARE EDUCATION/TRAINING PROGRAM

## 2023-03-18 PROCEDURE — 87077 CULTURE AEROBIC IDENTIFY: CPT | Mod: 91 | Performed by: STUDENT IN AN ORGANIZED HEALTH CARE EDUCATION/TRAINING PROGRAM

## 2023-03-18 PROCEDURE — 85379 FIBRIN DEGRADATION QUANT: CPT | Performed by: STUDENT IN AN ORGANIZED HEALTH CARE EDUCATION/TRAINING PROGRAM

## 2023-03-18 PROCEDURE — 87088 URINE BACTERIA CULTURE: CPT | Performed by: STUDENT IN AN ORGANIZED HEALTH CARE EDUCATION/TRAINING PROGRAM

## 2023-03-18 PROCEDURE — 25000003 PHARM REV CODE 250: Performed by: GENERAL PRACTICE

## 2023-03-18 PROCEDURE — 85025 COMPLETE CBC W/AUTO DIFF WBC: CPT | Performed by: STUDENT IN AN ORGANIZED HEALTH CARE EDUCATION/TRAINING PROGRAM

## 2023-03-18 PROCEDURE — 25000003 PHARM REV CODE 250: Performed by: STUDENT IN AN ORGANIZED HEALTH CARE EDUCATION/TRAINING PROGRAM

## 2023-03-18 PROCEDURE — 99285 EMERGENCY DEPT VISIT HI MDM: CPT | Mod: 25

## 2023-03-18 PROCEDURE — 83605 ASSAY OF LACTIC ACID: CPT | Performed by: STUDENT IN AN ORGANIZED HEALTH CARE EDUCATION/TRAINING PROGRAM

## 2023-03-18 PROCEDURE — 84100 ASSAY OF PHOSPHORUS: CPT | Performed by: STUDENT IN AN ORGANIZED HEALTH CARE EDUCATION/TRAINING PROGRAM

## 2023-03-18 RX ORDER — BUSPIRONE HYDROCHLORIDE 7.5 MG/1
7.5 TABLET ORAL NIGHTLY
Status: DISCONTINUED | OUTPATIENT
Start: 2023-03-18 | End: 2023-03-21 | Stop reason: HOSPADM

## 2023-03-18 RX ORDER — SODIUM CHLORIDE 9 MG/ML
1000 INJECTION, SOLUTION INTRAVENOUS
Status: COMPLETED | OUTPATIENT
Start: 2023-03-18 | End: 2023-03-19

## 2023-03-18 RX ORDER — SODIUM CHLORIDE 0.9 % (FLUSH) 0.9 %
10 SYRINGE (ML) INJECTION
Status: DISCONTINUED | OUTPATIENT
Start: 2023-03-18 | End: 2023-03-21 | Stop reason: HOSPADM

## 2023-03-18 RX ORDER — VENLAFAXINE 37.5 MG/1
37.5 TABLET ORAL DAILY
Status: DISCONTINUED | OUTPATIENT
Start: 2023-03-19 | End: 2023-03-21 | Stop reason: HOSPADM

## 2023-03-18 RX ORDER — ASPIRIN 81 MG/1
81 TABLET ORAL 2 TIMES DAILY
Status: DISCONTINUED | OUTPATIENT
Start: 2023-03-18 | End: 2023-03-21 | Stop reason: HOSPADM

## 2023-03-18 RX ORDER — TALC
6 POWDER (GRAM) TOPICAL NIGHTLY PRN
Status: DISCONTINUED | OUTPATIENT
Start: 2023-03-18 | End: 2023-03-19

## 2023-03-18 RX ORDER — ACETAMINOPHEN 500 MG
1000 TABLET ORAL
Status: COMPLETED | OUTPATIENT
Start: 2023-03-18 | End: 2023-03-18

## 2023-03-18 RX ADMIN — SODIUM CHLORIDE 1000 ML: 9 INJECTION, SOLUTION INTRAVENOUS at 07:03

## 2023-03-18 RX ADMIN — ACETAMINOPHEN 1000 MG: 500 TABLET, FILM COATED ORAL at 06:03

## 2023-03-18 RX ADMIN — IOPAMIDOL 100 ML: 755 INJECTION, SOLUTION INTRAVENOUS at 07:03

## 2023-03-18 RX ADMIN — CEFTRIAXONE SODIUM 1 G: 1 INJECTION, POWDER, FOR SOLUTION INTRAMUSCULAR; INTRAVENOUS at 07:03

## 2023-03-18 NOTE — ED PROVIDER NOTES
Encounter Date: 3/18/2023       History     Chief Complaint   Patient presents with    Cough     Coughing for one week grand kids been sick with same , congested , fever at times feels sob.      62-year-old female history of hypertension, obesity, GERD, femur fracture in December presents to ED with complaint of wet but nonproductive cough, congestion for the past week, chills, fever , shortness of breath.  Patient states that her grand kids have been sick with the similar symptoms.  Patient has home health and physical therapy for the femur an has been physically active other shortness of breath has been limiting her for the past week.  Had COVID in March of 2020.  Denies any chest pain, nausea, vomiting however endorses abdominal and back pain she states is from coughing.  Has had minimal p.o. intake is low and states that whenever she drinks water she cannot help her urinate on herself.  Denies dysuria, hematuria      Review of patient's allergies indicates:  No Known Allergies  Past Medical History:   Diagnosis Date    Anxiety     Back muscle spasm     Depression     GERD (gastroesophageal reflux disease)     Hypertension     Intertrochanteric fracture of left femur     Morbid obesity     Personal history of colonic polyps 12/02/2021    Dr. Andrey Vizcarra     Past Surgical History:   Procedure Laterality Date    ANTEGRADE INTRAMEDULLARY RODDING OF FEMUR Left 12/25/2022    Procedure: INSERTION, INTRAMEDULLARY JARETH, FEMUR, ANTEGRADE;  Surgeon: Ronnell Olivares MD;  Location: [facility];  Service: Orthopedics;  Laterality: Left;    COLONOSCOPY  12/02/2021    Dr. Andrey Vizcarra     No family history on file.  Social History     Tobacco Use    Smoking status: Never    Smokeless tobacco: Never   Substance Use Topics    Alcohol use: Never     Review of Systems   Constitutional:  Positive for chills and fever.   HENT:  Positive for congestion. Negative for sore throat.    Respiratory:  Positive for cough and shortness of breath.     Cardiovascular:  Negative for chest pain.   Gastrointestinal:  Negative for nausea.   Genitourinary:  Positive for frequency. Negative for dysuria.   Musculoskeletal:  Positive for back pain.   Skin:  Negative for rash.   Neurological:  Negative for weakness.   Hematological:  Does not bruise/bleed easily.     Physical Exam     Initial Vitals [03/18/23 1721]   BP Pulse Resp Temp SpO2   (!) 104/56 88 (!) 25 (!) 100.4 °F (38 °C) 96 %      MAP       --         Physical Exam    Nursing note and vitals reviewed.  Constitutional: She appears well-developed and well-nourished.   obese   HENT:   Head: Normocephalic.   Eyes: EOM are normal. Pupils are equal, round, and reactive to light.   Neck:   Normal range of motion.  Cardiovascular:  Normal rate, regular rhythm, normal heart sounds, intact distal pulses and normal pulses.           Pulmonary/Chest: Breath sounds normal. No respiratory distress. She has no wheezes.   Abdominal: Abdomen is soft. Bowel sounds are normal. There is no abdominal tenderness.   Musculoskeletal:         General: No tenderness or edema. Normal range of motion.      Cervical back: Normal range of motion.     Neurological: She is alert and oriented to person, place, and time. GCS score is 15. GCS eye subscore is 4. GCS verbal subscore is 5. GCS motor subscore is 6.   Skin: Skin is warm and dry. Capillary refill takes less than 2 seconds.   Psychiatric: She has a normal mood and affect.       ED Course   Procedures  Labs Reviewed   COMPREHENSIVE METABOLIC PANEL - Abnormal; Notable for the following components:       Result Value    Sodium Level 135 (*)     Creatinine 1.14 (*)     Protein Total 7.7 (*)     Albumin Level 2.8 (*)     Globulin 4.9 (*)     Albumin/Globulin Ratio 0.6 (*)     All other components within normal limits   URINALYSIS, REFLEX TO URINE CULTURE - Abnormal; Notable for the following components:    Appearance, UA Cloudy (*)     Protein,  (*)     Blood, UA Moderate (*)      Leukocyte Esterase, UA Large (*)     All other components within normal limits   D DIMER, QUANTITATIVE - Abnormal; Notable for the following components:    D-Dimer 4.02 (*)     All other components within normal limits   CBC WITH DIFFERENTIAL - Abnormal; Notable for the following components:    WBC 14.9 (*)     RBC 4.08 (*)     Hgb 10.6 (*)     Hct 33.9 (*)     MCHC 31.3 (*)     MPV 11.1 (*)     Neut # 12.18 (*)     All other components within normal limits   URINALYSIS, MICROSCOPIC - Abnormal; Notable for the following components:    Bacteria, UA Few (*)     RBC, UA 21-50 (*)     WBC, UA >=100 (*)     Squamous Epithelial Cells, UA Few (*)     All other components within normal limits   TROPONIN ISTAT - Abnormal; Notable for the following components:    POC Cardiac Troponin I 0.33 (*)     All other components within normal limits   COVID/FLU A&B PCR - Normal    Narrative:     The XpNutrino Xpress SARS-CoV-2/FLU/RSV plus is a rapid, multiplexed real-time PCR test intended for the simultaneous qualitative detection and differentiation of SARS-CoV-2, Influenza A, Influenza B, and respiratory syncytial virus (RSV) viral RNA in either nasopharyngeal swab or nasal swab specimens.         LACTIC ACID, PLASMA - Normal   MAGNESIUM - Normal   PHOSPHORUS - Normal   B-TYPE NATRIURETIC PEPTIDE - Normal   BLOOD CULTURE OLG   BLOOD CULTURE [facility]   CULTURE, URINE   CBC W/ AUTO DIFFERENTIAL    Narrative:     The following orders were created for panel order CBC Auto Differential.  Procedure                               Abnormality         Status                     ---------                               -----------         ------                     CBC with Differential[[id number]]        Abnormal            Final result                 Please view results for these tests on the individual orders.   POCT TROPONIN     EKG Readings: (Independently Interpreted)   Initial Reading: No STEMI. Rhythm: Normal Sinus Rhythm. Heart Rate: 86.  Ectopy: No Ectopy. Conduction: Normal.     Imaging Results              CT Renal Stone Study ABD Pelvis WO (Preliminary result)  Result time 03/18/23 20:42:09      Preliminary result by Carlos Licona MD (03/18/23 20:42:09)                   Narrative:    START OF REPORT:  Technique: CT of the abdomen and pelvis was performed with axial images as well as sagittal and coronal reconstruction images without intravenous contrast renal stone protocol.    Comparison: None available.    Clinical History: Admit DR asked for scan prior to admit.    Dosage Information: Automated Exposure Control was utilized 1648.61 mGy.cm.    Findings:  Lines and Tubes: None.  Thorax:  Lungs: The visualized lung bases appear unremarkable.  Pleura: No effusions or thickening.  Heart: The heart size is within normal limits.  Abdomen:  Abdominal Wall: There medium sized to large uncomplicated umbilical hernia which contains mesenteric fat.  Liver: The liver appears unremarkable.  Biliary System: No extrahepatic biliary duct dilatation is seen.  Gallbladder: The gallbladder appears unremarkable with note of minimal wall calcifications.  Pancreas: The pancreas appears unremarkable.  Spleen: The spleen appears unremarkable.  Adrenals: There are low attenuation (HU up to 13) masses in the lateral limb of the left adrenal gland and medial limb of the right adrenal gland as seen in series 3 image 30 measuring 3.5 x 2.7 cm and 2.9 x 1.9 cm, respectively. Correlate with clinical and laboratory parameters as regards additional evaluation and follow up.  Kidneys: The kidneys appear unremarkable with no stones cysts masses or hydronephrosis.  Aorta: The abdominal aorta appears unremarkable.  IVC: Unremarkable.  Bowel:  Esophagus: The visualized esophagus appears unremarkable.  Stomach: The stomach appears unremarkable.  Duodenum: Unremarkable appearing duodenum.  Small Bowel: The small bowel appears unremarkable.  Colon: Nondistended. A few diverticula  are seen in the sigmoid colon. No associated inflammatory stranding or pericolonic fluid is seen to suggest diverticulitis.  Appendix: The appendix is not identified but no inflammatory changes are seen in the right lower quadrant to suggest appendicitis.  Peritoneum: No intraperitoneal free air or ascites is seen.    Pelvis:  Bladder: The bladder is nondistended but appears otherwise unremarkable.  Female:  Uterus: The uterus appears unremarkable.  Ovaries: A small cystic focus is seen in the right ovary. Recommend ultrasound correlation for further evaluation if clinically warranted. The left ovary is unremarkable.    Bony structures:  Dorsal Spine: There is mild spondylosis of the visualized dorsal spine. There is grade I anterior listhesis of L4 on L5.  Bony Pelvis: Degenerative change is noted in the bilateral hip joints.      Impression:  1. No acute intraabdominal or pelvic solid organ or bowel pathology identified. Details and other findings as discussed above.                          Preliminary result by Interface, Rad Results In (03/18/23 20:42:09)                   Narrative:    START OF REPORT:  Technique: CT of the abdomen and pelvis was performed with axial images as well as sagittal and coronal reconstruction images without intravenous contrast renal stone protocol.    Comparison: None available.    Clinical History: Admit DR asked for scan prior to admit.    Dosage Information: Automated Exposure Control was utilized 1648.61 mGy.cm.    Findings:  Lines and Tubes: None.  Thorax:  Lungs: The visualized lung bases appear unremarkable.  Pleura: No effusions or thickening.  Heart: The heart size is within normal limits.  Abdomen:  Abdominal Wall: There medium sized to large uncomplicated umbilical hernia which contains mesenteric fat.  Liver: The liver appears unremarkable.  Biliary System: No extrahepatic biliary duct dilatation is seen.  Gallbladder: The gallbladder appears unremarkable with note of  minimal wall calcifications.  Pancreas: The pancreas appears unremarkable.  Spleen: The spleen appears unremarkable.  Adrenals: There are low attenuation (HU up to 13) masses in the lateral limb of the left adrenal gland and medial limb of the right adrenal gland as seen in series 3 image 30 measuring 3.5 x 2.7 cm and 2.9 x 1.9 cm, respectively. Correlate with clinical and laboratory parameters as regards additional evaluation and follow up.  Kidneys: The kidneys appear unremarkable with no stones cysts masses or hydronephrosis.  Aorta: The abdominal aorta appears unremarkable.  IVC: Unremarkable.  Bowel:  Esophagus: The visualized esophagus appears unremarkable.  Stomach: The stomach appears unremarkable.  Duodenum: Unremarkable appearing duodenum.  Small Bowel: The small bowel appears unremarkable.  Colon: Nondistended. A few diverticula are seen in the sigmoid colon. No associated inflammatory stranding or pericolonic fluid is seen to suggest diverticulitis.  Appendix: The appendix is not identified but no inflammatory changes are seen in the right lower quadrant to suggest appendicitis.  Peritoneum: No intraperitoneal free air or ascites is seen.    Pelvis:  Bladder: The bladder is nondistended but appears otherwise unremarkable.  Female:  Uterus: The uterus appears unremarkable.  Ovaries: A small cystic focus is seen in the right ovary. Recommend ultrasound correlation for further evaluation if clinically warranted. The left ovary is unremarkable.    Bony structures:  Dorsal Spine: There is mild spondylosis of the visualized dorsal spine. There is grade I anterior listhesis of L4 on L5.  Bony Pelvis: Degenerative change is noted in the bilateral hip joints.      Impression:  1. No acute intraabdominal or pelvic solid organ or bowel pathology identified. Details and other findings as discussed above.                                         CTA Chest Non-Coronary (PE Studies) (Final result)  Result time 03/18/23  19:37:55      Final result by Price Pelaez MD (03/18/23 19:37:55)                   Impression:      1.  Limited contrast opacification of pulmonary arterial system.  No definite pulmonary embolism identified.    2.  Clear lungs.      Electronically signed by: Price Pelaez  Date:    03/18/2023  Time:    19:37               Narrative:    EXAMINATION:  CTA CHEST NON CORONARY (PE STUDIES)    CLINICAL HISTORY:  Pulmonary embolism (PE) suspected, positive D-dimer;    TECHNIQUE:  Sequential axial images performed of the chest using pulmonary embolism protocol following intravenous contrast bolus. Sagittal and contrast reformations performed.    Dose product length of 1001 mGycm. Automated exposure control was utilized to minimize radiation dose.    COMPARISON:  CT chest May 13, 2022    FINDINGS:  There is less than optimal contrast opacification of pulmonary arterial system and the images are also degraded by misregistration.  Considering these limitations, there are no definite filling defects from pulmonary thromboembolism within the main pulmonary artery and the major branches. Thoracic aorta maintains unremarkable course and diameter.  Cardiac chambers are upper limits of normal.  No enlarged chest lymph nodes.    Lungs hypoventilatory changes without groundglass pneumonitis or pulmonary venous hypertension. There are no pulmonary consolidations. The pleural and pericardial spaces are without fluid accumulation.    There is stable size of bilateral adrenal gland nodules which are favored to represent benign adenomas.  Right gland nodule measures 2.5 cm and the left 3.0 cm.  Thoracic mild kyphoscoliosis and degenerative changes.                                       X-Ray Chest PA And Lateral (Final result)  Result time 03/18/23 19:01:23      Final result by Price Pelaez MD (03/18/23 19:01:23)                   Impression:      No acute cardiopulmonary process identified.      Electronically signed by: Price  Pelaez  Date:    03/18/2023  Time:    19:01               Narrative:    EXAMINATION:  XR CHEST PA AND LATERAL    CLINICAL HISTORY:  Shortness of breath    TECHNIQUE:  Two-view    COMPARISON:  December 24, 2022.    FINDINGS:  Cardiopericardial silhouette is within normal limits.  Lungs show hyperinflation without dense focal or segmental consolidation, congestion, pleural effusion or pneumothorax.                                       Medications   sodium chloride 0.9% flush 10 mL (has no administration in time range)   melatonin tablet 6 mg (has no administration in time range)   cefTRIAXone (ROCEPHIN) 1 g in dextrose 5 % in water (D5W) 5 % 50 mL IVPB (MB+) (has no administration in time range)   aspirin EC tablet 81 mg (has no administration in time range)   busPIRone tablet 7.5 mg (has no administration in time range)   venlafaxine tablet 37.5 mg (has no administration in time range)   acetaminophen tablet 1,000 mg (1,000 mg Oral Given 3/18/23 1821)   sodium chloride 0.9% bolus 1,000 mL 1,000 mL (0 mLs Intravenous Stopped 3/18/23 2045)   cefTRIAXone (ROCEPHIN) 1 g in dextrose 5 % in water (D5W) 5 % 50 mL IVPB (MB+) (0 g Intravenous Stopped 3/18/23 2045)   iopamidoL (ISOVUE-370) injection 100 mL (100 mLs Intravenous Given 3/18/23 1927)   0.9%  NaCl infusion (1,000 mLs Intravenous New Bag 3/19/23 0400)     Medical Decision Making:   Differential Diagnosis:   COVID/flu/viral illness/pulmonary embolism/pneumonia                        Clinical Impression:   Final diagnoses:  [R06.02] SOB (shortness of breath)        ED Disposition Condition    Observation Stable                Jozef Spicer MD  03/19/23 7389

## 2023-03-19 PROBLEM — R06.02 SOB (SHORTNESS OF BREATH): Status: ACTIVE | Noted: 2023-03-19

## 2023-03-19 LAB
ANION GAP SERPL CALC-SCNC: 12 MEQ/L
B PERT.PT PRMT NPH QL NAA+NON-PROBE: NOT DETECTED
BASOPHILS # BLD AUTO: 0.01 X10(3)/MCL (ref 0–0.2)
BASOPHILS NFR BLD AUTO: 0.1 %
BUN SERPL-MCNC: 16 MG/DL (ref 9.8–20.1)
C PNEUM DNA NPH QL NAA+NON-PROBE: NOT DETECTED
CALCIUM SERPL-MCNC: 9.2 MG/DL (ref 8.4–10.2)
CHLORIDE SERPL-SCNC: 102 MMOL/L (ref 98–107)
CO2 SERPL-SCNC: 22 MMOL/L (ref 23–31)
CREAT SERPL-MCNC: 1.17 MG/DL (ref 0.55–1.02)
CREAT/UREA NIT SERPL: 14
EOSINOPHIL # BLD AUTO: 0.07 X10(3)/MCL (ref 0–0.9)
EOSINOPHIL NFR BLD AUTO: 0.6 %
ERYTHROCYTE [DISTWIDTH] IN BLOOD BY AUTOMATED COUNT: 15.3 % (ref 11.5–17)
GFR SERPLBLD CREATININE-BSD FMLA CKD-EPI: 54 MLS/MIN/1.73/M2
GLUCOSE SERPL-MCNC: 123 MG/DL (ref 82–115)
HADV DNA NPH QL NAA+NON-PROBE: NOT DETECTED
HCOV 229E RNA NPH QL NAA+NON-PROBE: NOT DETECTED
HCOV HKU1 RNA NPH QL NAA+NON-PROBE: NOT DETECTED
HCOV NL63 RNA NPH QL NAA+NON-PROBE: NOT DETECTED
HCOV OC43 RNA NPH QL NAA+NON-PROBE: NOT DETECTED
HCT VFR BLD AUTO: 31 % (ref 37–47)
HGB BLD-MCNC: 9.5 G/DL (ref 12–16)
HMPV RNA NPH QL NAA+NON-PROBE: NOT DETECTED
HPIV1 RNA NPH QL NAA+NON-PROBE: NOT DETECTED
HPIV2 RNA NPH QL NAA+NON-PROBE: NOT DETECTED
HPIV3 RNA NPH QL NAA+NON-PROBE: NOT DETECTED
HPIV4 RNA NPH QL NAA+NON-PROBE: DETECTED
IMM GRANULOCYTES # BLD AUTO: 0.05 X10(3)/MCL (ref 0–0.04)
IMM GRANULOCYTES NFR BLD AUTO: 0.4 %
LYMPHOCYTES # BLD AUTO: 1.12 X10(3)/MCL (ref 0.6–4.6)
LYMPHOCYTES NFR BLD AUTO: 10.1 %
M PNEUMO DNA NPH QL NAA+NON-PROBE: NOT DETECTED
MCH RBC QN AUTO: 26.1 PG
MCHC RBC AUTO-ENTMCNC: 30.6 G/DL (ref 33–36)
MCV RBC AUTO: 85.2 FL (ref 80–94)
MONOCYTES # BLD AUTO: 1.27 X10(3)/MCL (ref 0.1–1.3)
MONOCYTES NFR BLD AUTO: 11.4 %
NEUTROPHILS # BLD AUTO: 8.6 X10(3)/MCL (ref 2.1–9.2)
NEUTROPHILS NFR BLD AUTO: 77.4 %
PLATELET # BLD AUTO: 202 X10(3)/MCL (ref 130–400)
PMV BLD AUTO: 11.6 FL (ref 7.4–10.4)
POTASSIUM SERPL-SCNC: 4.1 MMOL/L (ref 3.5–5.1)
RBC # BLD AUTO: 3.64 X10(6)/MCL (ref 4.2–5.4)
RSV RNA NPH QL NAA+NON-PROBE: NOT DETECTED
RV+EV RNA NPH QL NAA+NON-PROBE: NOT DETECTED
SODIUM SERPL-SCNC: 136 MMOL/L (ref 136–145)
TROPONIN I SERPL-MCNC: 0.09 NG/ML (ref 0–0.04)
TROPONIN I SERPL-MCNC: 0.13 NG/ML (ref 0–0.04)
WBC # SPEC AUTO: 11.1 X10(3)/MCL (ref 4.5–11.5)

## 2023-03-19 PROCEDURE — 96366 THER/PROPH/DIAG IV INF ADDON: CPT

## 2023-03-19 PROCEDURE — 80048 BASIC METABOLIC PNL TOTAL CA: CPT | Performed by: STUDENT IN AN ORGANIZED HEALTH CARE EDUCATION/TRAINING PROGRAM

## 2023-03-19 PROCEDURE — 25000003 PHARM REV CODE 250: Performed by: STUDENT IN AN ORGANIZED HEALTH CARE EDUCATION/TRAINING PROGRAM

## 2023-03-19 PROCEDURE — 96372 THER/PROPH/DIAG INJ SC/IM: CPT | Performed by: STUDENT IN AN ORGANIZED HEALTH CARE EDUCATION/TRAINING PROGRAM

## 2023-03-19 PROCEDURE — G0378 HOSPITAL OBSERVATION PER HR: HCPCS

## 2023-03-19 PROCEDURE — 87798 DETECT AGENT NOS DNA AMP: CPT | Performed by: STUDENT IN AN ORGANIZED HEALTH CARE EDUCATION/TRAINING PROGRAM

## 2023-03-19 PROCEDURE — 84484 ASSAY OF TROPONIN QUANT: CPT | Performed by: STUDENT IN AN ORGANIZED HEALTH CARE EDUCATION/TRAINING PROGRAM

## 2023-03-19 PROCEDURE — 94760 N-INVAS EAR/PLS OXIMETRY 1: CPT

## 2023-03-19 PROCEDURE — 85025 COMPLETE CBC W/AUTO DIFF WBC: CPT | Performed by: STUDENT IN AN ORGANIZED HEALTH CARE EDUCATION/TRAINING PROGRAM

## 2023-03-19 PROCEDURE — 63600175 PHARM REV CODE 636 W HCPCS: Performed by: STUDENT IN AN ORGANIZED HEALTH CARE EDUCATION/TRAINING PROGRAM

## 2023-03-19 PROCEDURE — 25000003 PHARM REV CODE 250: Performed by: GENERAL PRACTICE

## 2023-03-19 PROCEDURE — 87633 RESP VIRUS 12-25 TARGETS: CPT | Performed by: STUDENT IN AN ORGANIZED HEALTH CARE EDUCATION/TRAINING PROGRAM

## 2023-03-19 PROCEDURE — 99900035 HC TECH TIME PER 15 MIN (STAT)

## 2023-03-19 PROCEDURE — 94799 UNLISTED PULMONARY SVC/PX: CPT

## 2023-03-19 RX ORDER — SODIUM CHLORIDE 0.9 % (FLUSH) 0.9 %
10 SYRINGE (ML) INJECTION
Status: DISCONTINUED | OUTPATIENT
Start: 2023-03-19 | End: 2023-03-19

## 2023-03-19 RX ORDER — IBUPROFEN 200 MG
16 TABLET ORAL
Status: DISCONTINUED | OUTPATIENT
Start: 2023-03-19 | End: 2023-03-21 | Stop reason: HOSPADM

## 2023-03-19 RX ORDER — IBUPROFEN 200 MG
24 TABLET ORAL
Status: DISCONTINUED | OUTPATIENT
Start: 2023-03-19 | End: 2023-03-21 | Stop reason: HOSPADM

## 2023-03-19 RX ORDER — MORPHINE SULFATE 4 MG/ML
2 INJECTION, SOLUTION INTRAMUSCULAR; INTRAVENOUS EVERY 6 HOURS PRN
Status: DISCONTINUED | OUTPATIENT
Start: 2023-03-19 | End: 2023-03-21 | Stop reason: HOSPADM

## 2023-03-19 RX ORDER — ENOXAPARIN SODIUM 100 MG/ML
40 INJECTION SUBCUTANEOUS EVERY 24 HOURS
Status: DISCONTINUED | OUTPATIENT
Start: 2023-03-19 | End: 2023-03-21 | Stop reason: HOSPADM

## 2023-03-19 RX ORDER — ACETAMINOPHEN 325 MG/1
650 TABLET ORAL EVERY 4 HOURS PRN
Status: DISCONTINUED | OUTPATIENT
Start: 2023-03-19 | End: 2023-03-21 | Stop reason: HOSPADM

## 2023-03-19 RX ORDER — NALOXONE HCL 0.4 MG/ML
0.02 VIAL (ML) INJECTION
Status: DISCONTINUED | OUTPATIENT
Start: 2023-03-19 | End: 2023-03-21 | Stop reason: HOSPADM

## 2023-03-19 RX ORDER — TALC
9 POWDER (GRAM) TOPICAL NIGHTLY PRN
Status: DISCONTINUED | OUTPATIENT
Start: 2023-03-19 | End: 2023-03-21 | Stop reason: HOSPADM

## 2023-03-19 RX ORDER — GUAIFENESIN 600 MG/1
600 TABLET, EXTENDED RELEASE ORAL 2 TIMES DAILY
Status: DISCONTINUED | OUTPATIENT
Start: 2023-03-19 | End: 2023-03-21 | Stop reason: HOSPADM

## 2023-03-19 RX ORDER — GLUCAGON 1 MG
1 KIT INJECTION
Status: DISCONTINUED | OUTPATIENT
Start: 2023-03-19 | End: 2023-03-21 | Stop reason: HOSPADM

## 2023-03-19 RX ORDER — BENZONATATE 100 MG/1
200 CAPSULE ORAL 3 TIMES DAILY
Status: DISCONTINUED | OUTPATIENT
Start: 2023-03-19 | End: 2023-03-21 | Stop reason: HOSPADM

## 2023-03-19 RX ORDER — LISINOPRIL 5 MG/1
5 TABLET ORAL NIGHTLY
Status: DISCONTINUED | OUTPATIENT
Start: 2023-03-19 | End: 2023-03-20

## 2023-03-19 RX ORDER — ONDANSETRON 4 MG/1
8 TABLET, ORALLY DISINTEGRATING ORAL EVERY 8 HOURS PRN
Status: DISCONTINUED | OUTPATIENT
Start: 2023-03-19 | End: 2023-03-21 | Stop reason: HOSPADM

## 2023-03-19 RX ORDER — SIMETHICONE 80 MG
1 TABLET,CHEWABLE ORAL 4 TIMES DAILY PRN
Status: DISCONTINUED | OUTPATIENT
Start: 2023-03-19 | End: 2023-03-21 | Stop reason: HOSPADM

## 2023-03-19 RX ORDER — IPRATROPIUM BROMIDE AND ALBUTEROL SULFATE 2.5; .5 MG/3ML; MG/3ML
3 SOLUTION RESPIRATORY (INHALATION) EVERY 6 HOURS PRN
Status: DISCONTINUED | OUTPATIENT
Start: 2023-03-19 | End: 2023-03-19

## 2023-03-19 RX ORDER — MAG HYDROX/ALUMINUM HYD/SIMETH 200-200-20
30 SUSPENSION, ORAL (FINAL DOSE FORM) ORAL 4 TIMES DAILY PRN
Status: DISCONTINUED | OUTPATIENT
Start: 2023-03-19 | End: 2023-03-21 | Stop reason: HOSPADM

## 2023-03-19 RX ORDER — HYDROCODONE BITARTRATE AND ACETAMINOPHEN 5; 325 MG/1; MG/1
1 TABLET ORAL EVERY 6 HOURS PRN
Status: DISCONTINUED | OUTPATIENT
Start: 2023-03-19 | End: 2023-03-21 | Stop reason: HOSPADM

## 2023-03-19 RX ORDER — POLYETHYLENE GLYCOL 3350 17 G/17G
17 POWDER, FOR SOLUTION ORAL 3 TIMES DAILY PRN
Status: DISCONTINUED | OUTPATIENT
Start: 2023-03-19 | End: 2023-03-21 | Stop reason: HOSPADM

## 2023-03-19 RX ORDER — LEVALBUTEROL INHALATION SOLUTION 1.25 MG/3ML
1.25 SOLUTION RESPIRATORY (INHALATION) EVERY 6 HOURS PRN
Status: DISCONTINUED | OUTPATIENT
Start: 2023-03-19 | End: 2023-03-21 | Stop reason: HOSPADM

## 2023-03-19 RX ORDER — BISACODYL 10 MG
10 SUPPOSITORY, RECTAL RECTAL DAILY PRN
Status: DISCONTINUED | OUTPATIENT
Start: 2023-03-19 | End: 2023-03-21 | Stop reason: HOSPADM

## 2023-03-19 RX ORDER — ONDANSETRON 2 MG/ML
4 INJECTION INTRAMUSCULAR; INTRAVENOUS EVERY 8 HOURS PRN
Status: DISCONTINUED | OUTPATIENT
Start: 2023-03-19 | End: 2023-03-21 | Stop reason: HOSPADM

## 2023-03-19 RX ADMIN — VENLAFAXINE 37.5 MG: 37.5 TABLET ORAL at 05:03

## 2023-03-19 RX ADMIN — ASPIRIN 81 MG: 81 TABLET, COATED ORAL at 08:03

## 2023-03-19 RX ADMIN — BUSPIRONE HYDROCHLORIDE 7.5 MG: 7.5 TABLET ORAL at 08:03

## 2023-03-19 RX ADMIN — SODIUM CHLORIDE 1000 ML: 9 INJECTION, SOLUTION INTRAVENOUS at 04:03

## 2023-03-19 RX ADMIN — ACETAMINOPHEN 650 MG: 325 TABLET ORAL at 11:03

## 2023-03-19 RX ADMIN — GUAIFENESIN 600 MG: 600 TABLET, EXTENDED RELEASE ORAL at 11:03

## 2023-03-19 RX ADMIN — BENZONATATE 200 MG: 100 CAPSULE ORAL at 08:03

## 2023-03-19 RX ADMIN — BENZONATATE 200 MG: 100 CAPSULE ORAL at 03:03

## 2023-03-19 RX ADMIN — LISINOPRIL 5 MG: 5 TABLET ORAL at 08:03

## 2023-03-19 RX ADMIN — ENOXAPARIN SODIUM 40 MG: 40 INJECTION SUBCUTANEOUS at 05:03

## 2023-03-19 RX ADMIN — CEFTRIAXONE SODIUM 1 G: 1 INJECTION, POWDER, FOR SOLUTION INTRAMUSCULAR; INTRAVENOUS at 07:03

## 2023-03-19 RX ADMIN — GUAIFENESIN 600 MG: 600 TABLET, EXTENDED RELEASE ORAL at 08:03

## 2023-03-19 NOTE — PLAN OF CARE
[facility] - Medical Surgical Unit  Initial Discharge Assessment       Primary Care Provider: MARION Salazar    Admission Diagnosis: SOB (shortness of breath) [R06.02]    Admission Date: 3/18/2023  Expected Discharge Date:     Discharge Barriers Identified: None    Payor: BLUE [employer]OSS [employer]EFIT / Plan: [employer]UE [place]OSS [place] / Product Type: Self Funded /     Extended Emergency Contact Information  Primary Emergency Contact: Hillary Thomas  Address: [address] [place] [address] [facility]  Home Phone: [phone]  Work Phone: [phone]  Mobile Phone: [phone]  Relation: Other  Preferred language: English    Discharge Plan A: Home with family         [facility] - [place], [place] - [address]  [address]  [facility] [address]  Phone: [phone] Fax: [phone]    [id number] [address]  AT [facility] [address]  [address] [facility] [address]  Phone: [phone] Fax: [phone]      Initial Assessment (most recent)       Adult Discharge Assessment - 03/19/23 0956          Discharge Assessment    Assessment Type Discharge Planning Assessment     Confirmed/corrected address, phone number and insurance Yes     Confirmed Demographics Correct on Facesheet     Source of Information patient     If unable to respond/provide information was family/caregiver contacted? Yes     Contact Name/Number Hillary Thomas      Does patient/caregiver understand observation status Yes     Communicated XENA with patient/caregiver Date not available/Unable to determine     Reason For Admission SOB     People in Home child(liset), adult     Facility Arrived From: home     Do you expect to return to your current living situation? Yes     Do you have help at home or someone to help you manage your care at home? Yes     Who are your caregiver(s) and their phone number(s)?  Hillary Thomas      Prior to hospitilization cognitive status: Alert/Oriented     Current cognitive status: Alert/Oriented     Walking or Climbing Stairs ambulation difficulty, requires equipment     Mobility Management walker     Home Layout Able to live on 1st floor     Equipment Currently Used at Home bedside commode;walker, rolling     Readmission within 30 days? No     Patient currently being followed by outpatient case management? No     Do you currently have service(s) that help you manage your care at home? No     Do you take prescription medications? Yes     Do you have prescription coverage? Yes     Do you have any problems affording any of your prescribed medications? TBD     Is the patient taking medications as prescribed? yes     How do you get to doctors appointments? family or friend will provide     Are you on dialysis? No     Do you take coumadin? No     Discharge Plan A Home with family     DME Needed Upon Discharge  none     Discharge Plan discussed with: Adult children     Discharge Barriers Identified None        Physical Activity    On average, how many days per week do you engage in moderate to strenuous exercise (like a brisk walk)? 0 days     On average, how many minutes do you engage in exercise at this level? 0 min        Financial Resource Strain    How hard is it for you to pay for the very basics like food, housing, medical care, and heating? Not hard at all        Housing Stability    In the last 12 months, was there a time when you were not able to pay the mortgage or rent on time? No     In the last 12 months, how many places have you lived? 1     In the last 12 months, was there a time when you did not have a steady place to sleep or slept in a shelter (including now)? No        Transportation Needs    In the past 12 months, has lack of transportation kept you from medical appointments or from getting medications? No     In the past 12 months, has lack of transportation  kept you from meetings, work, or from getting things needed for daily living? No        Food Insecurity    Within the past 12 months, you worried that your food would run out before you got the money to buy more. Never true     Within the past 12 months, the food you bought just didn't last and you didn't have money to get more. Never true        Stress    Do you feel stress - tense, restless, nervous, or anxious, or unable to sleep at night because your mind is troubled all the time - these days? Only a little        Social Connections    How often do you get together with friends or relatives? More than three times a week     How often do you attend [employer] or [employer] services? More than 4 times per year     Do you belong to any clubs or organizations such as Baptist groups, unions, fraternal or athletic groups, or school groups? No     How often do you attend meetings of the clubs or organizations you belong to? 1 to 4 times per year     Are you , , , , never , or living with a partner? Never         Alcohol Use    Q1: How often do you have a drink containing alcohol? Never     Q2: How many drinks containing alcohol do you have on a typical day when you are drinking? Patient does not drink     Q3: How often do you have six or more drinks on one occasion? Never        OTHER    Name(s) of People in Home Hillary Thomas

## 2023-03-19 NOTE — PLAN OF CARE
Problem: Adult Inpatient Plan of Care  Goal: Plan of Care Review  Outcome: Ongoing, Progressing  Flowsheets (Taken 3/19/2023 1554)  Plan of Care Reviewed With: patient  Goal: Patient-Specific Goal (Individualized)  Outcome: Ongoing, Progressing  Goal: Absence of Hospital-Acquired Illness or Injury  Outcome: Ongoing, Progressing  Intervention: Prevent and Manage VTE (Venous Thromboembolism) Risk  Flowsheets (Taken 3/19/2023 1400)  VTE Prevention/Management:   bleeding precations maintained   bleeding risk assessed   ambulation promoted   dorsiflexion/plantar flexion performed   fluids promoted   ROM (active) performed  Range of Motion:   ROM (range of motion) performed   active ROM (range of motion) encouraged  Intervention: Prevent Infection  Flowsheets (Taken 3/19/2023 1554)  Infection Prevention:   cohorting utilized   rest/sleep promoted   equipment surfaces disinfected   hand hygiene promoted  Goal: Optimal Comfort and Wellbeing  Outcome: Ongoing, Progressing  Intervention: Monitor Pain and Promote Comfort  Flowsheets (Taken 3/19/2023 1554)  Pain Management Interventions: quiet environment facilitated  Intervention: Provide Person-Centered Care  Flowsheets (Taken 3/19/2023 1554)  Trust Relationship/Rapport:   care explained   questions answered  Goal: Readiness for Transition of Care  Outcome: Ongoing, Progressing     Problem: Bariatric Environmental Safety  Goal: Safety Maintained with Care  Outcome: Ongoing, Progressing     Problem: Impaired Wound Healing  Goal: Optimal Wound Healing  Outcome: Ongoing, Progressing  Intervention: Promote Wound Healing  Flowsheets (Taken 3/19/2023 1554)  Sleep/Rest Enhancement: awakenings minimized  Activity Management:   Walk with assistive devise and /or staff member - L3   Ambulated to bathroom - L4  Pain Management Interventions: quiet environment facilitated     Problem: Infection  Goal: Absence of Infection Signs and Symptoms  Outcome: Ongoing, Progressing  Intervention:  Prevent or Manage Infection  Flowsheets (Taken 3/19/2023 1554)  Infection Management: aseptic technique maintained  Isolation Precautions: protective     Problem: Breathing Pattern Ineffective  Goal: Effective Breathing Pattern  Outcome: Ongoing, Progressing  Intervention: Promote Improved Breathing Pattern  Flowsheets (Taken 3/19/2023 1554)  Breathing Techniques/Airway Clearance: deep/controlled cough encouraged  Supportive Measures: active listening utilized  Head of Bed (HOB) Positioning: HOB at 30-45 degrees     Problem: Fall Injury Risk  Goal: Absence of Fall and Fall-Related Injury  Outcome: Ongoing, Progressing  Intervention: Identify and Manage Contributors  Flowsheets (Taken 3/19/2023 1554)  Self-Care Promotion: independence encouraged  Medication Review/Management: medications reviewed     Problem: Mobility Impairment  Goal: Optimal Mobility  Outcome: Ongoing, Progressing  Intervention: Optimize Mobility  Flowsheets (Taken 3/19/2023 1554)  Activity Management:   Walk with assistive devise and /or staff member - L3   Ambulated to bathroom - L4

## 2023-03-19 NOTE — H&P
[facility] MEDICINE   H&P ADMISSION NOTE      Patient Name: Tanya Linda  MRN: [id number]  Patient Class: OP- Observation   Admission Date: 3/18/2023   Admitting Physician: DENNIS Service   Attending Physician: Thairy G Reyes, DO  Primary Care Provider: MARION Salazar  Face-to-Face encounter date: 03/19/2023        CHIEF COMPLAINT     Chief Complaint   Patient presents with    Cough     Coughing for one week grand kids been sick with same , congested , fever at times feels sob.        HISTORY OF PRESENTING ILLNESS   62-year-old female with a past medical history of morbid obesity, hypertension, anxiety who presents from home with complaint of dyspnea and incontinence. Pt states she was exposed to sick grandchildren 3 weeks ago and her symptoms started approx 2 weeks ago with nonproductive cough. Yesterday she had a cough fit and could not catch her breath. Affirms to associated stress incontinence with coughing which she states only happens when she has UTI. In the ED, CXR and CTA PE showed no evidence of PE and clear lungs. UA was not clean catch but will follow cultures and she was started on ceftriaxone empirically.   PAST MEDICAL HISTORY     Past Medical History:   Diagnosis Date    Anxiety     Back muscle spasm     Depression     GERD (gastroesophageal reflux disease)     Hypertension     Intertrochanteric fracture of left femur     Morbid obesity     Personal history of colonic polyps 12/02/2021    Dr. Andrey Vizcarra       PAST SURGICAL HISTORY     Past Surgical History:   Procedure Laterality Date    ANTEGRADE INTRAMEDULLARY RODDING OF FEMUR Left 12/25/2022    Procedure: INSERTION, INTRAMEDULLARY JARETH, FEMUR, ANTEGRADE;  Surgeon: Ronnell Olivares MD;  Location: [facility];  Service: Orthopedics;  Laterality: Left;    COLONOSCOPY  12/02/2021    Dr. Andrey Vizcarra       FAMILY HISTORY   Reviewed and noncontributory to this case    SOCIAL HISTORY     Social History     Socioeconomic History    Marital status: Single    Tobacco Use    Smoking status: Never    Smokeless tobacco: Never   Substance and Sexual Activity    Alcohol use: Never     Social Determinants of Health     Financial Resource Strain: Low Risk     Difficulty of Paying Living Expenses: Not hard at all   Food Insecurity: No Food Insecurity    Worried About Running Out of Food in the Last Year: Never true    Ran Out of Food in the Last Year: Never true   Transportation Needs: No Transportation Needs    Lack of Transportation (Medical): No    Lack of Transportation (Non-Medical): No   Physical Activity: Inactive    Days of Exercise per Week: 0 days    Minutes of Exercise per Session: 0 min   Stress: No Stress Concern Present    Feeling of Stress : Only a little   Social Connections: Moderately Integrated    Frequency of Communication with Friends and Family: More than three times a week    Frequency of Social Gatherings with Friends and Family: More than three times a week    Attends Sabianism Services: More than 4 times per year    Active Member of Clubs or Organizations: No    Attends Club or Organization Meetings: 1 to 4 times per year    Marital Status: Never    Housing Stability: Low Risk     Unable to Pay for Housing in the Last Year: No    Number of Places Lived in the Last Year: 1    Unstable Housing in the Last Year: No       HOME MEDICATIONS     Prior to Admission medications    Medication Sig Start Date End Date Taking? Authorizing Provider   busPIRone (BUSPAR) 7.5 MG tablet Take 1 tablet (7.5 mg total) by mouth every evening. 11/17/22  Yes MARION Salazar   lisinopriL (PRINIVIL,ZESTRIL) 5 MG tablet Take 5 mg by mouth every evening.   Yes Historical Provider   polyethylene glycol (GLYCOLAX) 17 gram PwPk Take 17 g by mouth once daily. 12/30/22  Yes Ciarra Condon MD   venlafaxine (EFFEXOR) 37.5 MG Tab Take 1 tablet (37.5 mg total) by mouth Daily. 11/17/22 11/17/23 Yes MARION Salazar   aspirin (ECOTRIN) 81 MG EC tablet Take 1  tablet (81 mg total) by mouth 2 (two) times a day. 1/20/23 2/19/23  Stephon Kearney MD   HYDROcodone-acetaminophen (NORCO) 5-325 mg per tablet Take 1 tablet by mouth every 6 (six) hours as needed for Pain.  Patient not taking: Reported on 3/13/2023 1/20/23   Alexis Torres MD   ibuprofen (ADVIL,MOTRIN) 800 MG tablet Take 1 tablet (800 mg total) by mouth 3 (three) times daily as needed for Pain. 3/3/23   MARION Salazar   methocarbamoL (ROBAXIN) 500 MG Tab Take 500 mg by mouth. 1/23/23   Historical Provider       ALLERGIES   Patient has no known allergies.  REVIEW OF SYSTEMS   Except as documented above, all other systems reviewed and negative    PHYSICAL EXAM     Vitals:    03/19/23 1139   BP:    Pulse: 84   Resp: 18   Temp:       General:  In no acute distress, resting comfortably, morbidly obese  Head and neck:  Atraumatic, normocephalic, moist mucous membranes, supple neck  Chest:  Clear to auscultation bilaterally  Heart:  S1, S2, no appreciable murmur  Abdomen:  Soft, nontender, BS +  MSK:  Warm, no lower extremity edema, no clubbing or cyanosis  Neuro:  Alert and oriented x4, moving all extremities with good strength  Integumentary:  No obvious skin rash  Psychiatry:  Appropriate mood and affect    ASSESSMENT AND PLAN   UTI (?)  -started empirically on ceftriaxone  -leukocytosis has resolved  -follow culture    Dyspnea  -pending viral panel  -symptomatic treatment  -cxr with no acute findings  -CTA showed clear lungs    Adrenal nodules  -likely benign adenomas, size stable     Gallbladder calcification  -incidental finding on CT abdomen  -f/u outpatient with GI      DVT prophylaxis:  lovenox  Admit to observation status under my care  __________________________________________________________________  LABS/MICRO/MEDS/DIAGNOSTICS       LABS  Recent Labs     03/18/23  1813 03/19/23  0750   * 136   K 3.5 4.1   CHLORIDE 100 102   CO2 24 22*   BUN 16.4 16.0   CREATININE 1.14* 1.17*   GLUCOSE 96  123*   CALCIUM 9.6 9.2   ALKPHOS 98  --    AST 26  --    ALT 24  --    ALBUMIN 2.8*  --      Recent Labs     03/19/23  0750   WBC 11.1   RBC 3.64*   HCT 31.0*   MCV 85.2          MICROBIOLOGY  Microbiology Results (last 7 days)       Procedure Component Value Units Date/Time    Urine culture [[id number]] Collected: 03/18/23 1817    Order Status: Sent Specimen: Urine Updated: 03/18/23 1841    Blood culture x two cultures. Draw prior to antibiotics. [[id number]] Collected: 03/18/23 1830    Order Status: Sent Specimen: Blood Updated: 03/18/23 1830    Blood culture x two cultures. Draw prior to antibiotics. [[id number]] Collected: 03/18/23 1815    Order Status: Sent Specimen: Blood             MEDICATIONS   aspirin  81 mg Oral BID    benzonatate  200 mg Oral TID    busPIRone  7.5 mg Oral QHS    cefTRIAXone (ROCEPHIN) IVPB  1 g Intravenous Q24H    enoxaparin  40 mg Subcutaneous Daily    guaiFENesin  600 mg Oral BID    lisinopriL  5 mg Oral QHS    venlafaxine  37.5 mg Oral Daily      INFUSIONS      DIAGNOSTIC TESTS  CT Renal Stone Study ABD Pelvis WO   Final Result      1. No acute abnormality of the abdomen and pelvis.   2. Unchanged bilateral adrenal nodules which are indeterminate.   3. Unchanged eccentric gallbladder wall calcification.   4. Diverticulosis without diverticulitis.   [employer] concordance         Electronically signed by: Ren Gonzalez MD   Date:    03/19/2023   Time:    08:28      CTA Chest Non-Coronary (PE Studies)   Final Result      1.  Limited contrast opacification of pulmonary arterial system.  No definite pulmonary embolism identified.      2.  Clear lungs.         Electronically signed by: Price Pelaez   Date:    03/18/2023   Time:    19:37      X-Ray Chest PA And Lateral   Final Result      No acute cardiopulmonary process identified.         Electronically signed by: Price Pelaez   Date:    03/18/2023   Time:    19:01             Patient information was obtained from patient, patient's  family, past medical records and ER records.   All diagnosis and differential diagnosis have been reviewed; assessment and plan has been documented. I have personally reviewed the labs and test results that are presently available; I have reviewed the patients medication list. I have reviewed the consulting providers response and recommendations. I have reviewed or attempted to review medical records based upon their availability.  All of the patient's questions have been addressed and answered. Patient's is agreeable to the above stated plan. I will continue to monitor closely and make adjustments to medical management as needed.  This note was created using [employer] voice recognition software that occasionally misinterpreted phrases or words.  Please contact me if any questions may rise regarding documentation to clarify verbiage.        Thairy G Reyes,    03/19/2023   Internal Medicine

## 2023-03-20 LAB
BSA FOR ECHO PROCEDURE: 2.65 M2
CHOLEST SERPL-MCNC: 164 MG/DL
CHOLEST/HDLC SERPL: 5 {RATIO} (ref 0–5)
EJECTION FRACTION: 60 %
EST. AVERAGE GLUCOSE BLD GHB EST-MCNC: 108.3 MG/DL
HBA1C MFR BLD: 5.4 %
HDLC SERPL-MCNC: 30 MG/DL (ref 35–60)
LDLC SERPL CALC-MCNC: 107 MG/DL (ref 50–140)
POCT GLUCOSE: 95 MG/DL (ref 70–110)
POCT GLUCOSE: 95 MG/DL (ref 70–110)
RA PRESSURE: 3 MMHG
TRIGL SERPL-MCNC: 135 MG/DL (ref 37–140)
TROPONIN I SERPL-MCNC: 0.09 NG/ML (ref 0–0.04)
VLDLC SERPL CALC-MCNC: 27 MG/DL

## 2023-03-20 PROCEDURE — 93010 EKG 12-LEAD: ICD-10-PCS | Mod: ,,, | Performed by: INTERNAL MEDICINE

## 2023-03-20 PROCEDURE — 25000003 PHARM REV CODE 250: Performed by: STUDENT IN AN ORGANIZED HEALTH CARE EDUCATION/TRAINING PROGRAM

## 2023-03-20 PROCEDURE — 93005 ELECTROCARDIOGRAM TRACING: CPT

## 2023-03-20 PROCEDURE — 80061 LIPID PANEL: CPT | Performed by: NURSE PRACTITIONER

## 2023-03-20 PROCEDURE — 25000003 PHARM REV CODE 250: Performed by: NURSE PRACTITIONER

## 2023-03-20 PROCEDURE — 96372 THER/PROPH/DIAG INJ SC/IM: CPT | Performed by: STUDENT IN AN ORGANIZED HEALTH CARE EDUCATION/TRAINING PROGRAM

## 2023-03-20 PROCEDURE — 96366 THER/PROPH/DIAG IV INF ADDON: CPT

## 2023-03-20 PROCEDURE — 84484 ASSAY OF TROPONIN QUANT: CPT | Performed by: STUDENT IN AN ORGANIZED HEALTH CARE EDUCATION/TRAINING PROGRAM

## 2023-03-20 PROCEDURE — 94760 N-INVAS EAR/PLS OXIMETRY 1: CPT

## 2023-03-20 PROCEDURE — 93041 RHYTHM ECG TRACING: CPT

## 2023-03-20 PROCEDURE — 99900031 HC PATIENT EDUCATION (STAT)

## 2023-03-20 PROCEDURE — G0378 HOSPITAL OBSERVATION PER HR: HCPCS

## 2023-03-20 PROCEDURE — 63600175 PHARM REV CODE 636 W HCPCS: Performed by: STUDENT IN AN ORGANIZED HEALTH CARE EDUCATION/TRAINING PROGRAM

## 2023-03-20 PROCEDURE — 83036 HEMOGLOBIN GLYCOSYLATED A1C: CPT | Performed by: NURSE PRACTITIONER

## 2023-03-20 PROCEDURE — 93010 ELECTROCARDIOGRAM REPORT: CPT | Mod: ,,, | Performed by: INTERNAL MEDICINE

## 2023-03-20 RX ORDER — METOPROLOL TARTRATE 25 MG/1
25 TABLET, FILM COATED ORAL 2 TIMES DAILY
Status: DISCONTINUED | OUTPATIENT
Start: 2023-03-20 | End: 2023-03-21 | Stop reason: HOSPADM

## 2023-03-20 RX ORDER — ATORVASTATIN CALCIUM 40 MG/1
40 TABLET, FILM COATED ORAL DAILY
Status: DISCONTINUED | OUTPATIENT
Start: 2023-03-20 | End: 2023-03-21 | Stop reason: HOSPADM

## 2023-03-20 RX ADMIN — ATORVASTATIN CALCIUM 40 MG: 40 TABLET, FILM COATED ORAL at 09:03

## 2023-03-20 RX ADMIN — GUAIFENESIN 600 MG: 600 TABLET, EXTENDED RELEASE ORAL at 09:03

## 2023-03-20 RX ADMIN — ASPIRIN 81 MG: 81 TABLET, COATED ORAL at 09:03

## 2023-03-20 RX ADMIN — ENOXAPARIN SODIUM 40 MG: 40 INJECTION SUBCUTANEOUS at 05:03

## 2023-03-20 RX ADMIN — BENZONATATE 200 MG: 100 CAPSULE ORAL at 02:03

## 2023-03-20 RX ADMIN — VENLAFAXINE 37.5 MG: 37.5 TABLET ORAL at 05:03

## 2023-03-20 RX ADMIN — BUSPIRONE HYDROCHLORIDE 7.5 MG: 7.5 TABLET ORAL at 08:03

## 2023-03-20 RX ADMIN — ASPIRIN 81 MG: 81 TABLET, COATED ORAL at 08:03

## 2023-03-20 RX ADMIN — BENZONATATE 200 MG: 100 CAPSULE ORAL at 08:03

## 2023-03-20 RX ADMIN — BENZONATATE 200 MG: 100 CAPSULE ORAL at 09:03

## 2023-03-20 RX ADMIN — CEFTRIAXONE SODIUM 1 G: 1 INJECTION, POWDER, FOR SOLUTION INTRAMUSCULAR; INTRAVENOUS at 08:03

## 2023-03-20 RX ADMIN — METOPROLOL TARTRATE 25 MG: 25 TABLET, FILM COATED ORAL at 08:03

## 2023-03-20 RX ADMIN — METOPROLOL TARTRATE 25 MG: 25 TABLET, FILM COATED ORAL at 09:03

## 2023-03-20 RX ADMIN — GUAIFENESIN 600 MG: 600 TABLET, EXTENDED RELEASE ORAL at 08:03

## 2023-03-20 NOTE — PROGRESS NOTES
HOSPITAL MEDICINE  PROGRESS NOTE        CHIEF COMPLAINT   Hospital follow up    HOSPITAL COURSE   62-year-old female with a past medical history of morbid obesity, hypertension, anxiety admitted for dyspnea and concern for UTI.  Troponins peaked at 0.33 during her admission, patient has been asymptomatic with no acute ST segment elevation or depression on EKG.  [facility] was consulted on 03/20 and they have ordered an echocardiogram, requesting patient remain in-house until results are finalized therefore she will be discharged tomorrow.    Today comfortable in bed, no acute complaints.  Remains complaining of occasional cough for which [facility] discontinued lisinopril.  OBJECTIVE/PHYSICAL EXAM     VITAL SIGNS (MOST RECENT):  Temp: 97.7 °F (36.5 °C) (03/20/23 0714)  Pulse: 74 (03/20/23 0955)  Resp: 18 (03/20/23 0402)  BP: (!) 151/79 (03/20/23 0955)  SpO2: 99 % (03/20/23 0714)   VITAL SIGNS (24 HOUR RANGE):  Temp:  [97.6 °F (36.4 °C)-99.1 °F (37.3 °C)] 97.7 °F (36.5 °C)  Pulse:  [67-84] 74  Resp:  [18] 18  SpO2:  [95 %-99 %] 99 %  BP: (108-164)/(71-79) 151/79   GENERAL: In no acute distress, afebrile, morbidly obese  HEENT:  PERRLA  CHEST: Clear to auscultation bilaterally  HEART: S1, S2, no appreciable murmur  ABDOMEN: Soft, nontender, BS +  MSK: Warm, no lower extremity edema, no clubbing or cyanosis  NEUROLOGIC: Alert and oriented x4, moving all extremities with good strength   INTEGUMENTARY:  Warm, dry  PSYCHIATRY:  Appropriate affect        ASSESSMENT/PLAN   UTI (?)  -started empirically on ceftriaxone  -leukocytosis has resolved  -follow culture    NSTEMI   -A1c unremarkable, pending lipid panel   Aspirin, atorvastatin, metoprolol   -pending echocardiogram findings for discharge  -evaluated by [facility] requested follow up within 1-2 weeks outpatient    Dyspnea  -viral panel positive for parainfluenza  -symptomatic treatment  -cxr with no acute findings  -CTA showed clear lungs     Adrenal nodules  -likely benign adenomas, size  stable      Gallbladder calcification  -incidental finding on CT abdomen  -f/u outpatient with GI        DVT prophylaxis:  lovenox  Admit to observation status under my care  __________________________________________________________________________    LABS/MICRO/MEDS/DIAGNOSTICS       LABS  Recent Labs     03/18/23 1813 03/19/23  0750   * 136   K 3.5 4.1   CHLORIDE 100 102   CO2 24 22*   BUN 16.4 16.0   CREATININE 1.14* 1.17*   GLUCOSE 96 123*   CALCIUM 9.6 9.2   ALKPHOS 98  --    AST 26  --    ALT 24  --    ALBUMIN 2.8*  --      Recent Labs     03/19/23  0750   WBC 11.1   RBC 3.64*   HCT 31.0*   MCV 85.2          MICROBIOLOGY  Microbiology Results (last 7 days)       Procedure Component Value Units Date/Time    Blood culture x two cultures. Draw prior to antibiotics. [[id number]] Collected: 03/18/23 1815    Order Status: Resulted Specimen: Blood Updated: 03/19/23 1735    Urine culture [[id number]] Collected: 03/18/23 1817    Order Status: Resulted Specimen: Urine Updated: 03/18/23 1841    Blood culture x two cultures. Draw prior to antibiotics. [[id number]] Collected: 03/18/23 1830    Order Status: Resulted Specimen: Blood Updated: 03/18/23 1830               MEDICATIONS   aspirin  81 mg Oral BID    atorvastatin  40 mg Oral Daily    benzonatate  200 mg Oral TID    busPIRone  7.5 mg Oral QHS    cefTRIAXone (ROCEPHIN) IVPB  1 g Intravenous Q24H    enoxaparin  40 mg Subcutaneous Daily    guaiFENesin  600 mg Oral BID    metoprolol tartrate  25 mg Oral BID    venlafaxine  37.5 mg Oral Daily         INFUSIONS         DIAGNOSTIC TESTS  CT Renal Stone Study ABD Pelvis WO   Final Result      1. No acute abnormality of the abdomen and pelvis.   2. Unchanged bilateral adrenal nodules which are indeterminate.   3. Unchanged eccentric gallbladder wall calcification.   4. Diverticulosis without diverticulitis.   [employer] concordance         Electronically signed by: Ren Gonzalez MD   Date:    03/19/2023    Time:    08:28      CTA Chest Non-Coronary (PE Studies)   Final Result      1.  Limited contrast opacification of pulmonary arterial system.  No definite pulmonary embolism identified.      2.  Clear lungs.         Electronically signed by: Price Pelaez   Date:    03/18/2023   Time:    19:37      X-Ray Chest PA And Lateral   Final Result      No acute cardiopulmonary process identified.         Electronically signed by: Price Pelaez   Date:    03/18/2023   Time:    19:01           No results found for: EF           Case related differential diagnoses have been reviewed; assessment and plan has been documented. I have personally reviewed the labs and test results that are currently available; I have reviewed the patients medication list. I have reviewed the consulting providers recommendations. I have reviewed or attempted to review medical records based upon their availability.  All of the patient's and/or family's questions have been addressed and answered to the best of my ability.  I will continue to monitor closely and make adjustments to medical management as needed.  This document was created using [employer] Fluency Direct.  Transcription errors may have been made.  Please contact me if any questions may rise regarding documentation to clarify transcription.        Thairy G Reyes,    03/20/2023   Internal Medicine

## 2023-03-20 NOTE — PLAN OF CARE
Problem: Adult Inpatient Plan of Care  Goal: Plan of Care Review  Outcome: Ongoing, Progressing  Flowsheets (Taken 3/19/2023 2154)  Plan of Care Reviewed With: patient  Goal: Patient-Specific Goal (Individualized)  Outcome: Ongoing, Progressing  Flowsheets (Taken 3/19/2023 2154)  Individualized Care Needs: pt will not c/o of SOB by 3/20/23 at 0700     Problem: Infection  Goal: Absence of Infection Signs and Symptoms  Outcome: Ongoing, Progressing  Intervention: Prevent or Manage Infection  Flowsheets (Taken 3/19/2023 2154)  Infection Management: aseptic technique maintained     Problem: Breathing Pattern Ineffective  Goal: Effective Breathing Pattern  Outcome: Ongoing, Progressing  Intervention: Promote Improved Breathing Pattern  Flowsheets (Taken 3/19/2023 2154)  Supportive Measures: active listening utilized     Problem: Fall Injury Risk  Goal: Absence of Fall and Fall-Related Injury  Outcome: Ongoing, Progressing  Intervention: Identify and Manage Contributors  Flowsheets (Taken 3/19/2023 2154)  Self-Care Promotion: independence encouraged  Medication Review/Management: medications reviewed     Problem: Mobility Impairment  Goal: Optimal Mobility  Outcome: Ongoing, Progressing  Intervention: Optimize Mobility  Flowsheets (Taken 3/19/2023 2154)  Assistive Device Utilized: walker  Activity Management: Ambulated to bathroom - L4

## 2023-03-20 NOTE — PLAN OF CARE
Problem: Adult Inpatient Plan of Care  Goal: Plan of Care Review  Outcome: Ongoing, Progressing  Goal: Patient-Specific Goal (Individualized)  Outcome: Ongoing, Progressing  Flowsheets (Taken 3/20/2023 0800)  Anxieties, Fears or Concerns: None  Individualized Care Needs: Patient will remain fall free and not c/o SOB by the end of shift 3/20/23 at 1900  Goal: Absence of Hospital-Acquired Illness or Injury  Outcome: Ongoing, Progressing  Goal: Optimal Comfort and Wellbeing  Outcome: Ongoing, Progressing  Goal: Readiness for Transition of Care  Outcome: Ongoing, Progressing  Intervention: Mutually Develop Transition Plan  Flowsheets (Taken 3/20/2023 1821)  Equipment Currently Used at Home:   bedside commode   walker, rolling  Transportation Anticipated: family or friend will provide  Who are your caregiver(s) and their phone number(s)?: Hillary Thomas [phone]  Communicated XENA with patient/caregiver: Date not available/Unable to determine  Do you expect to return to your current living situation?: Yes  Do you have help at home or someone to help you manage your care at home?: Yes  Readmission within 30 days?: No  Do you currently have service(s) that help you manage your care at home?: No     Problem: Bariatric Environmental Safety  Goal: Safety Maintained with Care  Outcome: Ongoing, Progressing  Intervention: Promote Safety and Comfort  Flowsheets (Taken 3/20/2023 0800)  Bariatric Safety: air-displacement transfer device utilized     Problem: Impaired Wound Healing  Goal: Optimal Wound Healing  Outcome: Ongoing, Progressing  Intervention: Promote Wound Healing  Flowsheets (Taken 3/20/2023 0800)  Oral Nutrition Promotion: calorie-dense foods provided     Problem: Infection  Goal: Absence of Infection Signs and Symptoms  Outcome: Ongoing, Progressing     Problem: Breathing Pattern Ineffective  Goal: Effective Breathing Pattern  Outcome: Ongoing, Progressing  Intervention: Promote Improved Breathing  Pattern  Flowsheets (Taken 3/20/2023 0800)  Head of Bed (HOB) Positioning: HOB at 30-45 degrees     Problem: Fall Injury Risk  Goal: Absence of Fall and Fall-Related Injury  Outcome: Ongoing, Progressing     Problem: Mobility Impairment  Goal: Optimal Mobility  Outcome: Ongoing, Progressing  Intervention: Optimize Mobility  Flowsheets (Taken 3/20/2023 0800)  Positioning/Transfer Devices:   pillows   in use

## 2023-03-20 NOTE — CONSULTS
Inpatient consult to Cardiology  Consult performed by: MARION Gautam  Consult ordered by: Thairy G Reyes, DO  Reason for consult: Elevated Troponin    [facility]  Cardiology  Consult Note    Patient Name: Tanya Linda  MRN: [id number]  Admission Date: 3/18/2023  Hospital Length of Stay: 0 days  Code Status: Full Code   Attending Provider: Thairy G Reyes, DO   Consulting Provider: MARION Gautam  Primary Care Physician: MARION Salazar  Principal Problem:SOB (shortness of breath)    Patient information was obtained from patient, ER records, and primary team.     Subjective:     Chief Complaint:  Cough, SOB    HPI: Patient is a 62 year old female unknown to [facility]. History of HTN, obesity, GERD and femur fracture in December who presents to ED with complaints of nonproductive cough x 1 week, chills, fever, SOB.  She reports sick contacts. Patient currently has home health and physical therapy for femur fracture and has been able to participate in PT but over the last week she has been limited in her activity due to SOB.  She denied chest pain, nausea, vomiting but was having abdominal and back pain which she felt was from coughing.  Patient also reported urinary incontinence without dysuria, hematuria. ED work up indicated elevated D-Dimer with resulting CTA of chest negative for PE.She also tested negative for COVID/Flu A&B. CT of abdomen and pelvis were unremarkable. BNP was normal, POC Cardiac troponin 0.33. EKG was NSR with no ST-T wave changes. Troponin levels were trended on 3/19/23: 0.132; 0.093; 0.094.  [facility] has been consulted for elevated troponin.      PMH: HTN, anxiety, back muscle spasms, depression, GERD, Intertrochanteric fracture of the left femur, morbid obesity, personal history of colonic polyps  PSH: Left antegrade intramedullary rodding of femur  Family History: Unknown  Social History: Denies tobacco, ETOH or illegal drug use.    Previous  Cardiac Diagnostics:   None to report      Review of patient's allergies indicates:  No Known Allergies    No current facility-administered medications on file prior to encounter.     Current Outpatient Medications on File Prior to Encounter   Medication Sig    busPIRone (BUSPAR) 7.5 MG tablet Take 1 tablet (7.5 mg total) by mouth every evening.    lisinopriL (PRINIVIL,ZESTRIL) 5 MG tablet Take 5 mg by mouth every evening.    polyethylene glycol (GLYCOLAX) 17 gram PwPk Take 17 g by mouth once daily.    venlafaxine (EFFEXOR) 37.5 MG Tab Take 1 tablet (37.5 mg total) by mouth Daily.    aspirin (ECOTRIN) 81 MG EC tablet Take 1 tablet (81 mg total) by mouth 2 (two) times a day.    HYDROcodone-acetaminophen (NORCO) 5-325 mg per tablet Take 1 tablet by mouth every 6 (six) hours as needed for Pain. (Patient not taking: Reported on 3/13/2023)    ibuprofen (ADVIL,MOTRIN) 800 MG tablet Take 1 tablet (800 mg total) by mouth 3 (three) times daily as needed for Pain.    methocarbamoL (ROBAXIN) 500 MG Tab Take 500 mg by mouth.     Family History    None       Tobacco Use    Smoking status: Never    Smokeless tobacco: Never   Substance and Sexual Activity    Alcohol use: Never    Drug use: Not on file    Sexual activity: Not on file       Review of Systems   All other systems reviewed and are negative.    Objective:     Vital Signs (Most Recent):  Temp: 97.7 °F (36.5 °C) (03/20/23 0714)  Pulse: 74 (03/20/23 0714)  Resp: 18 (03/20/23 0402)  BP: (!) 151/79 (03/20/23 0714)  SpO2: 99 % (03/20/23 0714)   Vital Signs (24h Range):  Temp:  [97.6 °F (36.4 °C)-99.1 °F (37.3 °C)] 97.7 °F (36.5 °C)  Pulse:  [67-84] 74  Resp:  [18] 18  SpO2:  [95 %-99 %] 99 %  BP: (108-164)/(71-79) 151/79     Weight: (!) 153 kg (337 lb 4.9 oz)  Body mass index is 56.13 kg/m².    SpO2: 99 %         Intake/Output Summary (Last 24 hours) at 3/20/2023 0804  Last data filed at 3/19/2023 1750  Gross per 24 hour   Intake 1000 ml   Output --   Net 1000 ml        Lines/Drains/Airways       Peripheral Intravenous Line  Duration                  Peripheral IV - Single Lumen 03/18/23 1810 20 G Left Antecubital 1 day                    Significant Labs:  Recent Results (from the past 72 hour(s))   Troponin ISTAT    Collection Time: 03/18/23 11:01 AM   Result Value Ref Range    POC Cardiac Troponin I 0.33 (H) 0.00 - 0.08 ng/mL    Sample unknown    COVID/FLU A&B PCR    Collection Time: 03/18/23  5:25 PM   Result Value Ref Range    Influenza A PCR Not Detected Not Detected    Influenza B PCR Not Detected Not Detected    SARS-CoV-2 PCR Not Detected Not Detected, Negative, Invalid   Comprehensive Metabolic Panel    Collection Time: 03/18/23  6:13 PM   Result Value Ref Range    Sodium Level 135 (L) 136 - 145 mmol/L    Potassium Level 3.5 3.5 - 5.1 mmol/L    Chloride 100 98 - 107 mmol/L    Carbon Dioxide 24 23 - 31 mmol/L    Glucose Level 96 82 - 115 mg/dL    Blood Urea Nitrogen 16.4 9.8 - 20.1 mg/dL    Creatinine 1.14 (H) 0.55 - 1.02 mg/dL    Calcium Level Total 9.6 8.4 - 10.2 mg/dL    Protein Total 7.7 (H) 5.8 - 7.6 gm/dL    Albumin Level 2.8 (L) 3.4 - 4.8 g/dL    Globulin 4.9 (H) 2.4 - 3.5 gm/dL    Albumin/Globulin Ratio 0.6 (L) 1.1 - 2.0 ratio    Bilirubin Total 0.7 <=1.5 mg/dL    Alkaline Phosphatase 98 40 - 150 unit/L    Alanine Aminotransferase 24 0 - 55 unit/L    Aspartate Aminotransferase 26 5 - 34 unit/L    eGFR 55 mls/min/1.73/m2   D dimer, quantitative    Collection Time: 03/18/23  6:13 PM   Result Value Ref Range    D-Dimer 4.02 (H) 0.00 - 0.50 ug/mL FEU   Lactic acid, plasma #1    Collection Time: 03/18/23  6:13 PM   Result Value Ref Range    Lactic Acid Level 1.5 0.5 - 2.2 mmol/L   Magnesium    Collection Time: 03/18/23  6:13 PM   Result Value Ref Range    Magnesium Level 1.90 1.60 - 2.60 mg/dL   Phosphorus    Collection Time: 03/18/23  6:13 PM   Result Value Ref Range    Phosphorus Level 2.7 2.3 - 4.7 mg/dL   CBC with Differential    Collection Time: 03/18/23   6:13 PM   Result Value Ref Range    WBC 14.9 (H) 4.5 - 11.5 x10(3)/mcL    RBC 4.08 (L) 4.20 - 5.40 x10(6)/mcL    Hgb 10.6 (L) 12.0 - 16.0 g/dL    Hct 33.9 (L) 37.0 - 47.0 %    MCV 83.1 80.0 - 94.0 fL    MCH 26.0 pg    MCHC 31.3 (L) 33.0 - 36.0 g/dL    RDW 15.1 11.5 - 17.0 %    Platelet 262 130 - 400 x10(3)/mcL    MPV 11.1 (H) 7.4 - 10.4 fL    Neut % 82.1 %    Lymph % 9.5 %    Mono % 7.7 %    Eos % 0.3 %    Basophil % 0.1 %    Lymph # 1.41 0.6 - 4.6 x10(3)/mcL    Neut # 12.18 (H) 2.1 - 9.2 x10(3)/mcL    Mono # 1.15 0.1 - 1.3 x10(3)/mcL    Eos # 0.05 0 - 0.9 x10(3)/mcL    Baso # 0.02 0 - 0.2 x10(3)/mcL    IG# 0.04 0 - 0.04 x10(3)/mcL    IG% 0.3 %   Urinalysis, Reflex to Urine Culture    Collection Time: 03/18/23  6:17 PM    Specimen: Urine   Result Value Ref Range    Color, UA Yellow Yellow, Light-Yellow, Dark Yellow, Sandra, Straw    Appearance, UA Cloudy (A) Clear    Specific Gravity, UA 1.015     pH, UA 7.0 5.0 - 8.5    Protein,  (A) Negative mg/dL    Glucose, UA Negative Negative, Normal mg/dL    Ketones, UA Negative Negative mg/dL    Blood, UA Moderate (A) Negative unit/L    Bilirubin, UA Negative Negative mg/dL    Urobilinogen, UA 0.2 0.2, 1.0, Normal mg/dL    Nitrites, UA Negative Negative    Leukocyte Esterase, UA Large (A) Negative unit/L   Urinalysis, Microscopic    Collection Time: 03/18/23  6:17 PM   Result Value Ref Range    Bacteria, UA Few (A) None Seen, Rare, Occasional /HPF    RBC, UA 21-50 (A) None Seen, 0-2, 3-5, 0-5 /HPF    WBC, UA >=100 (A) None Seen, 0-2, 3-5, 0-5 /HPF    Squamous Epithelial Cells, UA Few (A) None Seen, Rare, Occasional, Occ /HPF   BNP    Collection Time: 03/18/23  6:18 PM   Result Value Ref Range    Natriuretic Peptide 94.6 <=100.0 pg/mL   Basic Metabolic Panel    Collection Time: 03/19/23  7:50 AM   Result Value Ref Range    Sodium Level 136 136 - 145 mmol/L    Potassium Level 4.1 3.5 - 5.1 mmol/L    Chloride 102 98 - 107 mmol/L    Carbon Dioxide 22 (L) 23 - 31 mmol/L     Glucose Level 123 (H) 82 - 115 mg/dL    Blood Urea Nitrogen 16.0 9.8 - 20.1 mg/dL    Creatinine 1.17 (H) 0.55 - 1.02 mg/dL    BUN/Creatinine Ratio 14     Calcium Level Total 9.2 8.4 - 10.2 mg/dL    Anion Gap 12.0 mEq/L    eGFR 54 mls/min/1.73/m2   CBC with Differential    Collection Time: 03/19/23  7:50 AM   Result Value Ref Range    WBC 11.1 4.5 - 11.5 x10(3)/mcL    RBC 3.64 (L) 4.20 - 5.40 x10(6)/mcL    Hgb 9.5 (L) 12.0 - 16.0 g/dL    Hct 31.0 (L) 37.0 - 47.0 %    MCV 85.2 80.0 - 94.0 fL    MCH 26.1 pg    MCHC 30.6 (L) 33.0 - 36.0 g/dL    RDW 15.3 11.5 - 17.0 %    Platelet 202 130 - 400 x10(3)/mcL    MPV 11.6 (H) 7.4 - 10.4 fL    Neut % 77.4 %    Lymph % 10.1 %    Mono % 11.4 %    Eos % 0.6 %    Basophil % 0.1 %    Lymph # 1.12 0.6 - 4.6 x10(3)/mcL    Neut # 8.60 2.1 - 9.2 x10(3)/mcL    Mono # 1.27 0.1 - 1.3 x10(3)/mcL    Eos # 0.07 0 - 0.9 x10(3)/mcL    Baso # 0.01 0 - 0.2 x10(3)/mcL    IG# 0.05 (H) 0 - 0.04 x10(3)/mcL    IG% 0.4 %   Respiratory Panel    Collection Time: 03/19/23  1:04 PM   Result Value Ref Range    Adenovirus Not Detected Not Detected    Coronavirus 229E Not Detected Not Detected    Coronavirus HKU1 Not Detected Not Detected    Coronavirus NL63 Not Detected Not Detected    Coronavirus OC43 PCR, Common Cold Virus Not Detected Not Detected    Human Metapneumovirus Not Detected Not Detected    Parainfluenza Virus 1 Not Detected Not Detected    Parainfluenza Virus 2 Not Detected Not Detected    Parainfluenza Virus 3 Not Detected Not Detected    Parainfluenza Virus 4 Detected (A) Not Detected    Bordetella pertussis (ptxP) Not Detected Not Detected    Chlamydia pneumoniae Not Detected Not Detected    Mycoplasma pneumoniae Not Detected Not Detected    Human Rhinovirus/Enterovirus Not Detected Not Detected    Bordetella parapertussis (YP8607) Not Detected Not Detected   Troponin I    Collection Time: 03/19/23  1:57 PM   Result Value Ref Range    Troponin-I 0.132 (H) 0.000 - 0.045 ng/mL   Troponin I     Collection Time: 03/19/23  7:54 PM   Result Value Ref Range    Troponin-I 0.093 (H) 0.000 - 0.045 ng/mL   Troponin I    Collection Time: 03/20/23  1:42 AM   Result Value Ref Range    Troponin-I 0.094 (H) 0.000 - 0.045 ng/mL   POCT glucose    Collection Time: 03/20/23  5:06 AM   Result Value Ref Range    POCT Glucose 95 70 - 110 mg/dL       Significant Imaging:  Imaging Results              CT Renal Stone Study ABD Pelvis WO (Final result)  Result time 03/19/23 08:28:23      Final result by Ren Gonzalez MD (03/19/23 08:28:23)                   Impression:      1. No acute abnormality of the abdomen and pelvis.  2. Unchanged bilateral adrenal nodules which are indeterminate.  3. Unchanged eccentric gallbladder wall calcification.  4. Diverticulosis without diverticulitis.  [facility] concordance      Electronically signed by: Ren Gonzalez MD  Date:    03/19/2023  Time:    08:28               Narrative:    EXAMINATION:  CT RENAL STONE STUDY ABD PELVIS WO    CLINICAL HISTORY:  sepsis;    TECHNIQUE:  Axial images of the abdomen and pelvis were obtained without IV contrast administration.  Coronal and sagittal reconstructions were provided.  Three dimensional and MIP images were obtained and evaluated.  Total DLP was 1648.61 mGy-cm. Dose lowering technique and automated exposure control were utilized for this exam.    COMPARISON:  CT of the chest abdomen and pelvis 05/13/2022.    FINDINGS:  The bilateral lung bases are clear.  The heart is normal in size.    The liver is homogeneous in attenuation.  There is a calcification in the gallbladder wall which is unchanged from the prior exam.  The spleen, pancreas, and bilateral kidneys are normal.  There is excreted contrast in the collecting system.  There are bilateral adrenal nodules which are unchanged from the prior exam.    The stomach and small bowel are decompressed.  There is no bowel obstruction.  The appendix is normal.  There is colonic diverticulosis without  diverticulitis.  The uterus and adnexa are normal for age.  The urinary bladder is normal.  There is no pelvic or retroperitoneal lymphadenopathy.  The aorta is nonaneurysmal.  There is no lytic or blastic osseous lesion.                        Preliminary result by Ren Gonzalez MD (03/18/23 20:42:09)                   Narrative:    START OF REPORT:  Technique: CT of the abdomen and pelvis was performed with axial images as well as sagittal and coronal reconstruction images without intravenous contrast renal stone protocol.    Comparison: None available.    Clinical History: Admit DR asked for scan prior to admit.    Dosage Information: Automated Exposure Control was utilized 1648.61 mGy.cm.    Findings:  Lines and Tubes: None.  Thorax:  Lungs: The visualized lung bases appear unremarkable.  Pleura: No effusions or thickening.  Heart: The heart size is within normal limits.  Abdomen:  Abdominal Wall: There medium sized to large uncomplicated umbilical hernia which contains mesenteric fat.  Liver: The liver appears unremarkable.  Biliary System: No extrahepatic biliary duct dilatation is seen.  Gallbladder: The gallbladder appears unremarkable with note of minimal wall calcifications.  Pancreas: The pancreas appears unremarkable.  Spleen: The spleen appears unremarkable.  Adrenals: There are low attenuation (HU up to 13) masses in the lateral limb of the left adrenal gland and medial limb of the right adrenal gland as seen in series 3 image 30 measuring 3.5 x 2.7 cm and 2.9 x 1.9 cm, respectively. Correlate with clinical and laboratory parameters as regards additional evaluation and follow up.  Kidneys: The kidneys appear unremarkable with no stones cysts masses or hydronephrosis.  Aorta: The abdominal aorta appears unremarkable.  IVC: Unremarkable.  Bowel:  Esophagus: The visualized esophagus appears unremarkable.  Stomach: The stomach appears unremarkable.  Duodenum: Unremarkable appearing duodenum.  Small Bowel:  The small bowel appears unremarkable.  Colon: Nondistended. A few diverticula are seen in the sigmoid colon. No associated inflammatory stranding or pericolonic fluid is seen to suggest diverticulitis.  Appendix: The appendix is not identified but no inflammatory changes are seen in the right lower quadrant to suggest appendicitis.  Peritoneum: No intraperitoneal free air or ascites is seen.    Pelvis:  Bladder: The bladder is nondistended but appears otherwise unremarkable.  Female:  Uterus: The uterus appears unremarkable.  Ovaries: A small cystic focus is seen in the right ovary. Recommend ultrasound correlation for further evaluation if clinically warranted. The left ovary is unremarkable.    Bony structures:  Dorsal Spine: There is mild spondylosis of the visualized dorsal spine. There is grade I anterior listhesis of L4 on L5.  Bony Pelvis: Degenerative change is noted in the bilateral hip joints.      Impression:  1. No acute intraabdominal or pelvic solid organ or bowel pathology identified. Details and other findings as discussed above.                          Preliminary result by Carlos Licona MD (03/18/23 20:42:09)                   Narrative:    START OF REPORT:  Technique: CT of the abdomen and pelvis was performed with axial images as well as sagittal and coronal reconstruction images without intravenous contrast renal stone protocol.    Comparison: None available.    Clinical History: Admit DR asked for scan prior to admit.    Dosage Information: Automated Exposure Control was utilized 1648.61 mGy.cm.    Findings:  Lines and Tubes: None.  Thorax:  Lungs: The visualized lung bases appear unremarkable.  Pleura: No effusions or thickening.  Heart: The heart size is within normal limits.  Abdomen:  Abdominal Wall: There medium sized to large uncomplicated umbilical hernia which contains mesenteric fat.  Liver: The liver appears unremarkable.  Biliary System: No extrahepatic biliary duct dilatation is  seen.  Gallbladder: The gallbladder appears unremarkable with note of minimal wall calcifications.  Pancreas: The pancreas appears unremarkable.  Spleen: The spleen appears unremarkable.  Adrenals: There are low attenuation (HU up to 13) masses in the lateral limb of the left adrenal gland and medial limb of the right adrenal gland as seen in series 3 image 30 measuring 3.5 x 2.7 cm and 2.9 x 1.9 cm, respectively. Correlate with clinical and laboratory parameters as regards additional evaluation and follow up.  Kidneys: The kidneys appear unremarkable with no stones cysts masses or hydronephrosis.  Aorta: The abdominal aorta appears unremarkable.  IVC: Unremarkable.  Bowel:  Esophagus: The visualized esophagus appears unremarkable.  Stomach: The stomach appears unremarkable.  Duodenum: Unremarkable appearing duodenum.  Small Bowel: The small bowel appears unremarkable.  Colon: Nondistended. A few diverticula are seen in the sigmoid colon. No associated inflammatory stranding or pericolonic fluid is seen to suggest diverticulitis.  Appendix: The appendix is not identified but no inflammatory changes are seen in the right lower quadrant to suggest appendicitis.  Peritoneum: No intraperitoneal free air or ascites is seen.    Pelvis:  Bladder: The bladder is nondistended but appears otherwise unremarkable.  Female:  Uterus: The uterus appears unremarkable.  Ovaries: A small cystic focus is seen in the right ovary. Recommend ultrasound correlation for further evaluation if clinically warranted. The left ovary is unremarkable.    Bony structures:  Dorsal Spine: There is mild spondylosis of the visualized dorsal spine. There is grade I anterior listhesis of L4 on L5.  Bony Pelvis: Degenerative change is noted in the bilateral hip joints.      Impression:  1. No acute intraabdominal or pelvic solid organ or bowel pathology identified. Details and other findings as discussed above.                                         CTA  Chest Non-Coronary (PE Studies) (Final result)  Result time 03/18/23 19:37:55      Final result by Price Pelaez MD (03/18/23 19:37:55)                   Impression:      1.  Limited contrast opacification of pulmonary arterial system.  No definite pulmonary embolism identified.    2.  Clear lungs.      Electronically signed by: Price Pelaez  Date:    03/18/2023  Time:    19:37               Narrative:    EXAMINATION:  CTA CHEST NON CORONARY (PE STUDIES)    CLINICAL HISTORY:  Pulmonary embolism (PE) suspected, positive D-dimer;    TECHNIQUE:  Sequential axial images performed of the chest using pulmonary embolism protocol following intravenous contrast bolus. Sagittal and contrast reformations performed.    Dose product length of 1001 mGycm. Automated exposure control was utilized to minimize radiation dose.    COMPARISON:  CT chest May 13, 2022    FINDINGS:  There is less than optimal contrast opacification of pulmonary arterial system and the images are also degraded by misregistration.  Considering these limitations, there are no definite filling defects from pulmonary thromboembolism within the main pulmonary artery and the major branches. Thoracic aorta maintains unremarkable course and diameter.  Cardiac chambers are upper limits of normal.  No enlarged chest lymph nodes.    Lungs hypoventilatory changes without groundglass pneumonitis or pulmonary venous hypertension. There are no pulmonary consolidations. The pleural and pericardial spaces are without fluid accumulation.    There is stable size of bilateral adrenal gland nodules which are favored to represent benign adenomas.  Right gland nodule measures 2.5 cm and the left 3.0 cm.  Thoracic mild kyphoscoliosis and degenerative changes.                                       X-Ray Chest PA And Lateral (Final result)  Result time 03/18/23 19:01:23      Final result by Price Pelaez MD (03/18/23 19:01:23)                   Impression:      No acute  cardiopulmonary process identified.      Electronically signed by: Price Pelaez  Date:    03/18/2023  Time:    19:01               Narrative:    EXAMINATION:  XR CHEST PA AND LATERAL    CLINICAL HISTORY:  Shortness of breath    TECHNIQUE:  Two-view    COMPARISON:  December 24, 2022.    FINDINGS:  Cardiopericardial silhouette is within normal limits.  Lungs show hyperinflation without dense focal or segmental consolidation, congestion, pleural effusion or pneumothorax.                                      EKG:  No results found for this visit on 03/18/23.      Physical Exam  Vitals reviewed.   Constitutional:       Appearance: Normal appearance. She is obese.   HENT:      Head: Normocephalic and atraumatic.      Mouth/Throat:      Mouth: Mucous membranes are moist.      Pharynx: Oropharynx is clear.   Eyes:      Conjunctiva/sclera: Conjunctivae normal.      Pupils: Pupils are equal, round, and reactive to light.   Cardiovascular:      Rate and Rhythm: Normal rate and regular rhythm.      Pulses: Normal pulses.      Heart sounds: Normal heart sounds.   Pulmonary:      Effort: Pulmonary effort is normal.      Breath sounds: Normal breath sounds.   Abdominal:      General: There is no distension.      Palpations: Abdomen is soft.   Musculoskeletal:         General: Normal range of motion.      Cervical back: Normal range of motion and neck supple.      Right lower leg: No edema.      Left lower leg: No edema.   Skin:     General: Skin is warm and dry.   Neurological:      General: No focal deficit present.      Mental Status: She is alert and oriented to person, place, and time.   Psychiatric:         Mood and Affect: Mood normal.         Behavior: Behavior normal.         Thought Content: Thought content normal.         Judgment: Judgment normal.       Home Medications:   No current facility-administered medications on file prior to encounter.     Current Outpatient Medications on File Prior to Encounter   Medication  Sig Dispense Refill    busPIRone (BUSPAR) 7.5 MG tablet Take 1 tablet (7.5 mg total) by mouth every evening. 30 tablet 11    lisinopriL (PRINIVIL,ZESTRIL) 5 MG tablet Take 5 mg by mouth every evening.      polyethylene glycol (GLYCOLAX) 17 gram PwPk Take 17 g by mouth once daily.  0    venlafaxine (EFFEXOR) 37.5 MG Tab Take 1 tablet (37.5 mg total) by mouth Daily. 30 tablet 11    aspirin (ECOTRIN) 81 MG EC tablet Take 1 tablet (81 mg total) by mouth 2 (two) times a day. 60 tablet 0    HYDROcodone-acetaminophen (NORCO) 5-325 mg per tablet Take 1 tablet by mouth every 6 (six) hours as needed for Pain. (Patient not taking: Reported on 3/13/2023) 10 tablet 0    ibuprofen (ADVIL,MOTRIN) 800 MG tablet Take 1 tablet (800 mg total) by mouth 3 (three) times daily as needed for Pain. 90 tablet 6    methocarbamoL (ROBAXIN) 500 MG Tab Take 500 mg by mouth.         Current Inpatient Medications:    Current Facility-Administered Medications:     acetaminophen tablet 650 mg, 650 mg, Oral, Q4H PRN, Thairy G Reyes, DO, 650 mg at 03/19/23 2355    aluminum-magnesium hydroxide-simethicone 200-200-20 mg/5 mL suspension 30 mL, 30 mL, Oral, QID PRN, Thairy G Reyes, DO    aspirin EC tablet 81 mg, 81 mg, Oral, BID, Thairy G Reyes, DO, 81 mg at 03/19/23 2016    benzonatate capsule 200 mg, 200 mg, Oral, TID, Jeffry G Reyes, DO, 200 mg at 03/19/23 2016    bisacodyL suppository 10 mg, 10 mg, Rectal, Daily PRN, Thairy G Reyes, DO    busPIRone tablet 7.5 mg, 7.5 mg, Oral, QHS, Jeffry G Reyes, DO, 7.5 mg at 03/19/23 2016    cefTRIAXone (ROCEPHIN) 1 g in dextrose 5 % in water (D5W) 5 % 50 mL IVPB (MB+), 1 g, Intravenous, Q24H, Thairy G Reyes, DO, Stopped at 03/19/23 2026    dextrose 50% injection 12.5 g, 12.5 g, Intravenous, PRN, Thairy G Reyes, DO    dextrose 50% injection 25 g, 25 g, Intravenous, PRN, Thairy G Reyes, DO    enoxaparin injection 40 mg, 40 mg, Subcutaneous, Daily, Thairy G Reyes, DO, 40 mg at 03/19/23 1742    glucagon (human  recombinant) injection 1 mg, 1 mg, Intramuscular, PRN, Xury G Reyes, DO    glucose chewable tablet 16 g, 16 g, Oral, PRN, Xury G Reyes, DO    glucose chewable tablet 24 g, 24 g, Oral, PRN, Xury G Reyes, DO    guaiFENesin 12 hr tablet 600 mg, 600 mg, Oral, BID, Xury G Reyes, DO, 600 mg at 03/19/23 2016    HYDROcodone-acetaminophen 5-325 mg per tablet 1 tablet, 1 tablet, Oral, Q6H PRN, Thairy G Reyes, DO    levalbuterol nebulizer solution 1.25 mg, 1.25 mg, Nebulization, Q6H PRN, Xury G Reyes, DO    lisinopriL tablet 5 mg, 5 mg, Oral, QHS, Xury G Reyes, DO, 5 mg at 03/19/23 2016    melatonin tablet 9 mg, 9 mg, Oral, Nightly PRN, Jeffry G Reyes, DO    morphine injection 2 mg, 2 mg, Intravenous, Q6H PRN, Xury G Reyes, DO    naloxone 0.4 mg/mL injection 0.02 mg, 0.02 mg, Intravenous, PRN, Xury G Reyes, DO    ondansetron disintegrating tablet 8 mg, 8 mg, Oral, Q8H PRN, Xury G Reyes, DO    ondansetron injection 4 mg, 4 mg, Intravenous, Q8H PRN, Xury G Reyes, DO    polyethylene glycol packet 17 g, 17 g, Oral, TID PRN, Xury G Reyes, DO    simethicone chewable tablet 80 mg, 1 tablet, Oral, QID PRN, Xury G Reyes, DO    sodium chloride 0.9% flush 10 mL, 10 mL, Intravenous, PRN, Xury G Reyes, DO    venlafaxine tablet 37.5 mg, 37.5 mg, Oral, Daily, Jeffry G Reyes, DO, 37.5 mg at 03/19/23 1742         VTE Risk Mitigation (From admission, onward)           Ordered     enoxaparin injection 40 mg  Daily         03/19/23 1033     IP VTE HIGH RISK PATIENT  Once         03/19/23 1033     Place sequential compression device  Until discontinued         03/18/23 2344                    Assessment:   Elevated troponin  --peak 0.132  --trending down  HTN  --some elevated BP  --Home medication--lisinopril 5 mg daily  Dyspnea  --CTA of chest normal, no PE; lungs are clear  UTI, possible  --treated empirically on ceftriaxone  Adrenal nodules  Gallbladder calcification      Plan:   Repeat EKG  Continue ASA 81  mg  Continue Lovenox 40 mg SQ daily   Discontinue Lisinopril, may be contributing factor for non-productive cough  Start metoprolol tartrate 25 mg po BID  Start atorvastatin 40 mg po daily  Echocardiogram  Labs HgA1C and lipid panel  Recommend outpatient sleep study for possible YANETH  Follow-up with MANSOOR Negrete, 1-2 weeks after discharge for further cardiac work-up    Thank you for your consult.     MARION Gautam  Cardiology  [facility]  03/20/2023 8:04 AM

## 2023-03-21 VITALS
OXYGEN SATURATION: 96 % | TEMPERATURE: 98 F | HEART RATE: 73 BPM | SYSTOLIC BLOOD PRESSURE: 134 MMHG | HEIGHT: 65 IN | BODY MASS INDEX: 48.82 KG/M2 | WEIGHT: 293 LBS | RESPIRATION RATE: 18 BRPM | DIASTOLIC BLOOD PRESSURE: 70 MMHG

## 2023-03-21 LAB — POCT GLUCOSE: 96 MG/DL (ref 70–110)

## 2023-03-21 PROCEDURE — 25000003 PHARM REV CODE 250: Performed by: STUDENT IN AN ORGANIZED HEALTH CARE EDUCATION/TRAINING PROGRAM

## 2023-03-21 PROCEDURE — 25000003 PHARM REV CODE 250: Performed by: NURSE PRACTITIONER

## 2023-03-21 PROCEDURE — 94760 N-INVAS EAR/PLS OXIMETRY 1: CPT

## 2023-03-21 PROCEDURE — G0378 HOSPITAL OBSERVATION PER HR: HCPCS

## 2023-03-21 RX ORDER — METOPROLOL TARTRATE 25 MG/1
25 TABLET, FILM COATED ORAL 2 TIMES DAILY
Qty: 60 TABLET | Refills: 0 | Status: SHIPPED | OUTPATIENT
Start: 2023-03-21 | End: 2023-05-08 | Stop reason: SDUPTHER

## 2023-03-21 RX ORDER — ATORVASTATIN CALCIUM 40 MG/1
40 TABLET, FILM COATED ORAL DAILY
Qty: 30 TABLET | Refills: 0 | Status: SHIPPED | OUTPATIENT
Start: 2023-03-22 | End: 2023-05-08 | Stop reason: SDUPTHER

## 2023-03-21 RX ORDER — BENZONATATE 200 MG/1
200 CAPSULE ORAL 3 TIMES DAILY
Qty: 30 CAPSULE | Refills: 0 | Status: SHIPPED | OUTPATIENT
Start: 2023-03-21 | End: 2023-03-31

## 2023-03-21 RX ORDER — NITROFURANTOIN 25; 75 MG/1; MG/1
100 CAPSULE ORAL 2 TIMES DAILY
Qty: 14 CAPSULE | Refills: 0 | Status: SHIPPED | OUTPATIENT
Start: 2023-03-21 | End: 2023-03-22 | Stop reason: ALTCHOICE

## 2023-03-21 RX ORDER — GUAIFENESIN 600 MG/1
600 TABLET, EXTENDED RELEASE ORAL 2 TIMES DAILY
Qty: 20 TABLET | Refills: 0 | Status: SHIPPED | OUTPATIENT
Start: 2023-03-21 | End: 2023-03-31

## 2023-03-21 RX ORDER — ASPIRIN 81 MG/1
81 TABLET ORAL 2 TIMES DAILY
Qty: 60 TABLET | Refills: 0 | Status: SHIPPED | OUTPATIENT
Start: 2023-03-21 | End: 2023-05-08 | Stop reason: SDUPTHER

## 2023-03-21 RX ADMIN — BENZONATATE 200 MG: 100 CAPSULE ORAL at 03:03

## 2023-03-21 RX ADMIN — GUAIFENESIN 600 MG: 600 TABLET, EXTENDED RELEASE ORAL at 08:03

## 2023-03-21 RX ADMIN — BENZONATATE 200 MG: 100 CAPSULE ORAL at 08:03

## 2023-03-21 RX ADMIN — ATORVASTATIN CALCIUM 40 MG: 40 TABLET, FILM COATED ORAL at 08:03

## 2023-03-21 RX ADMIN — METOPROLOL TARTRATE 25 MG: 25 TABLET, FILM COATED ORAL at 08:03

## 2023-03-21 RX ADMIN — ASPIRIN 81 MG: 81 TABLET, COATED ORAL at 08:03

## 2023-03-21 NOTE — DISCHARGE INSTRUCTIONS
Please follow all discharge instructions as given. KEEP ALL FOLLOW UP APPOINTMENTS!        If you experience any worsening or NEW signs or symptoms please call your primary care provider or head to your nearest emergency department.           THANK YOU FOR CHOOSING [facility].  If you have any questions please call [phone].

## 2023-03-21 NOTE — NURSING
D/C instructions given to patient. All belongings given to patient. Patient wheeled to private vehicle by hospital staff.

## 2023-03-21 NOTE — PROGRESS NOTES
[facility]  Cardiology  Progress Note    Patient Name: Tanya Linda  MRN: [id number]  Admission Date: 3/18/2023  Hospital Length of Stay: 0 days  Code Status: Full Code   Attending Physician: Thairy G Reyes, DO   Primary Care Physician: MARION Salazar  Expected Discharge Date:   Principal Problem:SOB (shortness of breath)    Subjective:     Brief HPI: Patient is a 62 year old female unknown to [facility]. History of HTN, obesity, GERD and femur fracture in December who presents to ED with complaints of nonproductive cough x 1 week, chills, fever, SOB.  She reports sick contacts. Patient currently has home health and physical therapy for femur fracture and has been able to participate in PT but over the last week she has been limited in her activity due to SOB.  She denied chest pain, nausea, vomiting but was having abdominal and back pain which she felt was from coughing.  Patient also reported urinary incontinence without dysuria, hematuria. ED work up indicated elevated D-Dimer with resulting CTA of chest negative for PE.She also tested negative for COVID/Flu A&B. CT of abdomen and pelvis were unremarkable. BNP was normal, POC Cardiac troponin 0.33. EKG was NSR with no ST-T wave changes. Troponin levels were trended on 3/19/23: 0.132; 0.093; 0.094.  [facility] has been consulted for elevated troponin.        PMH: HTN, anxiety, back muscle spasms, depression, GERD, Intertrochanteric fracture of the left femur, morbid obesity, personal history of colonic polyps  PSH: Left antegrade intramedullary rodding of femur  Family History: Unknown  Social History: Denies tobacco, ETOH or illegal drug use.     Previous Cardiac Diagnostics:   Echo (3/21/23):  The left ventricle is normal in size with mild concentric hypertrophy and normal systolic function.  Normal left ventricular diastolic function.  The estimated ejection fraction is 60%.  Normal right ventricular size with normal right ventricular  "systolic function.  Normal central venous pressure (3 mmHg).    Hospital Course:   3.21.23:  NAD.  "Feeling fine".  Normal results from Echo on 3.20.23.  BP controlled.  O2 98% on RA.    Review of Systems   All other systems reviewed and are negative.    Objective:     Vital Signs (Most Recent):  Temp: 98.1 °F (36.7 °C) (03/20/23 2016)  Pulse: 84 (03/20/23 2016)  Resp: 18 (03/20/23 2016)  BP: 129/83 (03/20/23 2016)  SpO2: 98 % (03/20/23 2016) Vital Signs (24h Range):  Temp:  [97.6 °F (36.4 °C)-98.1 °F (36.7 °C)] 98.1 °F (36.7 °C)  Pulse:  [71-84] 84  Resp:  [18] 18  SpO2:  [98 %-99 %] 98 %  BP: (129-151)/(72-83) 129/83     Weight: (!) 153 kg (337 lb 4.9 oz)  Body mass index is 56.13 kg/m².    SpO2: 98 %         Intake/Output Summary (Last 24 hours) at 3/21/2023 0745  Last data filed at 3/20/2023 1831  Gross per 24 hour   Intake 600 ml   Output --   Net 600 ml       Lines/Drains/Airways       Peripheral Intravenous Line  Duration                  Peripheral IV - Single Lumen 03/18/23 1810 20 G Left Antecubital 2 days                    Significant Labs:   Recent Results (from the past 72 hour(s))   Troponin ISTAT    Collection Time: 03/18/23 11:01 AM   Result Value Ref Range    POC Cardiac Troponin I 0.33 (H) 0.00 - 0.08 ng/mL    Sample unknown    COVID/FLU A&B PCR    Collection Time: 03/18/23  5:25 PM   Result Value Ref Range    Influenza A PCR Not Detected Not Detected    Influenza B PCR Not Detected Not Detected    SARS-CoV-2 PCR Not Detected Not Detected, Negative, Invalid   Comprehensive Metabolic Panel    Collection Time: 03/18/23  6:13 PM   Result Value Ref Range    Sodium Level 135 (L) 136 - 145 mmol/L    Potassium Level 3.5 3.5 - 5.1 mmol/L    Chloride 100 98 - 107 mmol/L    Carbon Dioxide 24 23 - 31 mmol/L    Glucose Level 96 82 - 115 mg/dL    Blood Urea Nitrogen 16.4 9.8 - 20.1 mg/dL    Creatinine 1.14 (H) 0.55 - 1.02 mg/dL    Calcium Level Total 9.6 8.4 - 10.2 mg/dL    Protein Total 7.7 (H) 5.8 - 7.6 " gm/dL    Albumin Level 2.8 (L) 3.4 - 4.8 g/dL    Globulin 4.9 (H) 2.4 - 3.5 gm/dL    Albumin/Globulin Ratio 0.6 (L) 1.1 - 2.0 ratio    Bilirubin Total 0.7 <=1.5 mg/dL    Alkaline Phosphatase 98 40 - 150 unit/L    Alanine Aminotransferase 24 0 - 55 unit/L    Aspartate Aminotransferase 26 5 - 34 unit/L    eGFR 55 mls/min/1.73/m2   D dimer, quantitative    Collection Time: 03/18/23  6:13 PM   Result Value Ref Range    D-Dimer 4.02 (H) 0.00 - 0.50 ug/mL FEU   Lactic acid, plasma #1    Collection Time: 03/18/23  6:13 PM   Result Value Ref Range    Lactic Acid Level 1.5 0.5 - 2.2 mmol/L   Magnesium    Collection Time: 03/18/23  6:13 PM   Result Value Ref Range    Magnesium Level 1.90 1.60 - 2.60 mg/dL   Phosphorus    Collection Time: 03/18/23  6:13 PM   Result Value Ref Range    Phosphorus Level 2.7 2.3 - 4.7 mg/dL   CBC with Differential    Collection Time: 03/18/23  6:13 PM   Result Value Ref Range    WBC 14.9 (H) 4.5 - 11.5 x10(3)/mcL    RBC 4.08 (L) 4.20 - 5.40 x10(6)/mcL    Hgb 10.6 (L) 12.0 - 16.0 g/dL    Hct 33.9 (L) 37.0 - 47.0 %    MCV 83.1 80.0 - 94.0 fL    MCH 26.0 pg    MCHC 31.3 (L) 33.0 - 36.0 g/dL    RDW 15.1 11.5 - 17.0 %    Platelet 262 130 - 400 x10(3)/mcL    MPV 11.1 (H) 7.4 - 10.4 fL    Neut % 82.1 %    Lymph % 9.5 %    Mono % 7.7 %    Eos % 0.3 %    Basophil % 0.1 %    Lymph # 1.41 0.6 - 4.6 x10(3)/mcL    Neut # 12.18 (H) 2.1 - 9.2 x10(3)/mcL    Mono # 1.15 0.1 - 1.3 x10(3)/mcL    Eos # 0.05 0 - 0.9 x10(3)/mcL    Baso # 0.02 0 - 0.2 x10(3)/mcL    IG# 0.04 0 - 0.04 x10(3)/mcL    IG% 0.3 %   Blood culture x two cultures. Draw prior to antibiotics.    Collection Time: 03/18/23  6:15 PM    Specimen: Blood   Result Value Ref Range    CULTURE, BLOOD ([facility]) No Growth At 24 Hours    Urinalysis, Reflex to Urine Culture    Collection Time: 03/18/23  6:17 PM    Specimen: Urine   Result Value Ref Range    Color, UA Yellow Yellow, Light-Yellow, Dark Yellow, Sandra, Straw    Appearance, UA Cloudy (A) Clear     Specific Gravity, UA 1.015     pH, UA 7.0 5.0 - 8.5    Protein,  (A) Negative mg/dL    Glucose, UA Negative Negative, Normal mg/dL    Ketones, UA Negative Negative mg/dL    Blood, UA Moderate (A) Negative unit/L    Bilirubin, UA Negative Negative mg/dL    Urobilinogen, UA 0.2 0.2, 1.0, Normal mg/dL    Nitrites, UA Negative Negative    Leukocyte Esterase, UA Large (A) Negative unit/L   Urinalysis, Microscopic    Collection Time: 03/18/23  6:17 PM   Result Value Ref Range    Bacteria, UA Few (A) None Seen, Rare, Occasional /HPF    RBC, UA 21-50 (A) None Seen, 0-2, 3-5, 0-5 /HPF    WBC, UA >=100 (A) None Seen, 0-2, 3-5, 0-5 /HPF    Squamous Epithelial Cells, UA Few (A) None Seen, Rare, Occasional, Occ /HPF   Urine culture    Collection Time: 03/18/23  6:17 PM    Specimen: Urine   Result Value Ref Range    Urine Culture (A)      50,000-75,000 colonies/ml Presumptive proteus species    Urine Culture 10,000 - 25,000 colonies/ml Gram-negative Rods (A)    BNP    Collection Time: 03/18/23  6:18 PM   Result Value Ref Range    Natriuretic Peptide 94.6 <=100.0 pg/mL   Blood culture x two cultures. Draw prior to antibiotics.    Collection Time: 03/18/23  6:30 PM    Specimen: Blood   Result Value Ref Range    CULTURE, BLOOD ([facility]) No Growth At 24 Hours    Basic Metabolic Panel    Collection Time: 03/19/23  7:50 AM   Result Value Ref Range    Sodium Level 136 136 - 145 mmol/L    Potassium Level 4.1 3.5 - 5.1 mmol/L    Chloride 102 98 - 107 mmol/L    Carbon Dioxide 22 (L) 23 - 31 mmol/L    Glucose Level 123 (H) 82 - 115 mg/dL    Blood Urea Nitrogen 16.0 9.8 - 20.1 mg/dL    Creatinine 1.17 (H) 0.55 - 1.02 mg/dL    BUN/Creatinine Ratio 14     Calcium Level Total 9.2 8.4 - 10.2 mg/dL    Anion Gap 12.0 mEq/L    eGFR 54 mls/min/1.73/m2   CBC with Differential    Collection Time: 03/19/23  7:50 AM   Result Value Ref Range    WBC 11.1 4.5 - 11.5 x10(3)/mcL    RBC 3.64 (L) 4.20 - 5.40 x10(6)/mcL    Hgb 9.5 (L) 12.0 - 16.0 g/dL    Hct  31.0 (L) 37.0 - 47.0 %    MCV 85.2 80.0 - 94.0 fL    MCH 26.1 pg    MCHC 30.6 (L) 33.0 - 36.0 g/dL    RDW 15.3 11.5 - 17.0 %    Platelet 202 130 - 400 x10(3)/mcL    MPV 11.6 (H) 7.4 - 10.4 fL    Neut % 77.4 %    Lymph % 10.1 %    Mono % 11.4 %    Eos % 0.6 %    Basophil % 0.1 %    Lymph # 1.12 0.6 - 4.6 x10(3)/mcL    Neut # 8.60 2.1 - 9.2 x10(3)/mcL    Mono # 1.27 0.1 - 1.3 x10(3)/mcL    Eos # 0.07 0 - 0.9 x10(3)/mcL    Baso # 0.01 0 - 0.2 x10(3)/mcL    IG# 0.05 (H) 0 - 0.04 x10(3)/mcL    IG% 0.4 %   Respiratory Panel    Collection Time: 03/19/23  1:04 PM   Result Value Ref Range    Adenovirus Not Detected Not Detected    Coronavirus 229E Not Detected Not Detected    Coronavirus HKU1 Not Detected Not Detected    Coronavirus NL63 Not Detected Not Detected    Coronavirus OC43 PCR, Common Cold Virus Not Detected Not Detected    Human Metapneumovirus Not Detected Not Detected    Parainfluenza Virus 1 Not Detected Not Detected    Parainfluenza Virus 2 Not Detected Not Detected    Parainfluenza Virus 3 Not Detected Not Detected    Parainfluenza Virus 4 Detected (A) Not Detected    Bordetella pertussis (ptxP) Not Detected Not Detected    Chlamydia pneumoniae Not Detected Not Detected    Mycoplasma pneumoniae Not Detected Not Detected    Human Rhinovirus/Enterovirus Not Detected Not Detected    Bordetella parapertussis (VK5530) Not Detected Not Detected   Troponin I    Collection Time: 03/19/23  1:57 PM   Result Value Ref Range    Troponin-I 0.132 (H) 0.000 - 0.045 ng/mL   Troponin I    Collection Time: 03/19/23  7:54 PM   Result Value Ref Range    Troponin-I 0.093 (H) 0.000 - 0.045 ng/mL   Troponin I    Collection Time: 03/20/23  1:42 AM   Result Value Ref Range    Troponin-I 0.094 (H) 0.000 - 0.045 ng/mL   POCT glucose    Collection Time: 03/20/23  5:06 AM   Result Value Ref Range    POCT Glucose 95 70 - 110 mg/dL   Hemoglobin A1C    Collection Time: 03/20/23  9:53 AM   Result Value Ref Range    Hemoglobin A1c 5.4 <=7.0 %     Estimated Average Glucose 108.3 mg/dL   Lipid Panel    Collection Time: 03/20/23  9:53 AM   Result Value Ref Range    Cholesterol Total 164 <=200 mg/dL    HDL Cholesterol 30 (L) 35 - 60 mg/dL    Triglyceride 135 37 - 140 mg/dL    Cholesterol/HDL Ratio 5 0 - 5    Very Low Density Lipoprotein 27     LDL Cholesterol 107.00 50.00 - 140.00 mg/dL   Echo Saline Bubble? No    Collection Time: 03/20/23  5:27 PM   Result Value Ref Range    BSA 2.65 m2    Right Atrial Pressure (from IVC) 3 mmHg    EF 60 %   POCT glucose    Collection Time: 03/20/23  8:45 PM   Result Value Ref Range    POCT Glucose 95 70 - 110 mg/dL         Physical Exam  Vitals reviewed.   Constitutional:       Appearance: Normal appearance. She is obese.   HENT:      Head: Normocephalic and atraumatic.      Mouth/Throat:      Mouth: Mucous membranes are moist.      Pharynx: Oropharynx is clear.   Eyes:      Conjunctiva/sclera: Conjunctivae normal.      Pupils: Pupils are equal, round, and reactive to light.   Cardiovascular:      Rate and Rhythm: Normal rate and regular rhythm.   Pulmonary:      Effort: Pulmonary effort is normal.      Breath sounds: Normal breath sounds.   Musculoskeletal:         General: Normal range of motion.      Cervical back: Normal range of motion and neck supple.      Right lower leg: No edema.      Left lower leg: No edema.   Skin:     General: Skin is warm and dry.   Neurological:      General: No focal deficit present.      Mental Status: She is alert and oriented to person, place, and time.   Psychiatric:         Mood and Affect: Mood normal.         Behavior: Behavior normal.         Thought Content: Thought content normal.         Judgment: Judgment normal.       Current Inpatient Medications:    Current Facility-Administered Medications:     acetaminophen tablet 650 mg, 650 mg, Oral, Q4H PRN, Thairy G Reyes, DO, 650 mg at 03/19/23 3365    aluminum-magnesium hydroxide-simethicone 200-200-20 mg/5 mL suspension 30 mL, 30 mL,  Oral, QID PRN, Thairy G Reyes, DO    aspirin EC tablet 81 mg, 81 mg, Oral, BID, Xury G Reyes, DO, 81 mg at 03/20/23 2038    atorvastatin tablet 40 mg, 40 mg, Oral, Daily, Elissa Reece, FNP, 40 mg at 03/20/23 0955    benzonatate capsule 200 mg, 200 mg, Oral, TID, Xury G Reyes, DO, 200 mg at 03/20/23 2038    bisacodyL suppository 10 mg, 10 mg, Rectal, Daily PRN, Xury G Reyes, DO    busPIRone tablet 7.5 mg, 7.5 mg, Oral, QHS, Xury G Reyes, DO, 7.5 mg at 03/20/23 2038    cefTRIAXone (ROCEPHIN) 1 g in dextrose 5 % in water (D5W) 5 % 50 mL IVPB (MB+), 1 g, Intravenous, Q24H, Jeffry G Reyes, DO, Stopped at 03/20/23 2108    dextrose 50% injection 12.5 g, 12.5 g, Intravenous, PRN, Jeffry G Reyes, DO    dextrose 50% injection 25 g, 25 g, Intravenous, PRN, Xury G Reyes, DO    enoxaparin injection 40 mg, 40 mg, Subcutaneous, Daily, Xury G Reyes, DO, 40 mg at 03/20/23 1733    glucagon (human recombinant) injection 1 mg, 1 mg, Intramuscular, PRN, Thairy G Reyes, DO    glucose chewable tablet 16 g, 16 g, Oral, PRN, Xury G Reyes, DO    glucose chewable tablet 24 g, 24 g, Oral, PRN, Thairy G Reyes, DO    guaiFENesin 12 hr tablet 600 mg, 600 mg, Oral, BID, Thairy G Reyes, DO, 600 mg at 03/20/23 2038    HYDROcodone-acetaminophen 5-325 mg per tablet 1 tablet, 1 tablet, Oral, Q6H PRN, Thairy G Reyes, DO    levalbuterol nebulizer solution 1.25 mg, 1.25 mg, Nebulization, Q6H PRN, Xury G Reyes, DO    melatonin tablet 9 mg, 9 mg, Oral, Nightly PRN, Thairy G Reyes, DO    metoprolol tartrate (LOPRESSOR) tablet 25 mg, 25 mg, Oral, BID, Elissa Reece, RUSHP, 25 mg at 03/20/23 2038    morphine injection 2 mg, 2 mg, Intravenous, Q6H PRN, Thairy G Reyes, DO    naloxone 0.4 mg/mL injection 0.02 mg, 0.02 mg, Intravenous, PRN, Thairy G Reyes, DO    ondansetron disintegrating tablet 8 mg, 8 mg, Oral, Q8H PRN, Thairy G Reyes, DO    ondansetron injection 4 mg, 4 mg, Intravenous, Q8H PRN, Thairy G Reyes, DO    polyethylene  glycol packet 17 g, 17 g, Oral, TID PRN, Thairy G Reyes, DO    simethicone chewable tablet 80 mg, 1 tablet, Oral, QID PRN, Thairy G Reyes, DO    sodium chloride 0.9% flush 10 mL, 10 mL, Intravenous, PRN, Thairy G Reyes, DO    venlafaxine tablet 37.5 mg, 37.5 mg, Oral, Daily, Jeffry HILTON Reyes, DO, 37.5 mg at 03/20/23 1733    VTE Risk Mitigation (From admission, onward)           Ordered     enoxaparin injection 40 mg  Daily         03/19/23 1033     IP VTE HIGH RISK PATIENT  Once         03/19/23 1033     Place sequential compression device  Until discontinued         03/18/23 4914                    Assessment:   Elevated troponin  --peak 0.132  --trending down  --Echo 3/20/23- EF 60%  HTN  --controlled  --Home medication--lisinopril 5 mg daily discontinued 3/20/23 due to non-productive cough  Dyspnea  --CTA of chest normal, no PE; lungs are clear  Adrenal nodules  Gallbladder calcification  Morbid obesity  --BMI 56.13 kg/m2        Plan:   OK to discharge from cardiac standpoint  D/c lisinopril  Continue metoprolol tartrate 25 mg po BID after discharge.  Continue atorvastatin 40 mg po daily after discharge  Continue ASA 81 mg daily  Recommend outpatient sleep study for possible YANETH  Follow-up with [facility] with Dr. Thompson or Dr. Maher,  1-2 weeks after discharge for further cardiac work-up    MARION Gautam  Cardiology  [facility]  03/21/2023

## 2023-03-21 NOTE — PLAN OF CARE
Problem: Adult Inpatient Plan of Care  Goal: Plan of Care Review  Outcome: Ongoing, Progressing  Goal: Patient-Specific Goal (Individualized)  Outcome: Ongoing, Progressing  Flowsheets (Taken 3/21/2023 0800)  Anxieties, Fears or Concerns: Patient verbalized her concerns as wanting to be discharged today.  Individualized Care Needs: Patient will remain free from injury from falls due to refusal of bed/chair alarms and bedrails raised by discharge.     Problem: Mobility Impairment  Goal: Optimal Mobility  Outcome: Ongoing, Progressing

## 2023-03-21 NOTE — PLAN OF CARE
Problem: Adult Inpatient Plan of Care  Goal: Plan of Care Review  Outcome: Ongoing, Progressing  Flowsheets (Taken 3/20/2023 2323)  Plan of Care Reviewed With: patient  Goal: Patient-Specific Goal (Individualized)  Outcome: Ongoing, Progressing  Flowsheets (Taken 3/20/2023 2323)  Anxieties, Fears or Concerns: none  Individualized Care Needs: manage uti with abt     Problem: Infection  Goal: Absence of Infection Signs and Symptoms  Outcome: Ongoing, Progressing

## 2023-03-21 NOTE — PLAN OF CARE
[facility]  Discharge Final Note    Primary Care Provider: MARION Salazar    Expected Discharge Date: 3/21/2023    Final Discharge Note (most recent)       Final Note - 03/21/23 1100          Final Note    Assessment Type Final Discharge Note     Anticipated Discharge Disposition Home or Self Care     What phone number can be called within the next 1-3 days to see how you are doing after discharge? [phone]     Hospital Resources/Appts/Education Provided Provided patient/caregiver with written discharge plan information        Post-Acute Status    Discharge Delays None known at this time                     Important Message from Medicare             Contact Info       MARION Salazar   Specialty: Family Medicine   Relationship: PCP - [place] [id number]   Phone: [phone]       Next Steps: Go on 3/27/2023    Instructions: 3/27/23 @ 1:40p.m.  Spoke to Nisha.    Marilu Thompson MD   Specialty: Cardiology    [address] [facility] [address]   Phone: [phone]       Next Steps: Go on 3/21/2023    Instructions: Need to see MD in 1-2 weeks  spoke to Beti and the office will call the patient and make appointment.

## 2023-03-21 NOTE — NURSING
Discharge instructions printed and reviewed with patient.  Patient verbalized understanding of her discharge instructions.  Patient has 6 prescriptions that will be picked up from [facility] and her follow-up appointments have been scheduled by Roxy, [facility] [place].  Patient verbalized that her ride will be here at 3:30 PM to pick her up; Daughter.  IV discontinued; tip intact and pressure bandage applied.  No distress noted at this time.

## 2023-03-22 LAB
BACTERIA UR CULT: ABNORMAL
BACTERIA UR CULT: ABNORMAL

## 2023-03-22 RX ORDER — LEVOFLOXACIN 250 MG/1
250 TABLET ORAL DAILY
Qty: 3 TABLET | Refills: 0 | Status: SHIPPED | OUTPATIENT
Start: 2023-03-22 | End: 2023-03-25

## 2023-03-24 LAB
BACTERIA BLD CULT: NORMAL
BACTERIA BLD CULT: NORMAL

## 2023-04-11 ENCOUNTER — EXTERNAL HOME HEALTH (OUTPATIENT)
Dept: HOME HEALTH SERVICES | Facility: HOSPITAL | Age: 63
End: 2023-04-11
Payer: COMMERCIAL

## 2023-04-14 ENCOUNTER — DOCUMENT SCAN (OUTPATIENT)
Dept: HOME HEALTH SERVICES | Facility: HOSPITAL | Age: 63
End: 2023-04-14
Payer: COMMERCIAL

## 2023-04-23 ENCOUNTER — DOCUMENT SCAN (OUTPATIENT)
Dept: HOME HEALTH SERVICES | Facility: HOSPITAL | Age: 63
End: 2023-04-23
Payer: COMMERCIAL

## 2023-04-24 PROBLEM — Z09 HOSPITAL DISCHARGE FOLLOW-UP: Status: RESOLVED | Noted: 2023-01-23 | Resolved: 2023-04-24

## 2023-05-03 ENCOUNTER — TELEPHONE (OUTPATIENT)
Dept: FAMILY MEDICINE | Facility: CLINIC | Age: 63
End: 2023-05-03
Payer: COMMERCIAL

## 2023-05-03 NOTE — TELEPHONE ENCOUNTER
Patient Centered Pre Visit Workflow:    Pre-Visit Chart Review-       Target Diagnosis: HOSP FU       Outstanding and referrals?    Lab work/Tests Needed:      Seen in ER, urgent care clinic, or been admitted since last visit?  3/18 - 3/21/2023  SOB, HTN, MORBID OBESITY    Seen any other health care providers since last visit?  Marilu Thompson MD (Cardiology) on 3/21/2023; Need to see MD in 1-2 weeks   spoke to Beti and the office will call the patient and make appointment.   3/13/2023 -Ronnell Olivares MD [facility]    Health Maintenance reviewed and updated:   Health Maintenance Due   Topic Date Due    Hepatitis C Screening  Never done    Cervical Cancer Screening  Never done    HIV Screening  Never done    Shingles Vaccine (1 of 2) Never done    Mammogram  09/09/2020    COVID-19 Vaccine (3 - Booster for Pfizer series) 04/21/2021            Wellness: 3/2024      Patient Notified:

## 2023-05-06 ENCOUNTER — HOSPITAL ENCOUNTER (EMERGENCY)
Facility: HOSPITAL | Age: 63
Discharge: HOME | End: 2023-05-06
Attending: FAMILY MEDICINE
Payer: COMMERCIAL

## 2023-05-06 VITALS
WEIGHT: 293 LBS | RESPIRATION RATE: 20 BRPM | SYSTOLIC BLOOD PRESSURE: 158 MMHG | HEIGHT: 65 IN | DIASTOLIC BLOOD PRESSURE: 98 MMHG | TEMPERATURE: 98 F | OXYGEN SATURATION: 98 % | BODY MASS INDEX: 48.82 KG/M2 | HEART RATE: 84 BPM

## 2023-05-06 DIAGNOSIS — M79.605 PAIN OF LEFT LOWER EXTREMITY: Primary | ICD-10-CM

## 2023-05-06 DIAGNOSIS — R07.89 CHEST WALL PAIN: ICD-10-CM

## 2023-05-06 DIAGNOSIS — R52 PAIN: ICD-10-CM

## 2023-05-06 PROCEDURE — 63600175 PHARM REV CODE 636 W HCPCS: Performed by: FAMILY MEDICINE

## 2023-05-06 PROCEDURE — 93010 ELECTROCARDIOGRAM REPORT: CPT | Mod: ,,, | Performed by: INTERNAL MEDICINE

## 2023-05-06 PROCEDURE — 99284 EMERGENCY DEPT VISIT MOD MDM: CPT

## 2023-05-06 PROCEDURE — 93010 EKG 12-LEAD: ICD-10-PCS | Mod: ,,, | Performed by: INTERNAL MEDICINE

## 2023-05-06 PROCEDURE — 93005 ELECTROCARDIOGRAM TRACING: CPT

## 2023-05-06 PROCEDURE — 96372 THER/PROPH/DIAG INJ SC/IM: CPT | Performed by: FAMILY MEDICINE

## 2023-05-06 RX ORDER — KETOROLAC TROMETHAMINE 30 MG/ML
30 INJECTION, SOLUTION INTRAMUSCULAR; INTRAVENOUS
Status: COMPLETED | OUTPATIENT
Start: 2023-05-06 | End: 2023-05-06

## 2023-05-06 RX ORDER — MELOXICAM 15 MG/1
15 TABLET ORAL DAILY
Qty: 30 TABLET | Refills: 0 | Status: SHIPPED | OUTPATIENT
Start: 2023-05-06 | End: 2023-06-05

## 2023-05-06 RX ADMIN — KETOROLAC TROMETHAMINE 30 MG: 30 INJECTION, SOLUTION INTRAMUSCULAR at 02:05

## 2023-05-06 NOTE — ED PROVIDER NOTES
Encounter Date: 5/6/2023       History     Chief Complaint   Patient presents with    Chest Pain     R sided chest wall pain x 2-3 days intermittent with gregory leg pain     Joint pain   62-year-old female patient who in the beginning of the year had a fractured left head of the femur and resultant surgical correction patient states with moving around that she is having bilateral knee pain leg weakness from walking in the pain that continues in the left hip and also when getting into bed using her arms because the bed is higher that she had an episode of right-sided chest discomfort and pulling sensation in the muscle patient is not having chest pain without movement no diaphoresis no shortness of breath no dyspnea on exertion      Review of patient's allergies indicates:  No Known Allergies  Past Medical History:   Diagnosis Date    Anxiety     Back muscle spasm     Depression     GERD (gastroesophageal reflux disease)     Hypertension     Intertrochanteric fracture of left femur     Morbid obesity     Personal history of colonic polyps 12/02/2021    Dr. Andrey Vizcarra     Past Surgical History:   Procedure Laterality Date    ANTEGRADE INTRAMEDULLARY RODDING OF FEMUR Left 12/25/2022    Procedure: INSERTION, INTRAMEDULLARY JARETH, FEMUR, ANTEGRADE;  Surgeon: Ronnell Olivares MD;  Location: [facility];  Service: Orthopedics;  Laterality: Left;    COLONOSCOPY  12/02/2021    Dr. Andrey Vizcarra     No family history on file.  Social History     Tobacco Use    Smoking status: Never    Smokeless tobacco: Never   Substance Use Topics    Alcohol use: Never     Review of Systems    Physical Exam     Initial Vitals [05/06/23 1402]   BP Pulse Resp Temp SpO2   (!) 158/98 84 20 98.3 °F (36.8 °C) 98 %      MAP       --         Physical Exam    Nursing note and vitals reviewed.  Constitutional: She appears well-developed.   HENT:   Head: Normocephalic and atraumatic.   Right Ear: External ear normal.   Left Ear: External ear normal.   Nose: Nose  normal.   Mouth/Throat: Oropharynx is clear and moist. No oropharyngeal exudate.   Eyes: Conjunctivae and EOM are normal. Pupils are equal, round, and reactive to light. Right eye exhibits no discharge. Left eye exhibits no discharge.   Neck: Neck supple. No tracheal deviation present. No JVD present.   Normal range of motion.  Cardiovascular:  Normal rate, regular rhythm, normal heart sounds and intact distal pulses.     Exam reveals no gallop and no friction rub.       No murmur heard.  Pulmonary/Chest: Breath sounds normal. No stridor. No respiratory distress. She has no wheezes. She has no rhonchi. She has no rales.   Abdominal: Abdomen is soft. Bowel sounds are normal. She exhibits no distension and no mass. There is no abdominal tenderness. There is no rebound and no guarding.   Musculoskeletal:         General: Tenderness present. Normal range of motion.      Cervical back: Normal range of motion and neck supple.     Neurological: She is alert and oriented to person, place, and time. She has normal strength. No cranial nerve deficit.   Skin: Skin is warm and dry. No rash and no abscess noted. No erythema.   Psychiatric: She has a normal mood and affect. Her behavior is normal. Judgment and thought content normal.       ED Course   Procedures  Labs Reviewed - No data to display       Imaging Results              X-Ray Hip 2 or 3 views Left (with Pelvis when performed) (In process)                   X-Rays:   Independently Interpreted Readings:   Other Readings:  See radiology report  Medications   ketorolac injection 30 mg (30 mg Intramuscular Given 5/6/23 1415)     Medical Decision Making:   Differential Diagnosis:   Poor healing hip injury osteoarthritis leg weakness from previous fracture                        Clinical Impression:   Final diagnoses:  [R07.89] Chest wall pain  [R52] Pain  [M79.605] Pain of left lower extremity (Primary)        ED Disposition Condition    Discharge Stable          ED  Prescriptions       Medication Sig Dispense Start Date End Date Auth. Provider    meloxicam (MOBIC) 15 MG tablet Take 1 tablet (15 mg total) by mouth once daily. 30 tablet 5/6/2023 6/5/2023 Jonathan Aly MD          Follow-up Information    None          Jonathan Aly MD  05/06/23 8714

## 2023-05-08 ENCOUNTER — OFFICE VISIT (OUTPATIENT)
Dept: FAMILY MEDICINE | Facility: CLINIC | Age: 63
End: 2023-05-08
Payer: COMMERCIAL

## 2023-05-08 VITALS
BODY MASS INDEX: 48.82 KG/M2 | HEIGHT: 65 IN | SYSTOLIC BLOOD PRESSURE: 119 MMHG | DIASTOLIC BLOOD PRESSURE: 67 MMHG | OXYGEN SATURATION: 99 % | HEART RATE: 69 BPM | WEIGHT: 293 LBS | RESPIRATION RATE: 18 BRPM

## 2023-05-08 DIAGNOSIS — E78.5 HYPERLIPIDEMIA, UNSPECIFIED HYPERLIPIDEMIA TYPE: ICD-10-CM

## 2023-05-08 DIAGNOSIS — I10 HYPERTENSION, UNSPECIFIED TYPE: Primary | ICD-10-CM

## 2023-05-08 DIAGNOSIS — E66.01 CLASS 3 SEVERE OBESITY WITH BODY MASS INDEX (BMI) OF 60.0 TO 69.9 IN ADULT, UNSPECIFIED OBESITY TYPE, UNSPECIFIED WHETHER SERIOUS COMORBIDITY PRESENT: ICD-10-CM

## 2023-05-08 DIAGNOSIS — F32.A DEPRESSION, UNSPECIFIED DEPRESSION TYPE: ICD-10-CM

## 2023-05-08 DIAGNOSIS — M79.605 PAIN OF LEFT LOWER EXTREMITY: ICD-10-CM

## 2023-05-08 PROCEDURE — 3066F PR DOCUMENTATION OF TREATMENT FOR NEPHROPATHY: ICD-10-PCS | Mod: CPTII,,, | Performed by: NURSE PRACTITIONER

## 2023-05-08 PROCEDURE — 3008F PR BODY MASS INDEX (BMI) DOCUMENTED: ICD-10-PCS | Mod: CPTII,,, | Performed by: NURSE PRACTITIONER

## 2023-05-08 PROCEDURE — 3074F PR MOST RECENT SYSTOLIC BLOOD PRESSURE < 130 MM HG: ICD-10-PCS | Mod: CPTII,,, | Performed by: NURSE PRACTITIONER

## 2023-05-08 PROCEDURE — 3060F POS MICROALBUMINURIA REV: CPT | Mod: CPTII,,, | Performed by: NURSE PRACTITIONER

## 2023-05-08 PROCEDURE — 3074F SYST BP LT 130 MM HG: CPT | Mod: CPTII,,, | Performed by: NURSE PRACTITIONER

## 2023-05-08 PROCEDURE — 3060F PR POS MICROALBUMINURIA RESULT DOCUMENTED/REVIEW: ICD-10-PCS | Mod: CPTII,,, | Performed by: NURSE PRACTITIONER

## 2023-05-08 PROCEDURE — 3078F DIAST BP <80 MM HG: CPT | Mod: CPTII,,, | Performed by: NURSE PRACTITIONER

## 2023-05-08 PROCEDURE — 3066F NEPHROPATHY DOC TX: CPT | Mod: CPTII,,, | Performed by: NURSE PRACTITIONER

## 2023-05-08 PROCEDURE — 4010F PR ACE/ARB THEARPY RXD/TAKEN: ICD-10-PCS | Mod: CPTII,,, | Performed by: NURSE PRACTITIONER

## 2023-05-08 PROCEDURE — 99213 PR OFFICE/OUTPT VISIT, EST, LEVL III, 20-29 MIN: ICD-10-PCS | Mod: ,,, | Performed by: NURSE PRACTITIONER

## 2023-05-08 PROCEDURE — 4010F ACE/ARB THERAPY RXD/TAKEN: CPT | Mod: CPTII,,, | Performed by: NURSE PRACTITIONER

## 2023-05-08 PROCEDURE — 3078F PR MOST RECENT DIASTOLIC BLOOD PRESSURE < 80 MM HG: ICD-10-PCS | Mod: CPTII,,, | Performed by: NURSE PRACTITIONER

## 2023-05-08 PROCEDURE — 3008F BODY MASS INDEX DOCD: CPT | Mod: CPTII,,, | Performed by: NURSE PRACTITIONER

## 2023-05-08 PROCEDURE — 99213 OFFICE O/P EST LOW 20 MIN: CPT | Mod: ,,, | Performed by: NURSE PRACTITIONER

## 2023-05-08 RX ORDER — METOPROLOL TARTRATE 25 MG/1
25 TABLET, FILM COATED ORAL 2 TIMES DAILY
Qty: 60 TABLET | Refills: 0 | Status: SHIPPED | OUTPATIENT
Start: 2023-05-08 | End: 2024-03-21

## 2023-05-08 RX ORDER — ATORVASTATIN CALCIUM 40 MG/1
40 TABLET, FILM COATED ORAL NIGHTLY
Qty: 30 TABLET | Refills: 0 | Status: SHIPPED | OUTPATIENT
Start: 2023-05-08 | End: 2023-06-14 | Stop reason: SDUPTHER

## 2023-05-08 RX ORDER — ASPIRIN 81 MG/1
81 TABLET ORAL 2 TIMES DAILY
Qty: 60 TABLET | Refills: 0 | Status: SHIPPED | OUTPATIENT
Start: 2023-05-08 | End: 2023-09-21

## 2023-05-08 RX ORDER — VENLAFAXINE 37.5 MG/1
37.5 TABLET ORAL DAILY
Qty: 30 TABLET | Refills: 0 | Status: SHIPPED | OUTPATIENT
Start: 2023-05-08 | End: 2024-03-21

## 2023-05-08 RX ORDER — BUSPIRONE HYDROCHLORIDE 7.5 MG/1
7.5 TABLET ORAL NIGHTLY
Qty: 30 TABLET | Refills: 0 | Status: SHIPPED | OUTPATIENT
Start: 2023-05-08 | End: 2024-03-21

## 2023-05-08 NOTE — PROGRESS NOTES
"     Chief Complaint: Follow-up (Hosp f/u)      HPI:     Tanya Linda is a 62 y.o. female here today for a follow up. No other complaints today after ED visit for muscle spasm pain.  She reports a fracture to her left femur she was following up with orthopedic.   Believes since she for the muscle compensating for mobility to her left femur.  Today she is requesting a refill on medication.  She does take Mobic states that it has been helping.         Past Medical History:  has a past medical history of Anxiety, Back muscle spasm, Depression, GERD (gastroesophageal reflux disease), Hypertension, Intertrochanteric fracture of left femur, Morbid obesity, and Personal history of colonic polyps.    Surgical History:  has a past surgical history that includes Colonoscopy (12/02/2021) and Antegrade intramedullary rodding of femur (Left, 12/25/2022).    Family History: family history is not on file.    Social History:  reports that she has never smoked. She has never used smokeless tobacco. She reports that she does not drink alcohol and does not use drugs.    Current Outpatient Medications   Medication Instructions    aspirin (ECOTRIN) 81 mg, Oral, 2 times daily    atorvastatin (LIPITOR) 40 mg, Oral, Nightly    busPIRone (BUSPAR) 7.5 mg, Oral, Nightly    meloxicam (MOBIC) 15 mg, Oral, Daily    metoprolol tartrate (LOPRESSOR) 25 mg, Oral, 2 times daily    polyethylene glycol (GLYCOLAX) 17 g, Oral, Daily    venlafaxine (EFFEXOR) 37.5 mg, Oral, Daily       Patient has No Known Allergies.     Patient Care Team:  MARION Salazar as PCP - General (Nurse Practitioner)  Miladys Barr LPN as Care Coordinator       Subjective:     Review of Systems    12 point review of systems conducted, negative except as stated in the history of present illness. See HPI for details.      Objective:     Visit Vitals  /67 (BP Location: Right arm)   Pulse 69   Resp 18   Ht 5' 5" (1.651 m)   Wt (!) 152 kg (335 lb)   SpO2 99% "   BMI 55.75 kg/m²       Physical Exam    General: Alert and oriented, No acute distress.  Head: Normocephalic, Atraumatic.  Eye: Pupils are equal, round and reactive to light, Extraocular movements are intact, Sclera non-icteric.  Ears/Nose/Throat: Normal, Mucosa moist,Clear.  Neck/Thyroid: Supple, Non-tender, No carotid bruit, No lymphadenopathy, No JVD, Full range of motion.  Respiratory: Clear to auscultation bilaterally; No wheezes, rales or rhonchi,Non-labored respirations, Symmetrical chest wall expansion.  Cardiovascular: Regular rate and rhythm, S1/S2 normal, No murmurs, rubs or gallops.  Gastrointestinal: Soft, Non-tender, Non-distended, Normal bowel sounds, No palpable organomegaly.  Musculoskeletal: Normal range of motion.  Integumentary: Warm, Dry, Intact, No suspicious lesions or rashes.  Extremities: No clubbing, cyanosis or edema  Neurologic: No focal deficits, Cranial Nerves II-XII are grossly intact, Motor strength normal upper and lower extremities, Sensory exam intact.  Psychiatric: Normal interaction, Coherent speech, Euthymic mood, Appropriate affect     Labs Reviewed:     Chemistry:  Lab Results   Component Value Date     03/19/2023    K 4.1 03/19/2023    CHLORIDE 102 03/19/2023    BUN 16.0 03/19/2023    CREATININE 1.17 (H) 03/19/2023    EGFRNORACEVR 54 03/19/2023    GLUCOSE 123 (H) 03/19/2023    CALCIUM 9.2 03/19/2023    ALKPHOS 98 03/18/2023    LABPROT 7.7 (H) 03/18/2023    ALBUMIN 2.8 (L) 03/18/2023    BILIDIR 0.2 02/25/2022    IBILI 0.40 02/25/2022    AST 26 03/18/2023    ALT 24 03/18/2023    MG 1.90 03/18/2023    PHOS 2.7 03/18/2023    [id number] 12.3 (L) 03/03/2023    TSH 0.896 03/03/2023        Lab Results   Component Value Date    HGBA1C 5.4 03/20/2023        Hematology:  Lab Results   Component Value Date    WBC 11.1 03/19/2023    HGB 9.5 (L) 03/19/2023    HCT 31.0 (L) 03/19/2023     03/19/2023       Lipid Panel:  Lab Results   Component Value Date    CHOL 164 03/20/2023     HDL 30 (L) 03/20/2023    .00 03/20/2023    TRIG 135 03/20/2023    TOTALCHOLEST 5 03/20/2023        Urine:  Lab Results   Component Value Date    COLORUA Yellow 03/18/2023    APPEARANCEUA Cloudy (A) 03/18/2023    SGUA 1.015 03/18/2023    PHUA 7.0 03/18/2023    PROTEINUA 100 (A) 03/18/2023    GLUCOSEUA Negative 03/18/2023    KETONESUA Negative 03/18/2023    BLOODUA Moderate (A) 03/18/2023    NITRITESUA Negative 03/18/2023    LEUKOCYTESUR Large (A) 03/18/2023    RBCUA 21-50 (A) 03/18/2023    WBCUA >=100 (A) 03/18/2023    BACTERIA Few (A) 03/18/2023    CREATRANDUR 51.9 03/03/2023          Assessment:       ICD-10-CM ICD-9-CM   1. Hypertension, unspecified type  I10 401.9   2. Class 3 severe obesity with body mass index (BMI) of 60.0 to 69.9 in adult, unspecified obesity type, unspecified whether serious comorbidity present  E66.01 278.01    Z68.44 V85.44   3. Depression, unspecified depression type  F32.A 311   4. Hyperlipidemia, unspecified hyperlipidemia type  E78.5 272.4   5. Pain of left lower extremity  M79.605 729.5        Plan:     There are no diagnoses linked to this encounter.   1. Hypertension, unspecified type  - metoprolol tartrate (LOPRESSOR) 25 MG tablet; Take 1 tablet (25 mg total) by mouth 2 (two) times daily.  Dispense: 60 tablet; Refill: 0  - atorvastatin (LIPITOR) 40 MG tablet; Take 1 tablet (40 mg total) by mouth every evening.  Dispense: 30 tablet; Refill: 0  - aspirin (ECOTRIN) 81 MG EC tablet; Take 1 tablet (81 mg total) by mouth 2 (two) times a day.  Dispense: 60 tablet; Refill: 0    2. Class 3 severe obesity with body mass index (BMI) of 60.0 to 69.9 in adult, unspecified obesity type, unspecified whether serious comorbidity present    3. Depression, unspecified depression type  - busPIRone (BUSPAR) 7.5 MG tablet; Take 1 tablet (7.5 mg total) by mouth every evening.  Dispense: 30 tablet; Refill: 0  - venlafaxine (EFFEXOR) 37.5 MG Tab; Take 1 tablet (37.5 mg total) by mouth Daily.   Dispense: 30 tablet; Refill: 0    4. Hyperlipidemia, unspecified hyperlipidemia type  Continue statin daily as prescribed.   Low fat, low cholesterol diet .  Increase dietary fiber  Avoid fried, fatty , high saturated fats.  Add flaxseed or fish oil omega supplement to diet .   exercise to reduce LDL and raise HDL. Exercise at least 30 mins a day as tolerated     5. Pain of left lower extremity  Mobic    Follow up with ortho as recommended       No follow-ups on file. In addition to their scheduled follow up, the patient has also been instructed to follow up on as needed basis.     Future Appointments   Date Time Provider Department Center   6/14/2023  8:45 AM [facility]   6/14/2023  2:45 PM MARION Salazar [facility]        MARION Chung

## 2023-06-10 ENCOUNTER — HOSPITAL ENCOUNTER (EMERGENCY)
Facility: HOSPITAL | Age: 63
Discharge: HOME | End: 2023-06-10
Attending: FAMILY MEDICINE
Payer: COMMERCIAL

## 2023-06-10 VITALS
HEIGHT: 65 IN | RESPIRATION RATE: 20 BRPM | WEIGHT: 293 LBS | BODY MASS INDEX: 48.82 KG/M2 | OXYGEN SATURATION: 96 % | SYSTOLIC BLOOD PRESSURE: 132 MMHG | HEART RATE: 123 BPM | TEMPERATURE: 99 F | DIASTOLIC BLOOD PRESSURE: 60 MMHG

## 2023-06-10 DIAGNOSIS — M54.9 BACK PAIN, UNSPECIFIED BACK LOCATION, UNSPECIFIED BACK PAIN LATERALITY, UNSPECIFIED CHRONICITY: Primary | ICD-10-CM

## 2023-06-10 LAB
APPEARANCE UR: ABNORMAL
BACTERIA #/AREA URNS AUTO: ABNORMAL /HPF
BILIRUB UR QL STRIP.AUTO: NEGATIVE MG/DL
COLOR UR: YELLOW
GLUCOSE UR QL STRIP.AUTO: NEGATIVE MG/DL
KETONES UR QL STRIP.AUTO: NEGATIVE MG/DL
LEUKOCYTE ESTERASE UR QL STRIP.AUTO: ABNORMAL UNIT/L
NITRITE UR QL STRIP.AUTO: POSITIVE
PH UR STRIP.AUTO: 5.5 [PH]
PROT UR QL STRIP.AUTO: NEGATIVE MG/DL
RBC #/AREA URNS AUTO: ABNORMAL /HPF
RBC UR QL AUTO: ABNORMAL UNIT/L
SP GR UR STRIP.AUTO: 1.02
SQUAMOUS #/AREA URNS AUTO: ABNORMAL /HPF
UROBILINOGEN UR STRIP-ACNC: 0.2 MG/DL
WBC #/AREA URNS AUTO: ABNORMAL /HPF

## 2023-06-10 PROCEDURE — 99284 EMERGENCY DEPT VISIT MOD MDM: CPT

## 2023-06-10 PROCEDURE — 96372 THER/PROPH/DIAG INJ SC/IM: CPT | Performed by: FAMILY MEDICINE

## 2023-06-10 PROCEDURE — 81001 URINALYSIS AUTO W/SCOPE: CPT | Performed by: FAMILY MEDICINE

## 2023-06-10 PROCEDURE — 87088 URINE BACTERIA CULTURE: CPT | Performed by: FAMILY MEDICINE

## 2023-06-10 PROCEDURE — 63600175 PHARM REV CODE 636 W HCPCS: Performed by: FAMILY MEDICINE

## 2023-06-10 PROCEDURE — 87077 CULTURE AEROBIC IDENTIFY: CPT | Performed by: FAMILY MEDICINE

## 2023-06-10 RX ORDER — KETOROLAC TROMETHAMINE 30 MG/ML
30 INJECTION, SOLUTION INTRAMUSCULAR; INTRAVENOUS
Status: COMPLETED | OUTPATIENT
Start: 2023-06-10 | End: 2023-06-10

## 2023-06-10 RX ORDER — DICLOFENAC SODIUM 50 MG/1
50 TABLET, DELAYED RELEASE ORAL 3 TIMES DAILY
Qty: 9 TABLET | Refills: 0 | Status: SHIPPED | OUTPATIENT
Start: 2023-06-10 | End: 2023-06-13

## 2023-06-10 RX ORDER — CYCLOBENZAPRINE HCL 10 MG
10 TABLET ORAL 3 TIMES DAILY PRN
Qty: 15 TABLET | Refills: 0 | Status: SHIPPED | OUTPATIENT
Start: 2023-06-10 | End: 2023-06-15

## 2023-06-10 RX ORDER — NITROFURANTOIN 25; 75 MG/1; MG/1
100 CAPSULE ORAL 2 TIMES DAILY
Qty: 14 CAPSULE | Refills: 0 | Status: SHIPPED | OUTPATIENT
Start: 2023-06-10 | End: 2023-06-14 | Stop reason: SDUPTHER

## 2023-06-10 RX ADMIN — KETOROLAC TROMETHAMINE 30 MG: 30 INJECTION, SOLUTION INTRAMUSCULAR; INTRAVENOUS at 10:06

## 2023-06-11 NOTE — ED PROVIDER NOTES
Encounter Date: 6/10/2023       History     Chief Complaint   Patient presents with    Back Pain     Pt c/o right sided back pain that started this morning. Pt reports n/v and increased urination; denies abd pain or burning upon urination.      Back pain   62-year-old female patient comes in with complaint of right-sided back pain that started this morning patient has an extensive history of back pain in her chronic history contributing to today's episode patient and her son or the source of patient's history      Review of patient's allergies indicates:  No Known Allergies  Past Medical History:   Diagnosis Date    Anxiety     Back muscle spasm     Depression     GERD (gastroesophageal reflux disease)     Hypertension     Intertrochanteric fracture of left femur     Morbid obesity     Personal history of colonic polyps 12/02/2021    Dr. Andrey Vizcarra     Past Surgical History:   Procedure Laterality Date    ANTEGRADE INTRAMEDULLARY RODDING OF FEMUR Left 12/25/2022    Procedure: INSERTION, INTRAMEDULLARY JARETH, FEMUR, ANTEGRADE;  Surgeon: Ronnell Olivares MD;  Location: [facility];  Service: Orthopedics;  Laterality: Left;    COLONOSCOPY  12/02/2021    Dr. Andrey Vizcarra     No family history on file.  Social History     Tobacco Use    Smoking status: Never    Smokeless tobacco: Never   Substance Use Topics    Alcohol use: Never    Drug use: Never     Review of Systems   Constitutional:  Negative for fever.   HENT:  Negative for sore throat.    Respiratory:  Negative for shortness of breath.    Cardiovascular:  Negative for chest pain.   Gastrointestinal:  Negative for nausea.   Genitourinary:  Negative for dysuria.   Musculoskeletal:  Positive for arthralgias, back pain and myalgias.   Skin:  Negative for rash.   Neurological:  Negative for weakness.   Hematological:  Does not bruise/bleed easily.   All other systems reviewed and are negative.    Physical Exam     Initial Vitals [06/10/23 1940]   BP Pulse Resp Temp SpO2    132/60 (!) 123 20 99.1 °F (37.3 °C) 96 %      MAP       --         Physical Exam    Nursing note and vitals reviewed.  Constitutional: She appears well-developed.   HENT:   Head: Normocephalic and atraumatic.   Right Ear: External ear normal.   Left Ear: External ear normal.   Nose: Nose normal.   Mouth/Throat: Oropharynx is clear and moist. No oropharyngeal exudate.   Eyes: Conjunctivae and EOM are normal. Pupils are equal, round, and reactive to light. Right eye exhibits no discharge. Left eye exhibits no discharge.   Neck: Neck supple. No tracheal deviation present. No JVD present.   Normal range of motion.  Cardiovascular:  Normal rate, regular rhythm, normal heart sounds and intact distal pulses.     Exam reveals no gallop and no friction rub.       No murmur heard.  Pulmonary/Chest: Breath sounds normal. No stridor. No respiratory distress. She has no wheezes. She has no rhonchi. She has no rales.   Abdominal: Abdomen is soft. Bowel sounds are normal. She exhibits no distension and no mass. There is no abdominal tenderness. There is no rebound and no guarding.   Musculoskeletal:         General: Tenderness present. Normal range of motion.      Cervical back: Normal range of motion and neck supple.     Neurological: She is alert and oriented to person, place, and time. She has normal strength. No cranial nerve deficit.   Skin: Skin is warm and dry. No rash and no abscess noted. No erythema.   Psychiatric: She has a normal mood and affect. Her behavior is normal. Judgment and thought content normal.       ED Course   Procedures  Labs Reviewed   URINALYSIS, REFLEX TO URINE CULTURE - Abnormal; Notable for the following components:       Result Value    Appearance, UA Cloudy (*)     Blood, UA Moderate (*)     Nitrites, UA Positive (*)     Leukocyte Esterase, UA Small (*)     All other components within normal limits   URINALYSIS, MICROSCOPIC - Abnormal; Notable for the following components:    Bacteria, UA  Moderate (*)     WBC, UA 50-99 (*)     Squamous Epithelial Cells, UA Few (*)     All other components within normal limits   CULTURE, URINE          Imaging Results              X-Ray Lumbar Spine Ap And Lateral (Preliminary result)  Result time 06/10/23 21:35:06      Wet Read by Jonathan Aly MD (06/10/23 21:35:06, [facility] - Emergency Dept, Emergency Medicine)    No acute process see radiology report                                  X-Rays:   Independently Interpreted Readings:   Other Readings:  Please see radiology report  Medications   ketorolac injection 30 mg (has no administration in time range)     Medical Decision Making:   Differential Diagnosis:   Back pain back injury fracture contusion sprain strain                        Clinical Impression:   Final diagnoses:  [M54.9] Back pain, unspecified back location, unspecified back pain laterality, unspecified chronicity (Primary)        ED Disposition Condition    Discharge Stable          ED Prescriptions       Medication Sig Dispense Start Date End Date Auth. Provider    diclofenac (VOLTAREN) 50 MG EC tablet Take 1 tablet (50 mg total) by mouth 3 (three) times daily. for 3 days 9 tablet 6/10/2023 6/13/2023 Jonathan Aly MD    cyclobenzaprine (FLEXERIL) 10 MG tablet Take 1 tablet (10 mg total) by mouth 3 (three) times daily as needed for Muscle spasms. 15 tablet 6/10/2023 6/15/2023 Jonathan Aly MD    nitrofurantoin, macrocrystal-monohydrate, (MACROBID) 100 MG capsule Take 1 capsule (100 mg total) by mouth 2 (two) times daily. for 7 days 14 capsule 6/10/2023 6/17/2023 Jonathan Aly MD          Follow-up Information       Follow up With Specialties Details Why Contact Info    Sarita De Leon, [facility]   [address] [address] [address] [phone]               Jonathan Aly MD  06/10/23 2766

## 2023-06-13 LAB — BACTERIA UR CULT: ABNORMAL

## 2023-06-14 ENCOUNTER — OFFICE VISIT (OUTPATIENT)
Dept: FAMILY MEDICINE | Facility: CLINIC | Age: 63
End: 2023-06-14
Payer: COMMERCIAL

## 2023-06-14 VITALS
RESPIRATION RATE: 18 BRPM | OXYGEN SATURATION: 100 % | HEART RATE: 57 BPM | WEIGHT: 293 LBS | BODY MASS INDEX: 47.09 KG/M2 | SYSTOLIC BLOOD PRESSURE: 100 MMHG | TEMPERATURE: 98 F | DIASTOLIC BLOOD PRESSURE: 58 MMHG | HEIGHT: 66 IN

## 2023-06-14 DIAGNOSIS — I10 HYPERTENSION, UNSPECIFIED TYPE: ICD-10-CM

## 2023-06-14 DIAGNOSIS — E78.2 MIXED HYPERLIPIDEMIA: ICD-10-CM

## 2023-06-14 DIAGNOSIS — Z00.00 WELL ADULT EXAM: Primary | ICD-10-CM

## 2023-06-14 PROCEDURE — 3066F PR DOCUMENTATION OF TREATMENT FOR NEPHROPATHY: ICD-10-PCS | Mod: CPTII,,, | Performed by: NURSE PRACTITIONER

## 2023-06-14 PROCEDURE — 3066F NEPHROPATHY DOC TX: CPT | Mod: CPTII,,, | Performed by: NURSE PRACTITIONER

## 2023-06-14 PROCEDURE — 3078F DIAST BP <80 MM HG: CPT | Mod: CPTII,,, | Performed by: NURSE PRACTITIONER

## 2023-06-14 PROCEDURE — 3074F SYST BP LT 130 MM HG: CPT | Mod: CPTII,,, | Performed by: NURSE PRACTITIONER

## 2023-06-14 PROCEDURE — 4010F ACE/ARB THERAPY RXD/TAKEN: CPT | Mod: CPTII,,, | Performed by: NURSE PRACTITIONER

## 2023-06-14 PROCEDURE — 3074F PR MOST RECENT SYSTOLIC BLOOD PRESSURE < 130 MM HG: ICD-10-PCS | Mod: CPTII,,, | Performed by: NURSE PRACTITIONER

## 2023-06-14 PROCEDURE — 1160F RVW MEDS BY RX/DR IN RCRD: CPT | Mod: CPTII,,, | Performed by: NURSE PRACTITIONER

## 2023-06-14 PROCEDURE — 4010F PR ACE/ARB THEARPY RXD/TAKEN: ICD-10-PCS | Mod: CPTII,,, | Performed by: NURSE PRACTITIONER

## 2023-06-14 PROCEDURE — 1159F PR MEDICATION LIST DOCUMENTED IN MEDICAL RECORD: ICD-10-PCS | Mod: CPTII,,, | Performed by: NURSE PRACTITIONER

## 2023-06-14 PROCEDURE — 3008F BODY MASS INDEX DOCD: CPT | Mod: CPTII,,, | Performed by: NURSE PRACTITIONER

## 2023-06-14 PROCEDURE — 99396 PREV VISIT EST AGE 40-64: CPT | Mod: ,,, | Performed by: NURSE PRACTITIONER

## 2023-06-14 PROCEDURE — 1160F PR REVIEW ALL MEDS BY PRESCRIBER/CLIN PHARMACIST DOCUMENTED: ICD-10-PCS | Mod: CPTII,,, | Performed by: NURSE PRACTITIONER

## 2023-06-14 PROCEDURE — 1159F MED LIST DOCD IN RCRD: CPT | Mod: CPTII,,, | Performed by: NURSE PRACTITIONER

## 2023-06-14 PROCEDURE — 3060F POS MICROALBUMINURIA REV: CPT | Mod: CPTII,,, | Performed by: NURSE PRACTITIONER

## 2023-06-14 PROCEDURE — 99396 PR PREVENTIVE VISIT,EST,40-64: ICD-10-PCS | Mod: ,,, | Performed by: NURSE PRACTITIONER

## 2023-06-14 PROCEDURE — 3008F PR BODY MASS INDEX (BMI) DOCUMENTED: ICD-10-PCS | Mod: CPTII,,, | Performed by: NURSE PRACTITIONER

## 2023-06-14 PROCEDURE — 3078F PR MOST RECENT DIASTOLIC BLOOD PRESSURE < 80 MM HG: ICD-10-PCS | Mod: CPTII,,, | Performed by: NURSE PRACTITIONER

## 2023-06-14 PROCEDURE — 3044F HG A1C LEVEL LT 7.0%: CPT | Mod: CPTII,,, | Performed by: NURSE PRACTITIONER

## 2023-06-14 PROCEDURE — 3044F PR MOST RECENT HEMOGLOBIN A1C LEVEL <7.0%: ICD-10-PCS | Mod: CPTII,,, | Performed by: NURSE PRACTITIONER

## 2023-06-14 PROCEDURE — 3060F PR POS MICROALBUMINURIA RESULT DOCUMENTED/REVIEW: ICD-10-PCS | Mod: CPTII,,, | Performed by: NURSE PRACTITIONER

## 2023-06-14 RX ORDER — ATORVASTATIN CALCIUM 80 MG/1
80 TABLET, FILM COATED ORAL NIGHTLY
Qty: 30 TABLET | Refills: 11 | Status: SHIPPED | OUTPATIENT
Start: 2023-06-14 | End: 2024-03-21

## 2023-06-14 RX ORDER — ASPIRIN 325 MG
50000 TABLET, DELAYED RELEASE (ENTERIC COATED) ORAL WEEKLY
Qty: 12 CAPSULE | Refills: 0 | Status: SHIPPED | OUTPATIENT
Start: 2023-06-14 | End: 2023-08-31

## 2023-06-14 RX ORDER — NITROFURANTOIN 25; 75 MG/1; MG/1
100 CAPSULE ORAL 2 TIMES DAILY
Qty: 14 CAPSULE | Refills: 0 | Status: SHIPPED | OUTPATIENT
Start: 2023-06-14 | End: 2023-06-21

## 2023-06-14 NOTE — PROGRESS NOTES
Patient ID: [id number]     Chief Complaint: Annual Exam      HPI:     Tanya Linda is a 62 y.o. female here today for an annual wellness visit. No other complaints today.       Cervical Cancer Screening - DUE  Breast Cancer Screening - DUE  Dental Exam - Recommend biannually  Vaccinations -   Immunization History   Administered Date(s) Administered    COVID-19, MRNA, LN-S, PF (Pfizer) (Purple Cap) 02/03/2021, 02/24/2021    Influenza - Quadrivalent - PF *Preferred* (6 months and older) 09/29/2021, 10/11/2022    Tdap 10/16/2020        Past Medical History:  has a past medical history of Anxiety, Back muscle spasm, Depression, GERD (gastroesophageal reflux disease), Hypertension, Intertrochanteric fracture of left femur, Morbid obesity, and Personal history of colonic polyps.    Surgical History:  has a past surgical history that includes Colonoscopy (12/02/2021) and Antegrade intramedullary rodding of femur (Left, 12/25/2022).    Family History: family history is not on file.    Social History:  reports that she has never smoked. She has never used smokeless tobacco. She reports that she does not drink alcohol and does not use drugs.    Current Outpatient Medications   Medication Instructions    aspirin (ECOTRIN) 81 mg, Oral, 2 times daily    atorvastatin (LIPITOR) 80 mg, Oral, Nightly    busPIRone (BUSPAR) 7.5 mg, Oral, Nightly    cholecalciferol (vitamin D3) 50,000 Units, Oral, Weekly    metoprolol tartrate (LOPRESSOR) 25 mg, Oral, 2 times daily    polyethylene glycol (GLYCOLAX) 17 g, Oral, Daily    venlafaxine (EFFEXOR) 37.5 mg, Oral, Daily       Patient has No Known Allergies.     Patient Care Team:  MARION Salazar as PCP - General (Nurse Practitioner)  Miladys Barr LPN as Care Coordinator  Andrey Vizcarra MD as Consulting Physician (General Surgery)       Subjective:     Review of Systems    12 point review of systems conducted, negative except as stated in the history of present illness. See HPI  "for details.      Objective:     Visit Vitals  BP (!) 100/58 (BP Location: Left arm, Patient Position: Sitting)   Pulse (!) 57   Temp 98.2 °F (36.8 °C) (Oral)   Resp 18   Ht 5' 6" (1.676 m)   Wt (!) 150.7 kg (332 lb 3.2 oz)   SpO2 100%   BMI 53.62 kg/m²       Physical Exam    General: Alert and oriented, No acute distress.  Head: Normocephalic, Atraumatic.  Eye: Pupils are equal, round and reactive to light, Extraocular movements are intact, Sclera non-icteric.  Ears/Nose/Throat: Normal, Mucosa moist,Clear.  Neck/Thyroid: Supple, Non-tender, No carotid bruit, No lymphadenopathy, No JVD, Full range of motion.  Respiratory: Clear to auscultation bilaterally; No wheezes, rales or rhonchi,Non-labored respirations, Symmetrical chest wall expansion.  Cardiovascular: Regular rate and rhythm, S1/S2 normal, No murmurs, rubs or gallops.  Gastrointestinal: Soft, Non-tender, Non-distended, Normal bowel sounds, No palpable organomegaly.  Musculoskeletal: Normal range of motion.  Integumentary: Warm, Dry, Intact, No suspicious lesions or rashes.  Extremities: No clubbing, cyanosis or edema  Neurologic: No focal deficits, Cranial Nerves II-XII are grossly intact, Motor strength normal upper and lower extremities, Sensory exam intact.  Psychiatric: Normal interaction, Coherent speech, Euthymic mood, Appropriate affect     Labs Reviewed:     Chemistry:  Lab Results   Component Value Date     03/19/2023    K 4.1 03/19/2023    CHLORIDE 102 03/19/2023    BUN 16.0 03/19/2023    CREATININE 1.17 (H) 03/19/2023    EGFRNORACEVR 54 03/19/2023    GLUCOSE 123 (H) 03/19/2023    CALCIUM 9.2 03/19/2023    ALKPHOS 98 03/18/2023    LABPROT 7.7 (H) 03/18/2023    ALBUMIN 2.8 (L) 03/18/2023    BILIDIR 0.2 02/25/2022    IBILI 0.40 02/25/2022    AST 26 03/18/2023    ALT 24 03/18/2023    MG 1.90 03/18/2023    PHOS 2.7 03/18/2023    [id number] 12.3 (L) 03/03/2023    TSH 0.896 03/03/2023        Lab Results   Component Value Date    HGBA1C 5.4 " 03/20/2023        Hematology:  Lab Results   Component Value Date    WBC 11.1 03/19/2023    HGB 9.5 (L) 03/19/2023    HCT 31.0 (L) 03/19/2023     03/19/2023       Lipid Panel:  Lab Results   Component Value Date    CHOL 164 03/20/2023    HDL 30 (L) 03/20/2023    .00 03/20/2023    TRIG 135 03/20/2023    TOTALCHOLEST 5 03/20/2023        Urine:  Lab Results   Component Value Date    COLORUA Yellow 06/10/2023    APPEARANCEUA Cloudy (A) 06/10/2023    SGUA 1.020 06/10/2023    PHUA 5.5 06/10/2023    PROTEINUA Negative 06/10/2023    GLUCOSEUA Negative 06/10/2023    KETONESUA Negative 06/10/2023    BLOODUA Moderate (A) 06/10/2023    NITRITESUA Positive (A) 06/10/2023    LEUKOCYTESUR Small (A) 06/10/2023    RBCUA None Seen 06/10/2023    WBCUA 50-99 (A) 06/10/2023    BACTERIA Moderate (A) 06/10/2023    CREATRANDUR 51.9 03/03/2023          Assessment:       ICD-10-CM ICD-9-CM   1. Mixed hyperlipidemia  E78.2 272.2   2. Hypertension, unspecified type  I10 401.9        Plan:   1. Well adult exam  Healthy lifestyle discussed.     2. Hypertension, unspecified type  Stable. Continue current management.  Well controlled.   Low Sodium Diet (DASH Diet - Less than 2 grams of sodium per day).  Monitor blood pressure daily and log. Report consistent numbers greater than 140/90.  Maintain healthy weight with goal BMI <30. Exercise 30 minutes per day, 5 days per week.  Smoking cessation encouraged to aid in BP reduction.    - atorvastatin (LIPITOR) 80 MG tablet; Take 1 tablet (80 mg total) by mouth every evening.  Dispense: 30 tablet; Refill: 11    3. Mixed hyperlipidemia  Lab Results   Component Value Date    .00 03/20/2023    TRIG 135 03/20/2023    HDL 30 (L) 03/20/2023    TOTALCHOLEST 5 03/20/2023     Stressed importance of dietary modifications. Follow a low cholesterol, low saturated fat diet with less that 200mg of cholesterol a day.  Avoid fried foods and high saturated fats (high saturated fats less than 7% of  calories).  Add Flax Seed/Fish Oil supplements to diet. Increase dietary fiber.  Regular exercise can reduce LDL and raise HDL. Stressed importance of physical activity 5 times per week for 30 minutes per day.     - Comprehensive Metabolic Panel; Future  - Lipid Panel; Future      Follow up in about 3 months (around 9/14/2023) for HTN, Hyperlipidemia-Lipid panel due. In addition to their scheduled follow up, the patient has also been instructed to follow up on as needed basis.     Future Appointments   Date Time Provider Department Center   9/21/2023  9:30 AM MARION Salazar [facility]   6/18/2024  2:00 PM MARION Salazar [facility]        MARION Salazar

## 2023-07-25 DIAGNOSIS — Z12.31 ENCOUNTER FOR SCREENING MAMMOGRAM FOR MALIGNANT NEOPLASM OF BREAST: Primary | ICD-10-CM

## 2023-08-09 ENCOUNTER — PATIENT OUTREACH (OUTPATIENT)
Dept: ADMINISTRATIVE | Facility: HOSPITAL | Age: 63
End: 2023-08-09
Payer: COMMERCIAL

## 2023-08-09 NOTE — PROGRESS NOTES
Population Health Outreach.    Mammogram due/ ordered.PAP due. Called number listed [phone]- Is patient's work number and she was not there.Her daughter gave contact #: [phone]. Left voicemail.

## 2023-09-20 ENCOUNTER — LAB VISIT (OUTPATIENT)
Dept: LAB | Facility: HOSPITAL | Age: 63
End: 2023-09-20
Attending: NURSE PRACTITIONER
Payer: COMMERCIAL

## 2023-09-20 DIAGNOSIS — E78.2 MIXED HYPERLIPIDEMIA: ICD-10-CM

## 2023-09-20 LAB
ALBUMIN SERPL-MCNC: 3.5 G/DL (ref 3.4–4.8)
ALBUMIN/GLOB SERPL: 0.9 RATIO (ref 1.1–2)
ALP SERPL-CCNC: 85 UNIT/L (ref 40–150)
ALT SERPL-CCNC: 13 UNIT/L (ref 0–55)
AST SERPL-CCNC: 14 UNIT/L (ref 5–34)
BILIRUB SERPL-MCNC: 0.7 MG/DL
BUN SERPL-MCNC: 17.2 MG/DL (ref 9.8–20.1)
CALCIUM SERPL-MCNC: 9.2 MG/DL (ref 8.4–10.2)
CHLORIDE SERPL-SCNC: 108 MMOL/L (ref 98–107)
CHOLEST SERPL-MCNC: 197 MG/DL
CHOLEST/HDLC SERPL: 4 {RATIO} (ref 0–5)
CO2 SERPL-SCNC: 24 MMOL/L (ref 23–31)
CREAT SERPL-MCNC: 0.94 MG/DL (ref 0.55–1.02)
GFR SERPLBLD CREATININE-BSD FMLA CKD-EPI: >60 MLS/MIN/1.73/M2
GLOBULIN SER-MCNC: 3.9 GM/DL (ref 2.4–3.5)
GLUCOSE SERPL-MCNC: 92 MG/DL (ref 82–115)
HDLC SERPL-MCNC: 44 MG/DL (ref 35–60)
LDLC SERPL CALC-MCNC: 122 MG/DL (ref 50–140)
POTASSIUM SERPL-SCNC: 4.6 MMOL/L (ref 3.5–5.1)
PROT SERPL-MCNC: 7.4 GM/DL (ref 5.8–7.6)
SODIUM SERPL-SCNC: 140 MMOL/L (ref 136–145)
TRIGL SERPL-MCNC: 157 MG/DL (ref 37–140)
VLDLC SERPL CALC-MCNC: 31 MG/DL

## 2023-09-20 PROCEDURE — 80053 COMPREHEN METABOLIC PANEL: CPT

## 2023-09-20 PROCEDURE — 80061 LIPID PANEL: CPT

## 2023-09-20 PROCEDURE — 36415 COLL VENOUS BLD VENIPUNCTURE: CPT

## 2023-09-21 ENCOUNTER — OFFICE VISIT (OUTPATIENT)
Dept: FAMILY MEDICINE | Facility: CLINIC | Age: 63
End: 2023-09-21
Payer: COMMERCIAL

## 2023-09-21 VITALS
OXYGEN SATURATION: 99 % | WEIGHT: 293 LBS | TEMPERATURE: 98 F | SYSTOLIC BLOOD PRESSURE: 122 MMHG | HEART RATE: 69 BPM | RESPIRATION RATE: 18 BRPM | HEIGHT: 66 IN | DIASTOLIC BLOOD PRESSURE: 76 MMHG | BODY MASS INDEX: 47.09 KG/M2

## 2023-09-21 DIAGNOSIS — R22.0 MASS OF SCALP: ICD-10-CM

## 2023-09-21 DIAGNOSIS — E78.2 MIXED HYPERLIPIDEMIA: ICD-10-CM

## 2023-09-21 DIAGNOSIS — I10 HYPERTENSION, UNSPECIFIED TYPE: Primary | ICD-10-CM

## 2023-09-21 DIAGNOSIS — Z12.4 ENCOUNTER FOR SCREENING FOR CERVICAL CANCER: ICD-10-CM

## 2023-09-21 PROCEDURE — 3066F PR DOCUMENTATION OF TREATMENT FOR NEPHROPATHY: ICD-10-PCS | Mod: CPTII,,, | Performed by: NURSE PRACTITIONER

## 2023-09-21 PROCEDURE — 3060F PR POS MICROALBUMINURIA RESULT DOCUMENTED/REVIEW: ICD-10-PCS | Mod: CPTII,,, | Performed by: NURSE PRACTITIONER

## 2023-09-21 PROCEDURE — 3074F SYST BP LT 130 MM HG: CPT | Mod: CPTII,,, | Performed by: NURSE PRACTITIONER

## 2023-09-21 PROCEDURE — 3074F PR MOST RECENT SYSTOLIC BLOOD PRESSURE < 130 MM HG: ICD-10-PCS | Mod: CPTII,,, | Performed by: NURSE PRACTITIONER

## 2023-09-21 PROCEDURE — 99214 PR OFFICE/OUTPT VISIT, EST, LEVL IV, 30-39 MIN: ICD-10-PCS | Mod: ,,, | Performed by: NURSE PRACTITIONER

## 2023-09-21 PROCEDURE — 3044F PR MOST RECENT HEMOGLOBIN A1C LEVEL <7.0%: ICD-10-PCS | Mod: CPTII,,, | Performed by: NURSE PRACTITIONER

## 2023-09-21 PROCEDURE — 1160F RVW MEDS BY RX/DR IN RCRD: CPT | Mod: CPTII,,, | Performed by: NURSE PRACTITIONER

## 2023-09-21 PROCEDURE — 4010F PR ACE/ARB THEARPY RXD/TAKEN: ICD-10-PCS | Mod: CPTII,,, | Performed by: NURSE PRACTITIONER

## 2023-09-21 PROCEDURE — 3078F PR MOST RECENT DIASTOLIC BLOOD PRESSURE < 80 MM HG: ICD-10-PCS | Mod: CPTII,,, | Performed by: NURSE PRACTITIONER

## 2023-09-21 PROCEDURE — 3066F NEPHROPATHY DOC TX: CPT | Mod: CPTII,,, | Performed by: NURSE PRACTITIONER

## 2023-09-21 PROCEDURE — 3008F BODY MASS INDEX DOCD: CPT | Mod: CPTII,,, | Performed by: NURSE PRACTITIONER

## 2023-09-21 PROCEDURE — 3060F POS MICROALBUMINURIA REV: CPT | Mod: CPTII,,, | Performed by: NURSE PRACTITIONER

## 2023-09-21 PROCEDURE — 1159F MED LIST DOCD IN RCRD: CPT | Mod: CPTII,,, | Performed by: NURSE PRACTITIONER

## 2023-09-21 PROCEDURE — 3008F PR BODY MASS INDEX (BMI) DOCUMENTED: ICD-10-PCS | Mod: CPTII,,, | Performed by: NURSE PRACTITIONER

## 2023-09-21 PROCEDURE — 4010F ACE/ARB THERAPY RXD/TAKEN: CPT | Mod: CPTII,,, | Performed by: NURSE PRACTITIONER

## 2023-09-21 PROCEDURE — 1160F PR REVIEW ALL MEDS BY PRESCRIBER/CLIN PHARMACIST DOCUMENTED: ICD-10-PCS | Mod: CPTII,,, | Performed by: NURSE PRACTITIONER

## 2023-09-21 PROCEDURE — 1159F PR MEDICATION LIST DOCUMENTED IN MEDICAL RECORD: ICD-10-PCS | Mod: CPTII,,, | Performed by: NURSE PRACTITIONER

## 2023-09-21 PROCEDURE — 99214 OFFICE O/P EST MOD 30 MIN: CPT | Mod: ,,, | Performed by: NURSE PRACTITIONER

## 2023-09-21 PROCEDURE — 3078F DIAST BP <80 MM HG: CPT | Mod: CPTII,,, | Performed by: NURSE PRACTITIONER

## 2023-09-21 PROCEDURE — 3044F HG A1C LEVEL LT 7.0%: CPT | Mod: CPTII,,, | Performed by: NURSE PRACTITIONER

## 2023-09-21 RX ORDER — ACETAMINOPHEN 160 MG/5ML
400 SUSPENSION, ORAL (FINAL DOSE FORM) ORAL DAILY
Qty: 60 CAPSULE | Refills: 6 | Status: SHIPPED | OUTPATIENT
Start: 2023-09-21 | End: 2024-03-21

## 2023-09-21 NOTE — PROGRESS NOTES
" Patient ID: [id number]     Chief Complaint: Hyperlipidemia (3 mo fu) and Hypertension      HPI:     Tanya Linda is a 62 y.o. female here today for a follow up. Patient presents with complaints of a left scalp mass that has been present for years.  Patient denies any changes in LOC.  Patient denies any pain or tenderness to site.    Past Medical History:  has a past medical history of Anxiety, Back muscle spasm, Depression, GERD (gastroesophageal reflux disease), Hypertension, Intertrochanteric fracture of left femur, Morbid obesity, and Personal history of colonic polyps.    Surgical History:  has a past surgical history that includes Colonoscopy (12/02/2021) and Antegrade intramedullary rodding of femur (Left, 12/25/2022).    Family History: family history is not on file.    Social History:  reports that she has never smoked. She has never used smokeless tobacco. She reports that she does not drink alcohol and does not use drugs.    Current Outpatient Medications   Medication Instructions    aspirin (ECOTRIN) 81 mg, Oral, 2 times daily    atorvastatin (LIPITOR) 80 mg, Oral, Nightly    busPIRone (BUSPAR) 7.5 mg, Oral, Nightly    coenzyme Q10 400 mg, Oral, Daily    metoprolol tartrate (LOPRESSOR) 25 mg, Oral, 2 times daily    venlafaxine (EFFEXOR) 37.5 mg, Oral, Daily       Patient has No Known Allergies.     Patient Care Team:  Sarita De Leon FNP as PCP - General (Nurse Practitioner)  Miladys Barr LPN as Care Coordinator  Andrey Vizcarra MD as Consulting Physician (General Surgery)       Subjective:     Review of Systems    12 point review of systems conducted, negative except as stated in the history of present illness. See HPI for details.      Objective:     Visit Vitals  /76 (BP Location: Left arm, Patient Position: Sitting)   Pulse 69   Temp 98.1 °F (36.7 °C) (Oral)   Resp 18   Ht 5' 6" (1.676 m)   Wt (!) 150.9 kg (332 lb 11.2 oz)   SpO2 99%   BMI 53.70 kg/m²       Physical " Exam    General: Alert and oriented, No acute distress.  Head: Normocephalic, Atraumatic.  Eye: Pupils are equal, round and reactive to light, Extraocular movements are intact, Sclera non-icteric.  Ears/Nose/Throat: Normal, Mucosa moist,Clear.  Neck/Thyroid: Supple, Non-tender, No carotid bruit, No lymphadenopathy, No JVD, Full range of motion.  Respiratory: Clear to auscultation bilaterally; No wheezes, rales or rhonchi,Non-labored respirations, Symmetrical chest wall expansion.  Cardiovascular: Regular rate and rhythm, S1/S2 normal, No murmurs, rubs or gallops.  Gastrointestinal: Soft, Non-tender, Non-distended, Normal bowel sounds, No palpable organomegaly.  Musculoskeletal: Normal range of motion.  Integumentary: Warm, Dry, Intact, No rashes. Left scalp mass.  Extremities: No clubbing, cyanosis or edema  Neurologic: No focal deficits, Cranial Nerves II-XII are grossly intact, Motor strength normal upper and lower extremities, Sensory exam intact.  Psychiatric: Normal interaction, Coherent speech, Euthymic mood, Appropriate affect     Labs Reviewed:     Chemistry:  Lab Results   Component Value Date     09/20/2023    K 4.6 09/20/2023    CHLORIDE 108 (H) 09/20/2023    BUN 17.2 09/20/2023    CREATININE 0.94 09/20/2023    EGFRNORACEVR >60 09/20/2023    GLUCOSE 92 09/20/2023    CALCIUM 9.2 09/20/2023    ALKPHOS 85 09/20/2023    LABPROT 7.4 09/20/2023    ALBUMIN 3.5 09/20/2023    BILIDIR 0.2 02/25/2022    IBILI 0.40 02/25/2022    AST 14 09/20/2023    ALT 13 09/20/2023    MG 1.90 03/18/2023    PHOS 2.7 03/18/2023    [id number] 12.3 (L) 03/03/2023    TSH 0.896 03/03/2023        Lab Results   Component Value Date    HGBA1C 5.4 03/20/2023        Hematology:  Lab Results   Component Value Date    WBC 11.1 03/19/2023    HGB 9.5 (L) 03/19/2023    HCT 31.0 (L) 03/19/2023     03/19/2023       Lipid Panel:  Lab Results   Component Value Date    CHOL 197 09/20/2023    HDL 44 09/20/2023    .00 09/20/2023     TRIG 157 (H) 09/20/2023    TOTALCHOLEST 4 09/20/2023        Urine:  Lab Results   Component Value Date    COLORUA Yellow 06/10/2023    APPEARANCEUA Cloudy (A) 06/10/2023    SGUA 1.020 06/10/2023    PHUA 5.5 06/10/2023    PROTEINUA Negative 06/10/2023    GLUCOSEUA Negative 06/10/2023    KETONESUA Negative 06/10/2023    BLOODUA Moderate (A) 06/10/2023    NITRITESUA Positive (A) 06/10/2023    LEUKOCYTESUR Small (A) 06/10/2023    RBCUA None Seen 06/10/2023    WBCUA 50-99 (A) 06/10/2023    BACTERIA Moderate (A) 06/10/2023    CREATRANDUR 51.9 03/03/2023          Assessment:       ICD-10-CM ICD-9-CM   1. Hypertension, unspecified type  I10 401.9   2. Mixed hyperlipidemia  E78.2 272.2   3. Mass of scalp  R22.0 782.2   4. Encounter for screening for cervical cancer  Z12.4 V76.2        Plan:   1. Hypertension, unspecified type  Well controlled.   Continue   Hypertension Medications               metoprolol tartrate (LOPRESSOR) 25 MG tablet Take 1 tablet (25 mg total) by mouth 2 (two) times daily.        Low Sodium Diet (DASH Diet - Less than 2 grams of sodium per day).  Monitor blood pressure daily and log. Report consistent numbers greater than 140/90.  Maintain healthy weight with goal BMI <30. Exercise 30 minutes per day, 5 days per week.  Smoking cessation encouraged to aid in BP reduction.      2. Mixed hyperlipidemia  Lab Results   Component Value Date    .00 09/20/2023    TRIG 157 (H) 09/20/2023    HDL 44 09/20/2023    TOTALCHOLEST 4 09/20/2023     Continue   Hyperlipidemia Medications               atorvastatin (LIPITOR) 80 MG tablet Take 1 tablet (80 mg total) by mouth every evening.        Stressed importance of dietary modifications. Follow a low cholesterol, low saturated fat diet with less that 200mg of cholesterol a day.  Avoid fried foods and high saturated fats (high saturated fats less than 7% of calories).  Add Flax Seed/Fish Oil supplements to diet. Increase dietary fiber.  Regular exercise can  reduce LDL and raise HDL. Stressed importance of physical activity 5 times per week for 30 minutes per day.       3. Mass of scalp  - Ambulatory referral/consult to General Surgery; Future    4. Encounter for screening for cervical cancer  - Ambulatory referral/consult to Obstetrics / Gynecology; Future      Follow up in about 6 months (around 3/21/2024) for Wellness. In addition to their scheduled follow up, the patient has also been instructed to follow up on as needed basis.     Future Appointments   Date Time Provider Department Center   3/21/2024  9:00 AM Sarita De Leon FNP [facility]   6/18/2024  2:00 PM Sarita De Leon FNP [facility]        MARION Salazar

## 2023-09-21 NOTE — PATIENT INSTRUCTIONS
Tony Martínez,     If you are due for any health screening(s) below please notify me so we can arrange them to be ordered and scheduled. Most healthy patients at your age complete them, but you are free to accept or refuse.     If you can't do it, I'll definitely understand. If you can, I'd certainly appreciate it!    Tests to Keep You Healthy    Mammogram: ORDERED BUT NOT SCHEDULED  Colon Cancer Screening: Met on 12/2/2021  Cervical Cancer Screening: DUE  Last Blood Pressure <= 139/89 (9/21/2023): Yes      Schedule your breast cancer screening today     Breast cancer is the second most common cancer in women,  and the second leading cause of death from cancer. Mammograms can detect breast cancer early, which significantly increases the chances of curing the cancer.       Our records indicate that you may be overdue for breast cancer screening. Cancer screenings save lives, so schedule yours today to stay healthy.     If you recently had a mammogram performed outside of [facility], please let your Health care team know so that they can update your health record.        Your cervical cancer screening is due     Our records indicate that you may be overdue for your screening Pap smear. A Pap smear is an important health screening that can detect abnormal cells that can become cervical cancer. Cervical cancer screenings allow for early diagnosis and increase the likelihood of successful treatment.     The current recommendation for Pap smear screening is every 3-5 years for women at average risk. We encourage you to schedule your appointment with your womens health provider. Many women see a gynecologist for this screening, but some primary care providers also provide Pap screening.     If you recently had your Pap smear screening performed outside of [facility], please let your health care team know so that they can update your health record.

## 2023-10-12 NOTE — PROGRESS NOTES
[facility]  [facility] Medicine  Telehospitalist Progress Note    Patient Name: Tanya Linda  MRN: [id number]  Patient Class: IP- Swing   Admission Date: 12/29/2022  Length of Stay: 21 days  Attending Physician: Stephon Kearney MD  Primary Care Provider: MARION Salazar      Subjective:     Principal Problem:Closed intertrochanteric fracture of hip, left, initial encounter      HPI:  Ms. Linda is a 62-year-old  female with history of morbid obesity, hypertension, depression, GERD, and anxiety who presented to [facility] on 12/24/2022 with left hip pain following a fall.  X-ray of the left hip and femur showed a displaced intertrochanteric fracture of the left femur and chest x-ray revealed no acute cardiopulmonary process. CT of the left hip confirmed a comminuted intertrochanteric left femoral fracture and orthopedics was consulted.  She was taken to the OR on 12/25/2022 for intramedullary nail of left intertrochanteric femur fracture by Dr. Ronnell Olivares and she did relatively well postoperatively.  Her lisinopril was held due to mild hypotension and her blood pressure stabilized.  She had no other complications throughout her hospital course and she was transferred to [facility] swing bed on 12/29/2022 for PT/OT and management of her multiple medical comorbidities.      Overview/Hospital Course:  She was admitted to [facility] swing bed on 12/29/2022 for rehab following intramedullary nail of left intertrochanteric femur fracture.  She is being treated with a 4 week course of aspirin 81 mg PO BID for DVT prophylaxis and norco 5/325 mg PO every 6 hours as needed for pain.  She was started on diclofenac 1% gel 2 grams topical BID prn for left knee pain and stiffness on 1/6/2023. Her miralax was changed from 17 gm PO daily to prn on 1/8/2023.      Interval History: She is doing well today without complaints and she denies any left hip pain. She ambulated  Physical Therapy    Physical Therapy Evaluation    Patient Name: Nay Frost  MRN: [id number]  Today's Date: 10/12/2023   Time Calculation  Start Time: 0905  Stop Time: 0925  Time Calculation (min): 20 min    Assessment/Plan   PT Assessment  PT Assessment Results: Decreased strength, Decreased endurance, Impaired balance, Decreased cognition  Rehab Prognosis: Poor  Barriers to Discharge: cognition  Evaluation/Treatment Tolerance: Patient limited by pain  Medical Staff Made Aware: Yes  End of Session Communication: Bedside nurse, Care Coordinator  Assessment Comment: pt demonstrates decreased functional strength, mobility, transfers and inability to ambulate. Pt demos poor carryover to functional tasks as assessed, unable to follow instructions for ROM/ther ex completion. Recommend maintaining 24H supervision and assist to maintain pt safety and reinforce GASTON Precautions. low level PT as pt is appropriate/tolerates.  End of Session Patient Position: Up in chair, Alarm on  IP OR SWING BED PT PLAN  Inpatient or Swing Bed: Inpatient  PT Plan  Treatment/Interventions: Gait training, Bed mobility, Transfer training, Balance training, Strengthening  PT Plan: Skilled PT  PT Frequency: 3 times per week  PT Discharge Recommendations: Low intensity level of continued care, 24 hr supervision due to cognition  PT Recommended Transfer Status: Assist x2      Subjective   General Visit Information:  General  Reason for Referral: 87 YOF admitted with R hip fracture and hemiarthroplasty repair s/p fall  Referred By: JOSE Terrell  Past Medical History Relevant to Rehab: PMH: L GASTON, L TKA, Alzheimer's dementia with mood disturbances, HTN  Family/Caregiver Present: No  Co-Treatment: OT  Co-Treatment Reason: to maximize pt function and maintain safety  Prior to Session Communication: Bedside nurse  Patient Position Received: Bed, 3 rail up  General Comment: pt supine in bed, purewick, IV and post-op dressing in place. WBAT R LE s/p  100 feet x 4 with her rolling walker and standby assistance and she required standby assistance with sit to stand transitions.      Review of Systems   All other systems reviewed and are negative.  Objective:     Vital Signs (Most Recent):  Temp: 97.6 °F (36.4 °C) (01/19/23 0720)  Pulse: (!) 55 (01/19/23 0720)  Resp: 18 (01/19/23 0306)  BP: 135/80 (01/19/23 0720)  SpO2: 96 % (01/19/23 0720)   Vital Signs (24h Range):  Temp:  [97.5 °F (36.4 °C)-97.7 °F (36.5 °C)] 97.6 °F (36.4 °C)  Pulse:  [55-83] 55  Resp:  [16-18] 18  SpO2:  [94 %-100 %] 96 %  BP: (125-135)/(67-83) 135/80     Weight:  (could not get weight due to bed)  Body mass index is 57.41 kg/m².    Intake/Output Summary (Last 24 hours) at 1/19/2023 0838  Last data filed at 1/19/2023 0634  Gross per 24 hour   Intake 240 ml   Output 1600 ml   Net -1360 ml      Physical Exam  General - Morbidly obese  female in no acute distress.  HEENT - NCAT. No scleral icterus. Oropharynx clear. Mucous membranes moist.  Neck - No lymphadenopathy, thyromegaly, or JVD.  CVS - Regular rate and rhythm. No murmurs, rubs, or gallops.  Resp - Lungs are clear to auscultation bilaterally. No rales, wheeze, or rhonchi.   GI - Soft, nontender, nondistended, normoactive bowel sounds present.   Extremities - No clubbing, cyanosis, or edema.   Neuro - CN II through XII are grossly intact. No focal neurological deficits. Alert and oriented times four.   Psych - Normal affect.  Skin - Left hip surgical site c/d/i. Slight edema and ecchymosis noted to periwounds.     Significant Labs: All pertinent labs within the past 24 hours have been reviewed.    1/17/2023:  CBC: WBC count 7.8, hemoglobin 11.1, hematocrit 37.5, platelet count 261.  BMP: Sodium 139, potassium 4.2, chloride 102, CO2 26, BUN 18.7, creatinine 0.69, glucose 99, calcium 9.4, magnesium 1.9.     1/9/2023:  CBC: WBC count 10.4, hemoglobin 11.1, hematocrit 37.9, platelet count 316.  CMP: Sodium 137, potassium 4.8, chloride  hemiarthroplasty, posterior GASTON precuations anticipated. Pt pleasantly confused, difficult to attend to functional tasks as assessed.  Home Living:  Home Living  Type of Home: Long Term Care facility ([facility])  Lives With: Alone  Home Living Comments: Pt is LTC at [facility]; unable to obtain PLOF despite attempts to contact facility. Anticipate need for (at least) supervision/consistent safety cues d/t poor cognition as assessed.  Prior Level of Function:  Prior Function Per Pt/Caregiver Report  Level of [place]: Unable to asses at this time (attempted to contact [facility] upon evaluation without success. TCC also without success. Per EMR, attempts to contact Dtr (POA) have been unsuccessful.)    **UPDATE AT 1247: Per SW at [facility], pt requires maxA for bed mobility, assist x1 for transfers, total care for bathing/toileting, set up for feeding, and independent in ambulation without device.     Precautions:  Precautions  LE Weight Bearing Status: Weight Bearing as Tolerated  Medical Precautions: Fall precautions  Post-Surgical Precautions: Right hip precautions  Precautions Comment: pt unable to demo understanding of hip precautions d/t cognition  Vital Signs:       Objective   Pain:  Pain Assessment  Pain Assessment: Fountain-Baker FACES  Fountain-Baker FACES Pain Rating: Hurts little bit  Pain Type: Surgical pain  Pain Location: Hip  Pain Orientation: Right  Cognition:  Cognition  Overall Cognitive Status: Impaired at baseline  Safety/Judgement: Exceptions to WFL (impaired at baseline; requires consistent reorientation to post-op status)    General Assessments:                          Strength  Strength Comments: Unable to formally assess LE strength with MMTs d/t cognitive impairments. Generalized LE weakness as assessed through sitting balance and ability to transfer as assessed.                     Dynamic Sitting Balance  Dynamic Sitting-Balance Support: Bilateral upper extremity  98, CO2 28, BUN 17.8, creatinine 0.77, glucose 94, calcium 9.5, magnesium 2.1, alkaline phosphatase 223, total protein 6.6, albumin 3.3, total bilirubin 0.8, AST 27, ALT 35.     1/4/2023:  CBC: WBC count 10.4, hemoglobin 10.2, hematocrit 33.6, platelet count 307.  BMP: Sodium 141, potassium 4.7, chloride 102, CO2 28, BUN 20.8, creatinine 0.79, glucose 114, calcium 9.7, magnesium 2.0.     12/30/2022:   CBC:  WBC count 10.4, hemoglobin 10.1, hematocrit 33.6, platelet count 259 food   BMP: Sodium 137, potassium 4.4, chloride 100, CO2 25, BUN 14.3, creatinine 0.68, glucose 100, calcium 9.1, magnesium 2.0.      12/29/2022:  CBC:  WBC count 10.9, hemoglobin 9.8, hematocrit 31.7, glucose 241.    BMP: Sodium 136, potassium 4.7, chloride 100, CO2 26, BUN 14.4, creatinine 0.73, glucose 107, calcium 8.9.    12/27/2022:   CBC:  WBC count 1.5, hemoglobin 10.7, hematocrit 34.6, platelet count 202.    CMP: Sodium 137, potassium 4.3, chloride 105, CO2 25, BUN 16.1, creatinine 0.81, glucose 121, calcium 9.0, magnesium 2.1, alkaline phosphatase 73, total protein 6.4, albumin 3.0, total bilirubin 0.8, AST 39, ALT 22.     12/26/2022:   CBC:  WBC count 13.7, hemoglobin 10.7, hematocrit 34.3, platelet count 264.  CMP:  Sodium 137, potassium 4.4, chloride 103, CO2 25, BUN 18.9, creatinine 0.85, glucose 118, calcium 8.71 magnesium 1.9, alkaline phosphatase 72, total protein 5.7, albumin 3.1, total bilirubin 0.8, AST 56, ALT 26.     12/24/2022:   CMP: Sodium 140, potassium 3.9, chloride 105, CO2 25, BUN 17.2 creatinine 0.97, glucose 117, calcium 9.3, alkaline phosphatase 98, total protein 7.2, albumin 3.8, total bilirubin 0.5, AST 14, ALT 17.    CBC: WBC count 10.6, hemoglobin 12.9, hematocrit 41.2, platelet count 223.     Significant Imaging: I have reviewed all pertinent imaging results/findings within the past 24 hours.     X-ray left hip 12/25/2022: There has been placement of left femoral intramedullary taz and compression screw  supported  Dynamic Sitting-Balance:  (B LE/UE AROM)  Dynamic Sitting-Comments: poor balance, retro and L lateral lean requiring min to max A to maintain stability    Dynamic Standing Balance  Dynamic Standing-Comments: poor standing balance, unable to tolerate >10 seconds, maxA x2 and B UE support with FWW.  Functional Assessments:       Bed Mobility  Bed Mobility: Yes  Bed Mobility 1  Level of Assistance 1:  (x2)  Bed Mobility 2  Bed Mobility  2: Supine to sitting  Level of Assistance 2: Maximum assistance (x2)  Bed Mobility Comments 2: assist to maintain precautions    Transfers  Transfer: Yes  Transfers 3  Transfer From 3: Sit to  Transfer to 3: Stand  Technique 3: Sit to stand, Stand to sit  Transfer Device 3: Walker  Transfer Level of Assistance 3: Maximum assistance (x2)  Trials/Comments 3: unable to achieve standing with initial standing; completed second trial with poor tolerance and inability to maintain stand >10 seconds  Transfers 4  Transfer From 4: Bed to  Transfer to 4: Chair with arms  Technique 4: Stand pivot  Transfer Level of Assistance 4: Dependent    Ambulation/Gait Training  Ambulation/Gait Training Performed: No (unable to initiate d/t cognition and poor functional carryover)         Outcome Measures:  [facility] Basic Mobility  Turning from your back to your side while in a flat bed without using bedrails: Total  Moving from lying on your back to sitting on the side of a flat bed without using bedrails: Total  Moving to and from bed to chair (including a wheelchair): Total  Standing up from a chair using your arms (e.g. wheelchair or bedside chair): Total  To walk in hospital room: Total  Climbing 3-5 steps with railing: Total  Basic Mobility - Total Score: 6    Encounter Problems       Encounter Problems (Active)       Balance       balance (Progressing)       Start:  10/12/23    Expected End:  10/26/23       Pt will demo static/dynamic sitting balance x10 min and standing balance x3+ minutes with  B UE support and Jose Antonio x1 to improve stability and decrease falls              Balance       Pt will demo good static/dynamic sitting balance and FAIR standing balance during ADL and functional activity with UE support for improved independence and participation in ADL  (Progressing)       Start:  10/12/23    Expected End:  10/26/23               Mobility       strength (Progressing)       Start:  10/12/23    Expected End:  10/26/23       X10+ reps ther ex to increase general strength and improve functional independence            bed mobility  (Progressing)       Start:  10/12/23    Expected End:  10/26/23       Pt will completed bed mobility with Jose Antonio x1 necessary for homegoing               Pain - Adult          Transfers       transfers (Progressing)       Start:  10/12/23    Expected End:  10/26/23       Pt will complete all functional transfers with Jose Antonio x1 necessary to initiate return to PLOF               Transfers        Pt to demonstrate transfers with FWW at min A (Progressing)       Start:  10/12/23    Expected End:  10/26/23                   Education Documentation  Precautions, taught by Cece Nascimento, PT at 10/12/2023 11:16 AM.  Learner: Patient  Readiness: Acceptance  Method: Explanation, Demonstration  Response: Needs Reinforcement, No Evidence of Learning    Mobility Training, taught by Cece Nascimento, PT at 10/12/2023 11:16 AM.  Learner: Patient  Readiness: Acceptance  Method: Explanation, Demonstration  Response: Needs Reinforcement, No Evidence of Learning    Education Comments  No comments found.             traverses into the femoral head transfixing the intertrochanteric fracture.  Positioning and alignment is satisfactory.     CT left hip 12/25/2022: Comminuted intertrochanteric left femoral fracture.     CXR 12/24/2022: No acute chest disease is identified.     X-ray left hip 12/24/2022:  Displaced intertrochanteric fracture of the left femur.      Assessment/Plan:      * Left intertrochanteric femur fracture  Continue 4 week course of aspirin 81 mg PO BID for DVT prophylaxis, PT/OT, norco as needed for pain control, and weight-bearing as tolerated to the left lower extremity.  She is status post intramedullary nail by Dr. Ronnell Olivares on 12/25/2022.     Hypertension  Her blood pressure has been stable off antihypertensives.  She is no longer on lisinopril 5 mg PO daily which was held at [facility] due to hypotension.    Anxiety  Continue buspirone.    Depression  Continue venlafaxine.    GERD (gastroesophageal reflux disease)  Continue famotidine.    Morbid obesity  She was counseled on the importance of weight loss and diet.  She was noted to have a BMI of 57.41.      Disposition  Anticipate discharge home with home health in the next week. A ramp is being arranged at her house. Her next review date is on 1/19/2023.      VTE Risk Mitigation (From admission, onward)         Ordered     Place sequential compression device  Until discontinued         12/29/22 1612                Discharge Planning   XENA:      Code Status: Full Code   Is the patient medically ready for discharge?:     Reason for patient still in hospital (select all that apply): PT / OT recommendations  Discharge Plan A: Home with family, Home Health   Discharge Delays: None known at this time      This service was provided via telemedicine.  Type of Software: Audio/Visual.  Originating Site: [facility].  Distant Site: [place], [place].  His exam was performed with the assitance of Sveta Barroso RN.       Stephon Kearney MD  Department of Hospital  Medicine [facility] - [facility]

## 2023-10-20 DIAGNOSIS — R22.0 MASS OF SCALP: Primary | ICD-10-CM

## 2023-10-26 ENCOUNTER — TELEPHONE (OUTPATIENT)
Dept: FAMILY MEDICINE | Facility: CLINIC | Age: 63
End: 2023-10-26

## 2023-11-03 NOTE — PLAN OF CARE
[facility]  [address]  Phone: [phone]        Pt Name: Anne Harris  MRN: [id number]  [id number] 1981  Date of evaluation: 11/3/23      CHIEF COMPLAINT     Chief Complaint   Patient presents with    Fall     Pt presents to ED with complaints of a slip and fall in the shower yesterday approximately 24 hours ago. Pt states she fell backward striking the faucet on her lt upper and lower back with soreness to ribs as well. Pt denies head injury and LOC. No cervical pain noted. HISTORY OF PRESENT ILLNESS    Anne Harris is a 38 y.o. female who presents to our Emergency Department. Presents emerged department complaining of fall. States that yesterday she fell and scraped her back against the tub faucet. States that it was mechanical fall because there is soap on the tub. Patient states that she fell against the wall and hit the faucet with her left side. States that there is pain there is also some pain over her tailbone. States that she has had back surgery in the past for spina bifida. Patient denies any nausea or vomiting. Denies any her head. States that she has full recollection of the event. Denies any current numbness or tingling. Denies any chest pain or shortness of breath. Denies any weakness. Patient states that her surgery was when she was approximately 1 months old. Has tried taking Motrin at home without a large relief of her pain. States that she also has some left-sided rib pain. REVIEW OF SYSTEMS       Review of Systems   Constitutional:  Negative for chills, diaphoresis and fever. HENT:  Negative for drooling. Eyes:  Negative for redness. Respiratory:  Negative for cough, chest tightness and shortness of breath. Cardiovascular:  Negative for chest pain and palpitations. Gastrointestinal:  Negative for abdominal pain, constipation, diarrhea, nausea and vomiting.    Genitourinary:  Negative for dysuria and Weekly Staffing Report      Date Admitted: 12/29/2022 :   Staffing Date: 1/18/2023     Patient Active Problem List   Diagnosis    Dysuria    Morbid obesity    Flank pain    Back muscle spasm    Hypertension    Depression    Left intertrochanteric femur fracture    Femur fracture, left    Anxiety    GERD (gastroesophageal reflux disease)    Disposition          Team Members Present:       Nursing Present     Yes [x]    No []    Physical Therapy Present    Yes [x]    No []    Occupational Therapy Present     Yes [x]    No []    Speech Therapy Present    Yes []    No []    NA [x]    Dietary Present    Yes [x]    No []        Physician Present   [] Prem Machado    [] Thairy Reyes    [] MIGUEL Arita    [] ЕЛЕНА Kearney     [] LORNA Delacruz      Family Present    [x] Adult Children aurora on phon     [] Spouse    [] POA    [] Friend/ Caregiver    [] Other       Interdisciplinary Meeting Notes:  PT- progressing well. Walking 100ft at a time. With RW. Up from chair or bed able to do so with CGA. OT- Bathing- shower completed this week able to do all of that on her own. Lower body dressing able to do that on own. Toileting only needs help to wipe. Doing very well. Nutritionally- doing well. Medically- No acute issues. Plan to continue Therapy. Discharge to daughter's home when ready.                 Please see Documented PT/OT/ST, Dietary, Nursing, and Physician notes for detailed treatment information.                     left flank. Patient's work-up in the emergency department revealed CBC without any anemia. No leukocytosis. Platelet count within normal limits. Patient is BMP does not show any elevation in creatinine. Electrolytes largely within normal limits except for glucose of 116. Patient's UA does show nitrites as well as bacteria. Patient states that she does think that she might have a urinary tract infection because she says her urine smells bad and feels like she usually has an ear infection faction with these. States that Keflex is her antibiotic of choice. Patient was slightly tachycardic on arrival but states that she is little bit nervous when she is in the physician's. Patient's imaging done in the emergency department shows no evidence of acute injury in the chest abdomen or pelvis. No rib fracture. No lumbar spine abnormality. States that she does have a nodule that she has had FNA on.  States that she has had still had ultrasounds of this in the past.  Was explained that she may need further evaluation I am not sure what previous imaging was but 70 mm it may have grown. Verbalized understanding. Patient will be discharged at this time close follow-up. Given strict return precautions. Patient and significant other at content with plan of care at this time. Any questions asked. ED Course as of 11/04/23 0736   Fri Nov 03, 2023   6893 Is aware of thyroid nodule but has had biopsy.  [AT]      ED Course User Index  [AT] To, Nhung Gerardo DO       RE-EVALUATION:  Improved    CONSULTS:  None    PROCEDURES:  Procedures    Critical Care:  None      IMPRESSION/DISPOSITION     Discharge DISPOSITION Decision To Discharge 11/03/2023 06:25:48 PM    Patient was informed of their diagnosis and told to follow up with PCP in 2 days . Shared decision making was utilized in the discharge decision and patient/family in agreement with current plan of care. Patient told to return to ED for any worsening symptoms.

## 2024-01-03 ENCOUNTER — PATIENT OUTREACH (OUTPATIENT)
Dept: ADMINISTRATIVE | Facility: HOSPITAL | Age: 64
End: 2024-01-03
Payer: COMMERCIAL

## 2024-01-03 NOTE — PROGRESS NOTES
Population Health Outreach.    Mammogram ordered 07/25/23 -rerouted to scheduling department.     Pap Smear- was scheduled with Dr. Natarajan 04/02/24.

## 2024-03-07 ENCOUNTER — TELEPHONE (OUTPATIENT)
Dept: FAMILY MEDICINE | Facility: CLINIC | Age: 64
End: 2024-03-07
Payer: COMMERCIAL

## 2024-03-07 DIAGNOSIS — Z00.00 WELLNESS EXAMINATION: Primary | ICD-10-CM

## 2024-03-19 ENCOUNTER — LAB VISIT (OUTPATIENT)
Dept: LAB | Facility: HOSPITAL | Age: 64
End: 2024-03-19
Attending: NURSE PRACTITIONER
Payer: COMMERCIAL

## 2024-03-19 DIAGNOSIS — Z00.00 WELLNESS EXAMINATION: ICD-10-CM

## 2024-03-19 LAB
ALBUMIN SERPL-MCNC: 3.5 G/DL (ref 3.4–4.8)
ALBUMIN/GLOB SERPL: 0.9 RATIO (ref 1.1–2)
ALP SERPL-CCNC: 74 UNIT/L (ref 40–150)
ALT SERPL-CCNC: 14 UNIT/L (ref 0–55)
AST SERPL-CCNC: 10 UNIT/L (ref 5–34)
BASOPHILS # BLD AUTO: 0.03 X10(3)/MCL
BASOPHILS NFR BLD AUTO: 0.3 %
BILIRUB SERPL-MCNC: 0.5 MG/DL
BUN SERPL-MCNC: 16.4 MG/DL (ref 9.8–20.1)
CALCIUM SERPL-MCNC: 9.6 MG/DL (ref 8.4–10.2)
CHLORIDE SERPL-SCNC: 106 MMOL/L (ref 98–107)
CHOLEST SERPL-MCNC: 192 MG/DL
CHOLEST/HDLC SERPL: 5 {RATIO} (ref 0–5)
CO2 SERPL-SCNC: 25 MMOL/L (ref 23–31)
CREAT SERPL-MCNC: 0.98 MG/DL (ref 0.55–1.02)
DEPRECATED CALCIDIOL+CALCIFEROL SERPL-MC: 9.8 NG/ML (ref 30–80)
EOSINOPHIL # BLD AUTO: 0.22 X10(3)/MCL (ref 0–0.9)
EOSINOPHIL NFR BLD AUTO: 2.3 %
ERYTHROCYTE [DISTWIDTH] IN BLOOD BY AUTOMATED COUNT: 14.6 % (ref 11.5–17)
EST. AVERAGE GLUCOSE BLD GHB EST-MCNC: 102.5 MG/DL
GFR SERPLBLD CREATININE-BSD FMLA CKD-EPI: >60 MLS/MIN/1.73/M2
GLOBULIN SER-MCNC: 3.7 GM/DL (ref 2.4–3.5)
GLUCOSE SERPL-MCNC: 107 MG/DL (ref 82–115)
HBA1C MFR BLD: 5.2 %
HCT VFR BLD AUTO: 42.2 % (ref 37–47)
HDLC SERPL-MCNC: 42 MG/DL (ref 35–60)
HGB BLD-MCNC: 13.3 G/DL (ref 12–16)
IMM GRANULOCYTES # BLD AUTO: 0.03 X10(3)/MCL (ref 0–0.04)
IMM GRANULOCYTES NFR BLD AUTO: 0.3 %
LDLC SERPL CALC-MCNC: 122 MG/DL (ref 50–140)
LYMPHOCYTES # BLD AUTO: 2.25 X10(3)/MCL (ref 0.6–4.6)
LYMPHOCYTES NFR BLD AUTO: 23.1 %
MCH RBC QN AUTO: 27.5 PG (ref 27–31)
MCHC RBC AUTO-ENTMCNC: 31.5 G/DL (ref 33–36)
MCV RBC AUTO: 87.2 FL (ref 80–94)
MONOCYTES # BLD AUTO: 0.58 X10(3)/MCL (ref 0.1–1.3)
MONOCYTES NFR BLD AUTO: 6 %
NEUTROPHILS # BLD AUTO: 6.61 X10(3)/MCL (ref 2.1–9.2)
NEUTROPHILS NFR BLD AUTO: 68 %
PLATELET # BLD AUTO: 253 X10(3)/MCL (ref 130–400)
PMV BLD AUTO: 10.3 FL (ref 7.4–10.4)
POTASSIUM SERPL-SCNC: 4.6 MMOL/L (ref 3.5–5.1)
PROT SERPL-MCNC: 7.2 GM/DL (ref 5.8–7.6)
RBC # BLD AUTO: 4.84 X10(6)/MCL (ref 4.2–5.4)
SODIUM SERPL-SCNC: 141 MMOL/L (ref 136–145)
TRIGL SERPL-MCNC: 140 MG/DL (ref 37–140)
TSH SERPL-ACNC: 1.64 UIU/ML (ref 0.35–4.94)
VLDLC SERPL CALC-MCNC: 28 MG/DL
WBC # SPEC AUTO: 9.72 X10(3)/MCL (ref 4.5–11.5)

## 2024-03-19 PROCEDURE — 84443 ASSAY THYROID STIM HORMONE: CPT

## 2024-03-19 PROCEDURE — 85025 COMPLETE CBC W/AUTO DIFF WBC: CPT

## 2024-03-19 PROCEDURE — 82306 VITAMIN D 25 HYDROXY: CPT

## 2024-03-19 PROCEDURE — 80061 LIPID PANEL: CPT

## 2024-03-19 PROCEDURE — 80053 COMPREHEN METABOLIC PANEL: CPT

## 2024-03-19 PROCEDURE — 36415 COLL VENOUS BLD VENIPUNCTURE: CPT

## 2024-03-19 PROCEDURE — 83036 HEMOGLOBIN GLYCOSYLATED A1C: CPT

## 2024-03-21 ENCOUNTER — OFFICE VISIT (OUTPATIENT)
Dept: FAMILY MEDICINE | Facility: CLINIC | Age: 64
End: 2024-03-21
Payer: COMMERCIAL

## 2024-03-21 VITALS
BODY MASS INDEX: 55.07 KG/M2 | HEART RATE: 65 BPM | DIASTOLIC BLOOD PRESSURE: 74 MMHG | TEMPERATURE: 98 F | OXYGEN SATURATION: 98 % | WEIGHT: 293 LBS | SYSTOLIC BLOOD PRESSURE: 117 MMHG

## 2024-03-21 DIAGNOSIS — I10 HYPERTENSION, UNSPECIFIED TYPE: ICD-10-CM

## 2024-03-21 DIAGNOSIS — F32.A DEPRESSION, UNSPECIFIED DEPRESSION TYPE: ICD-10-CM

## 2024-03-21 DIAGNOSIS — F41.9 ANXIETY: ICD-10-CM

## 2024-03-21 DIAGNOSIS — E66.01 MORBID OBESITY: ICD-10-CM

## 2024-03-21 DIAGNOSIS — Z00.00 WELLNESS EXAMINATION: Primary | ICD-10-CM

## 2024-03-21 PROCEDURE — 1160F RVW MEDS BY RX/DR IN RCRD: CPT | Mod: CPTII,,, | Performed by: NURSE PRACTITIONER

## 2024-03-21 PROCEDURE — 3044F HG A1C LEVEL LT 7.0%: CPT | Mod: CPTII,,, | Performed by: NURSE PRACTITIONER

## 2024-03-21 PROCEDURE — 3078F DIAST BP <80 MM HG: CPT | Mod: CPTII,,, | Performed by: NURSE PRACTITIONER

## 2024-03-21 PROCEDURE — 3074F SYST BP LT 130 MM HG: CPT | Mod: CPTII,,, | Performed by: NURSE PRACTITIONER

## 2024-03-21 PROCEDURE — 99396 PREV VISIT EST AGE 40-64: CPT | Mod: ,,, | Performed by: NURSE PRACTITIONER

## 2024-03-21 PROCEDURE — 1159F MED LIST DOCD IN RCRD: CPT | Mod: CPTII,,, | Performed by: NURSE PRACTITIONER

## 2024-03-21 PROCEDURE — 3008F BODY MASS INDEX DOCD: CPT | Mod: CPTII,,, | Performed by: NURSE PRACTITIONER

## 2024-03-21 RX ORDER — ASPIRIN 325 MG
50000 TABLET, DELAYED RELEASE (ENTERIC COATED) ORAL WEEKLY
Qty: 12 CAPSULE | Refills: 0 | Status: SHIPPED | OUTPATIENT
Start: 2024-03-21 | End: 2024-06-07

## 2024-03-21 NOTE — PATIENT INSTRUCTIONS
Tony Martínez,     If you are due for any health screening(s) below please notify me so we can arrange them to be ordered and scheduled. Most healthy patients at your age complete them, but you are free to accept or refuse.     If you can't do it, I'll definitely understand. If you can, I'd certainly appreciate it!    Tests to Keep You Healthy    Mammogram: ORDERED BUT NOT SCHEDULED  Colon Cancer Screening: Met on 12/2/2021  Cervical Cancer Screening: DUE  Last Blood Pressure <= 139/89 (3/21/2024): Yes      Schedule your breast cancer screening today     Breast cancer is the second most common cancer in women,  and the second leading cause of death from cancer. Mammograms can detect breast cancer early, which significantly increases the chances of curing the cancer.       Our records indicate that you may be overdue for breast cancer screening. Cancer screenings save lives, so schedule yours today to stay healthy.     If you recently had a mammogram performed outside of [facility], please let your Health care team know so that they can update your health record.        Your cervical cancer screening is due     Our records indicate that you may be overdue for your screening Pap smear. A Pap smear is an important health screening that can detect abnormal cells that can become cervical cancer. Cervical cancer screenings allow for early diagnosis and increase the likelihood of successful treatment.     The current recommendation for Pap smear screening is every 3-5 years for women at average risk. We encourage you to schedule your appointment with your womens health provider. Many women see a gynecologist for this screening, but some primary care providers also provide Pap screening.     If you recently had your Pap smear screening performed outside of [facility], please let your health care team know so that they can update your health record.

## 2024-03-21 NOTE — PROGRESS NOTES
Patient ID: [id number]     Chief Complaint: wellness with lab review  (Patient stopped taking medication (1 year), no concerns )      HPI:     Tanya Linda is a 63 y.o. female here today for an annual wellness visit. She reports that she has not taken her medications in over 1 year due to her not being able to afford her medications. Denies any concerns.       Health Maintenance   Topic Date Due    Hepatitis C Screening  Never done    Shingles Vaccine (1 of 2) Never done    Mammogram  09/09/2020    Lipid Panel  03/19/2029    TETANUS VACCINE  10/16/2030    Colorectal Cancer Screening  12/02/2031     Dental Exam - Recommend biannually  Vaccinations -   Immunization History   Administered Date(s) Administered    COVID-19, MRNA, LN-S, PF (Pfizer) (Purple Cap) 02/03/2021, 02/24/2021    Influenza - Quadrivalent - PF *Preferred* (6 months and older) 09/29/2021, 10/11/2022    Tdap 10/16/2020        Past Medical History:  has a past medical history of Anxiety, Back muscle spasm, Depression, GERD (gastroesophageal reflux disease), Hypertension, Intertrochanteric fracture of left femur, Morbid obesity, and Personal history of colonic polyps.    Surgical History:  has a past surgical history that includes Colonoscopy (12/02/2021) and Antegrade intramedullary rodding of femur (Left, 12/25/2022).    Family History: family history is not on file.    Social History:  reports that she has never smoked. She has never used smokeless tobacco. She reports that she does not drink alcohol and does not use drugs.    Current Outpatient Medications   Medication Instructions    aspirin (ECOTRIN) 81 mg, Oral, 2 times daily    cholecalciferol (vitamin D3) 50,000 Units, Oral, Weekly       Patient has No Known Allergies.     Patient Care Team:  Sarita De Leon FNP as PCP - General (Nurse Practitioner)  Miladys Barr LPN as Care Coordinator  Andrey Vizcarra MD as Consulting Physician (General Surgery)       Subjective:     Review of  Systems    12 point review of systems conducted, negative except as stated in the history of present illness. See HPI for details.      Objective:     Visit Vitals  /74   Pulse 65   Temp 98.1 °F (36.7 °C)   Wt (!) 154.8 kg (341 lb 3.2 oz)   SpO2 98%   BMI 55.07 kg/m²       Physical Exam    General: Alert and oriented, No acute distress.  Head: Normocephalic, Atraumatic.  Eye: Pupils are equal, round and reactive to light, Extraocular movements are intact, Sclera non-icteric.  Ears/Nose/Throat: Normal, Mucosa moist,Clear.  Neck/Thyroid: Supple, Non-tender, No carotid bruit, No lymphadenopathy, No JVD, Full range of motion.  Respiratory: Clear to auscultation bilaterally; No wheezes, rales or rhonchi,Non-labored respirations, Symmetrical chest wall expansion.  Cardiovascular: Regular rate and rhythm, S1/S2 normal, No murmurs, rubs or gallops.  Gastrointestinal: Soft, Non-tender, Non-distended, Normal bowel sounds, No palpable organomegaly.  Musculoskeletal: Normal range of motion.  Integumentary: Warm, Dry, Intact, No suspicious lesions or rashes.  Extremities: No clubbing, cyanosis or edema  Neurologic: No focal deficits, Cranial Nerves II-XII are grossly intact, Motor strength normal upper and lower extremities, Sensory exam intact.  Psychiatric: Normal interaction, Coherent speech, Euthymic mood, Appropriate affect     Labs Reviewed:     Chemistry:  Lab Results   Component Value Date     03/19/2024    K 4.6 03/19/2024    CHLORIDE 106 03/19/2024    BUN 16.4 03/19/2024    CREATININE 0.98 03/19/2024    EGFRNORACEVR >60 03/19/2024    GLUCOSE 107 03/19/2024    CALCIUM 9.6 03/19/2024    ALKPHOS 74 03/19/2024    LABPROT 7.2 03/19/2024    ALBUMIN 3.5 03/19/2024    BILIDIR 0.2 02/25/2022    IBILI 0.40 02/25/2022    AST 10 03/19/2024    ALT 14 03/19/2024    MG 1.90 03/18/2023    PHOS 2.7 03/18/2023    WWAXVEVA74VR 9.8 (L) 03/19/2024    TSH 1.639 03/19/2024        Lab Results   Component Value Date    HGBA1C 5.2  03/19/2024        Hematology:  Lab Results   Component Value Date    WBC 9.72 03/19/2024    HGB 13.3 03/19/2024    HCT 42.2 03/19/2024     03/19/2024       Lipid Panel:  Lab Results   Component Value Date    CHOL 192 03/19/2024    HDL 42 03/19/2024    .00 03/19/2024    TRIG 140 03/19/2024    TOTALCHOLEST 5 03/19/2024        Urine:  Lab Results   Component Value Date    COLORUA Yellow 06/10/2023    APPEARANCEUA Cloudy (A) 06/10/2023    SGUA 1.020 06/10/2023    PHUA 5.5 06/10/2023    PROTEINUA Negative 06/10/2023    GLUCOSEUA Negative 06/10/2023    KETONESUA Negative 06/10/2023    BLOODUA Moderate (A) 06/10/2023    NITRITESUA Positive (A) 06/10/2023    LEUKOCYTESUR Small (A) 06/10/2023    RBCUA None Seen 06/10/2023    WBCUA 50-99 (A) 06/10/2023    BACTERIA Moderate (A) 06/10/2023    CREATRANDUR 51.9 03/03/2023          Assessment:   1. Wellness examination  2. Hypertension, unspecified type  3. Anxiety  4. Depression, unspecified depression type  5. Morbid obesity      Plan:   1. Wellness examination  Healthy lifestyle discussed.  Patient will call to schedule mammogram.  Patient has appointment with gyn for cervical cancer screening on April 24th.    2. Hypertension, unspecified type  Well controlled.   Low Sodium Diet (DASH Diet - Less than 2 grams of sodium per day).  Monitor blood pressure daily and log. Report consistent numbers greater than 140/90.  Maintain healthy weight with goal BMI <30. Exercise 30 minutes per day, 5 days per week.  Smoking cessation encouraged to aid in BP reduction.    3. Anxiety  Resolved. ED precautions.    4. Depression, unspecified depression type  Resolved.  ED precautions.    5. Morbid obesity  Body mass index is 55.07 kg/m².  Goal BMI <30.  Exercise 5 times a week for 30 minutes per day.  Avoid soda, simple sugars, excessive rice, potatoes or bread. Limit fast foods and fried foods.  Choose complex carbs in moderation (example: green vegetables, beans, oatmeal). Eat  plenty of fresh fruits and vegetables with lean meats daily.  Do not skip meals. Eat a balanced portion size.  Avoid fad diets. Consider permanent healthy life style changes.           Follow up in about 6 months (around 9/21/2024) for HTN. In addition to their scheduled follow up, the patient has also been instructed to follow up on as needed basis.     Future Appointments   Date Time Provider Department Center   4/2/2024  9:15 AM Carlos Natarajan MD [facility]   6/18/2024  2:00 PM Sarita De Leon ZOYA [facility]   9/23/2024  8:45 AM Sarita De Leon FNP [facility]        MARION Salazar

## 2024-06-24 PROBLEM — Z00.00 WELLNESS EXAMINATION: Status: RESOLVED | Noted: 2024-03-21 | Resolved: 2024-06-24

## 2024-06-28 ENCOUNTER — TELEPHONE (OUTPATIENT)
Dept: FAMILY MEDICINE | Facility: CLINIC | Age: 64
End: 2024-06-28
Payer: COMMERCIAL

## 2024-08-05 ENCOUNTER — TELEPHONE (OUTPATIENT)
Dept: FAMILY MEDICINE | Facility: CLINIC | Age: 64
End: 2024-08-05
Payer: COMMERCIAL

## 2024-08-05 DIAGNOSIS — Z12.31 ENCOUNTER FOR SCREENING MAMMOGRAM FOR BREAST CANCER: Primary | ICD-10-CM

## 2024-08-09 ENCOUNTER — HOSPITAL ENCOUNTER (EMERGENCY)
Facility: HOSPITAL | Age: 64
Discharge: HOME | End: 2024-08-09
Attending: FAMILY MEDICINE
Payer: COMMERCIAL

## 2024-08-09 VITALS
TEMPERATURE: 98 F | OXYGEN SATURATION: 99 % | SYSTOLIC BLOOD PRESSURE: 161 MMHG | RESPIRATION RATE: 20 BRPM | HEIGHT: 65 IN | WEIGHT: 293 LBS | DIASTOLIC BLOOD PRESSURE: 78 MMHG | HEART RATE: 68 BPM | BODY MASS INDEX: 48.82 KG/M2

## 2024-08-09 DIAGNOSIS — K11.20 PAROTIDITIS: Primary | ICD-10-CM

## 2024-08-09 PROCEDURE — 99283 EMERGENCY DEPT VISIT LOW MDM: CPT

## 2024-08-09 RX ORDER — CLINDAMYCIN HYDROCHLORIDE 300 MG/1
300 CAPSULE ORAL 3 TIMES DAILY
Qty: 21 CAPSULE | Refills: 0 | Status: SHIPPED | OUTPATIENT
Start: 2024-08-09 | End: 2024-08-16

## 2024-08-09 RX ORDER — IBUPROFEN 600 MG/1
600 TABLET ORAL EVERY 6 HOURS PRN
Qty: 20 TABLET | Refills: 0 | Status: SHIPPED | OUTPATIENT
Start: 2024-08-09

## 2024-08-09 NOTE — ED PROVIDER NOTES
Encounter Date: 8/9/2024       History     Chief Complaint   Patient presents with    Facial Swelling     R sided facial swelling x 1 day --denies pain     63-year-old female presents with some swelling on the right side of her neck and face for 1 day her vital signs are stable appears to be an inflamed infected parotid gland discussed findings at this time I feel no need for testing we will get some antibiotics limited drops warm compresses some follow up on Monday        Review of patient's allergies indicates:  No Known Allergies  Past Medical History:   Diagnosis Date    Anxiety     Back muscle spasm     Depression     GERD (gastroesophageal reflux disease)     Hypertension     Intertrochanteric fracture of left femur     Morbid obesity     Personal history of colonic polyps 12/02/2021    Dr. Andrey Vizcarra     Past Surgical History:   Procedure Laterality Date    ANTEGRADE INTRAMEDULLARY RODDING OF FEMUR Left 12/25/2022    Procedure: INSERTION, INTRAMEDULLARY JARETH, FEMUR, ANTEGRADE;  Surgeon: Ronnell Olivares MD;  Location: [facility];  Service: Orthopedics;  Laterality: Left;    COLONOSCOPY  12/02/2021    Dr. Andrey Vizcarra     No family history on file.  Social History     Tobacco Use    Smoking status: Never    Smokeless tobacco: Never   Substance Use Topics    Alcohol use: Never    Drug use: Never     Review of Systems   Constitutional:  Negative for fever.   HENT:  Negative for sore throat.    Respiratory:  Negative for shortness of breath.    Cardiovascular:  Negative for chest pain.   Gastrointestinal:  Negative for nausea.   Genitourinary:  Negative for dysuria.   Musculoskeletal:  Negative for back pain.   Skin:  Negative for rash.   Neurological:  Negative for weakness.   Hematological:  Does not bruise/bleed easily.   All other systems reviewed and are negative.      Physical Exam     Initial Vitals [08/09/24 1022]   BP Pulse Resp Temp SpO2   (!) 161/78 68 20 98 °F (36.7 °C) 99 %      MAP       --          Physical Exam    Nursing note and vitals reviewed.  Constitutional: She appears well-developed and well-nourished. She is active.   HENT:   Head: Normocephalic and atraumatic.   Mouth/Throat: Oropharynx is clear and moist.   Eyes: Conjunctivae, EOM and lids are normal. Pupils are equal, round, and reactive to light.   Neck: Trachea normal and phonation normal. Neck supple. No thyroid mass present.   Swollen parotid gland on right no adenopathy   Normal range of motion.  Cardiovascular:  Normal rate, regular rhythm, normal heart sounds and normal pulses.           Pulmonary/Chest: Breath sounds normal.   Musculoskeletal:         General: Normal range of motion.      Cervical back: Normal range of motion and neck supple.     Neurological: She is alert and oriented to person, place, and time.   Skin: Skin is warm and intact.   Psychiatric: She has a normal mood and affect. Her speech is normal and behavior is normal. Judgment and thought content normal. Cognition and memory are normal.         ED Course   Procedures  Labs Reviewed - No data to display       Imaging Results    None          Medications - No data to display  Medical Decision Making  63-year-old female presents with some swelling on the right side of her neck and face for 1 day her vital signs are stable appears to be an inflamed infected parotid gland discussed findings at this time I feel no need for testing we will get some antibiotics limited drops warm compresses some follow up on Monday          Risk  Prescription drug management.  Risk Details: Differential diagnosis provide otitis adenitis abscess                                      Clinical Impression:  Final diagnoses:  [K11.20] Parotiditis (Primary)          ED Disposition Condition    Discharge Stable          ED Prescriptions       Medication Sig Dispense Start Date End Date Auth. Provider    clindamycin (CLEOCIN) 300 MG capsule Take 1 capsule (300 mg total) by mouth 3 (three) times  daily. for 7 days 21 capsule 8/9/2024 8/16/2024 Tian Myirck MD    ibuprofen (ADVIL,MOTRIN) 600 MG tablet Take 1 tablet (600 mg total) by mouth every 6 (six) hours as needed for Pain. 20 tablet 8/9/2024 -- Tian Myrick MD          Follow-up Information       Follow up With Specialties Details Why Contact Info    Sarita De Leon, P Family Medicine In 3 days  [address] [facility] [address]  [phone]               Tian Myrick MD  08/09/24 0453

## 2024-08-09 NOTE — DISCHARGE INSTRUCTIONS
Take meds as prescribed where him in warm compresses recommend lemon drops follow up with PCP on Monday or Tuesday for rechecked

## 2024-08-15 ENCOUNTER — HOSPITAL ENCOUNTER (EMERGENCY)
Facility: HOSPITAL | Age: 64
Discharge: HOME | End: 2024-08-15
Attending: EMERGENCY MEDICINE
Payer: COMMERCIAL

## 2024-08-15 VITALS
OXYGEN SATURATION: 97 % | HEART RATE: 68 BPM | SYSTOLIC BLOOD PRESSURE: 140 MMHG | WEIGHT: 293 LBS | DIASTOLIC BLOOD PRESSURE: 86 MMHG | HEIGHT: 65 IN | RESPIRATION RATE: 18 BRPM | TEMPERATURE: 98 F | BODY MASS INDEX: 48.82 KG/M2

## 2024-08-15 DIAGNOSIS — K11.8 MASS OF RIGHT PAROTID GLAND: Primary | ICD-10-CM

## 2024-08-15 LAB
ALBUMIN SERPL-MCNC: 3.5 G/DL (ref 3.4–4.8)
ALBUMIN/GLOB SERPL: 0.8 RATIO (ref 1.1–2)
ALP SERPL-CCNC: 82 UNIT/L (ref 40–150)
ALT SERPL-CCNC: 16 UNIT/L (ref 0–55)
ANION GAP SERPL CALC-SCNC: 9 MEQ/L
AST SERPL-CCNC: 25 UNIT/L (ref 5–34)
BASOPHILS # BLD AUTO: 0.02 X10(3)/MCL
BASOPHILS NFR BLD AUTO: 0.1 %
BILIRUB SERPL-MCNC: 0.8 MG/DL
BUN SERPL-MCNC: 22.1 MG/DL (ref 9.8–20.1)
CALCIUM SERPL-MCNC: 9.7 MG/DL (ref 8.4–10.2)
CHLORIDE SERPL-SCNC: 104 MMOL/L (ref 98–107)
CO2 SERPL-SCNC: 22 MMOL/L (ref 23–31)
CREAT SERPL-MCNC: 1.02 MG/DL (ref 0.55–1.02)
CREAT/UREA NIT SERPL: 22
EOSINOPHIL # BLD AUTO: 0.14 X10(3)/MCL (ref 0–0.9)
EOSINOPHIL NFR BLD AUTO: 1 %
ERYTHROCYTE [DISTWIDTH] IN BLOOD BY AUTOMATED COUNT: 14.8 % (ref 11.5–17)
GFR SERPLBLD CREATININE-BSD FMLA CKD-EPI: >60 ML/MIN/1.73/M2
GLOBULIN SER-MCNC: 4.6 GM/DL (ref 2.4–3.5)
GLUCOSE SERPL-MCNC: 115 MG/DL (ref 82–115)
HCT VFR BLD AUTO: 43.5 % (ref 37–47)
HGB BLD-MCNC: 13.9 G/DL (ref 12–16)
IMM GRANULOCYTES # BLD AUTO: 0.05 X10(3)/MCL (ref 0–0.04)
IMM GRANULOCYTES NFR BLD AUTO: 0.4 %
LYMPHOCYTES # BLD AUTO: 1.69 X10(3)/MCL (ref 0.6–4.6)
LYMPHOCYTES NFR BLD AUTO: 11.9 %
MCH RBC QN AUTO: 28 PG (ref 27–31)
MCHC RBC AUTO-ENTMCNC: 32 G/DL (ref 33–36)
MCV RBC AUTO: 87.5 FL (ref 80–94)
MONOCYTES # BLD AUTO: 0.94 X10(3)/MCL (ref 0.1–1.3)
MONOCYTES NFR BLD AUTO: 6.6 %
NEUTROPHILS # BLD AUTO: 11.33 X10(3)/MCL (ref 2.1–9.2)
NEUTROPHILS NFR BLD AUTO: 80 %
PLATELET # BLD AUTO: 212 X10(3)/MCL (ref 130–400)
PMV BLD AUTO: 11.1 FL (ref 7.4–10.4)
POTASSIUM SERPL-SCNC: 5.1 MMOL/L (ref 3.5–5.1)
PROT SERPL-MCNC: 8.1 GM/DL (ref 5.8–7.6)
RBC # BLD AUTO: 4.97 X10(6)/MCL (ref 4.2–5.4)
SODIUM SERPL-SCNC: 135 MMOL/L (ref 136–145)
WBC # BLD AUTO: 14.17 X10(3)/MCL (ref 4.5–11.5)

## 2024-08-15 PROCEDURE — 80053 COMPREHEN METABOLIC PANEL: CPT | Performed by: EMERGENCY MEDICINE

## 2024-08-15 PROCEDURE — 96374 THER/PROPH/DIAG INJ IV PUSH: CPT

## 2024-08-15 PROCEDURE — 96375 TX/PRO/DX INJ NEW DRUG ADDON: CPT | Mod: 59

## 2024-08-15 PROCEDURE — 63600175 PHARM REV CODE 636 W HCPCS: Performed by: EMERGENCY MEDICINE

## 2024-08-15 PROCEDURE — 85025 COMPLETE CBC W/AUTO DIFF WBC: CPT | Performed by: EMERGENCY MEDICINE

## 2024-08-15 PROCEDURE — 25000003 PHARM REV CODE 250: Performed by: EMERGENCY MEDICINE

## 2024-08-15 PROCEDURE — 99285 EMERGENCY DEPT VISIT HI MDM: CPT | Mod: 25

## 2024-08-15 PROCEDURE — 25500020 PHARM REV CODE 255: Performed by: EMERGENCY MEDICINE

## 2024-08-15 RX ORDER — PREDNISONE 50 MG/1
50 TABLET ORAL DAILY
Qty: 5 TABLET | Refills: 0 | Status: SHIPPED | OUTPATIENT
Start: 2024-08-15 | End: 2024-08-20

## 2024-08-15 RX ORDER — AMPICILLIN AND SULBACTAM 2; 1 G/1; G/1
3 INJECTION, POWDER, FOR SOLUTION INTRAMUSCULAR; INTRAVENOUS
Status: DISCONTINUED | OUTPATIENT
Start: 2024-08-15 | End: 2024-08-15

## 2024-08-15 RX ORDER — AMOXICILLIN 875 MG/1
875 TABLET, FILM COATED ORAL 2 TIMES DAILY
Qty: 14 TABLET | Refills: 0 | Status: SHIPPED | OUTPATIENT
Start: 2024-08-15

## 2024-08-15 RX ORDER — HYDROCODONE BITARTRATE AND ACETAMINOPHEN 5; 325 MG/1; MG/1
1 TABLET ORAL EVERY 6 HOURS PRN
Qty: 12 TABLET | Refills: 0 | Status: SHIPPED | OUTPATIENT
Start: 2024-08-15

## 2024-08-15 RX ORDER — KETOROLAC TROMETHAMINE 30 MG/ML
15 INJECTION, SOLUTION INTRAMUSCULAR; INTRAVENOUS
Status: COMPLETED | OUTPATIENT
Start: 2024-08-15 | End: 2024-08-15

## 2024-08-15 RX ORDER — IBUPROFEN 800 MG/1
800 TABLET ORAL EVERY 8 HOURS PRN
Qty: 30 TABLET | Refills: 0 | Status: SHIPPED | OUTPATIENT
Start: 2024-08-15

## 2024-08-15 RX ADMIN — KETOROLAC TROMETHAMINE 15 MG: 30 INJECTION, SOLUTION INTRAMUSCULAR at 11:08

## 2024-08-15 RX ADMIN — IOHEXOL 100 ML: 350 INJECTION, SOLUTION INTRAVENOUS at 11:08

## 2024-08-15 RX ADMIN — AMPICILLIN SODIUM, SULBACTAM SODIUM 3 G: 2; 1 INJECTION, POWDER, FOR SOLUTION INTRAMUSCULAR; INTRAVENOUS at 12:08

## 2024-08-15 NOTE — Clinical Note
"Tanya"Lata Linda was seen and treated in our emergency department on 8/15/2024.  She may return to work on 08/22/2024.       If you have any questions or concerns, please don't hesitate to call.      Devan Cohen MD"

## 2024-08-15 NOTE — ED PROVIDER NOTES
NAME:  Tanya Linda  CSN:     [id number]  MRN:    [id number]  ADMIT DATE: 8/15/2024        eMERGENCY dEPARTMENT eNCOUnter    CHIEF COMPLAINT    Chief Complaint   Patient presents with    Facial Swelling     Pt seen in ER for R sided facial swelling -pt reports swelling has increased with redness to R side of face down toward neck -warm to touch & hard        HPI      Tanya Linda is a 63 y.o. female who presents to the ED for evaluation of swelling to the right side of the face.  Patient was seen here approximately 10 days ago and discharged with a diagnosis of parotitis.  She states it is set time the swelling and pain has gotten worse.  She denies any fevers.  She has not seen an ENT doctor yet.          ALLERGIES    Review of patient's allergies indicates:  No Known Allergies    PAST MEDICAL HISTORY  Past Medical History:   Diagnosis Date    Anxiety     Back muscle spasm     Depression     GERD (gastroesophageal reflux disease)     Hypertension     Intertrochanteric fracture of left femur     Morbid obesity     Personal history of colonic polyps 12/02/2021    Dr. Andrey Vizcarra       SURGICAL HISTORY    Past Surgical History:   Procedure Laterality Date    ANTEGRADE INTRAMEDULLARY RODDING OF FEMUR Left 12/25/2022    Procedure: INSERTION, INTRAMEDULLARY JARETH, FEMUR, ANTEGRADE;  Surgeon: Ronnell Olivares MD;  Location: [facility];  Service: Orthopedics;  Laterality: Left;    COLONOSCOPY  12/02/2021    Dr. Andrey Vizcarra       SOCIAL HISTORY    Social History     Socioeconomic History    Marital status: Single   Tobacco Use    Smoking status: Never    Smokeless tobacco: Never   Substance and Sexual Activity    Alcohol use: Never    Drug use: Never    Sexual activity: Not Currently     Social Determinants of Health     Financial Resource Strain: Low Risk  (3/19/2023)    Overall Financial Resource Strain (CARDIA)     Difficulty of Paying Living Expenses: Not hard at all   Food Insecurity: No Food Insecurity (3/19/2023)    Hunger  "Vital Sign     Worried About Running Out of Food in the Last Year: Never true     Ran Out of Food in the Last Year: Never true   Transportation Needs: No Transportation Needs (3/19/2023)    PRAPARE - Transportation     Lack of Transportation (Medical): No     Lack of Transportation (Non-Medical): No   Physical Activity: Inactive (3/19/2023)    Exercise Vital Sign     Days of Exercise per Week: 0 days     Minutes of Exercise per Session: 0 min   Stress: No Stress Concern Present (3/19/2023)    [employer] [place] [employer] - Occupational Stress Questionnaire     Feeling of Stress : Only a little   Housing Stability: Low Risk  (3/19/2023)    Housing Stability Vital Sign     Unable to Pay for Housing in the Last Year: No     Number of Places Lived in the Last Year: 1     Unstable Housing in the Last Year: No       FAMILY HISTORY    No family history on file.    REVIEW OF SYSTEMS   ROS  All Systems otherwise negative except as noted in the History of Present Illness.        PHYSICAL EXAM    Reviewed Triage Note  VITAL SIGNS:   ED Triage Vitals [08/15/24 0840]   Enc Vitals Group      BP (!) 157/79      Pulse 72      Resp 18      Temp 98.1 °F (36.7 °C)      Temp Source Oral      SpO2 98 %      Weight (!) 335 lb      Height 5' 5"      Head Circumference       Peak Flow       Pain Score       Pain Loc       Pain Education       Exclude from Growth Chart        Patient Vitals for the past 24 hrs:   BP Temp Temp src Pulse Resp SpO2 Height Weight   08/15/24 0840 (!) 157/79 98.1 °F (36.7 °C) Oral 72 18 98 % 5' 5" (1.651 m) (!) 152 kg (335 lb)           Physical Exam    Constitutional:  Well-developed, well-nourished. No acute distress  HENT:  Normocephalic, atraumatic.  Significant swelling noted to the right side of the face extending to the submandibular region  Eyes:  EOMI. Conjunctiva normal without discharge.   Neck: Normal range of motion.No stridor. No meningismus.   Respiratory:  No respiratory distress, " retractions, or conversational dyspnea. Cardiovascular:  Normal heart rate. No pitting lower extremity edema.   Musculoskeletal:  No gross deformity or limited range of motion of all major joints.   Integument:  Warm and dry. No rash.  Neurologic:  Normal motor function. No focal deficits noted. Alert and Interactive.  Psychiatric:  Affect normal. Mood normal.         LABS  Pertinent labs reviewed. (See chart for details)   Labs Reviewed   COMPREHENSIVE METABOLIC PANEL - Abnormal       Result Value    Sodium 135 (*)     Potassium 5.1      Chloride 104      CO2 22 (*)     Glucose 115      Blood Urea Nitrogen 22.1 (*)     Creatinine 1.02      Calcium 9.7      Protein Total 8.1 (*)     Albumin 3.5      Globulin 4.6 (*)     Albumin/Globulin Ratio 0.8 (*)     Bilirubin Total 0.8      ALP 82      ALT 16      AST 25      eGFR >60      Anion Gap 9.0      BUN/Creatinine Ratio 22     CBC WITH DIFFERENTIAL - Abnormal    WBC 14.17 (*)     RBC 4.97      Hgb 13.9      Hct 43.5      MCV 87.5      MCH 28.0      MCHC 32.0 (*)     RDW 14.8      Platelet 212      MPV 11.1 (*)     Neut % 80.0      Lymph % 11.9      Mono % 6.6      Eos % 1.0      Basophil % 0.1      Lymph # 1.69      Neut # 11.33 (*)     Mono # 0.94      Eos # 0.14      Baso # 0.02      IG# 0.05 (*)     IG% 0.4     CBC W/ AUTO DIFFERENTIAL    Narrative:     The following orders were created for panel order CBC auto differential.  Procedure                               Abnormality         Status                     ---------                               -----------         ------                     CBC with Differential[[id number]]       Abnormal            Final result                 Please view results for these tests on the individual orders.         RADIOLOGY    Imaging Results              CT Maxillofacial With Contrast (Final result)  Result time 08/15/24 11:24:41      Final result by Chasity Barnhart MD (08/15/24 11:24:41)                   Impression:       1. Lobulated 5.4 cm right parotid tail mass with significant surrounding inflammatory changes concerning for superimposed infection.  2. Inflammatory changes of right parotid and submandibular glands.  3. No definite drainable fluid collection identified.      Electronically signed by: Chasity Barnhart  Date:    08/15/2024  Time:    11:24               Narrative:    EXAMINATION:  CT MAXILLOFACIAL WITH CONTRAST    CLINICAL HISTORY:  Maxillary/facial abscess;    TECHNIQUE:  Volumetric CT acquisition of the facial bones with contrast.  Axial, coronal and sagittal reconstructions.    Automatic exposure control was utilized to limit radiation dose.    DLP: 632 mGy-cm    COMPARISON:  None    FINDINGS:  There is a lobulated mass in the right parotid tail measuring 3.3 x 5.4 cm in size there are surrounding inflammatory changes with soft tissue swelling in the right face and thickening of the platysma muscle.  The right parotid gland is enlarged and edematous.  There is no definite drainable fluid collection identified.  There is also enlargement of the right submandibular gland with inflammatory changes.    The airways are patent.  There is no acute marrow of the orbits or visualized brain parenchyma.  There is no destructive bone lesion.                                      PROCEDURES    Procedures      EKG     Interpreted by ERP:         ED COURSE & MEDICAL DECISION MAKING    Pertinent & Imaging studies reviewed. (See chart for details and specific orders.)        Medications   ampicillin-sulbactam injection 3 g (has no administration in time range)   iohexoL (OMNIPAQUE 350) injection 100 mL (100 mLs Intravenous Given 8/15/24 1106)   ketorolac injection 15 mg (15 mg Intravenous Given 8/15/24 1135)        Large lobulated parotid mass noted on CT.  ENT was consulted and recommended discharge with steroids and antibiotics.  The patient will have urgent outpatient follow-up         DISPOSITION  Patient discharged in stable  condition at No discharge date for patient encounter.      DISCHARGE INSTRUCTIONS & MEDS       Medication List        START taking these medications      amoxicillin 875 MG tablet  Commonly known as: AMOXIL  Take 1 tablet (875 mg total) by mouth 2 (two) times daily.     HYDROcodone-acetaminophen 5-325 mg per tablet  Commonly known as: NORCO  Take 1 tablet by mouth every 6 (six) hours as needed for Pain.     predniSONE 50 MG Tab  Commonly known as: DELTASONE  Take 1 tablet (50 mg total) by mouth once daily. for 5 days            CHANGE how you take these medications      * ibuprofen 600 MG tablet  Commonly known as: ADVIL,MOTRIN  Take 1 tablet (600 mg total) by mouth every 6 (six) hours as needed for Pain.  What changed: Another medication with the same name was added. Make sure you understand how and when to take each.     * ibuprofen 800 MG tablet  Commonly known as: ADVIL,MOTRIN  Take 1 tablet (800 mg total) by mouth every 8 (eight) hours as needed for Pain.  What changed: You were already taking a medication with the same name, and this prescription was added. Make sure you understand how and when to take each.           * This list has 2 medication(s) that are the same as other medications prescribed for you. Read the directions carefully, and ask your doctor or other care provider to review them with you.                ASK your doctor about these medications      aspirin 81 MG EC tablet  Commonly known as: ECOTRIN  Take 1 tablet (81 mg total) by mouth 2 (two) times a day.     clindamycin 300 MG capsule  Commonly known as: CLEOCIN  Take 1 capsule (300 mg total) by mouth 3 (three) times daily. for 7 days               Where to Get Your Medications        These medications were sent to [facility] - [facility] - [address]  [address], [place] [address]      Phone: [phone]   amoxicillin 875 MG tablet  HYDROcodone-acetaminophen 5-325 mg per tablet  ibuprofen 800 MG  tablet  predniSONE 50 MG Tab           New Prescriptions    AMOXICILLIN (AMOXIL) 875 MG TABLET    Take 1 tablet (875 mg total) by mouth 2 (two) times daily.    HYDROCODONE-ACETAMINOPHEN (NORCO) 5-325 MG PER TABLET    Take 1 tablet by mouth every 6 (six) hours as needed for Pain.    IBUPROFEN (ADVIL,MOTRIN) 800 MG TABLET    Take 1 tablet (800 mg total) by mouth every 8 (eight) hours as needed for Pain.    PREDNISONE (DELTASONE) 50 MG TAB    Take 1 tablet (50 mg total) by mouth once daily. for 5 days           FINAL IMPRESSION    1. Mass of right parotid gland              Blood Pressure Follow-Up Advised  Patient advised to follow up with PCP within 3-5 days for blood pressure re-check if blood pressure is equal to or greater than 120/80.         Critical care time spent with this patient (not including separately billable items) was  0 minutes.     DISCLAIMER: This note was prepared with Dragon NaturallySpeaking voice recognition transcription software. Garbled syntax, mangled pronouns, and other bizarre constructions may be attributed to that software system.      Devan Cohen MD  08/15/2024  11:43 AM           Devna Cohen MD  08/15/24 1212

## 2024-08-15 NOTE — ED NOTES
R sided facial swelling noted; Swelling travels along the jawline, beginning behind the R ear ending at the chin; Hard and warm upon palpation; c/o itchiness; denies difficulty breathing, SOB

## 2024-08-23 DIAGNOSIS — K11.8 MASS OF RIGHT PAROTID GLAND: Primary | ICD-10-CM

## 2024-09-13 ENCOUNTER — PATIENT OUTREACH (OUTPATIENT)
Facility: CLINIC | Age: 64
End: 2024-09-13
Payer: COMMERCIAL

## 2024-09-13 NOTE — PROGRESS NOTES
Population Health Outreach.  Patient Cell phone #: [phone] per patient's daughter.     Health Maintenance Topic(s) Outreach Outcomes & Actions Taken:  Breast Cancer Screening - Outreach Outcomes & Actions Taken  : Mammogram Screening Scheduled  Scheduled 9/23/24 @ 1030    Chronic Disease Management:    Hypertension Measures    BP Readings from Last 1 Encounters:   08/15/24 (!) 140/86     Called for updated remote BP. No answer/left VM.      [x]  Reviewed chart for active Hypertension diagnosis     [x]  Has scheduled necessary follow up appointments with Primary Care      Cervical Cancer Screening - Outreach Outcomes & Actions Taken  : DUE  Was scheduled with Dr. Natarajan, GYN in April 2024 but no record found. No answer/left VM.      Appointments:  Next PCP Visit Date: 9/23/24 HTN f/u    Completed Wellness 3/21/24  Next wellness Scheduled:3/25/25    Recent ER Diagnosis: Mass of right parotid gland   Thu 9/26/24  9:30 AM [facility] Otorhinolaryngology RESIDENT 2, [facility] OTORHINOLARYNGOLOGY New Patient Scheduled            Additional Notes:   MyChart Outcomes: Pt declined    Updated Care Coordination Note, Care Everywhere, Care Team Updated, and Immunizations Reconciliation Completed or Queried: [place]    Care Management: OPCM Risk Score: 36.3   Digital Medicine: Unavailable

## 2024-09-16 ENCOUNTER — TELEPHONE (OUTPATIENT)
Dept: FAMILY MEDICINE | Facility: CLINIC | Age: 64
End: 2024-09-16
Payer: COMMERCIAL

## 2024-09-26 ENCOUNTER — OFFICE VISIT (OUTPATIENT)
Dept: OTOLARYNGOLOGY | Facility: CLINIC | Age: 64
End: 2024-09-26
Payer: COMMERCIAL

## 2024-09-26 VITALS
WEIGHT: 293 LBS | BODY MASS INDEX: 48.82 KG/M2 | HEIGHT: 65 IN | RESPIRATION RATE: 20 BRPM | DIASTOLIC BLOOD PRESSURE: 82 MMHG | HEART RATE: 50 BPM | SYSTOLIC BLOOD PRESSURE: 130 MMHG

## 2024-09-26 DIAGNOSIS — K11.8 MASS OF RIGHT PAROTID GLAND: ICD-10-CM

## 2024-09-26 PROCEDURE — 99214 OFFICE O/P EST MOD 30 MIN: CPT | Mod: PBBFAC | Performed by: STUDENT IN AN ORGANIZED HEALTH CARE EDUCATION/TRAINING PROGRAM

## 2024-09-26 NOTE — PROGRESS NOTES
[facility]  Otolaryngology Clinic Note    Tanya Linda  Encounter Date: 9/26/2024  YOB: 1960  Physician: MD Darren    Chief Complaint: R parotid mass    HPI: Tanya Linda is a 63 y.o. female with no past medical history who presents to the ENT clinic after she had an episode of parotitis in August.  Upon getting a CT scan they found a large right parotid mass.  She was subsequently treated with antibiotics, steroids and referred to us for outpatient ENT.  Patient has only noticed the mass since the past 2 months however her daughter says it has been present for for longer.  She denies any pain, drainage.  She reports that when it is hot it occasionally swells up, however since the episode of parotitis it has remained stable in size.  She has never had any history of cancer previously.  She has had multiple orthopedic surgeries for her hip and knee, never had any ear nose and throat surgeries  She is a never smoker      ROS:   General: Negative except per HPI  Skin: Denies rash, ulcer, or lesion.  Eyes: Denies vision changes or diplopia.  Ears: Negative except per HPI  Nose: Negative except per HPI  Throat/mouth: Negative except per HPI  Cardiovascular: Negative except per HPI  Respiratory: Negative except per HPI  Neck: Negative except per HPI  Endocrine: Negative except per HPI  Neurologic: Negative except per HPI    Other 10-point review of systems negative except per HPI      Review of patient's allergies indicates:  No Known Allergies    Past Medical History:   Diagnosis Date    Anxiety     Back muscle spasm     Depression     GERD (gastroesophageal reflux disease)     Hypertension     Intertrochanteric fracture of left femur     Morbid obesity     Personal history of colonic polyps 12/02/2021    Dr. Andrey Vizcarra       Past Surgical History:   Procedure Laterality Date    ANTEGRADE INTRAMEDULLARY RODDING OF FEMUR Left 12/25/2022    Procedure: INSERTION, INTRAMEDULLARY  JARETH, FEMUR, ANTEGRADE;  Surgeon: Ronnell Olivares MD;  Location: [facility];  Service: Orthopedics;  Laterality: Left;    COLONOSCOPY  12/02/2021    Dr. Andrey Vizcarra       Social History     Socioeconomic History    Marital status: Single   Tobacco Use    Smoking status: Never    Smokeless tobacco: Never   Substance and Sexual Activity    Alcohol use: Never    Drug use: Never    Sexual activity: Not Currently     Social Determinants of Health     Financial Resource Strain: Low Risk  (3/19/2023)    Overall Financial Resource Strain (CARDIA)     Difficulty of Paying Living Expenses: Not hard at all   Food Insecurity: No Food Insecurity (3/19/2023)    Hunger Vital Sign     Worried About Running Out of Food in the Last Year: Never true     Ran Out of Food in the Last Year: Never true   Transportation Needs: No Transportation Needs (3/19/2023)    PRAPARE - Transportation     Lack of Transportation (Medical): No     Lack of Transportation (Non-Medical): No   Physical Activity: Inactive (3/19/2023)    Exercise Vital Sign     Days of Exercise per Week: 0 days     Minutes of Exercise per Session: 0 min   Stress: No Stress Concern Present (3/19/2023)    [employer] [place] [employer] - Occupational Stress Questionnaire     Feeling of Stress : Only a little   Housing Stability: Low Risk  (3/19/2023)    Housing Stability Vital Sign     Unable to Pay for Housing in the Last Year: No     Number of Places Lived in the Last Year: 1     Unstable Housing in the Last Year: No       No family history on file.    Outpatient Encounter Medications as of 9/26/2024   Medication Sig Dispense Refill    amoxicillin (AMOXIL) 875 MG tablet Take 1 tablet (875 mg total) by mouth 2 (two) times daily. 14 tablet 0    aspirin (ECOTRIN) 81 MG EC tablet Take 1 tablet (81 mg total) by mouth 2 (two) times a day. 60 tablet 0    HYDROcodone-acetaminophen (NORCO) 5-325 mg per tablet Take 1 tablet by mouth every 6 (six) hours as needed for Pain. 12  "tablet 0    ibuprofen (ADVIL,MOTRIN) 600 MG tablet Take 1 tablet (600 mg total) by mouth every 6 (six) hours as needed for Pain. 20 tablet 0    ibuprofen (ADVIL,MOTRIN) 800 MG tablet Take 1 tablet (800 mg total) by mouth every 8 (eight) hours as needed for Pain. 30 tablet 0     No facility-administered encounter medications on file as of 9/26/2024.       Physical Exam:  Vitals:    09/26/24 0859   BP: 130/82   BP Location: Right arm   Patient Position: Sitting   BP Method: Large (Automatic)   Pulse: (!) 50   Resp: 20   Weight: (!) 152 kg (335 lb 1.6 oz)   Height: 5' 5" (1.651 m)       Constitutional  General Appearance: well nourished, obese AAO x3, NAD  HEENT  Eyes: PEERLA, EOMI, normal conjunctivae  Ears: Hearing well at conversation level   AD: auricle normal, EAC normal, TM intact, no RIZWANA   AS: auricle normal, EAC normal, TM intact, no RIZWANA   Vestibular: ambulates without gait disturbance  Nose: septum midline, no inferior turbinate hypertrophy, no polyps, moist mucosa without erythema or blue hue  OC/OP: dentition moderate, no oral lesions, tongue/FOM/BOT- soft, no leukoplakia/ulcerations/ tenderness  Nasopharynx, Hypopharynx, and Larynx:    Indirect: attempted, limited view due to patient intolerance  Neck:  Able to palpate right parotid mass extending below the mandible, feels firm, well-circumscribed non-tender, no palpable lymph nodes   Thyroid region- no nodules or goiter  Neuro: CN II - XII intact bilaterally, no facial nerve deficit  Cardiovascular: peripheral pulses palpable  Respiratory: non-labored respirations  Psychiatric: oriented to time, place and person, no depression, anxiety or agitation      Pertinent Data:  ? LABS:  ? AUDIO:  ? CT Scan:    Imaging:   I personally reviewed the following images:    Summary of Outside Records:      Assessment/Plan:  Tanya Linda is a 63 y.o. female with no past medical history presents with large right parotid mass that has been stable in size    - order IR FNA " of right parotid mass  - follow up in 4-6 weeks  - may consider obtaining MRI after biopsy results      I. Gretta Banks MD  [facility] Department of Otolaryngology  -

## 2024-09-27 ENCOUNTER — OFFICE VISIT (OUTPATIENT)
Dept: FAMILY MEDICINE | Facility: CLINIC | Age: 64
End: 2024-09-27
Payer: COMMERCIAL

## 2024-09-27 VITALS
HEART RATE: 59 BPM | BODY MASS INDEX: 48.82 KG/M2 | WEIGHT: 293 LBS | DIASTOLIC BLOOD PRESSURE: 70 MMHG | TEMPERATURE: 98 F | HEIGHT: 65 IN | SYSTOLIC BLOOD PRESSURE: 128 MMHG | RESPIRATION RATE: 18 BRPM | OXYGEN SATURATION: 97 %

## 2024-09-27 DIAGNOSIS — K11.8 MASS OF RIGHT PAROTID GLAND: Primary | ICD-10-CM

## 2024-09-27 DIAGNOSIS — Z23 FLU VACCINE NEED: ICD-10-CM

## 2024-09-27 PROCEDURE — 99213 OFFICE O/P EST LOW 20 MIN: CPT | Mod: 25,,, | Performed by: NURSE PRACTITIONER

## 2024-09-27 PROCEDURE — 3078F DIAST BP <80 MM HG: CPT | Mod: CPTII,,, | Performed by: NURSE PRACTITIONER

## 2024-09-27 PROCEDURE — 3008F BODY MASS INDEX DOCD: CPT | Mod: CPTII,,, | Performed by: NURSE PRACTITIONER

## 2024-09-27 PROCEDURE — 3044F HG A1C LEVEL LT 7.0%: CPT | Mod: CPTII,,, | Performed by: NURSE PRACTITIONER

## 2024-09-27 PROCEDURE — 90471 IMMUNIZATION ADMIN: CPT | Mod: ,,, | Performed by: NURSE PRACTITIONER

## 2024-09-27 PROCEDURE — 90656 IIV3 VACC NO PRSV 0.5 ML IM: CPT | Mod: ,,, | Performed by: NURSE PRACTITIONER

## 2024-09-27 PROCEDURE — 3074F SYST BP LT 130 MM HG: CPT | Mod: CPTII,,, | Performed by: NURSE PRACTITIONER

## 2024-09-27 RX ORDER — TRIAMCINOLONE ACETONIDE 1 MG/G
CREAM TOPICAL 2 TIMES DAILY
Qty: 80 G | Refills: 0 | Status: SHIPPED | OUTPATIENT
Start: 2024-09-27

## 2024-09-27 NOTE — PATIENT INSTRUCTIONS
Tony Martínez,     If you are due for any health screening(s) below please notify me so we can arrange them to be ordered and scheduled. Most healthy patients at your age complete them, but you are free to accept or refuse.     If you can't do it, I'll definitely understand. If you can, I'd certainly appreciate it!    Tests to Keep You Healthy    Mammogram: ORDERED BUT NOT SCHEDULED  Colon Cancer Screening: Met on 12/2/2021  Cervical Cancer Screening: DUE  Last Blood Pressure <= 139/89 (9/27/2024): Yes      Schedule your breast cancer screening today     Breast cancer is the second most common cancer in women,  and the second leading cause of death from cancer. Mammograms can detect breast cancer early, which significantly increases the chances of curing the cancer.       Our records indicate that you may be overdue for breast cancer screening. Cancer screenings save lives, so schedule yours today to stay healthy.     If you recently had a mammogram performed outside of [facility], please let your Health care team know so that they can update your health record.        Your cervical cancer screening is due     Our records indicate that you may be overdue for your screening Pap smear. A Pap smear is an important health screening that can detect abnormal cells that can become cervical cancer. Cervical cancer screenings allow for early diagnosis and increase the likelihood of successful treatment.     The current recommendation for Pap smear screening is every 3-5 years for women at average risk. We encourage you to schedule your appointment with your womens health provider. Many women see a gynecologist for this screening, but some primary care providers also provide Pap screening.     If you recently had your Pap smear screening performed outside of [facility], please let your health care team know so that they can update your health record.

## 2024-09-27 NOTE — PROGRESS NOTES
"   Patient ID: [id number]     Chief Complaint: Follow-up      HPI:     Tanya Linda is a 63 y.o. female here today for a follow up. No other complaints today.       Past Medical History:  has a past medical history of Anxiety, Back muscle spasm, Depression, GERD (gastroesophageal reflux disease), Hypertension, Intertrochanteric fracture of left femur, Morbid obesity, and Personal history of colonic polyps.    Surgical History:  has a past surgical history that includes Colonoscopy (12/02/2021); Antegrade intramedullary rodding of femur (Left, 12/25/2022); and Excision of parotid gland (Right, 11/4/2024).    Family History: family history is not on file.    Social History:  reports that she has never smoked. She has never used smokeless tobacco. She reports that she does not drink alcohol and does not use drugs.    Current Outpatient Medications   Medication Instructions    acetaminophen (TYLENOL) 650 mg, Oral, Every 6 hours PRN, Alternate with ibuprofen    bacitracin 500 unit/gram ointment Apply three times daily to incision on right neck for 5 days. Then, switch to Aquaphor.    ibuprofen (ADVIL,MOTRIN) 800 mg, Oral, Every 6 hours, Alternate with tylenol.    ondansetron (ZOFRAN-ODT) 8 mg, Oral, 2 times daily    oxyCODONE (ROXICODONE) 5 mg, Oral, Every 4 hours PRN    triamcinolone acetonide 0.1% (KENALOG) 0.1 % cream Topical (Top), 2 times daily       Patient has No Known Allergies.     Patient Care Team:  Sarita De Leon FNP as PCP - General (Nurse Practitioner)  Miladys Barr LPN as Care Coordinator  Andrey Vizcarra MD as Consulting Physician (General Surgery)       Subjective:     Review of Systems    12 point review of systems conducted, negative except as stated in the history of present illness. See HPI for details.      Objective:     Visit Vitals  /70 (BP Location: Left arm, Patient Position: Sitting)   Pulse (!) 59   Temp 97.7 °F (36.5 °C) (Oral)   Resp 18   Ht 5' 5" (1.651 m)   Wt (!) " 158.4 kg (349 lb 3.2 oz)   SpO2 97%   BMI 58.11 kg/m²       Physical Exam    General: Alert and oriented, No acute distress.  Head: Normocephalic, Atraumatic.  Eye: Pupils are equal, round and reactive to light, Extraocular movements are intact, Sclera non-icteric.  Ears/Nose/Throat: Normal, Mucosa moist,Clear.  Neck/Thyroid: Supple, Non-tender, No carotid bruit, No lymphadenopathy, No JVD, Full range of motion.  Respiratory: Clear to auscultation bilaterally; No wheezes, rales or rhonchi,Non-labored respirations, Symmetrical chest wall expansion.  Cardiovascular: Regular rate and rhythm, S1/S2 normal, No murmurs, rubs or gallops.  Gastrointestinal: Soft, Non-tender, Non-distended, Normal bowel sounds, No palpable organomegaly.  Musculoskeletal: Normal range of motion.  Integumentary: Warm, Dry, Intact, No suspicious lesions or rashes.  Extremities: No clubbing, cyanosis or edema  Neurologic: No focal deficits, Cranial Nerves II-XII are grossly intact, Motor strength normal upper and lower extremities, Sensory exam intact.  Psychiatric: Normal interaction, Coherent speech, Euthymic mood, Appropriate affect     Labs Reviewed:     Chemistry:  Lab Results   Component Value Date     10/30/2024    K 4.8 10/30/2024    BUN 18.8 10/30/2024    CREATININE 1.24 (H) 10/30/2024    EGFRNORACEVR 50 10/30/2024    GLUCOSE 106 10/30/2024    CALCIUM 9.3 10/30/2024    ALKPHOS 81 10/30/2024    LABPROT <10.0 (L) 10/30/2024    LABPROT 7.3 10/30/2024    ALBUMIN 3.7 10/30/2024    BILIDIR 0.2 02/25/2022    IBILI 0.40 02/25/2022    AST 12 10/30/2024    ALT 13 10/30/2024    MG 1.90 03/18/2023    PHOS 2.7 03/18/2023    ACBKFMKK98HO 9.8 (L) 03/19/2024    TSH 1.639 03/19/2024        Lab Results   Component Value Date    HGBA1C 5.2 03/19/2024        Hematology:  Lab Results   Component Value Date    WBC 8.92 10/30/2024    HGB 13.5 10/30/2024    HCT 43.4 10/30/2024     10/30/2024       Lipid Panel:  Lab Results   Component Value Date     CHOL 192 03/19/2024    HDL 42 03/19/2024    .00 03/19/2024    TRIG 140 03/19/2024    TOTALCHOLEST 5 03/19/2024        Urine:  Lab Results   Component Value Date    APPEARANCEUA Cloudy (A) 06/10/2023    SGUA 1.020 06/10/2023    PROTEINUA Negative 06/10/2023    KETONESUA Negative 06/10/2023    LEUKOCYTESUR Small (A) 06/10/2023    RBCUA None Seen 06/10/2023    WBCUA 50-99 (A) 06/10/2023    BACTERIA Moderate (A) 06/10/2023    CREATRANDUR 51.9 03/03/2023          Assessment:       ICD-10-CM ICD-9-CM   1. Mass of right parotid gland  K11.8 527.8   2. Flu vaccine need  Z23 V04.81        Plan:   1. Mass of right parotid gland (Primary)  Keep scheduled follow-up appointment with Otolaryngology for scheduled biopsy.    2. Flu vaccine need  - influenza (Flulaval, Fluzone, Fluarix) 45 mcg/0.5 mL IM vaccine (> or = 6 mo) 0.5 mL      Follow up in about 3 months (around 12/27/2024) for HTN. In addition to their scheduled follow up, the patient has also been instructed to follow up on as needed basis.     Future Appointments   Date Time Provider Department Center   11/6/2024  8:30 AM [facility] MAMMO1 [facility] MAMMO [facility]   11/7/2024  1:30 PM RESIDENT 2, [facility] OTORHINOLARYNGOLOGY [facility] ENT [facility]   11/12/2024  8:00 AM RESIDENT 1, [facility] OTORHINOLARYNGOLOGY [facility] ENT [facility]   1/6/2025  9:00 AM Sarita De Leon FNP [facility]   3/25/2025  9:00 AM Sarita De Leon FNP [facility]        MARION Salazar

## 2024-09-30 ENCOUNTER — PATIENT OUTREACH (OUTPATIENT)
Facility: CLINIC | Age: 64
End: 2024-09-30
Payer: COMMERCIAL

## 2024-10-01 ENCOUNTER — TELEPHONE (OUTPATIENT)
Dept: INTERVENTIONAL RADIOLOGY/VASCULAR | Facility: HOSPITAL | Age: 64
End: 2024-10-01
Payer: COMMERCIAL

## 2024-10-01 NOTE — TELEPHONE ENCOUNTER
Jeannine called the patient to get them scheduled for the parotid FNA and she agreed to 10/17 @ [id number]

## 2024-10-02 DIAGNOSIS — Z01.818 PREOP TESTING: Primary | ICD-10-CM

## 2024-10-09 ENCOUNTER — PROCEDURE VISIT (OUTPATIENT)
Dept: OTOLARYNGOLOGY | Facility: CLINIC | Age: 64
End: 2024-10-09
Payer: COMMERCIAL

## 2024-10-09 VITALS
TEMPERATURE: 98 F | DIASTOLIC BLOOD PRESSURE: 74 MMHG | WEIGHT: 293 LBS | HEIGHT: 65 IN | RESPIRATION RATE: 20 BRPM | BODY MASS INDEX: 48.82 KG/M2 | HEART RATE: 60 BPM | OXYGEN SATURATION: 99 % | SYSTOLIC BLOOD PRESSURE: 135 MMHG

## 2024-10-09 DIAGNOSIS — K11.8 MASS OF RIGHT PAROTID GLAND: ICD-10-CM

## 2024-10-09 DIAGNOSIS — K11.8 MASS OF RIGHT PAROTID GLAND: Primary | ICD-10-CM

## 2024-10-09 PROCEDURE — 88173 CYTOPATH EVAL FNA REPORT: CPT | Mod: TC

## 2024-10-09 RX ORDER — LIDOCAINE HYDROCHLORIDE AND EPINEPHRINE BITARTRATE 20; .01 MG/ML; MG/ML
INJECTION, SOLUTION SUBCUTANEOUS
Status: DISCONTINUED
Start: 2024-10-09 | End: 2024-10-09 | Stop reason: HOSPADM

## 2024-10-09 NOTE — PROCEDURES
Procedures  PROCEDURE: Fine Needle Aspiration of Parotid Nodule    Surgeon: Ana Salinas MD  Indication: R parotid mass  Anesthesia: 1% lidocaine with 1:100,000  Blood Loss: none  Procedure in Detail:     The risks and benefits of procedure discussed with patient.  Informed consent was given to proceed. First, The patient's neck was cleaned and prepped with an alcohol wipe. Drapes were applied. 1 mL of 1% lidocaine with 1:100,000 was injected into the skin superficial to the nodule. Adequate time was allowed for the anesthesia to take effect.     Next, using a 22-gauge fine needle attached to a syringe, the skin was punctured directly over the parotid nodule. The needle was advanced into the nodule, with intermittent suction applied during withdrawal. Multiple passes were made within the thyroid nodule to obtain an adequate sample. Aspirated material was expelled onto glass slides for immediate on-site evaluation; one was allowed to air dry and one was placed in alcohol. The remainder of the syringe was washed with cytolyte solution. The patient tolerated the procedure well without complciations.

## 2024-10-14 LAB
ESTROGEN SERPL-MCNC: ABNORMAL PG/ML
INSULIN SERPL-ACNC: ABNORMAL U[IU]/ML
LAB AP CLINICAL INFORMATION: ABNORMAL
LAB AP GROSS DESCRIPTION: ABNORMAL
LAB AP NON-GYN INTERPRETATION SPECIMEN 1: ABNORMAL

## 2024-10-16 ENCOUNTER — OFFICE VISIT (OUTPATIENT)
Dept: OTOLARYNGOLOGY | Facility: CLINIC | Age: 64
End: 2024-10-16
Payer: COMMERCIAL

## 2024-10-16 VITALS
HEART RATE: 60 BPM | RESPIRATION RATE: 20 BRPM | SYSTOLIC BLOOD PRESSURE: 133 MMHG | TEMPERATURE: 98 F | DIASTOLIC BLOOD PRESSURE: 79 MMHG | BODY MASS INDEX: 48.82 KG/M2 | HEIGHT: 65 IN | OXYGEN SATURATION: 99 % | WEIGHT: 293 LBS

## 2024-10-16 DIAGNOSIS — K11.8 MASS OF RIGHT PAROTID GLAND: Primary | ICD-10-CM

## 2024-10-16 DIAGNOSIS — K11.8 PAROTID MASS: ICD-10-CM

## 2024-10-16 PROCEDURE — 99215 OFFICE O/P EST HI 40 MIN: CPT | Mod: PBBFAC

## 2024-10-16 RX ORDER — SODIUM CHLORIDE 9 MG/ML
INJECTION, SOLUTION INTRAVENOUS CONTINUOUS
OUTPATIENT
Start: 2024-10-16

## 2024-10-16 NOTE — H&P (VIEW-ONLY)
[facility]  Otolaryngology Clinic Note    Tanya Linda  Encounter Date: 10/16/2024  YOB: 1960  Physician: MD Darren    Chief Complaint: R parotid mass    HPI: Tanya Linda is a 63 y.o. female with no past medical history who presents to the ENT clinic after she had an episode of parotitis in August.  Upon getting a CT scan they found a large right parotid mass.  She was subsequently treated with antibiotics, steroids and referred to us for outpatient ENT.  Patient has only noticed the mass since the past 2 months however her daughter says it has been present for for longer.  She denies any pain, drainage.  She reports that when it is hot it occasionally swells up, however since the episode of parotitis it has remained stable in size.  She has never had any history of cancer previously.  She has had multiple orthopedic surgeries for her hip and knee, never had any ear nose and throat surgeries  She is a never smoker    10/9/24: FNA performed    10/16/24:  Patient presents today for follow-up FNA results.  She has been doing well.  She reports that the mass has increased slightly in size since we last saw her however she believes that it is still smaller than prior to the FNA when we retrieved fluid.  She denies any pain at the site of the mass.  Facial nerve is fully intact.  She states that her soft tissue masses along her scalp happens stable in size.      ROS:   General: Negative except per HPI  Skin: Denies rash, ulcer, or lesion.  Eyes: Denies vision changes or diplopia.  Ears: Negative except per HPI  Nose: Negative except per HPI  Throat/mouth: Negative except per HPI  Cardiovascular: Negative except per HPI  Respiratory: Negative except per HPI  Neck: Negative except per HPI  Endocrine: Negative except per HPI  Neurologic: Negative except per HPI    Other 10-point review of systems negative except per HPI      Review of patient's allergies indicates:  No Known  Allergies    Past Medical History:   Diagnosis Date    Anxiety     Back muscle spasm     Depression     GERD (gastroesophageal reflux disease)     Hypertension     Intertrochanteric fracture of left femur     Morbid obesity     Personal history of colonic polyps 12/02/2021    Dr. Andrey Vizcarra       Past Surgical History:   Procedure Laterality Date    ANTEGRADE INTRAMEDULLARY RODDING OF FEMUR Left 12/25/2022    Procedure: INSERTION, INTRAMEDULLARY JARETH, FEMUR, ANTEGRADE;  Surgeon: Ronnell Olivares MD;  Location: [facility];  Service: Orthopedics;  Laterality: Left;    COLONOSCOPY  12/02/2021    Dr. Andrey Vizcarra       Social History     Socioeconomic History    Marital status: Single   Tobacco Use    Smoking status: Never    Smokeless tobacco: Never   Substance and Sexual Activity    Alcohol use: Never    Drug use: Never    Sexual activity: Not Currently     Social Drivers of Health     Financial Resource Strain: Low Risk  (3/19/2023)    Overall Financial Resource Strain (CARDIA)     Difficulty of Paying Living Expenses: Not hard at all   Food Insecurity: No Food Insecurity (3/19/2023)    Hunger Vital Sign     Worried About Running Out of Food in the Last Year: Never true     Ran Out of Food in the Last Year: Never true   Transportation Needs: No Transportation Needs (3/19/2023)    PRAPARE - Transportation     Lack of Transportation (Medical): No     Lack of Transportation (Non-Medical): No   Physical Activity: Inactive (3/19/2023)    Exercise Vital Sign     Days of Exercise per Week: 0 days     Minutes of Exercise per Session: 0 min   Stress: No Stress Concern Present (3/19/2023)    [employer] [place] [employer] - Occupational Stress Questionnaire     Feeling of Stress : Only a little   Housing Stability: Low Risk  (3/19/2023)    Housing Stability Vital Sign     Unable to Pay for Housing in the Last Year: No     Number of Places Lived in the Last Year: 1     Unstable Housing in the Last Year: No       No family  "history on file.    Outpatient Encounter Medications as of 10/16/2024   Medication Sig Dispense Refill    triamcinolone acetonide 0.1% (KENALOG) 0.1 % cream Apply topically 2 (two) times daily. 80 g 0     No facility-administered encounter medications on file as of 10/16/2024.       Physical Exam:  Vitals:    10/16/24 0808   BP: 133/79   BP Location: Right arm   Patient Position: Sitting   Pulse: 60   Resp: 20   Temp: 98 °F (36.7 °C)   TempSrc: Oral   SpO2: 99%   Weight: (!) 158.3 kg (349 lb)   Height: 5' 5" (1.651 m)       Constitutional  General Appearance: well nourished, obese AAO x3, NAD  HEENT: multiple soft tissue masses along her scalp, 3 most notable in size  Eyes: PEERLA, EOMI, normal conjunctivae  Ears: Hearing well at conversation level  Nose: septum midline, no inferior turbinate hypertrophy, no polyps, moist mucosa without erythema or blue hue  OC/OP: dentition moderate, no oral lesions, tongue/FOM/BOT- soft, no leukoplakia/ulcerations/ tenderness  Nasopharynx, Hypopharynx, and Larynx:    Indirect: attempted, limited view due to patient intolerance  Neck:  Able to palpate right parotid mass extending below the mandible, feels firm, well-circumscribed non-tender, no palpable lymph nodes   Thyroid region- no nodules or goiter  Neuro: CN II - XII intact bilaterally, no facial nerve deficit  Cardiovascular: peripheral pulses palpable  Respiratory: non-labored respirations  Psychiatric: oriented to time, place and person, no depression, anxiety or agitation      Pertinent Data:  FNA 10/9/24: atypical cells present    Imaging:   I personally reviewed the following images:  CT Max: right sided parotid mass measuring approximately 5.4cm    Assessment/Plan:  Tanya Linda is a 63 y.o. female with no past medical history presents with multiple scalp soft tissue masses (3 most notable in size) and large right parotid mass that has been stable in size now s/p FNA with result of atypical cells    - discussed the " risks and benefits of surgery with patient.  She would like to proceed with right-sided parotidectomy and possible excision of soft tissue scalp masses while under anesthesia at the same time.  Consent forms were signed.  Preoperative labs, chest x-ray and EKG are ordered.  Surgery as booked for November 4th    Ana Salinas MD  [facility] Department of Otolaryngology  -III

## 2024-10-16 NOTE — PROGRESS NOTES
[facility]  Otolaryngology Clinic Note    Tanya Linda  Encounter Date: 10/16/2024  YOB: 1960  Physician: MD Darren    Chief Complaint: R parotid mass    HPI: Tanya Linda is a 63 y.o. female with no past medical history who presents to the ENT clinic after she had an episode of parotitis in August.  Upon getting a CT scan they found a large right parotid mass.  She was subsequently treated with antibiotics, steroids and referred to us for outpatient ENT.  Patient has only noticed the mass since the past 2 months however her daughter says it has been present for for longer.  She denies any pain, drainage.  She reports that when it is hot it occasionally swells up, however since the episode of parotitis it has remained stable in size.  She has never had any history of cancer previously.  She has had multiple orthopedic surgeries for her hip and knee, never had any ear nose and throat surgeries  She is a never smoker    10/9/24: FNA performed    10/16/24:  Patient presents today for follow-up FNA results.  She has been doing well.  She reports that the mass has increased slightly in size since we last saw her however she believes that it is still smaller than prior to the FNA when we retrieved fluid.  She denies any pain at the site of the mass.  Facial nerve is fully intact.  She states that her soft tissue masses along her scalp happens stable in size.      ROS:   General: Negative except per HPI  Skin: Denies rash, ulcer, or lesion.  Eyes: Denies vision changes or diplopia.  Ears: Negative except per HPI  Nose: Negative except per HPI  Throat/mouth: Negative except per HPI  Cardiovascular: Negative except per HPI  Respiratory: Negative except per HPI  Neck: Negative except per HPI  Endocrine: Negative except per HPI  Neurologic: Negative except per HPI    Other 10-point review of systems negative except per HPI      Review of patient's allergies indicates:  No Known  Allergies    Past Medical History:   Diagnosis Date    Anxiety     Back muscle spasm     Depression     GERD (gastroesophageal reflux disease)     Hypertension     Intertrochanteric fracture of left femur     Morbid obesity     Personal history of colonic polyps 12/02/2021    Dr. Andrey Vizcarra       Past Surgical History:   Procedure Laterality Date    ANTEGRADE INTRAMEDULLARY RODDING OF FEMUR Left 12/25/2022    Procedure: INSERTION, INTRAMEDULLARY JARETH, FEMUR, ANTEGRADE;  Surgeon: Ronnell Olivares MD;  Location: [facility];  Service: Orthopedics;  Laterality: Left;    COLONOSCOPY  12/02/2021    Dr. Andrey Vizcarra       Social History     Socioeconomic History    Marital status: Single   Tobacco Use    Smoking status: Never    Smokeless tobacco: Never   Substance and Sexual Activity    Alcohol use: Never    Drug use: Never    Sexual activity: Not Currently     Social Drivers of Health     Financial Resource Strain: Low Risk  (3/19/2023)    Overall Financial Resource Strain (CARDIA)     Difficulty of Paying Living Expenses: Not hard at all   Food Insecurity: No Food Insecurity (3/19/2023)    Hunger Vital Sign     Worried About Running Out of Food in the Last Year: Never true     Ran Out of Food in the Last Year: Never true   Transportation Needs: No Transportation Needs (3/19/2023)    PRAPARE - Transportation     Lack of Transportation (Medical): No     Lack of Transportation (Non-Medical): No   Physical Activity: Inactive (3/19/2023)    Exercise Vital Sign     Days of Exercise per Week: 0 days     Minutes of Exercise per Session: 0 min   Stress: No Stress Concern Present (3/19/2023)    [employer] [employer] - Occupational Stress Questionnaire     Feeling of Stress : Only a little   Housing Stability: Low Risk  (3/19/2023)    Housing Stability Vital Sign     Unable to Pay for Housing in the Last Year: No     Number of Places Lived in the Last Year: 1     Unstable Housing in the Last Year: No       No family  "history on file.    Outpatient Encounter Medications as of 10/16/2024   Medication Sig Dispense Refill    triamcinolone acetonide 0.1% (KENALOG) 0.1 % cream Apply topically 2 (two) times daily. 80 g 0     No facility-administered encounter medications on file as of 10/16/2024.       Physical Exam:  Vitals:    10/16/24 0808   BP: 133/79   BP Location: Right arm   Patient Position: Sitting   Pulse: 60   Resp: 20   Temp: 98 °F (36.7 °C)   TempSrc: Oral   SpO2: 99%   Weight: (!) 158.3 kg (349 lb)   Height: 5' 5" (1.651 m)       Constitutional  General Appearance: well nourished, obese AAO x3, NAD  HEENT: multiple soft tissue masses along her scalp, 3 most notable in size  Eyes: PEERLA, EOMI, normal conjunctivae  Ears: Hearing well at conversation level  Nose: septum midline, no inferior turbinate hypertrophy, no polyps, moist mucosa without erythema or blue hue  OC/OP: dentition moderate, no oral lesions, tongue/FOM/BOT- soft, no leukoplakia/ulcerations/ tenderness  Nasopharynx, Hypopharynx, and Larynx:    Indirect: attempted, limited view due to patient intolerance  Neck:  Able to palpate right parotid mass extending below the mandible, feels firm, well-circumscribed non-tender, no palpable lymph nodes   Thyroid region- no nodules or goiter  Neuro: CN II - XII intact bilaterally, no facial nerve deficit  Cardiovascular: peripheral pulses palpable  Respiratory: non-labored respirations  Psychiatric: oriented to time, place and person, no depression, anxiety or agitation      Pertinent Data:  FNA 10/9/24: atypical cells present    Imaging:   I personally reviewed the following images:  CT Max: right sided parotid mass measuring approximately 5.4cm    Assessment/Plan:  Tanya Linda is a 63 y.o. female with no past medical history presents with multiple scalp soft tissue masses (3 most notable in size) and large right parotid mass that has been stable in size now s/p FNA with result of atypical cells    - discussed the " risks and benefits of surgery with patient.  She would like to proceed with right-sided parotidectomy and possible excision of soft tissue scalp masses while under anesthesia at the same time.  Consent forms were signed.  Preoperative labs, chest x-ray and EKG are ordered.  Surgery as booked for November 4th    Ana Salinas MD  [facility] Department of Otolaryngology  -III

## 2024-10-18 ENCOUNTER — ANESTHESIA EVENT (OUTPATIENT)
Dept: SURGERY | Facility: HOSPITAL | Age: 64
DRG: 139 | End: 2024-10-18
Payer: COMMERCIAL

## 2024-10-18 NOTE — ANESTHESIA PREPROCEDURE EVALUATION
"Tanya Linda is a 63 y.o. female presenting for  EXCISION, PAROTID GLAND (Right: Face)       EXCISION, SUPERFICIAL MASS, HEAD (Bilateral) with a history of   -Mass of right parotid gland   -ANXIETY/DEPRESSION  -GERD  -HTN  -MORBID OBESITY, BMI 58      BETA-BLOCKER: NONE    New Orders for Anesthesia: NONE    Patient Active Problem List   Diagnosis    Dysuria    Morbid obesity    Flank pain    Back muscle spasm    Hypertension    Depression    Left intertrochanteric femur fracture    Femur fracture, left    Anxiety    GERD (gastroesophageal reflux disease)    SOB (shortness of breath)     Pre-op Assessment    I have reviewed the NPO Status.      Review of Systems  Anesthesia Hx:  No problems with previous Anesthesia                Social:  Non-Smoker       Cardiovascular:     Hypertension, well controlled                                          Pulmonary:  Pulmonary Normal                       Renal/:  Renal/ Normal                 Hepatic/GI:     GERD, well controlled                Neurological:  Neurology Normal                                      Endocrine:        Morbid Obesity / BMI > 40  Psych:   anxiety depression              Vitals:    10/28/24 1455 11/04/24 0506 11/04/24 0510 11/04/24 0605   BP:  (!) 141/72  (!) 145/85   Pulse:  61  68   Resp:    20   Temp:  36.5 °C (97.7 °F)  36.3 °C (97.3 °F)   TempSrc:  Oral  Temporal   SpO2:  98%  100%   Weight: (!) 158.3 kg (349 lb)  (!) 155.1 kg (342 lb)    Height: 5' 5" (1.651 m)            Physical Exam  General: Alert, Cooperative and Well nourished    Airway:  Mallampati: II   Mouth Opening: Normal  TM Distance: Normal  Tongue: Normal  Neck ROM: Normal ROM    Dental:  Intact    Chest/Lungs:  Clear to auscultation, Normal Respiratory Rate    Heart:  Rate: Normal  Rhythm: Regular Rhythm  Sounds: Normal      Lab Results   Component Value Date    WBC 8.92 10/30/2024    HGB 13.5 10/30/2024    HCT 43.4 10/30/2024    MCV 88.2 10/30/2024     10/30/2024 "       CMP  Sodium   Date Value Ref Range Status   10/30/2024 140 136 - 145 mmol/L Final     Potassium   Date Value Ref Range Status   10/30/2024 4.8 3.5 - 5.1 mmol/L Final     Chloride   Date Value Ref Range Status   10/30/2024 108 (H) 98 - 107 mmol/L Final     CO2   Date Value Ref Range Status   10/30/2024 24 23 - 31 mmol/L Final     Blood Urea Nitrogen   Date Value Ref Range Status   10/30/2024 18.8 9.8 - 20.1 mg/dL Final     Creatinine   Date Value Ref Range Status   10/30/2024 1.24 (H) 0.55 - 1.02 mg/dL Final     Calcium   Date Value Ref Range Status   10/30/2024 9.3 8.4 - 10.2 mg/dL Final     Albumin   Date Value Ref Range Status   10/30/2024 3.7 3.4 - 4.8 g/dL Final     Bilirubin Total   Date Value Ref Range Status   10/30/2024 0.4 <=1.5 mg/dL Final     ALP   Date Value Ref Range Status   10/30/2024 81 40 - 150 unit/L Final     AST   Date Value Ref Range Status   10/30/2024 12 5 - 34 unit/L Final     ALT   Date Value Ref Range Status   10/30/2024 13 0 - 55 unit/L Final     eGFR   Date Value Ref Range Status   10/30/2024 50 mL/min/1.73/m2 Final                 Anesthesia Plan  Type of Anesthesia, risks & benefits discussed:    Anesthesia Type: Gen ETT  Intra-op Monitoring Plan: Standard ASA Monitors  Post Op Pain Control Plan: IV/PO Opioids PRN  Induction:  IV  Airway Plan: Direct  Informed Consent: Informed consent signed with the Patient and all parties understand the risks and agree with anesthesia plan.  All questions answered.   ASA Score: 3  Day of Surgery Review of History & Physical: H&P Update referred to the surgeon/provider.  Anesthesia Plan Notes: Remifentanil gtt    Ready For Surgery From Anesthesia Perspective.     .

## 2024-10-21 ENCOUNTER — HOSPITAL ENCOUNTER (OUTPATIENT)
Dept: RADIOLOGY | Facility: HOSPITAL | Age: 64
Discharge: HOME | End: 2024-10-21
Payer: COMMERCIAL

## 2024-10-21 DIAGNOSIS — K11.8 MASS OF RIGHT PAROTID GLAND: ICD-10-CM

## 2024-10-21 DIAGNOSIS — Z12.31 BREAST CANCER SCREENING BY MAMMOGRAM: Primary | ICD-10-CM

## 2024-10-21 LAB
CREAT SERPL-MCNC: 1 MG/DL (ref 0.55–1.02)
GFR SERPLBLD CREATININE-BSD FMLA CKD-EPI: >60 ML/MIN/1.73/M2

## 2024-10-21 PROCEDURE — 25500020 PHARM REV CODE 255

## 2024-10-21 PROCEDURE — 70487 CT MAXILLOFACIAL W/DYE: CPT | Mod: TC

## 2024-10-21 PROCEDURE — 82565 ASSAY OF CREATININE: CPT

## 2024-10-21 PROCEDURE — 71046 X-RAY EXAM CHEST 2 VIEWS: CPT | Mod: TC

## 2024-10-21 RX ADMIN — IOHEXOL 100 ML: 350 INJECTION, SOLUTION INTRAVENOUS at 03:10

## 2024-10-24 ENCOUNTER — TELEPHONE (OUTPATIENT)
Dept: INTERVENTIONAL RADIOLOGY/VASCULAR | Facility: HOSPITAL | Age: 64
End: 2024-10-24

## 2024-10-24 NOTE — TELEPHONE ENCOUNTER
Called the patient to see about her late arrival for FNA and she noted she already had it done. I reviewed her chart and it looks as if she had it done in clinic. Cancelled her IR order.

## 2024-10-30 ENCOUNTER — HOSPITAL ENCOUNTER (OUTPATIENT)
Dept: CARDIOLOGY | Facility: HOSPITAL | Age: 64
Discharge: HOME | End: 2024-10-30
Payer: COMMERCIAL

## 2024-10-30 DIAGNOSIS — K11.8 MASS OF RIGHT PAROTID GLAND: ICD-10-CM

## 2024-10-30 PROCEDURE — 93010 ELECTROCARDIOGRAM REPORT: CPT | Mod: ,,, | Performed by: INTERNAL MEDICINE

## 2024-10-30 PROCEDURE — 93005 ELECTROCARDIOGRAM TRACING: CPT

## 2024-10-31 LAB
OHS QRS DURATION: 80 MS
OHS QTC CALCULATION: 408 MS

## 2024-11-01 ENCOUNTER — PATIENT MESSAGE (OUTPATIENT)
Dept: SURGERY | Facility: HOSPITAL | Age: 64
End: 2024-11-01
Payer: COMMERCIAL

## 2024-11-04 ENCOUNTER — ANESTHESIA (OUTPATIENT)
Dept: SURGERY | Facility: HOSPITAL | Age: 64
DRG: 139 | End: 2024-11-04
Payer: COMMERCIAL

## 2024-11-04 ENCOUNTER — HOSPITAL ENCOUNTER (INPATIENT)
Facility: HOSPITAL | Age: 64
LOS: 1 days | Discharge: HOME | DRG: 139 | End: 2024-11-04
Attending: OTOLARYNGOLOGY | Admitting: OTOLARYNGOLOGY
Payer: COMMERCIAL

## 2024-11-04 DIAGNOSIS — K11.8 MASS OF RIGHT PAROTID GLAND: ICD-10-CM

## 2024-11-04 DIAGNOSIS — K11.8 PAROTID MASS: ICD-10-CM

## 2024-11-04 PROCEDURE — 71000015 HC POSTOP RECOV 1ST HR: Performed by: OTOLARYNGOLOGY

## 2024-11-04 PROCEDURE — 37000009 HC ANESTHESIA EA ADD 15 MINS: Performed by: OTOLARYNGOLOGY

## 2024-11-04 PROCEDURE — 0CT80ZZ RESECTION OF RIGHT PAROTID GLAND, OPEN APPROACH: ICD-10-PCS | Performed by: OTOLARYNGOLOGY

## 2024-11-04 PROCEDURE — 11000001 HC ACUTE MED/SURG PRIVATE ROOM

## 2024-11-04 PROCEDURE — 88307 TISSUE EXAM BY PATHOLOGIST: CPT | Mod: TC | Performed by: OTOLARYNGOLOGY

## 2024-11-04 PROCEDURE — 27201423 OPTIME MED/SURG SUP & DEVICES STERILE SUPPLY: Performed by: OTOLARYNGOLOGY

## 2024-11-04 PROCEDURE — 0KX20ZZ TRANSFER RIGHT NECK MUSCLE, OPEN APPROACH: ICD-10-PCS | Performed by: OTOLARYNGOLOGY

## 2024-11-04 PROCEDURE — 25000003 PHARM REV CODE 250: Performed by: NURSE ANESTHETIST, CERTIFIED REGISTERED

## 2024-11-04 PROCEDURE — 63600175 PHARM REV CODE 636 W HCPCS: Performed by: NURSE ANESTHETIST, CERTIFIED REGISTERED

## 2024-11-04 PROCEDURE — 71000033 HC RECOVERY, INTIAL HOUR: Performed by: OTOLARYNGOLOGY

## 2024-11-04 PROCEDURE — 25000003 PHARM REV CODE 250: Performed by: ANESTHESIOLOGY

## 2024-11-04 PROCEDURE — 25000003 PHARM REV CODE 250: Performed by: OTOLARYNGOLOGY

## 2024-11-04 PROCEDURE — 36000708 HC OR TIME LEV III 1ST 15 MIN: Performed by: OTOLARYNGOLOGY

## 2024-11-04 PROCEDURE — 63600175 PHARM REV CODE 636 W HCPCS: Performed by: OTOLARYNGOLOGY

## 2024-11-04 PROCEDURE — 71000016 HC POSTOP RECOV ADDL HR: Performed by: OTOLARYNGOLOGY

## 2024-11-04 PROCEDURE — 36000709 HC OR TIME LEV III EA ADD 15 MIN: Performed by: OTOLARYNGOLOGY

## 2024-11-04 PROCEDURE — 37000008 HC ANESTHESIA 1ST 15 MINUTES: Performed by: OTOLARYNGOLOGY

## 2024-11-04 PROCEDURE — 63600175 PHARM REV CODE 636 W HCPCS: Performed by: ANESTHESIOLOGY

## 2024-11-04 RX ORDER — MORPHINE SULFATE 2 MG/ML
2 INJECTION, SOLUTION INTRAMUSCULAR; INTRAVENOUS EVERY 5 MIN PRN
Status: DISCONTINUED | OUTPATIENT
Start: 2024-11-04 | End: 2024-11-04 | Stop reason: HOSPADM

## 2024-11-04 RX ORDER — DEXAMETHASONE SODIUM PHOSPHATE 4 MG/ML
INJECTION, SOLUTION INTRA-ARTICULAR; INTRALESIONAL; INTRAMUSCULAR; INTRAVENOUS; SOFT TISSUE
Status: DISCONTINUED | OUTPATIENT
Start: 2024-11-04 | End: 2024-11-04

## 2024-11-04 RX ORDER — ONDANSETRON HYDROCHLORIDE 2 MG/ML
4 INJECTION, SOLUTION INTRAVENOUS DAILY PRN
Status: DISCONTINUED | OUTPATIENT
Start: 2024-11-04 | End: 2024-11-04 | Stop reason: HOSPADM

## 2024-11-04 RX ORDER — OXYMETAZOLINE HCL 0.05 %
SPRAY, NON-AEROSOL (ML) NASAL
Status: DISCONTINUED | OUTPATIENT
Start: 2024-11-04 | End: 2024-11-04 | Stop reason: HOSPADM

## 2024-11-04 RX ORDER — LIDOCAINE HYDROCHLORIDE 20 MG/ML
INJECTION, SOLUTION EPIDURAL; INFILTRATION; INTRACAUDAL; PERINEURAL
Status: DISCONTINUED | OUTPATIENT
Start: 2024-11-04 | End: 2024-11-04

## 2024-11-04 RX ORDER — OXYCODONE HYDROCHLORIDE 5 MG/1
5 TABLET ORAL EVERY 4 HOURS PRN
Qty: 30 TABLET | Refills: 0 | Status: SHIPPED | OUTPATIENT
Start: 2024-11-04

## 2024-11-04 RX ORDER — PHENYLEPHRINE HYDROCHLORIDE 10 MG/ML
INJECTION INTRAVENOUS
Status: DISCONTINUED | OUTPATIENT
Start: 2024-11-04 | End: 2024-11-04

## 2024-11-04 RX ORDER — BACITRACIN 500 [USP'U]/G
OINTMENT TOPICAL
Start: 2024-11-04

## 2024-11-04 RX ORDER — SODIUM CHLORIDE 0.9 % (FLUSH) 0.9 %
10 SYRINGE (ML) INJECTION
Status: DISCONTINUED | OUTPATIENT
Start: 2024-11-04 | End: 2024-11-04 | Stop reason: HOSPADM

## 2024-11-04 RX ORDER — CEFAZOLIN 2 G/1
INJECTION, POWDER, FOR SOLUTION INTRAMUSCULAR; INTRAVENOUS
Status: DISCONTINUED | OUTPATIENT
Start: 2024-11-04 | End: 2024-11-04

## 2024-11-04 RX ORDER — IBUPROFEN 800 MG/1
800 TABLET ORAL EVERY 6 HOURS
Qty: 60 TABLET | Refills: 1 | Status: SHIPPED | OUTPATIENT
Start: 2024-11-04

## 2024-11-04 RX ORDER — ONDANSETRON HYDROCHLORIDE 2 MG/ML
INJECTION, SOLUTION INTRAMUSCULAR; INTRAVENOUS
Status: DISCONTINUED | OUTPATIENT
Start: 2024-11-04 | End: 2024-11-04

## 2024-11-04 RX ORDER — GLUCAGON 1 MG
1 KIT INJECTION
Status: DISCONTINUED | OUTPATIENT
Start: 2024-11-04 | End: 2024-11-04 | Stop reason: HOSPADM

## 2024-11-04 RX ORDER — MUPIROCIN 20 MG/G
OINTMENT TOPICAL 2 TIMES DAILY
Status: DISCONTINUED | OUTPATIENT
Start: 2024-11-04 | End: 2024-11-04 | Stop reason: HOSPADM

## 2024-11-04 RX ORDER — SUCCINYLCHOLINE CHLORIDE 20 MG/ML
INJECTION INTRAMUSCULAR; INTRAVENOUS
Status: DISCONTINUED | OUTPATIENT
Start: 2024-11-04 | End: 2024-11-04

## 2024-11-04 RX ORDER — SODIUM CHLORIDE, SODIUM LACTATE, POTASSIUM CHLORIDE, CALCIUM CHLORIDE 600; 310; 30; 20 MG/100ML; MG/100ML; MG/100ML; MG/100ML
INJECTION, SOLUTION INTRAVENOUS CONTINUOUS
Status: DISCONTINUED | OUTPATIENT
Start: 2024-11-04 | End: 2024-11-04 | Stop reason: HOSPADM

## 2024-11-04 RX ORDER — ONDANSETRON 4 MG/1
8 TABLET, ORALLY DISINTEGRATING ORAL 2 TIMES DAILY
Qty: 10 TABLET | Refills: 0 | Status: SHIPPED | OUTPATIENT
Start: 2024-11-04

## 2024-11-04 RX ORDER — OXYCODONE HYDROCHLORIDE 5 MG/1
5 TABLET ORAL
Status: DISCONTINUED | OUTPATIENT
Start: 2024-11-04 | End: 2024-11-04 | Stop reason: HOSPADM

## 2024-11-04 RX ORDER — SODIUM CHLORIDE 9 MG/ML
INJECTION, SOLUTION INTRAVENOUS CONTINUOUS
Status: DISCONTINUED | OUTPATIENT
Start: 2024-11-04 | End: 2024-11-04 | Stop reason: HOSPADM

## 2024-11-04 RX ORDER — KETOROLAC TROMETHAMINE 30 MG/ML
INJECTION, SOLUTION INTRAMUSCULAR; INTRAVENOUS
Status: DISCONTINUED | OUTPATIENT
Start: 2024-11-04 | End: 2024-11-04

## 2024-11-04 RX ORDER — PROPOFOL 10 MG/ML
VIAL (ML) INTRAVENOUS
Status: DISCONTINUED | OUTPATIENT
Start: 2024-11-04 | End: 2024-11-04

## 2024-11-04 RX ORDER — MEPERIDINE HYDROCHLORIDE 25 MG/ML
12.5 INJECTION INTRAMUSCULAR; INTRAVENOUS; SUBCUTANEOUS EVERY 10 MIN PRN
Status: DISCONTINUED | OUTPATIENT
Start: 2024-11-04 | End: 2024-11-04 | Stop reason: HOSPADM

## 2024-11-04 RX ORDER — ACETAMINOPHEN 325 MG/1
650 TABLET ORAL EVERY 6 HOURS PRN
Qty: 60 TABLET | Refills: 0 | Status: SHIPPED | OUTPATIENT
Start: 2024-11-04

## 2024-11-04 RX ORDER — BACITRACIN 500 [USP'U]/G
OINTMENT TOPICAL
Status: DISCONTINUED | OUTPATIENT
Start: 2024-11-04 | End: 2024-11-04 | Stop reason: HOSPADM

## 2024-11-04 RX ORDER — FENTANYL CITRATE 50 UG/ML
INJECTION, SOLUTION INTRAMUSCULAR; INTRAVENOUS
Status: DISCONTINUED | OUTPATIENT
Start: 2024-11-04 | End: 2024-11-04

## 2024-11-04 RX ORDER — MIDAZOLAM HYDROCHLORIDE 2 MG/2ML
2 INJECTION, SOLUTION INTRAMUSCULAR; INTRAVENOUS ONCE AS NEEDED
Status: COMPLETED | OUTPATIENT
Start: 2024-11-04 | End: 2024-11-04

## 2024-11-04 RX ADMIN — FENTANYL CITRATE 100 MCG: 50 INJECTION, SOLUTION INTRAMUSCULAR; INTRAVENOUS at 07:11

## 2024-11-04 RX ADMIN — PROPOFOL 200 MG: 10 INJECTION, EMULSION INTRAVENOUS at 07:11

## 2024-11-04 RX ADMIN — MIDAZOLAM HYDROCHLORIDE 2 MG: 1 INJECTION, SOLUTION INTRAMUSCULAR; INTRAVENOUS at 06:11

## 2024-11-04 RX ADMIN — CEFAZOLIN 2 G: 2 INJECTION, POWDER, FOR SOLUTION INTRAMUSCULAR; INTRAVENOUS at 11:11

## 2024-11-04 RX ADMIN — SUCCINYLCHOLINE CHLORIDE 120 MG: 20 INJECTION, SOLUTION INTRAMUSCULAR; INTRAVENOUS; PARENTERAL at 07:11

## 2024-11-04 RX ADMIN — PHENYLEPHRINE HYDROCHLORIDE 200 MCG: 10 INJECTION INTRAVENOUS at 11:11

## 2024-11-04 RX ADMIN — SODIUM CHLORIDE 0.03 MCG/KG/MIN: 9 INJECTION, SOLUTION INTRAVENOUS at 07:11

## 2024-11-04 RX ADMIN — OXYCODONE HYDROCHLORIDE 5 MG: 5 TABLET ORAL at 02:11

## 2024-11-04 RX ADMIN — ONDANSETRON 4 MG: 2 INJECTION INTRAMUSCULAR; INTRAVENOUS at 12:11

## 2024-11-04 RX ADMIN — CEFAZOLIN 3 G: 2 INJECTION, POWDER, FOR SOLUTION INTRAMUSCULAR; INTRAVENOUS at 07:11

## 2024-11-04 RX ADMIN — KETOROLAC TROMETHAMINE 30 MG: 30 INJECTION, SOLUTION INTRAMUSCULAR at 07:11

## 2024-11-04 RX ADMIN — ONDANSETRON 4 MG: 2 INJECTION INTRAMUSCULAR; INTRAVENOUS at 07:11

## 2024-11-04 RX ADMIN — DEXAMETHASONE SODIUM PHOSPHATE 8 MG: 4 INJECTION, SOLUTION INTRA-ARTICULAR; INTRALESIONAL; INTRAMUSCULAR; INTRAVENOUS; SOFT TISSUE at 07:11

## 2024-11-04 RX ADMIN — LIDOCAINE HYDROCHLORIDE 50 MG: 20 INJECTION, SOLUTION EPIDURAL; INFILTRATION; INTRACAUDAL; PERINEURAL at 07:11

## 2024-11-04 NOTE — DISCHARGE INSTRUCTIONS
Keep follow up appointment at the [facility] (ENT[facility].  Resume home medications unless otherwise instructed by your doctor.    · No heavy lifting, bending, or straining for couple weeks.    · You may take a shower tomorrow.    ` You have steri strips over your incision. You do not need to do anything with them. They will fall off on their own in 1-2 weeks. Water can run over them, just do not soak them in water. So no bathing, swimming, or hot tubs for 2 weeks.    `Sutures will be removed at clinic visit.    · Be sure to empty your drain and record drainage as directed. Your drain will be removed on follow-up. Call your doctor immediately if your drain comes out, if there is increasing swelling, redness, drainage or heat around the insertion site, if blood/drainage output increases or changes to a yellow or green color or appears to be pus-like.    · Instructions for emptying drain:    o Wash your hands    o Remove plug from pouring spout.    o Pour drainage into measuring cup.    o Flatten drainage container and replace plug into spout. Keeping the container flat helps to remove drainage from under the skin.    o Record the date, time, and amount of drainage on the DRAINAGE RECORD SHEET. In addition, note any problems you have encountered and report them to your doctor right away.    o Empty the container two times a day and as needed when half full. (Example- when you get up and before you go to bed.)    · You may alternate Ibuprofen and Tylenol every 4-6 hours as needed for pain or discomfort.  Can add narcotic pain med as needed. If you are experiencing severe pain even with your pain medications, please call the ENT clinic at [phone] to notify your doctor.    · You may use an ice pack as needed for 20 minutes at a time over your cheeks/forehead to minimize swelling and help relieve pain.      `  Sleep with head of bed elevated (using pillows if necessary) to help with pain and minimize swelling.    · Do  not drink alcohol or drive today, or as long as you are on pain medication.    · Notify MD of any moderate to severe pain unrelieved by pain medicine, if you have continuous or severe bleeding, or for any signs of infection including fever above 100.4, excessive redness or swelling, yellow/green foul- smelling drainage, nausea or vomiting. Clinics number is [phone]. If it is after business hours or emergency call [phone] and state Im having post op complications and need to speak to the ENT surgeon on call.    · Thanks for choosing [facility]! Have a smooth recovery!

## 2024-11-04 NOTE — LETTER
November 4, 2024         [address] [facility] [address]  Phone: [phone]  Fax: [phone]       Patient: Tanya Linda   YOB: 1960  Date of Visit: 11/04/2024    To Whom It May Concern:    Please excuse Francis Linda from work today and tomorrow. He was assisting on the care of Eligio Linda  who was at [facility] on 11/04/2024. He will return to work on 11/06/2024. If you have any questions or concerns, or if I can be of further assistance, please do not hesitate to contact me.    Sincerely,    Adali Best RN

## 2024-11-04 NOTE — TRANSFER OF CARE
"Anesthesia Transfer of Care Note    Patient: Tanya Linda    Procedure(s) Performed: Procedure(s) (LRB):  EXCISION, PAROTID GLAND (Right)    Patient location: PACU    Anesthesia Type: general    Transport from OR: Transported from OR on room air with adequate spontaneous ventilation    Post pain: adequate analgesia    Post assessment: no apparent anesthetic complications    Post vital signs: stable    Level of consciousness: awake    Nausea/Vomiting: no nausea/vomiting    Complications: none    Transfer of care protocol was followed      Last vitals: Visit Vitals  BP (!) 145/85   Pulse 68   Temp 36.3 °C (97.3 °F) (Temporal)   Resp 20   Ht 5' 5" (1.651 m)   Wt (!) 155.1 kg (342 lb)   SpO2 100%   Breastfeeding No   BMI 56.91 kg/m²     "

## 2024-11-04 NOTE — LETTER
November 4, 2024         [address] [facility] [address]  Phone: [phone]  Fax: [phone]       Patient: Tanya Linda   YOB: 1960  Date of Visit: 11/04/2024    To Whom It May Concern:    Please excuse Hillary Linda from work this week. She is assisting in the care of Eligio Linda who  was at [facility] on 11/04/2024. She will return to work on 11/11/11/2024. If you have any questions or concerns, or if I can be of further assistance, please do not hesitate to contact me.    Sincerely,    Adali Best RN

## 2024-11-04 NOTE — OP NOTE
[facility] [place] OTOLARYNGOLOGY OPERATIVE REPORT    Patient Name: Tanya Linda  Date of Admission: 11/4/2024  YOB: 1960  Date of procedure: 11/04/2024    Attending Surgeon: Stu Grady MD    Resident Surgeon(s):   Skyler Dumont MD, PGY-5  Kaushal Calderon MD, PGY-4    Pre-operative diagnosis:  1. Right parotid gland mass  2. Obesity, Class III      Post-operative diagnosis: Same    Procedure:  1. Right superficial parotidectomy with facial nerve dissection  2. Rotational sternocleidomastoid flap, right  3. Stensen's duct ligation, right    Anesthesia: General endotracheal    Blood Loss: 50cc    Implants: None    Drains: One Kevin-Camarillo 10 Portuguese drain    Specimens:   ID Type Source Tests Collected by Time Destination   A : right parotid mass Tissue Neck, Anterior SPECIMEN TO PATHOLOGY Stu Grady MD 11/4/2024 1126        Complications: None    Findings: Mass of right parotid gland, superficial lobe but quite anterior.  Facial nerve dissected out and preserved. The tumor was very closely associated with the buccal branch of the facial nerve as well as Stensen's duct; Stensen's duct was ligated near its insertion into the masseter. At the end of the case, all divisions of the facial nerve stimulated at 0.8mA with the exception of the marginal mandibular nerve. However, the marginal mandibular nerve was intact. Rotational sternocleodomastoid flap, approximately 4 x 2 cm, used to fill defect from parotidectomy.     Indication:  Tanya Linda is a 63 y.o. female with a history of a right parotid mass. It was biopsied in our clinic, but the final pathology was only notable for atypical cells. All treatment options were discussed, including observation, medical management, and surgical intervention. Ultimately, the joint decision was made to proceed with surgery. Informed consent was obtained from the patient. All risks, benefits, and alternatives were reviewed, and all questions were answered.     Procedure  in detail:    The patient was brought to the operating theater and placed in a supine position.  General endotracheal anesthesia was induced. Facial nerve monitoring leads were placed in the right orbicularis oculi and the right orbicularis oris. This was confirmed to be working properly and was utilized throughout the case. The right preauricular incision was marked out and injected with 1% lidocaine with 1:100,000 epinephrine. The face was prepped and draped in the usual sterile fashion.  Timeout was performed confirming correct patient, site, and procedure.  Perioperative antibiotics were administered.     First, a 15 blade was used to make a modified Micheal incision, carrying down to the right neck. A combination of sharp dissection and electrocautery was used to raise a flap at the parotid-masseteric fascia. The sternocleidomastoid muscle was dissected out inferiorly. Hemostasis was achieved with electrocautery during this process. Next, the pre-tragal tunnel was identified with blunt dissection and carried down to the tragal pointer. The digastric muscle was then identified inferiorly in the neck and traced superiorly to the mastoid. This helped to serve as a landmark for the level of the facial nerve. The parotid was freed laterally from the sternocleidomastoid. Next, meticulous dissection was carried down along the pre-auricular soft tissue, slowly and carefully removing facial attachments and parotid tissue overlying the nerve. This was carried out layer by layer. The facial nerve was then identified inferior and deep to the tragal pointer and using the tympanomastoid groove as a landmark. This was then traced anterolaterally using a Torres dissector. Harmonic and bipolar electrocautery was used to remove parotid tissue overlying this. The pes anserinus was identified, and the inferior division of the right facial nerve was further dissected anterolaterally. The mass was quite anterior and was closely  associated with Stensen's duct. It was unable to be freed from the duct, so the duct was ligated with 2-0 silk sutures. It was also very closely associated with a small branch of the facial nerve, likely the buccal branch. However, this portion of the nerve was freed and kept intact. This dissection was continued until the mass was completely excised.     The wound was copiously irrigated and hemostasis was achieved with bipolar electrocautery. The facial nerve stimulated at the pes anserinus in both the superior and inferior divisions at 0.8mA. The individual branches were also stimulated successfully distally at 0.8mA with the exception of the marginal mandibular nerve (it had stimulated successfully earlier in the case and was kept intact). Bovie electrocautery was used to free a 4 x 2 cm portion of the superior sternocleidomastoid and this was rotated to cover the defect from the parotid mass. This was secured and approximated to the remaining parotid tissue with 3-0 vicryl. The parotid tissue overlying the pes anserinus was reapproximated using 3-0 Vicryl as well. A 10 Fr Kevin-Camarillo drain was placed behind the incision and secured with 2-0 silk. The deep layers of the incision were closed with 3-0 vicryl in the neck and 4-0 vicryl in the preauricular area. The superficial layers were closed with 4-0 nylon running sutures in the neck and 4-0 monocryl subcuticular suture in the preauricular area. Steri strips and bacitracin were applied. All drapes were removed.     The patient was then returned to the anesthesia team for emergence. After extubation, the patient was awakened and rolled to PACU in a stable condition, having suffered no untoward events.    Dr. Grady was present for every step of this surgical procedure.    Skyler Dumont MD  [employer] Otolaryngology, PGY-5  11/4/2024 1:53 PM

## 2024-11-04 NOTE — BRIEF OP NOTE
[facility]  Brief Operative Note    Surgery Date: 11/4/2024     Surgeons and Role:     * Stu Grday MD - Primary     * Skyler Dumont MD     * Kaushal Calderon MD    Pre-op Diagnosis:  Right parotid mass    Post-op Diagnosis:  Post-Op Diagnosis Codes:     * Mass of right parotid gland [K11.8]    Procedure(s) (LRB):  EXCISION, PAROTID GLAND (Right)    Anesthesia: General    Operative Findings: Mass of right parotid, superficial lobe but quite anterior. Facial nerve dissected out and preserved. At the end of the case, all divisions of the facial nerve stimulated at 0.8mA with the exception of the marginal mandibular nerve. However, the marginal mandibular nerve was intact.     Estimated Blood Loss: 50cc         Specimens:   ID Type Source Tests Collected by Time Destination   A : right parotid mass Tissue Neck, Anterior SPECIMEN TO PATHOLOGY Stu Grady MD 11/4/2024 1126          Discharge Note    OUTCOME: Patient tolerated treatment/procedure well without complication and is now ready for discharge.    DISPOSITION: Home or Self Care    FINAL DIAGNOSIS:  Mass of right parotid gland    FOLLOWUP: In clinic    DISCHARGE INSTRUCTIONS:    Discharge Procedure Orders   Diet Adult Regular     Other restrictions (specify):   Order Comments: Ok to shower on the first day after surgery. When showering, please do not scrub over the incision, but simply let the water run over it and pat it to dry with a towel when you are done. No heavy lifting, bending, or straining for 2 weeks. Ok to eat a normal diet.     Notify your health care provider if you experience any of the following:  severe persistent headache     Notify your health care provider if you experience any of the following:  redness, tenderness, or signs of infection (pain, swelling, redness, odor or green/yellow discharge around incision site)     Leave dressing on - Keep it clean, dry, and intact until clinic visit   Order  Comments: We will remove the sutures in clinic. Please leave the tape on. It will come off on its own in 1-2 weeks.        Clinical Reference Documents Added to Patient Instructions         Document    PAROTIDECTOMY DISCHARGE INSTRUCTIONS (ENGLISH)          Skyler Dumont MD  [facility] Otolaryngology PGY-5  11/04/2024 1:49 PM

## 2024-11-04 NOTE — LETTER
November 4, 2024         [address] [facility] [address]  Phone: [phone]  Fax: [phone]       Patient: Tanya Linda   YOB: 1960  Date of Visit: 11/04/2024    To Whom It May Concern:    Eligio Linda  was at [facility] on 11/04/2024. The patient may return to work on 11/18/2024. If you have any questions or concerns, or if I can be of further assistance, please do not hesitate to contact me.    Sincerely,    Adali Best RN

## 2024-11-04 NOTE — ANESTHESIA PROCEDURE NOTES
Intubation    Date/Time: 11/4/2024 7:09 AM    Performed by: Hugo Silva CRNA  Authorized by: Molly Leo MD    Intubation:     Induction:  Intravenous    Intubated:  Postinduction    Mask Ventilation:  Moderately difficult with oral airway    Attempts:  1    Attempted By:  Staff anesthesiologist    Method of Intubation:  Video laryngoscopy    Laryngeal View Grade: Grade I - full view of cords      Difficult Airway Encountered?: No      Complications:  None    Airway Device:  EMG ETT (NIMS)    Airway Device Size:  7.0    Style/Cuff Inflation:  Cuffed    Inflation Amount (mL):  4    Tube secured:  22    Secured at:  The lips    Placement Verified By:  Capnometry    Complicating Factors:  None    Findings Post-Intubation:  BS equal bilateral

## 2024-11-05 ENCOUNTER — PATIENT OUTREACH (OUTPATIENT)
Dept: ADMINISTRATIVE | Facility: CLINIC | Age: 64
End: 2024-11-05
Payer: COMMERCIAL

## 2024-11-05 NOTE — PROGRESS NOTES
C3 nurse spoke with Tanya Linad  for a TCC post hospital discharge follow up call. The patient has a scheduled HOSFU appointment @ [facility] for drain removal on 11/07/2024 @ 130 pm.  The patient does not have a scheduled HOSFU appointment with Sarita De Leon FNP  within 5-7 days post hospital discharge date 11/04/2024. C3 nurse was unable to schedule HOSFU appointment in [facility].    Message sent to PCP staff requesting they contact patient and schedule follow up appointment.

## 2024-11-06 ENCOUNTER — OFFICE VISIT (OUTPATIENT)
Dept: FAMILY MEDICINE | Facility: CLINIC | Age: 64
End: 2024-11-06
Payer: COMMERCIAL

## 2024-11-06 VITALS
WEIGHT: 293 LBS | BODY MASS INDEX: 48.82 KG/M2 | DIASTOLIC BLOOD PRESSURE: 77 MMHG | TEMPERATURE: 98 F | SYSTOLIC BLOOD PRESSURE: 145 MMHG | HEART RATE: 68 BPM | HEIGHT: 65 IN

## 2024-11-06 VITALS
TEMPERATURE: 98 F | OXYGEN SATURATION: 100 % | HEIGHT: 65 IN | BODY MASS INDEX: 48.82 KG/M2 | WEIGHT: 293 LBS | SYSTOLIC BLOOD PRESSURE: 164 MMHG | HEART RATE: 75 BPM | RESPIRATION RATE: 18 BRPM | DIASTOLIC BLOOD PRESSURE: 89 MMHG

## 2024-11-06 DIAGNOSIS — M25.511 ACUTE PAIN OF RIGHT SHOULDER: ICD-10-CM

## 2024-11-06 DIAGNOSIS — Z09 HOSPITAL DISCHARGE FOLLOW-UP: Primary | ICD-10-CM

## 2024-11-06 PROCEDURE — 1111F DSCHRG MED/CURRENT MED MERGE: CPT | Mod: CPTII,,, | Performed by: NURSE PRACTITIONER

## 2024-11-06 PROCEDURE — 96372 THER/PROPH/DIAG INJ SC/IM: CPT | Mod: ,,, | Performed by: NURSE PRACTITIONER

## 2024-11-06 PROCEDURE — 3078F DIAST BP <80 MM HG: CPT | Mod: CPTII,,, | Performed by: NURSE PRACTITIONER

## 2024-11-06 PROCEDURE — 3044F HG A1C LEVEL LT 7.0%: CPT | Mod: CPTII,,, | Performed by: NURSE PRACTITIONER

## 2024-11-06 PROCEDURE — 99496 TRANSJ CARE MGMT HIGH F2F 7D: CPT | Mod: 25,,, | Performed by: NURSE PRACTITIONER

## 2024-11-06 PROCEDURE — 1160F RVW MEDS BY RX/DR IN RCRD: CPT | Mod: CPTII,,, | Performed by: NURSE PRACTITIONER

## 2024-11-06 PROCEDURE — 1159F MED LIST DOCD IN RCRD: CPT | Mod: CPTII,,, | Performed by: NURSE PRACTITIONER

## 2024-11-06 PROCEDURE — 3077F SYST BP >= 140 MM HG: CPT | Mod: CPTII,,, | Performed by: NURSE PRACTITIONER

## 2024-11-06 RX ORDER — CYCLOBENZAPRINE HCL 10 MG
10 TABLET ORAL 3 TIMES DAILY PRN
Qty: 30 TABLET | Refills: 0 | Status: SHIPPED | OUTPATIENT
Start: 2024-11-06 | End: 2024-11-16

## 2024-11-06 RX ORDER — KETOROLAC TROMETHAMINE 30 MG/ML
60 INJECTION, SOLUTION INTRAMUSCULAR; INTRAVENOUS ONCE
Status: COMPLETED | OUTPATIENT
Start: 2024-11-06 | End: 2024-11-06

## 2024-11-06 RX ADMIN — KETOROLAC TROMETHAMINE 60 MG: 30 INJECTION, SOLUTION INTRAMUSCULAR; INTRAVENOUS at 02:11

## 2024-11-06 NOTE — PROGRESS NOTES
Patient given Toradol 60 mg, IM in the right gluteal without difficulty.  Patient tolerated well without complaints.  Instructed to call prn prior to next scheduled office visit.  Voices understanding and agrees.

## 2024-11-06 NOTE — PROGRESS NOTES
Patient ID: [id number]     Chief Complaint: [facility] f/u  sx 11/04/24 (Right shoulder pain , )      HPI:     Tanya Linda is a 63 y.o. female here today for a hospital follow up.  Patient had an excision of her right parotid gland on November 4, 2024 with Dr. Mckeon.  She presents today with complaints of right shoulder pain following surgery.  Patient rates pain 10/10.  Patient reports she has limited range of motion to right upper extremity.  Denies any known trauma or injury.      Past Medical History:  has a past medical history of Anxiety, Back muscle spasm, Depression, GERD (gastroesophageal reflux disease), Hypertension, Intertrochanteric fracture of left femur, Morbid obesity, and Personal history of colonic polyps.    Surgical History:  has a past surgical history that includes Colonoscopy (12/02/2021); Antegrade intramedullary rodding of femur (Left, 12/25/2022); and Excision of parotid gland (Right, 11/4/2024).    Family History: family history is not on file.    Social History:  reports that she has never smoked. She has never used smokeless tobacco. She reports that she does not drink alcohol and does not use drugs.    Current Outpatient Medications   Medication Instructions    acetaminophen (TYLENOL) 650 mg, Oral, Every 6 hours PRN, Alternate with ibuprofen    bacitracin 500 unit/gram ointment Apply three times daily to incision on right neck for 5 days. Then, switch to Aquaphor.    ibuprofen (ADVIL,MOTRIN) 800 mg, Oral, Every 6 hours, Alternate with tylenol.    ondansetron (ZOFRAN-ODT) 8 mg, Oral, 2 times daily    oxyCODONE (ROXICODONE) 5 mg, Oral, Every 4 hours PRN    triamcinolone acetonide 0.1% (KENALOG) 0.1 % cream Topical (Top), 2 times daily       Patient has No Known Allergies.     Patient Care Team:  Sarita De Leon FNP as PCP - General (Nurse Practitioner)  Miladys Barr LPN as Care Coordinator  Andrey Vizcarra MD as Consulting Physician (General Surgery)  "      Subjective:     Review of Systems    12 point review of systems conducted, negative except as stated in the history of present illness. See HPI for details.      Objective:     Visit Vitals  BP (!) 145/77   Pulse (P) 72   Temp 98.3 °F (36.8 °C)   Ht 5' 5" (1.651 m)   Wt (!) 157.9 kg (348 lb 3.2 oz)   BMI 57.94 kg/m²       Physical Exam    General: Alert and oriented, No acute distress.  Head: Normocephalic, Atraumatic.  Eye: Pupils are equal, round and reactive to light, Extraocular movements are intact, Sclera non-icteric.  Ears/Nose/Throat: Normal, Mucosa moist,Clear.  Neck/Thyroid: Supple, Non-tender, No carotid bruit, No lymphadenopathy, No JVD, Full range of motion.  Respiratory: Clear to auscultation bilaterally; No wheezes, rales or rhonchi,Non-labored respirations, Symmetrical chest wall expansion.  Cardiovascular: Regular rate and rhythm, S1/S2 normal, No murmurs, rubs or gallops.  Gastrointestinal: Soft, Non-tender, Non-distended, Normal bowel sounds, No palpable organomegaly.  Musculoskeletal:  Decreased range of motion to right upper extremity.  Integumentary:  Right neck surgical incision, no signs and symptoms of infection noted, VARUN drain intact with sanguinous drainage noted.  Extremities: No clubbing, cyanosis or edema  Neurologic: No focal deficits, Cranial Nerves II-XII are grossly intact, Motor strength normal upper and lower extremities, Sensory exam intact.  Psychiatric: Normal interaction, Coherent speech, Euthymic mood, Appropriate affect     Labs Reviewed:     Chemistry:  Lab Results   Component Value Date     10/30/2024    K 4.8 10/30/2024    BUN 18.8 10/30/2024    CREATININE 1.24 (H) 10/30/2024    EGFRNORACEVR 50 10/30/2024    GLUCOSE 106 10/30/2024    CALCIUM 9.3 10/30/2024    ALKPHOS 81 10/30/2024    LABPROT <10.0 (L) 10/30/2024    LABPROT 7.3 10/30/2024    ALBUMIN 3.7 10/30/2024    BILIDIR 0.2 02/25/2022    IBILI 0.40 02/25/2022    AST 12 10/30/2024    ALT 13 10/30/2024    MG " 1.90 03/18/2023    PHOS 2.7 03/18/2023    XXILYQOV61UA 9.8 (L) 03/19/2024    TSH 1.639 03/19/2024        Lab Results   Component Value Date    HGBA1C 5.2 03/19/2024        Hematology:  Lab Results   Component Value Date    WBC 8.92 10/30/2024    HGB 13.5 10/30/2024    HCT 43.4 10/30/2024     10/30/2024       Lipid Panel:  Lab Results   Component Value Date    CHOL 192 03/19/2024    HDL 42 03/19/2024    .00 03/19/2024    TRIG 140 03/19/2024    TOTALCHOLEST 5 03/19/2024        Urine:  Lab Results   Component Value Date    APPEARANCEUA Cloudy (A) 06/10/2023    SGUA 1.020 06/10/2023    PROTEINUA Negative 06/10/2023    KETONESUA Negative 06/10/2023    LEUKOCYTESUR Small (A) 06/10/2023    RBCUA None Seen 06/10/2023    WBCUA 50-99 (A) 06/10/2023    BACTERIA Moderate (A) 06/10/2023    CREATRANDUR 51.9 03/03/2023          Assessment:       ICD-10-CM ICD-9-CM   1. Hospital discharge follow-up  Z09 V67.59   2. Acute pain of right shoulder  M25.511 719.41        Plan:     1. Hospital discharge follow-up (Primary)  Stable.  Progressing as expected.  Keep scheduled follow-up appointment with surgeon.    2. Acute pain of right shoulder  - cyclobenzaprine (FLEXERIL) 10 MG tablet; Take 1 tablet (10 mg total) by mouth 3 (three) times daily as needed for Muscle spasms.  Dispense: 30 tablet; Refill: 0  - ketorolac injection 60 mg         No follow-ups on file. In addition to their scheduled follow up, the patient has also been instructed to follow up on as needed basis.     Future Appointments   Date Time Provider Department Center   12/3/2024 10:30 AM [facility] MAMMO1 [facility] MAMMO  [facility]   12/26/2024  1:45 PM RESIDENT 1, [facility] OTORHINOLARYNGOLOGY [facility] ENT [facility]   1/6/2025  9:00 AM [facility]   3/25/2025  9:00 AM Sarita De Leon FNP [facility] JOAO  MAR[facility]

## 2024-11-07 ENCOUNTER — OFFICE VISIT (OUTPATIENT)
Dept: OTOLARYNGOLOGY | Facility: CLINIC | Age: 64
End: 2024-11-07
Payer: COMMERCIAL

## 2024-11-07 VITALS
BODY MASS INDEX: 48.82 KG/M2 | WEIGHT: 293 LBS | HEIGHT: 65 IN | DIASTOLIC BLOOD PRESSURE: 73 MMHG | HEART RATE: 71 BPM | SYSTOLIC BLOOD PRESSURE: 148 MMHG | OXYGEN SATURATION: 99 % | RESPIRATION RATE: 20 BRPM

## 2024-11-07 DIAGNOSIS — K11.8 MASS OF RIGHT PAROTID GLAND: Primary | ICD-10-CM

## 2024-11-07 PROCEDURE — 99213 OFFICE O/P EST LOW 20 MIN: CPT | Mod: PBBFAC | Performed by: STUDENT IN AN ORGANIZED HEALTH CARE EDUCATION/TRAINING PROGRAM

## 2024-11-07 NOTE — PROGRESS NOTES
[facility]  Otolaryngology Clinic Note    Tanya Linda  Encounter Date: 11/7/2024  YOB: 1960    Chief Complaint: R parotid mass    HPI: Tanya Linda is a 63 y.o. female with no past medical history who presents to the ENT clinic after she had an episode of parotitis in August.  Upon getting a CT scan they found a large right parotid mass.  She was subsequently treated with antibiotics, steroids and referred to us for outpatient ENT.  Patient has only noticed the mass since the past 2 months however her daughter says it has been present for for longer.  She denies any pain, drainage.  She reports that when it is hot it occasionally swells up, however since the episode of parotitis it has remained stable in size.  She has never had any history of cancer previously.  She has had multiple orthopedic surgeries for her hip and knee, never had any ear nose and throat surgeries  She is a never smoker    10/9/24: FNA performed    10/16/24:  Patient presents today for follow-up FNA results.  She has been doing well.  She reports that the mass has increased slightly in size since we last saw her however she believes that it is still smaller than prior to the FNA when we retrieved fluid.  She denies any pain at the site of the mass.  Facial nerve is fully intact.  She states that her soft tissue masses along her scalp happens stable in size.    11/7/24: Doing well. Pain controlled. Drain with minimal output. No complaints.     ROS:   General: Negative except per HPI  Skin: Denies rash, ulcer, or lesion.  Eyes: Denies vision changes or diplopia.  Ears: Negative except per HPI  Nose: Negative except per HPI  Throat/mouth: Negative except per HPI  Cardiovascular: Negative except per HPI  Respiratory: Negative except per HPI  Neck: Negative except per HPI  Endocrine: Negative except per HPI  Neurologic: Negative except per HPI    Other 10-point review of systems negative except per  HPI      Review of patient's allergies indicates:  No Known Allergies    Past Medical History:   Diagnosis Date    Anxiety     Back muscle spasm     Depression     GERD (gastroesophageal reflux disease)     Hypertension     Intertrochanteric fracture of left femur     Morbid obesity     Personal history of colonic polyps 12/02/2021    Dr. Andrey Vizcarra       Past Surgical History:   Procedure Laterality Date    ANTEGRADE INTRAMEDULLARY RODDING OF FEMUR Left 12/25/2022    Procedure: INSERTION, INTRAMEDULLARY JARETH, FEMUR, ANTEGRADE;  Surgeon: Ronnell Olivares MD;  Location: [facility];  Service: Orthopedics;  Laterality: Left;    COLONOSCOPY  12/02/2021    Dr. Andrey Vizcarra    EXCISION OF PAROTID GLAND Right 11/4/2024    Procedure: EXCISION, PAROTID GLAND;  Surgeon: Stu Grady MD;  Location: [facility];  Service: ENT;  Laterality: Right;       Social History     Socioeconomic History    Marital status: Single   Tobacco Use    Smoking status: Never    Smokeless tobacco: Never   Substance and Sexual Activity    Alcohol use: Never    Drug use: Never    Sexual activity: Not Currently     Social Drivers of Health     Financial Resource Strain: Low Risk  (3/19/2023)    Overall Financial Resource Strain (CARDIA)     Difficulty of Paying Living Expenses: Not hard at all   Food Insecurity: No Food Insecurity (3/19/2023)    Hunger Vital Sign     Worried About Running Out of Food in the Last Year: Never true     Ran Out of Food in the Last Year: Never true   Transportation Needs: No Transportation Needs (3/19/2023)    PRAPARE - Transportation     Lack of Transportation (Medical): No     Lack of Transportation (Non-Medical): No   Physical Activity: Inactive (3/19/2023)    Exercise Vital Sign     Days of Exercise per Week: 0 days     Minutes of Exercise per Session: 0 min   Stress: No Stress Concern Present (3/19/2023)    [employer] [place] [employer] - Occupational Stress Questionnaire     Feeling of Stress : Only a  little   Housing Stability: Low Risk  (3/19/2023)    Housing Stability Vital Sign     Unable to Pay for Housing in the Last Year: No     Number of Places Lived in the Last Year: 1     Unstable Housing in the Last Year: No       No family history on file.    Outpatient Encounter Medications as of 11/7/2024   Medication Sig Dispense Refill    acetaminophen (TYLENOL) 325 MG tablet Take 2 tablets (650 mg total) by mouth every 6 (six) hours as needed for Pain. Alternate with ibuprofen 60 tablet 0    bacitracin 500 unit/gram ointment Apply three times daily to incision on right neck for 5 days. Then, switch to Aquaphor.      cyclobenzaprine (FLEXERIL) 10 MG tablet Take 1 tablet (10 mg total) by mouth 3 (three) times daily as needed for Muscle spasms. 30 tablet 0    ibuprofen (ADVIL,MOTRIN) 800 MG tablet Take 1 tablet (800 mg total) by mouth every 6 (six) hours. Alternate with tylenol. 60 tablet 1    ondansetron (ZOFRAN-ODT) 4 MG TbDL Take 2 tablets (8 mg total) by mouth 2 (two) times daily. 10 tablet 0    oxyCODONE (ROXICODONE) 5 MG immediate release tablet Take 1 tablet (5 mg total) by mouth every 4 (four) hours as needed for Pain. 30 tablet 0    triamcinolone acetonide 0.1% (KENALOG) 0.1 % cream Apply topically 2 (two) times daily. 80 g 0     Facility-Administered Encounter Medications as of 11/7/2024   Medication Dose Route Frequency Provider Last Rate Last Admin    [COMPLETED] ketorolac injection 60 mg  60 mg Intramuscular Once    60 mg at 11/06/24 1409       Physical Exam:  There were no vitals filed for this visit.  Constitutional  General Appearance: well nourished, obese AAO x3, NAD  HEENT: multiple soft tissue masses along her scalp, 3 most notable in size  Eyes: PEERLA, EOMI, normal conjunctivae  Ears: Hearing well at conversation level  Nose: septum midline, no inferior turbinate hypertrophy, no polyps, moist mucosa without erythema or blue hue  OC/OP: dentition moderate, no oral lesions, tongue/FOM/BOT- soft,  no leukoplakia/ulcerations/ tenderness  Nasopharynx, Hypopharynx, and Larynx:    Indirect: attempted, limited view due to patient intolerance  Neck:  incision c/d/I. No erythema, induration, or fluctuance. VARUN in place with minima serosanguinous output.  no palpable lymph nodes   Thyroid region- no nodules or goiter  Neuro: CN II - XII intact bilaterally, slight r nena weakness.  Cardiovascular: peripheral pulses palpable  Respiratory: non-labored respirations  Psychiatric: oriented to time, place and person, no depression, anxiety or agitation      Pertinent Data:  FNA 10/9/24: atypical cells present    Imaging:   I personally reviewed the following images:  CT Max: right sided parotid mass measuring approximately 5.4cm    Assessment/Plan:  Tanya Linda is a 63 y.o. female with no past medical history presents with multiple scalp soft tissue masses (3 most notable in size) and large right parotid mass that has been stable in size now s/p FNA with result of atypical cells    Underwent parotidectomy on 11/4. Path in process. Drain removed.     - Continue moisturizer to incision.   - Pain control prn.   - F/u 2 weeks. Call sooner with path if results.     Kaushal Calderon MD  [facility] Department of Otolaryngology  -IV

## 2024-11-12 LAB
ESTROGEN SERPL-MCNC: NORMAL PG/ML
INSULIN SERPL-ACNC: NORMAL U[IU]/ML
LAB AP CLINICAL INFORMATION: NORMAL
LAB AP GROSS DESCRIPTION: NORMAL
LAB AP REPORT FOOTNOTES: NORMAL
T3RU NFR SERPL: NORMAL %

## 2024-11-26 ENCOUNTER — OFFICE VISIT (OUTPATIENT)
Dept: OTOLARYNGOLOGY | Facility: CLINIC | Age: 64
End: 2024-11-26
Payer: COMMERCIAL

## 2024-11-26 VITALS
HEIGHT: 65 IN | WEIGHT: 293 LBS | HEART RATE: 88 BPM | BODY MASS INDEX: 48.82 KG/M2 | DIASTOLIC BLOOD PRESSURE: 67 MMHG | OXYGEN SATURATION: 99 % | TEMPERATURE: 97 F | SYSTOLIC BLOOD PRESSURE: 133 MMHG | RESPIRATION RATE: 20 BRPM

## 2024-11-26 DIAGNOSIS — K11.8 MASS OF RIGHT PAROTID GLAND: Primary | ICD-10-CM

## 2024-11-26 PROCEDURE — 99213 OFFICE O/P EST LOW 20 MIN: CPT | Mod: PBBFAC

## 2024-11-26 NOTE — PROGRESS NOTES
[facility]  Otolaryngology Clinic Note    Tanya Linda  Encounter Date: 11/26/2024  YOB: 1960    Chief Complaint: R parotid mass    HPI: Tanya Linda is a 63 y.o. female with no past medical history who presents to the ENT clinic after she had an episode of parotitis in August.  Upon getting a CT scan they found a large right parotid mass.  She was subsequently treated with antibiotics, steroids and referred to us for outpatient ENT.  Patient has only noticed the mass since the past 2 months however her daughter says it has been present for for longer.  She denies any pain, drainage.  She reports that when it is hot it occasionally swells up, however since the episode of parotitis it has remained stable in size.  She has never had any history of cancer previously.  She has had multiple orthopedic surgeries for her hip and knee, never had any ear nose and throat surgeries  She is a never smoker    10/9/24: FNA performed    10/16/24:  Patient presents today for follow-up FNA results.  She has been doing well.  She reports that the mass has increased slightly in size since we last saw her however she believes that it is still smaller than prior to the FNA when we retrieved fluid.  She denies any pain at the site of the mass.  Facial nerve is fully intact.  She states that her soft tissue masses along her scalp happens stable in size.    11/7/24: Doing well. Pain controlled. Drain with minimal output. No complaints.     11/26/24: Doing well. No pain. Incision healing well. No pain. Still with slight nena weakness.     ROS:   General: Negative except per HPI  Skin: Denies rash, ulcer, or lesion.  Eyes: Denies vision changes or diplopia.  Ears: Negative except per HPI  Nose: Negative except per HPI  Throat/mouth: Negative except per HPI  Cardiovascular: Negative except per HPI  Respiratory: Negative except per HPI  Neck: Negative except per HPI  Endocrine: Negative except per  HPI  Neurologic: Negative except per HPI    Other 10-point review of systems negative except per HPI      Review of patient's allergies indicates:  No Known Allergies    Past Medical History:   Diagnosis Date    Anxiety     Back muscle spasm     Depression     GERD (gastroesophageal reflux disease)     Hypertension     Intertrochanteric fracture of left femur     Morbid obesity     Personal history of colonic polyps 12/02/2021    Dr. Andrey Vizcarra       Past Surgical History:   Procedure Laterality Date    ANTEGRADE INTRAMEDULLARY RODDING OF FEMUR Left 12/25/2022    Procedure: INSERTION, INTRAMEDULLARY JARETH, FEMUR, ANTEGRADE;  Surgeon: Ronnell Olivares MD;  Location: [facility];  Service: Orthopedics;  Laterality: Left;    COLONOSCOPY  12/02/2021    Dr. Andrey Vizcarra    EXCISION OF PAROTID GLAND Right 11/4/2024    Procedure: EXCISION, PAROTID GLAND;  Surgeon: Stu Grady MD;  Location: [facility];  Service: ENT;  Laterality: Right;       Social History     Socioeconomic History    Marital status: Single   Tobacco Use    Smoking status: Never    Smokeless tobacco: Never   Substance and Sexual Activity    Alcohol use: Never    Drug use: Never    Sexual activity: Not Currently     Social Drivers of Health     Financial Resource Strain: Low Risk  (3/19/2023)    Overall Financial Resource Strain (CARDIA)     Difficulty of Paying Living Expenses: Not hard at all   Food Insecurity: No Food Insecurity (3/19/2023)    Hunger Vital Sign     Worried About Running Out of Food in the Last Year: Never true     Ran Out of Food in the Last Year: Never true   Transportation Needs: No Transportation Needs (3/19/2023)    PRAPARE - Transportation     Lack of Transportation (Medical): No     Lack of Transportation (Non-Medical): No   Physical Activity: Inactive (3/19/2023)    Exercise Vital Sign     Days of Exercise per Week: 0 days     Minutes of Exercise per Session: 0 min   Stress: No Stress Concern Present (3/19/2023)    [employer] [employer] - Occupational Stress Questionnaire     Feeling of Stress : Only a little   Housing Stability: Low Risk  (3/19/2023)    Housing Stability Vital Sign     Unable to Pay for Housing in the Last Year: No     Number of Places Lived in the Last Year: 1     Unstable Housing in the Last Year: No       No family history on file.    Outpatient Encounter Medications as of 11/26/2024   Medication Sig Dispense Refill    acetaminophen (TYLENOL) 325 MG tablet Take 2 tablets (650 mg total) by mouth every 6 (six) hours as needed for Pain. Alternate with ibuprofen 60 tablet 0    bacitracin 500 unit/gram ointment Apply three times daily to incision on right neck for 5 days. Then, switch to Aquaphor.      ibuprofen (ADVIL,MOTRIN) 800 MG tablet Take 1 tablet (800 mg total) by mouth every 6 (six) hours. Alternate with tylenol. 60 tablet 1    ondansetron (ZOFRAN-ODT) 4 MG TbDL Take 2 tablets (8 mg total) by mouth 2 (two) times daily. 10 tablet 0    oxyCODONE (ROXICODONE) 5 MG immediate release tablet Take 1 tablet (5 mg total) by mouth every 4 (four) hours as needed for Pain. 30 tablet 0    triamcinolone acetonide 0.1% (KENALOG) 0.1 % cream Apply topically 2 (two) times daily. 80 g 0     No facility-administered encounter medications on file as of 11/26/2024.       Physical Exam:  Vitals:    11/26/24 0907   BP: 133/67   BP Location: Left arm   Patient Position: Sitting   Pulse: 88   Resp: 20   Temp: 96.5 °F (35.8 °C)   TempSrc: Oral   SpO2: 99%   Weight: (!) 157.9 kg (348 lb 1.7 oz)   Height: 5' 5" (1.651 m)     Constitutional  General Appearance: well nourished, obese AAO x3, NAD  HEENT: multiple soft tissue masses along her scalp, 3 most notable in size  Eyes: PEERLA, EOMI, normal conjunctivae  Ears: Hearing well at conversation level  Nose: septum midline, no inferior turbinate hypertrophy, no polyps, moist mucosa without erythema or blue hue  OC/OP: dentition moderate, no oral lesions, tongue/FOM/BOT- " soft, no leukoplakia/ulcerations/ tenderness  Neck:  incision c/d/I. No erythema, induration, or fluctuance.   no palpable lymph nodes   Thyroid region- no nodules or goiter  Neuro: CN II - XII intact bilaterally, slight r nena weakness.  Cardiovascular: peripheral pulses palpable  Respiratory: non-labored respirations  Psychiatric: oriented to time, place and person, no depression, anxiety or agitation      Pertinent Data:  FNA 10/9/24: atypical cells present    Pathology:  Right parotid mass, excision:  - Salivary duct cyst (mucus retention cyst).  - Salivary gland parenchyma with areas of reactive fibrosis and chronic sialoadenitis.  - No evidence of malignancy.    Imaging:   I personally reviewed the following images:  CT Max: right sided parotid mass measuring approximately 5.4cm    Assessment/Plan:  Tanya Linda is a 63 y.o. female with no past medical history presents with multiple scalp soft tissue masses (3 most notable in size) and large right parotid mass that has been stable in size now s/p FNA with result of atypical cells    Underwent parotidectomy on 11/4. Path benign.     - Continue moisturizer to incision.   - Pain control prn.   - F/u 4 weeks.   - Will discuss excision scalp masses at that time.     Kaushal Calderon MD  [facility] Department of Otolaryngology  HO-IV

## 2024-11-26 NOTE — PROGRESS NOTES
I have reviewed and agree with the resident's findings, including all diagnostic interpretations and plans as written.     Stu Grady M.D.

## 2024-12-03 ENCOUNTER — HOSPITAL ENCOUNTER (OUTPATIENT)
Dept: RADIOLOGY | Facility: HOSPITAL | Age: 64
Discharge: HOME | End: 2024-12-03
Attending: NURSE PRACTITIONER
Payer: COMMERCIAL

## 2024-12-03 DIAGNOSIS — Z12.31 BREAST CANCER SCREENING BY MAMMOGRAM: ICD-10-CM

## 2024-12-03 PROCEDURE — 77067 SCR MAMMO BI INCL CAD: CPT | Mod: TC

## 2024-12-03 PROCEDURE — 77063 BREAST TOMOSYNTHESIS BI: CPT | Mod: 26,,, | Performed by: RADIOLOGY

## 2024-12-03 PROCEDURE — 77067 SCR MAMMO BI INCL CAD: CPT | Mod: 26,,, | Performed by: RADIOLOGY

## 2024-12-09 ENCOUNTER — TELEPHONE (OUTPATIENT)
Dept: FAMILY MEDICINE | Facility: CLINIC | Age: 64
End: 2024-12-09
Payer: COMMERCIAL

## 2024-12-09 NOTE — TELEPHONE ENCOUNTER
----- Message from MARION Delgado sent at 12/5/2024  1:37 PM CST -----  Normal MMG. Repeat in 1 year.

## 2024-12-09 NOTE — LETTER
December 9, 2024    Tanya Linda  [address] [facility] [place] [address]             [facility] [address]  [facility] [address]  Phone: [phone]  Fax: [phone] December 9, 2024     Tanya Linda  [address] [facility] [place] [address]    Patient: Tanya Linda   Address: [address] [place] [id number]   YOB: 1960   Date of Visit: 12/9/2024       Dear Ms. Linda,    Your mammogram did not show any significant findings,  according to the radiologists interpretation.    Please remember that some cancers (about 10-15%) cannot be  found by mammography alone, and that early detection  requires a combination of monthly self-examination, yearly  physical examination, and periodic mammography.    Please continue regular breast self-examinations and report  any changes that concern you, even before your next  appointment.  If you have any further questions, please  contact your doctor.      Sincerely,        MARION Salazar

## 2024-12-30 ENCOUNTER — TELEPHONE (OUTPATIENT)
Dept: FAMILY MEDICINE | Facility: CLINIC | Age: 64
End: 2024-12-30
Payer: COMMERCIAL

## 2025-01-06 ENCOUNTER — OFFICE VISIT (OUTPATIENT)
Dept: FAMILY MEDICINE | Facility: CLINIC | Age: 65
End: 2025-01-06
Payer: COMMERCIAL

## 2025-01-06 VITALS
TEMPERATURE: 98 F | BODY MASS INDEX: 48.82 KG/M2 | DIASTOLIC BLOOD PRESSURE: 80 MMHG | OXYGEN SATURATION: 99 % | HEART RATE: 60 BPM | WEIGHT: 293 LBS | RESPIRATION RATE: 16 BRPM | HEIGHT: 65 IN | SYSTOLIC BLOOD PRESSURE: 132 MMHG

## 2025-01-06 DIAGNOSIS — I10 HYPERTENSION, UNSPECIFIED TYPE: ICD-10-CM

## 2025-01-06 DIAGNOSIS — R35.0 URINARY FREQUENCY: Primary | ICD-10-CM

## 2025-01-06 DIAGNOSIS — Z00.00 WELL ADULT EXAM: ICD-10-CM

## 2025-01-06 DIAGNOSIS — N39.41 URGE INCONTINENCE: Primary | ICD-10-CM

## 2025-01-06 DIAGNOSIS — E66.01 MORBID OBESITY: ICD-10-CM

## 2025-01-06 LAB
BACTERIA #/AREA URNS AUTO: ABNORMAL /HPF
BILIRUB UR QL STRIP.AUTO: NEGATIVE
CLARITY UR: ABNORMAL
COLOR UR AUTO: ABNORMAL
GLUCOSE UR QL STRIP: NEGATIVE
HGB UR QL STRIP: NEGATIVE
KETONES UR QL STRIP: NEGATIVE
LEUKOCYTE ESTERASE UR QL STRIP: NEGATIVE
NITRITE UR QL STRIP: NEGATIVE
PH UR STRIP: 6 [PH]
PROT UR QL STRIP: NEGATIVE
RBC #/AREA URNS AUTO: ABNORMAL /HPF
SP GR UR STRIP.AUTO: 1.01 (ref 1–1.03)
SQUAMOUS #/AREA URNS AUTO: ABNORMAL /HPF
UROBILINOGEN UR STRIP-ACNC: 0.2
WBC #/AREA URNS AUTO: ABNORMAL /HPF

## 2025-01-06 PROCEDURE — 3079F DIAST BP 80-89 MM HG: CPT | Mod: CPTII,,, | Performed by: NURSE PRACTITIONER

## 2025-01-06 PROCEDURE — 3008F BODY MASS INDEX DOCD: CPT | Mod: CPTII,,, | Performed by: NURSE PRACTITIONER

## 2025-01-06 PROCEDURE — 1160F RVW MEDS BY RX/DR IN RCRD: CPT | Mod: CPTII,,, | Performed by: NURSE PRACTITIONER

## 2025-01-06 PROCEDURE — 99214 OFFICE O/P EST MOD 30 MIN: CPT | Mod: ,,, | Performed by: NURSE PRACTITIONER

## 2025-01-06 PROCEDURE — 3075F SYST BP GE 130 - 139MM HG: CPT | Mod: CPTII,,, | Performed by: NURSE PRACTITIONER

## 2025-01-06 PROCEDURE — 1159F MED LIST DOCD IN RCRD: CPT | Mod: CPTII,,, | Performed by: NURSE PRACTITIONER

## 2025-01-06 PROCEDURE — G2211 COMPLEX E/M VISIT ADD ON: HCPCS | Mod: ,,, | Performed by: NURSE PRACTITIONER

## 2025-01-06 PROCEDURE — 81003 URINALYSIS AUTO W/O SCOPE: CPT | Performed by: NURSE PRACTITIONER

## 2025-01-06 RX ORDER — OXYBUTYNIN CHLORIDE 5 MG/1
5 TABLET ORAL 2 TIMES DAILY
Qty: 60 TABLET | Refills: 11 | Status: SHIPPED | OUTPATIENT
Start: 2025-01-06 | End: 2026-01-06

## 2025-01-06 NOTE — PROGRESS NOTES
Patient ID: [id number]     Chief Complaint: Follow-up (3 month follow up visit for HTN, no complaints at this time.)      HPI:     Tanya Linda is a 64 y.o. female here today for a follow up.  He presents with complaints of urinary frequency and urge incontinence.  Denies fever, dysuria, pyuria, hematuria or CVA tenderness.  Patient reports symptoms have been present for greater than 6 months.       Past Medical History:  has a past medical history of Anxiety, Back muscle spasm, Depression, GERD (gastroesophageal reflux disease), Hypertension, Intertrochanteric fracture of left femur, Morbid obesity, and Personal history of colonic polyps.    Surgical History:  has a past surgical history that includes Colonoscopy (12/02/2021); Antegrade intramedullary rodding of femur (Left, 12/25/2022); and Excision of parotid gland (Right, 11/4/2024).    Family History: family history is not on file.    Social History:  reports that she has never smoked. She has never used smokeless tobacco. She reports that she does not drink alcohol and does not use drugs.    Current Outpatient Medications   Medication Instructions    acetaminophen (TYLENOL) 650 mg, Oral, Every 6 hours PRN, Alternate with ibuprofen    bacitracin 500 unit/gram ointment Apply three times daily to incision on right neck for 5 days. Then, switch to Aquaphor.    ibuprofen (ADVIL,MOTRIN) 800 mg, Oral, Every 6 hours, Alternate with tylenol.    triamcinolone acetonide 0.1% (KENALOG) 0.1 % cream Topical (Top), 2 times daily       Patient has No Known Allergies.     Patient Care Team:  Sarita De Leon FNP as PCP - General (Nurse Practitioner)  Miladys Barr LPN as Care Coordinator  Andrey Vizcarra MD as Consulting Physician (General Surgery)       Subjective:     Review of Systems    12 point review of systems conducted, negative except as stated in the history of present illness. See HPI for details.      Objective:     Visit Vitals  /80 (BP  "Location: Left arm, Patient Position: Sitting)   Pulse 60   Temp 98 °F (36.7 °C) (Oral)   Resp 16   Ht 5' 5" (1.651 m)   Wt (!) 158.5 kg (349 lb 6.4 oz)   SpO2 99%   BMI 58.14 kg/m²       Physical Exam    General: Alert and oriented, No acute distress.  Head: Normocephalic, Atraumatic.  Eye: Pupils are equal, round and reactive to light, Extraocular movements are intact, Sclera non-icteric.  Ears/Nose/Throat: Normal, Mucosa moist,Clear.  Neck/Thyroid: Supple, Non-tender, No carotid bruit, No lymphadenopathy, No JVD, Full range of motion.  Respiratory: Clear to auscultation bilaterally; No wheezes, rales or rhonchi,Non-labored respirations, Symmetrical chest wall expansion.  Cardiovascular: Regular rate and rhythm, S1/S2 normal, No murmurs, rubs or gallops.  Gastrointestinal: Soft, Non-tender, Non-distended, Normal bowel sounds, No palpable organomegaly.  Musculoskeletal: Normal range of motion.  Integumentary: Warm, Dry, Intact, No suspicious lesions or rashes.  Extremities: No clubbing, cyanosis or edema  Neurologic: No focal deficits, Cranial Nerves II-XII are grossly intact, Motor strength normal upper and lower extremities, Sensory exam intact.  Psychiatric: Normal interaction, Coherent speech, Euthymic mood, Appropriate affect     Labs Reviewed:     Chemistry:  Lab Results   Component Value Date     10/30/2024    K 4.8 10/30/2024    BUN 18.8 10/30/2024    CREATININE 1.24 (H) 10/30/2024    EGFRNORACEVR 50 10/30/2024    GLUCOSE 106 10/30/2024    CALCIUM 9.3 10/30/2024    ALKPHOS 81 10/30/2024    LABPROT <10.0 (L) 10/30/2024    LABPROT 7.3 10/30/2024    ALBUMIN 3.7 10/30/2024    BILIDIR 0.2 02/25/2022    IBILI 0.40 02/25/2022    AST 12 10/30/2024    ALT 13 10/30/2024    MG 1.90 03/18/2023    PHOS 2.7 03/18/2023    [id number] 9.8 (L) 03/19/2024    TSH 1.639 03/19/2024        Lab Results   Component Value Date    HGBA1C 5.2 03/19/2024        Hematology:  Lab Results   Component Value Date    WBC 8.92 " 10/30/2024    HGB 13.5 10/30/2024    HCT 43.4 10/30/2024     10/30/2024       Lipid Panel:  Lab Results   Component Value Date    CHOL 192 03/19/2024    HDL 42 03/19/2024    .00 03/19/2024    TRIG 140 03/19/2024    TOTALCHOLEST 5 03/19/2024        Urine:  Lab Results   Component Value Date    APPEARANCEUA Cloudy (A) 06/10/2023    SGUA 1.020 06/10/2023    PROTEINUA Negative 06/10/2023    KETONESUA Negative 06/10/2023    LEUKOCYTESUR Small (A) 06/10/2023    RBCUA None Seen 06/10/2023    WBCUA 50-99 (A) 06/10/2023    BACTERIA Moderate (A) 06/10/2023    CREATRANDUR 51.9 03/03/2023          Assessment:       ICD-10-CM ICD-9-CM   1. Urinary frequency  R35.0 788.41   2. Hypertension, unspecified type  I10 401.9   3. Morbid obesity  E66.01 278.01   4. Well adult exam  Z00.00 V70.0        Plan:   1. Urinary frequency (Primary)  - Urinalysis, Reflex to Urine Culture    2. Hypertension, unspecified type  Well controlled.   Low Sodium Diet (DASH Diet - Less than 2 grams of sodium per day).  Monitor blood pressure daily and log. Report consistent numbers greater than 140/90.  Maintain healthy weight with goal BMI <30. Exercise 30 minutes per day, 5 days per week.  Smoking cessation encouraged to aid in BP reduction.    3. Morbid obesity  Body mass index is 58.14 kg/m².  Goal BMI <30.  Exercise 5 times a week for 30 minutes per day.  Avoid soda, simple sugars, excessive rice, potatoes or bread. Limit fast foods and fried foods.  Choose complex carbs in moderation (example: green vegetables, beans, oatmeal). Eat plenty of fresh fruits and vegetables with lean meats daily.  Do not skip meals. Eat a balanced portion size.  Avoid fad diets. Consider permanent healthy life style changes.     4. Well adult exam  - CBC Auto Differential; Future  - Comprehensive Metabolic Panel; Future  - Lipid Panel; Future  - TSH; Future  - Hemoglobin A1C; Future  - Urinalysis; Future  - Microalbumin/Creatinine Ratio, Urine; Future  -  Vitamin D; Future  - Urinalysis    Follow up in about 3 months (around 4/6/2025) for Wellness. In addition to their scheduled follow up, the patient has also been instructed to follow up on as needed basis.     Future Appointments   Date Time Provider Department Center   1/7/2025  8:15 AM RESIDENT 1, [facility] OTORHINOLARYNGOLOGY [facility] ENT [facility]   4/7/2025  8:30 AM Sarita De Leon FNP [facility]        MARION Salazar

## 2025-01-07 ENCOUNTER — OFFICE VISIT (OUTPATIENT)
Dept: OTOLARYNGOLOGY | Facility: CLINIC | Age: 65
End: 2025-01-07
Payer: COMMERCIAL

## 2025-01-07 VITALS
TEMPERATURE: 98 F | WEIGHT: 293 LBS | DIASTOLIC BLOOD PRESSURE: 79 MMHG | OXYGEN SATURATION: 99 % | HEIGHT: 65 IN | BODY MASS INDEX: 48.82 KG/M2 | HEART RATE: 62 BPM | RESPIRATION RATE: 20 BRPM | SYSTOLIC BLOOD PRESSURE: 122 MMHG

## 2025-01-07 DIAGNOSIS — K11.8 MASS OF RIGHT PAROTID GLAND: Primary | ICD-10-CM

## 2025-01-07 DIAGNOSIS — R22.0 SCALP MASS: ICD-10-CM

## 2025-01-07 PROCEDURE — 99214 OFFICE O/P EST MOD 30 MIN: CPT | Mod: PBBFAC | Performed by: OTOLARYNGOLOGY

## 2025-01-07 NOTE — PROGRESS NOTES
[facility]  Otolaryngology Clinic Note    Tanya Linda  Encounter Date: 1/7/2025  YOB: 1960    Chief Complaint: R parotid mass    HPI: Tanya Linda is a 64 y.o. female with no past medical history who presents to the ENT clinic after she had an episode of parotitis in August.  Upon getting a CT scan they found a large right parotid mass.  She was subsequently treated with antibiotics, steroids and referred to us for outpatient ENT.  Patient has only noticed the mass since the past 2 months however her daughter says it has been present for for longer.  She denies any pain, drainage.  She reports that when it is hot it occasionally swells up, however since the episode of parotitis it has remained stable in size.  She has never had any history of cancer previously.  She has had multiple orthopedic surgeries for her hip and knee, never had any ear nose and throat surgeries  She is a never smoker    10/9/24: FNA performed    10/16/24:  Patient presents today for follow-up FNA results.  She has been doing well.  She reports that the mass has increased slightly in size since we last saw her however she believes that it is still smaller than prior to the FNA when we retrieved fluid.  She denies any pain at the site of the mass.  Facial nerve is fully intact.  She states that her soft tissue masses along her scalp happens stable in size.    11/7/24: Doing well. Pain controlled. Drain with minimal output. No complaints.     11/26/24: Doing well. No pain. Incision healing well. No pain. Still with slight nena weakness.     1/7/25: Patient returns for follow up. Complains of sensitivity and redness along R parotid incision site. Nylon suture still in place, no drainage. Also with persistent mild R nena weakness. Otherwise doing well.    ROS:   Negative except per HPI      Review of patient's allergies indicates:  No Known Allergies    Past Medical History:   Diagnosis Date    Anxiety      Back muscle spasm     Depression     GERD (gastroesophageal reflux disease)     Hypertension     Intertrochanteric fracture of left femur     Morbid obesity     Personal history of colonic polyps 12/02/2021    Dr. Andrey Vizcarra       Past Surgical History:   Procedure Laterality Date    ANTEGRADE INTRAMEDULLARY RODDING OF FEMUR Left 12/25/2022    Procedure: INSERTION, INTRAMEDULLARY JARETH, FEMUR, ANTEGRADE;  Surgeon: Ronnell Olivares MD;  Location: [facility] OR;  Service: Orthopedics;  Laterality: Left;    COLONOSCOPY  12/02/2021    Dr. Andrey Vizcarra    EXCISION OF PAROTID GLAND Right 11/4/2024    Procedure: EXCISION, PAROTID GLAND;  Surgeon: Stu Grady MD;  Location: [facility];  Service: ENT;  Laterality: Right;       Social History     Socioeconomic History    Marital status: Single   Tobacco Use    Smoking status: Never    Smokeless tobacco: Never   Substance and Sexual Activity    Alcohol use: Never    Drug use: Never    Sexual activity: Not Currently     Social Drivers of Health     Financial Resource Strain: Low Risk  (3/19/2023)    Overall Financial Resource Strain (CARDIA)     Difficulty of Paying Living Expenses: Not hard at all   Food Insecurity: No Food Insecurity (3/19/2023)    Hunger Vital Sign     Worried About Running Out of Food in the Last Year: Never true     Ran Out of Food in the Last Year: Never true   Transportation Needs: No Transportation Needs (3/19/2023)    PRAPARE - Transportation     Lack of Transportation (Medical): No     Lack of Transportation (Non-Medical): No   Physical Activity: Inactive (3/19/2023)    Exercise Vital Sign     Days of Exercise per Week: 0 days     Minutes of Exercise per Session: 0 min   Stress: No Stress Concern Present (3/19/2023)    [employer] [place] [employer] - Occupational Stress Questionnaire     Feeling of Stress : Only a little   Housing Stability: Low Risk  (3/19/2023)    Housing Stability Vital Sign     Unable to Pay for Housing in the Last Year:  "No     Number of Places Lived in the Last Year: 1     Unstable Housing in the Last Year: No       No family history on file.    Outpatient Encounter Medications as of 1/7/2025   Medication Sig Dispense Refill    ibuprofen (ADVIL,MOTRIN) 800 MG tablet Take 1 tablet (800 mg total) by mouth every 6 (six) hours. Alternate with tylenol. 60 tablet 1    acetaminophen (TYLENOL) 325 MG tablet Take 2 tablets (650 mg total) by mouth every 6 (six) hours as needed for Pain. Alternate with ibuprofen (Patient not taking: Reported on 1/7/2025) 60 tablet 0    bacitracin 500 unit/gram ointment Apply three times daily to incision on right neck for 5 days. Then, switch to Aquaphor. (Patient not taking: Reported on 1/7/2025)      oxybutynin (DITROPAN) 5 MG Tab Take 1 tablet (5 mg total) by mouth 2 (two) times daily. (Patient not taking: Reported on 1/7/2025) 60 tablet 11    triamcinolone acetonide 0.1% (KENALOG) 0.1 % cream Apply topically 2 (two) times daily. (Patient not taking: Reported on 1/7/2025) 80 g 0    [DISCONTINUED] ondansetron (ZOFRAN-ODT) 4 MG TbDL Take 2 tablets (8 mg total) by mouth 2 (two) times daily. (Patient not taking: Reported on 1/6/2025) 10 tablet 0    [DISCONTINUED] oxyCODONE (ROXICODONE) 5 MG immediate release tablet Take 1 tablet (5 mg total) by mouth every 4 (four) hours as needed for Pain. (Patient not taking: Reported on 1/6/2025) 30 tablet 0     No facility-administered encounter medications on file as of 1/7/2025.       Physical Exam:  Vitals:    01/07/25 0836 01/07/25 0839   BP: (!) 142/70 122/79   BP Location: Right arm Right arm   Patient Position: Sitting Sitting   Pulse: 60 62   Resp: 20    Temp: 97.9 °F (36.6 °C)    TempSrc: Oral    SpO2: 99%    Weight: (!) 158.3 kg (349 lb)    Height: 5' 5" (1.651 m)      Constitutional  General Appearance: well nourished, obese AAO x3, NAD  HEENT: multiple soft tissue masses along her scalp, 2 most notable in size (see below); R parotidectomy incision erythematous " with nylon suture removed today (see below), no drainage or signs of cellulitic infection   Eyes: PEERLA, EOMI, normal conjunctivae  Ears: Hearing well at conversation level  Nose: septum midline, no inferior turbinate hypertrophy, no polyps, moist mucosa without erythema or blue hue  OC/OP: dentition moderate, no oral lesions, tongue/FOM/BOT- soft, no leukoplakia/ulcerations/ tenderness  Neck:  incision c/d/I. No erythema, induration, or fluctuance.   no palpable lymph nodes   Thyroid region- no nodules or goiter  Neuro: CN II - XII intact bilaterally, slight R nena weakness.  Cardiovascular: peripheral pulses palpable  Respiratory: non-labored respirations  Psychiatric: oriented to time, place and person, no depression, anxiety or agitation                  Pertinent Data:  FNA 10/9/24: atypical cells present    Pathology:  Right parotid mass, excision:  - Salivary duct cyst (mucus retention cyst).  - Salivary gland parenchyma with areas of reactive fibrosis and chronic sialoadenitis.  - No evidence of malignancy.      Imaging:   I personally reviewed the following images:  CT Max: right sided parotid mass measuring approximately 5.4cm      Assessment/Plan:  Tanya Linda is a 64 y.o. female with no past medical history presents with multiple scalp soft tissue masses (2 most notable in size) and large right parotid mass that has been stable in size now s/p FNA with result of atypical cells. Underwent parotidectomy on 11/4. Path benign.     - Nylon suture removed from parotidectomy incision today; will defer antibiotic ointment/PO at this time   - Continue moisturizer to incision.   - Pain control prn  - F/u 2 weeks  - Patient has decided to continue observation for scalp masses      Ina Subramanian, PGY3  [employer] [facility]  1/7/2025 9:16 AM

## 2025-01-16 DIAGNOSIS — Z91.89 AT HIGH RISK FOR BREAST CANCER: Primary | ICD-10-CM

## 2025-01-16 NOTE — PROGRESS NOTES
Refer to high risk breast clinic per radiologist recommendation. TC score 30.2%. Discussed with patient. Referral sent.

## 2025-01-27 ENCOUNTER — HOSPITAL ENCOUNTER (EMERGENCY)
Facility: HOSPITAL | Age: 65
Discharge: HOME | End: 2025-01-27
Attending: EMERGENCY MEDICINE
Payer: COMMERCIAL

## 2025-01-27 VITALS
TEMPERATURE: 98 F | OXYGEN SATURATION: 98 % | HEART RATE: 58 BPM | SYSTOLIC BLOOD PRESSURE: 169 MMHG | HEIGHT: 65 IN | WEIGHT: 293 LBS | RESPIRATION RATE: 18 BRPM | BODY MASS INDEX: 48.82 KG/M2 | DIASTOLIC BLOOD PRESSURE: 69 MMHG

## 2025-01-27 DIAGNOSIS — M54.31 BILATERAL SCIATICA: Primary | ICD-10-CM

## 2025-01-27 DIAGNOSIS — M54.32 BILATERAL SCIATICA: Primary | ICD-10-CM

## 2025-01-27 LAB
BACTERIA #/AREA URNS AUTO: ABNORMAL /HPF
BILIRUB UR QL STRIP.AUTO: NEGATIVE
CLARITY UR: ABNORMAL
COLOR UR AUTO: ABNORMAL
GLUCOSE UR QL STRIP: NEGATIVE
HGB UR QL STRIP: NEGATIVE
KETONES UR QL STRIP: NEGATIVE
LEUKOCYTE ESTERASE UR QL STRIP: NEGATIVE
NITRITE UR QL STRIP: POSITIVE
PH UR STRIP: 6 [PH]
PROT UR QL STRIP: NEGATIVE
RBC #/AREA URNS AUTO: ABNORMAL /HPF
SP GR UR STRIP.AUTO: 1.01 (ref 1–1.03)
SQUAMOUS #/AREA URNS AUTO: ABNORMAL /HPF
UROBILINOGEN UR STRIP-ACNC: 0.2
WBC #/AREA URNS AUTO: ABNORMAL /HPF

## 2025-01-27 PROCEDURE — 99284 EMERGENCY DEPT VISIT MOD MDM: CPT | Mod: 25

## 2025-01-27 PROCEDURE — 96372 THER/PROPH/DIAG INJ SC/IM: CPT | Performed by: EMERGENCY MEDICINE

## 2025-01-27 PROCEDURE — 81003 URINALYSIS AUTO W/O SCOPE: CPT | Performed by: EMERGENCY MEDICINE

## 2025-01-27 PROCEDURE — 87086 URINE CULTURE/COLONY COUNT: CPT | Performed by: EMERGENCY MEDICINE

## 2025-01-27 PROCEDURE — 63600175 PHARM REV CODE 636 W HCPCS: Performed by: EMERGENCY MEDICINE

## 2025-01-27 RX ORDER — KETOROLAC TROMETHAMINE 30 MG/ML
30 INJECTION, SOLUTION INTRAMUSCULAR; INTRAVENOUS
Status: COMPLETED | OUTPATIENT
Start: 2025-01-27 | End: 2025-01-27

## 2025-01-27 RX ORDER — IBUPROFEN 800 MG/1
800 TABLET ORAL EVERY 8 HOURS PRN
Qty: 30 TABLET | Refills: 0 | Status: SHIPPED | OUTPATIENT
Start: 2025-01-27

## 2025-01-27 RX ORDER — DEXAMETHASONE SODIUM PHOSPHATE 4 MG/ML
8 INJECTION, SOLUTION INTRA-ARTICULAR; INTRALESIONAL; INTRAMUSCULAR; INTRAVENOUS; SOFT TISSUE
Status: COMPLETED | OUTPATIENT
Start: 2025-01-27 | End: 2025-01-27

## 2025-01-27 RX ORDER — METHOCARBAMOL 750 MG/1
1500 TABLET, FILM COATED ORAL EVERY 8 HOURS PRN
Qty: 30 TABLET | Refills: 0 | Status: SHIPPED | OUTPATIENT
Start: 2025-01-27 | End: 2025-02-01

## 2025-01-27 RX ADMIN — DEXAMETHASONE SODIUM PHOSPHATE 8 MG: 4 INJECTION, SOLUTION INTRA-ARTICULAR; INTRALESIONAL; INTRAMUSCULAR; INTRAVENOUS; SOFT TISSUE at 10:01

## 2025-01-27 RX ADMIN — KETOROLAC TROMETHAMINE 30 MG: 30 INJECTION, SOLUTION INTRAMUSCULAR at 10:01

## 2025-01-27 NOTE — Clinical Note
"Tanya"Lata Linda was seen and treated in our emergency department on 1/27/2025.  She may return to work on 01/29/2025.       If you have any questions or concerns, please don't hesitate to call.      Devan Cohen MD"

## 2025-01-27 NOTE — ED PROVIDER NOTES
NAME:  Tanya Linda  CSN:     [id number]  MRN:    [id number]  ADMIT DATE: 1/27/2025        eMERGENCY dEPARTMENT eNCOUnter    CHIEF COMPLAINT    Chief Complaint   Patient presents with    Back Pain     Lower back pain radiating to both legs x 1 day --denies injury or any urinary symptoms        HPI      Tanya Linda is a 64 y.o. female who presents to the ED for low back pain radiating down the legs for the past day.  She denies any trauma.  Patient took a Lortab at home with no relief.          ALLERGIES    Review of patient's allergies indicates:  No Known Allergies    PAST MEDICAL HISTORY  Past Medical History:   Diagnosis Date    Anxiety     Back muscle spasm     Depression     GERD (gastroesophageal reflux disease)     Hypertension     Intertrochanteric fracture of left femur     Morbid obesity     Personal history of colonic polyps 12/02/2021    Dr. Andrey Vizcarra       SURGICAL HISTORY    Past Surgical History:   Procedure Laterality Date    ANTEGRADE INTRAMEDULLARY RODDING OF FEMUR Left 12/25/2022    Procedure: INSERTION, INTRAMEDULLARY JARETH, FEMUR, ANTEGRADE;  Surgeon: Ronnell Olivares MD;  Location: [facility];  Service: Orthopedics;  Laterality: Left;    COLONOSCOPY  12/02/2021    Dr. Andrey Vizcarra    EXCISION OF PAROTID GLAND Right 11/4/2024    Procedure: EXCISION, PAROTID GLAND;  Surgeon: Stu Grady MD;  Location: [facility];  Service: ENT;  Laterality: Right;       SOCIAL HISTORY    Social History     Socioeconomic History    Marital status: Single   Tobacco Use    Smoking status: Never    Smokeless tobacco: Never   Substance and Sexual Activity    Alcohol use: Never    Drug use: Never    Sexual activity: Not Currently     Social Drivers of Health     Financial Resource Strain: Low Risk  (3/19/2023)    Overall Financial Resource Strain (CARDIA)     Difficulty of Paying Living Expenses: Not hard at all   Food Insecurity: No Food Insecurity (3/19/2023)    Hunger Vital Sign     Worried About Running Out of Food  "in the Last Year: Never true     Ran Out of Food in the Last Year: Never true   Transportation Needs: No Transportation Needs (3/19/2023)    PRAPARE - Transportation     Lack of Transportation (Medical): No     Lack of Transportation (Non-Medical): No   Physical Activity: Inactive (3/19/2023)    Exercise Vital Sign     Days of Exercise per Week: 0 days     Minutes of Exercise per Session: 0 min   Stress: No Stress Concern Present (3/19/2023)    [employer] [place] [employer] - Occupational Stress Questionnaire     Feeling of Stress : Only a little   Housing Stability: Low Risk  (3/19/2023)    Housing Stability Vital Sign     Unable to Pay for Housing in the Last Year: No     Number of Places Lived in the Last Year: 1     Unstable Housing in the Last Year: No       FAMILY HISTORY    No family history on file.    REVIEW OF SYSTEMS   ROS  All Systems otherwise negative except as noted in the History of Present Illness.        PHYSICAL EXAM    Reviewed Triage Note  VITAL SIGNS:   ED Triage Vitals [01/27/25 0842]   Encounter Vitals Group      BP (!) 169/69      Systolic BP Percentile       Diastolic BP Percentile       Pulse (!) 58      Resp 18      Temp 97.8 °F (36.6 °C)      Temp Source Oral      SpO2 98 %      Weight (!) 345 lb      Height 5' 5"      Head Circumference       Peak Flow       Pain Score       Pain Loc       Pain Education       Exclude from Growth Chart        Patient Vitals for the past 24 hrs:   BP Temp Temp src Pulse Resp SpO2 Height Weight   01/27/25 0842 (!) 169/69 97.8 °F (36.6 °C) Oral (!) 58 18 98 % 5' 5" (1.651 m) (!) 156.5 kg (345 lb)           Physical Exam    Constitutional:  Well-developed, well-nourished. No acute distress  HENT:  Normocephalic, atraumatic.  Eyes:  EOMI. Conjunctiva normal without discharge.   Neck: Normal range of motion.No stridor. No meningismus.   Respiratory:  No respiratory distress, retractions, or conversational dyspnea. Cardiovascular:  Normal heart " rate. No pitting lower extremity edema.   Musculoskeletal:  No gross deformity or limited range of motion of all major joints.   Integument:  Warm and dry. No rash.  Neurologic:  Normal motor function. No focal deficits noted. Alert and Interactive.  Psychiatric:  Affect normal. Mood normal.         LABS  Pertinent labs reviewed. (See chart for details)   Labs Reviewed   URINALYSIS, REFLEX TO URINE CULTURE - Abnormal       Result Value    Color, UA Straw      Appearance, UA Slightly Cloudy (*)     Specific Gravity, UA 1.015      pH, UA 6.0      Protein, UA Negative      Glucose, UA Negative      Ketones, UA Negative      Blood, UA Negative      Bilirubin, UA Negative      Urobilinogen, UA 0.2      Nitrites, UA Positive (*)     Leukocyte Esterase, UA Negative     URINALYSIS, MICROSCOPIC - Abnormal    Bacteria, UA Many (*)     RBC, UA None Seen      WBC, UA 11-20 (*)     Squamous Epithelial Cells, UA Moderate (*)    CULTURE, URINE         RADIOLOGY    Imaging Results    None         PROCEDURES    Procedures      EKG     Interpreted by ERP:         ED COURSE & MEDICAL DECISION MAKING    Pertinent & Imaging studies reviewed. (See chart for details and specific orders.)        Medications   dexAMETHasone injection 8 mg (has no administration in time range)   ketorolac injection 30 mg (has no administration in time range)          Patient advised NSAIDs and muscle relaxants and rest.  Follow up with Orthopedics if needed       DISPOSITION  Patient discharged in stable condition at No discharge date for patient encounter.      DISCHARGE INSTRUCTIONS & MEDS       Medication List        START taking these medications      methocarbamoL 750 MG Tab  Commonly known as: ROBAXIN  Take 2 tablets (1,500 mg total) by mouth every 8 (eight) hours as needed (back pain).            CHANGE how you take these medications      * ibuprofen 800 MG tablet  Commonly known as: ADVIL,MOTRIN  Take 1 tablet (800 mg total) by mouth every 6 (six)  hours. Alternate with tylenol.  What changed: Another medication with the same name was added. Make sure you understand how and when to take each.     * ibuprofen 800 MG tablet  Commonly known as: ADVIL,MOTRIN  Take 1 tablet (800 mg total) by mouth every 8 (eight) hours as needed for Pain.  What changed: You were already taking a medication with the same name, and this prescription was added. Make sure you understand how and when to take each.           * This list has 2 medication(s) that are the same as other medications prescribed for you. Read the directions carefully, and ask your doctor or other care provider to review them with you.                ASK your doctor about these medications      acetaminophen 325 MG tablet  Commonly known as: TYLENOL  Take 2 tablets (650 mg total) by mouth every 6 (six) hours as needed for Pain. Alternate with ibuprofen     bacitracin 500 unit/gram ointment  Apply three times daily to incision on right neck for 5 days. Then, switch to Aquaphor.     oxybutynin 5 MG Tab  Commonly known as: DITROPAN  Take 1 tablet (5 mg total) by mouth 2 (two) times daily.     triamcinolone acetonide 0.1% 0.1 % cream  Commonly known as: KENALOG  Apply topically 2 (two) times daily.               Where to Get Your Medications        These medications were sent to [facility] - [place] - [address] [place], [place] [address]mann [place]  [address], [place] [address]      Phone: [phone]   ibuprofen 800 MG tablet  methocarbamoL 750 MG Tab           New Prescriptions    IBUPROFEN (ADVIL,MOTRIN) 800 MG TABLET    Take 1 tablet (800 mg total) by mouth every 8 (eight) hours as needed for Pain.    METHOCARBAMOL (ROBAXIN) 750 MG TAB    Take 2 tablets (1,500 mg total) by mouth every 8 (eight) hours as needed (back pain).           FINAL IMPRESSION    1. Bilateral sciatica              Blood Pressure Follow-Up Advised  Patient advised to follow up with PCP within 3-5 days for blood pressure  re-check if blood pressure is equal to or greater than 120/80.         Critical care time spent with this patient (not including separately billable items) was  0 minutes.     DISCLAIMER: This note was prepared with Dragon NaturallySpeaking voice recognition transcription software. Garbled syntax, mangled pronouns, and other bizarre constructions may be attributed to that software system.      Devan Cohen MD  01/27/2025  10:21 AM           Devan Cohen MD  01/27/25 1022

## 2025-01-29 LAB — BACTERIA UR CULT: ABNORMAL

## 2025-03-14 ENCOUNTER — PATIENT OUTREACH (OUTPATIENT)
Facility: CLINIC | Age: 65
End: 2025-03-14
Payer: COMMERCIAL

## 2025-03-14 DIAGNOSIS — Z12.4 CERVICAL CANCER SCREENING: Primary | ICD-10-CM

## 2025-03-14 NOTE — PROGRESS NOTES
Population Health Outreach.    Hepatitis C and HIV Screening Never done per Master.     Health Maintenance Topic(s) Outreach Outcomes & Actions Taken:  Cervical Cancer Screening - Outreach Outcomes & Actions Taken  : External Records Uploaded & Care Team Updated if Applicable  Per Master, Last  PAP/HPV Screening 2015 @[facility]    Was scheduled with Dr. Natarajan, GYN in April 2024 but no record found   3/14/25 Referral sent to [facility] for PAP. Pt. Also willing to complete in PCP office if applicable.      Additional Notes:      Next Primary Care Visit Date: 4/7/2025 Reminded of appointment

## 2025-03-20 ENCOUNTER — TELEPHONE (OUTPATIENT)
Dept: FAMILY MEDICINE | Facility: CLINIC | Age: 65
End: 2025-03-20
Payer: COMMERCIAL

## 2025-04-09 ENCOUNTER — PATIENT MESSAGE (OUTPATIENT)
Facility: CLINIC | Age: 65
End: 2025-04-09
Payer: COMMERCIAL

## 2025-06-10 ENCOUNTER — PATIENT OUTREACH (OUTPATIENT)
Facility: CLINIC | Age: 65
End: 2025-06-10
Payer: COMMERCIAL

## 2025-06-10 NOTE — PROGRESS NOTES
Population Health Outreach.    Non-compliant patient.   Top No Show Rates for this Patient    Department Rate No Show Count   [facility] MAMMOGRAPHY 67% 2 out of 3   [facility] FAMILY MEDICINE 41% 9 out of 22   [facility] MOB ORTHOPAEDICS 33% 1 out of 3   [facility] OTORHINOLARYNGOLOGY 25% 2 out of 8   [facility] XRAY 0% 0 out of 6   All departments 26% 16 out of 62         DUE:      Cervical Cancer Screening     Pneumonia Vaccine  Shingles/Zoster Vaccine  RSV Vaccine                   9/13/24 Patient Cell phone #: [phone] per patient's daughter.   Was scheduled with Dr. Natarajan, GYN in April 2024 -no record.     3/2025 Referral sent to [facility] for PAP-f/u communication sent   Pt. requested to complete PAP in PCP office. She was scheduled  5/9/25 No-showed in office PAP.    Portal Active-message sent.    Speaking Coherently

## 2025-06-10 NOTE — LETTER
AUTHORIZATION FOR RELEASE OF   CONFIDENTIAL INFORMATION    Dear MARION Hernandez,    We are seeing Tanya Linda, date of birth 1960, in the clinic at [facility] FAMILY [facility]. Sarita De Leon FNP is the patient's PCP. Tanya Linda has an outstanding lab/procedure at the time we reviewed her chart. In order to help keep her health information updated, she has authorized us to request the following medical record(s):            ( X )  PAP SMEAR or status of 2025 referral          Please fax records to [facility], [place], Christi D, FNP, [phone]       If you have any questions, please contact [facility] at [phone]             Patient Name: Tanya Linda  : 1960  Patient Phone #: [phone]

## 2025-08-12 ENCOUNTER — PATIENT MESSAGE (OUTPATIENT)
Facility: CLINIC | Age: 65
End: 2025-08-12
Payer: COMMERCIAL

## 2025-08-27 ENCOUNTER — HOSPITAL ENCOUNTER (EMERGENCY)
Facility: HOSPITAL | Age: 65
Discharge: HOME | End: 2025-08-27
Attending: EMERGENCY MEDICINE
Payer: COMMERCIAL

## 2025-08-27 VITALS
WEIGHT: 293 LBS | HEART RATE: 75 BPM | RESPIRATION RATE: 18 BRPM | BODY MASS INDEX: 48.82 KG/M2 | TEMPERATURE: 99 F | HEIGHT: 65 IN | OXYGEN SATURATION: 98 % | SYSTOLIC BLOOD PRESSURE: 172 MMHG | DIASTOLIC BLOOD PRESSURE: 84 MMHG

## 2025-08-27 DIAGNOSIS — M54.50 ACUTE LOW BACK PAIN, UNSPECIFIED BACK PAIN LATERALITY, UNSPECIFIED WHETHER SCIATICA PRESENT: Primary | ICD-10-CM

## 2025-08-27 LAB
BILIRUB UR QL STRIP.AUTO: NEGATIVE
CLARITY UR: CLEAR
COLOR UR AUTO: NORMAL
GLUCOSE UR QL STRIP: NEGATIVE
HGB UR QL STRIP: NEGATIVE
KETONES UR QL STRIP: NEGATIVE
LEUKOCYTE ESTERASE UR QL STRIP: NEGATIVE
NITRITE UR QL STRIP: NEGATIVE
PH UR STRIP: 6 [PH]
PROT UR QL STRIP: NEGATIVE
SP GR UR STRIP.AUTO: 1.01 (ref 1–1.03)
UROBILINOGEN UR STRIP-ACNC: 0.2

## 2025-08-27 PROCEDURE — 81003 URINALYSIS AUTO W/O SCOPE: CPT | Performed by: EMERGENCY MEDICINE

## 2025-08-27 PROCEDURE — 99283 EMERGENCY DEPT VISIT LOW MDM: CPT

## 2025-08-27 RX ORDER — METHOCARBAMOL 750 MG/1
1500 TABLET, FILM COATED ORAL EVERY 8 HOURS PRN
Qty: 30 TABLET | Refills: 0 | Status: SHIPPED | OUTPATIENT
Start: 2025-08-27 | End: 2025-09-01

## (undated) DEVICE — SHEARS HARMONIC CRVD 9 CM

## (undated) DEVICE — SYR 10CC LUER LOCK

## (undated) DEVICE — SUT ETHILON 4-0 PS2 18 BLK

## (undated) DEVICE — ADHESIVE MASTISOL VIAL 48/BX

## (undated) DEVICE — SUT SA85H SILK 2-0

## (undated) DEVICE — ELECTRODE EMG NEEDLE

## (undated) DEVICE — TAPE SILK 3IN

## (undated) DEVICE — ELECTRODE REM POLYHESIVE II

## (undated) DEVICE — CLIPPER BLADE MOD 4406 (CAREF)

## (undated) DEVICE — COVER FULLGUARD SHOE HIGH-TOP

## (undated) DEVICE — SUT 2/0 30IN SILK BLK BRAI

## (undated) DEVICE — TRAY CATH FOL SIL URIMTR 16FR

## (undated) DEVICE — PROBE SIMULATOR KRAFF

## (undated) DEVICE — GLOVE SENSICARE PI GRN 6.5

## (undated) DEVICE — DRSNG POLYSKIN TRNSPAR 4X4.75

## (undated) DEVICE — Device

## (undated) DEVICE — SPONGE GAUZE 16PLY 4X4

## (undated) DEVICE — GLOVE SIGNATURE MICRO LTX 7.5

## (undated) DEVICE — SUT VICRYL PLUS 4-0 PS2 27

## (undated) DEVICE — GLOVE SENSICARE NEOPRENE 6

## (undated) DEVICE — MARKER WRITESITE SKIN CHLRAPRP

## (undated) DEVICE — NDL 27G X 1 1/4

## (undated) DEVICE — HOOK LONE STAR SHRP ELAS 5MM

## (undated) DEVICE — BLADE SURG CARBON STEEL #10

## (undated) DEVICE — KIT PT CARE HANA PROFX SSXT

## (undated) DEVICE — KWIRE FIXATION STRL 3.2X450MM
Type: IMPLANTABLE DEVICE | Site: LEG | Status: NON-FUNCTIONAL
Removed: 2022-12-25

## (undated) DEVICE — SUT 3-0 MONOCRYL PLUS PS-2

## (undated) DEVICE — GLOVE PROTEXIS BLUE LATEX 7.5

## (undated) DEVICE — REAMER MOD BIXCUT 8X48MM STER.

## (undated) DEVICE — SOL NACL IRR 1000ML BTL

## (undated) DEVICE — SUT MCRYL PLUS 4-0 PS2 27IN

## (undated) DEVICE — ADHESIVE DERMABOND ADVANCED

## (undated) DEVICE — DRESSING XEROFORM 5X9IN

## (undated) DEVICE — APPLICATOR CHLORAPREP ORN 26ML

## (undated) DEVICE — SUT VICRYL 2-0 36 CT-1

## (undated) DEVICE — COVER TABLE HVY DTY 60X90IN

## (undated) DEVICE — GOWN POLY REINF BRTH SLV XL

## (undated) DEVICE — SUT MCRYL PLUS 2-0 CT-1 36IN

## (undated) DEVICE — SUT VICRYL BR 1 GEN 27 CT-1

## (undated) DEVICE — CONTAINER SPECIMEN OR STER 4OZ

## (undated) DEVICE — SCREW PROXIMAL LOCK 5X37.5MM
Type: IMPLANTABLE DEVICE | Site: LEG | Status: NON-FUNCTIONAL
Removed: 2022-12-25

## (undated) DEVICE — STAPLER SKIN PROXIMATE WIDE

## (undated) DEVICE — STAPLER SKIN ROTATING HEAD

## (undated) DEVICE — BIT DRILL 4.2MM X 130MM

## (undated) DEVICE — GLOVE SENSICARE NEOPRENE 6.5

## (undated) DEVICE — GUIDE WIRE 3.0X1000MM BALL TIP
Type: IMPLANTABLE DEVICE | Site: LEG | Status: NON-FUNCTIONAL
Removed: 2022-12-25

## (undated) DEVICE — DRAPE INSTR MAGNETIC 10X16IN

## (undated) DEVICE — GLOVE PROTEXIS PI CRM 7

## (undated) DEVICE — MANIFOLD 4 PORT

## (undated) DEVICE — SPONGE KITTNER 1/4X 5/8 L STRL

## (undated) DEVICE — CORD BIPOLAR 12 FOOT

## (undated) DEVICE — DRAIN JACKSON PRATT 10MM

## (undated) DEVICE — DRAPE IOBAN 2 STERI

## (undated) DEVICE — CLOSURE SKIN STERI STRIP 1/2X4

## (undated) DEVICE — DRESSING POLYSKIN II 2X2.75IN

## (undated) DEVICE — PAD CURAD NONADH 3X4IN

## (undated) DEVICE — SUT MCRYL PLUS 3-0 PS2 27IN

## (undated) DEVICE — NDL ECLIPSE SAFETY 18GX1-1/2IN

## (undated) DEVICE — BLADE SURG STAINLESS STEEL #15

## (undated) DEVICE — DRESSING MEDIPORE CLTH 3.5X6IN

## (undated) DEVICE — SOLIDIFIER BOTTLE 1500CC

## (undated) DEVICE — ELECTRODE PATIENT RETURN DISP

## (undated) DEVICE — TRAY SKIN SCRUB WET PREMIUM

## (undated) DEVICE — SUT VICRYL PLUS 3-0 SH 18IN

## (undated) DEVICE — KIT SURGICAL TURNOVER

## (undated) DEVICE — DRAPE C-ARMOR EQUIPMENT COVER